# Patient Record
Sex: FEMALE | Race: BLACK OR AFRICAN AMERICAN | Employment: OTHER | URBAN - METROPOLITAN AREA
[De-identification: names, ages, dates, MRNs, and addresses within clinical notes are randomized per-mention and may not be internally consistent; named-entity substitution may affect disease eponyms.]

---

## 2021-01-27 ENCOUNTER — HOSPITAL ENCOUNTER (INPATIENT)
Age: 81
LOS: 4 days | Discharge: HOME | DRG: 377 | End: 2021-01-31
Attending: EMERGENCY MEDICINE | Admitting: INTERNAL MEDICINE
Payer: COMMERCIAL

## 2021-01-27 ENCOUNTER — APPOINTMENT (OUTPATIENT)
Dept: GENERAL RADIOLOGY | Age: 81
DRG: 377 | End: 2021-01-27
Payer: COMMERCIAL

## 2021-01-27 DIAGNOSIS — D64.9 ANEMIA, UNSPECIFIED TYPE: Primary | ICD-10-CM

## 2021-01-27 DIAGNOSIS — N17.9 AKI (ACUTE KIDNEY INJURY) (HCC): ICD-10-CM

## 2021-01-27 PROBLEM — D62 ACUTE BLOOD LOSS ANEMIA: Status: ACTIVE | Noted: 2021-01-27

## 2021-01-27 LAB
ABO/RH: NORMAL
ALBUMIN SERPL-MCNC: 3.5 G/DL (ref 3.4–5)
ALP BLD-CCNC: 84 U/L (ref 40–129)
ALT SERPL-CCNC: 24 U/L (ref 10–40)
ANION GAP SERPL CALCULATED.3IONS-SCNC: 11 MMOL/L (ref 3–16)
ANTIBODY SCREEN: NORMAL
AST SERPL-CCNC: 27 U/L (ref 15–37)
BASE EXCESS VENOUS: 2 MMOL/L (ref -2–3)
BASOPHILS ABSOLUTE: 0.1 K/UL (ref 0–0.2)
BASOPHILS RELATIVE PERCENT: 0.6 %
BILIRUB SERPL-MCNC: 0.6 MG/DL (ref 0–1)
BILIRUBIN DIRECT: <0.2 MG/DL (ref 0–0.3)
BILIRUBIN, INDIRECT: ABNORMAL MG/DL (ref 0–1)
BLOOD BANK DISPENSE STATUS: NORMAL
BLOOD BANK DISPENSE STATUS: NORMAL
BLOOD BANK PRODUCT CODE: NORMAL
BLOOD BANK PRODUCT CODE: NORMAL
BLOOD SMEAR REVIEW: NORMAL
BPU ID: NORMAL
BPU ID: NORMAL
BUN BLDV-MCNC: 43 MG/DL (ref 7–20)
CALCIUM SERPL-MCNC: 8.9 MG/DL (ref 8.3–10.6)
CARBOXYHEMOGLOBIN: 1.9 % (ref 0–1.5)
CHLORIDE BLD-SCNC: 100 MMOL/L (ref 99–110)
CO2: 25 MMOL/L (ref 21–32)
CREAT SERPL-MCNC: 1.4 MG/DL (ref 0.6–1.2)
DESCRIPTION BLOOD BANK: NORMAL
DESCRIPTION BLOOD BANK: NORMAL
EKG ATRIAL RATE: 234 BPM
EKG DIAGNOSIS: NORMAL
EKG Q-T INTERVAL: 422 MS
EKG QRS DURATION: 74 MS
EKG QTC CALCULATION (BAZETT): 384 MS
EKG R AXIS: 71 DEGREES
EKG T AXIS: -12 DEGREES
EKG VENTRICULAR RATE: 50 BPM
EOSINOPHILS ABSOLUTE: 0 K/UL (ref 0–0.6)
EOSINOPHILS RELATIVE PERCENT: 0.3 %
FERRITIN: 12 NG/ML (ref 15–150)
GFR AFRICAN AMERICAN: 44
GFR NON-AFRICAN AMERICAN: 36
GLUCOSE BLD-MCNC: 183 MG/DL (ref 70–99)
HCO3 VENOUS: 27.6 MMOL/L (ref 24–28)
HCT VFR BLD CALC: 15.4 % (ref 36–48)
HEMOGLOBIN: 4.3 G/DL (ref 12–16)
INR BLD: 3.61 (ref 0.86–1.14)
LACTATE DEHYDROGENASE: 335 U/L (ref 100–190)
LACTIC ACID: 2.5 MMOL/L (ref 0.4–2)
LYMPHOCYTES ABSOLUTE: 1.1 K/UL (ref 1–5.1)
LYMPHOCYTES RELATIVE PERCENT: 12.1 %
MCH RBC QN AUTO: 20.1 PG (ref 26–34)
MCHC RBC AUTO-ENTMCNC: 28.2 G/DL (ref 31–36)
MCV RBC AUTO: 71.2 FL (ref 80–100)
METHEMOGLOBIN VENOUS: 1 % (ref 0–1.5)
MONOCYTES ABSOLUTE: 1 K/UL (ref 0–1.3)
MONOCYTES RELATIVE PERCENT: 10.9 %
NEUTROPHILS ABSOLUTE: 6.9 K/UL (ref 1.7–7.7)
NEUTROPHILS RELATIVE PERCENT: 76.1 %
O2 SAT, VEN: 29 %
PCO2, VEN: 50.2 MMHG (ref 41–51)
PDW BLD-RTO: 21.2 % (ref 12.4–15.4)
PH VENOUS: 7.35 (ref 7.35–7.45)
PLATELET # BLD: 345 K/UL (ref 135–450)
PMV BLD AUTO: 8.1 FL (ref 5–10.5)
PO2, VEN: 24.1 MMHG (ref 25–40)
POTASSIUM REFLEX MAGNESIUM: 4.4 MMOL/L (ref 3.5–5.1)
PRO-BNP: 1113 PG/ML (ref 0–449)
PROTHROMBIN TIME: 42.4 SEC (ref 10–13.2)
RBC # BLD: 2.17 M/UL (ref 4–5.2)
SODIUM BLD-SCNC: 136 MMOL/L (ref 136–145)
TCO2 CALC VENOUS: 29 MMOL/L
TOTAL PROTEIN: 5.6 G/DL (ref 6.4–8.2)
TROPONIN: <0.01 NG/ML
WBC # BLD: 9.1 K/UL (ref 4–11)

## 2021-01-27 PROCEDURE — 83880 ASSAY OF NATRIURETIC PEPTIDE: CPT

## 2021-01-27 PROCEDURE — 86901 BLOOD TYPING SEROLOGIC RH(D): CPT

## 2021-01-27 PROCEDURE — 80076 HEPATIC FUNCTION PANEL: CPT

## 2021-01-27 PROCEDURE — 82803 BLOOD GASES ANY COMBINATION: CPT

## 2021-01-27 PROCEDURE — U0003 INFECTIOUS AGENT DETECTION BY NUCLEIC ACID (DNA OR RNA); SEVERE ACUTE RESPIRATORY SYNDROME CORONAVIRUS 2 (SARS-COV-2) (CORONAVIRUS DISEASE [COVID-19]), AMPLIFIED PROBE TECHNIQUE, MAKING USE OF HIGH THROUGHPUT TECHNOLOGIES AS DESCRIBED BY CMS-2020-01-R: HCPCS

## 2021-01-27 PROCEDURE — 99284 EMERGENCY DEPT VISIT MOD MDM: CPT

## 2021-01-27 PROCEDURE — 85610 PROTHROMBIN TIME: CPT

## 2021-01-27 PROCEDURE — 83615 LACTATE (LD) (LDH) ENZYME: CPT

## 2021-01-27 PROCEDURE — 80048 BASIC METABOLIC PNL TOTAL CA: CPT

## 2021-01-27 PROCEDURE — 86850 RBC ANTIBODY SCREEN: CPT

## 2021-01-27 PROCEDURE — 86923 COMPATIBILITY TEST ELECTRIC: CPT

## 2021-01-27 PROCEDURE — 86900 BLOOD TYPING SEROLOGIC ABO: CPT

## 2021-01-27 PROCEDURE — 82746 ASSAY OF FOLIC ACID SERUM: CPT

## 2021-01-27 PROCEDURE — P9017 PLASMA 1 DONOR FRZ W/IN 8 HR: HCPCS

## 2021-01-27 PROCEDURE — 82607 VITAMIN B-12: CPT

## 2021-01-27 PROCEDURE — 30233N1 TRANSFUSION OF NONAUTOLOGOUS RED BLOOD CELLS INTO PERIPHERAL VEIN, PERCUTANEOUS APPROACH: ICD-10-PCS | Performed by: INTERNAL MEDICINE

## 2021-01-27 PROCEDURE — 83605 ASSAY OF LACTIC ACID: CPT

## 2021-01-27 PROCEDURE — 85025 COMPLETE CBC W/AUTO DIFF WBC: CPT

## 2021-01-27 PROCEDURE — 93005 ELECTROCARDIOGRAM TRACING: CPT | Performed by: STUDENT IN AN ORGANIZED HEALTH CARE EDUCATION/TRAINING PROGRAM

## 2021-01-27 PROCEDURE — 0DB98ZX EXCISION OF DUODENUM, VIA NATURAL OR ARTIFICIAL OPENING ENDOSCOPIC, DIAGNOSTIC: ICD-10-PCS | Performed by: STUDENT IN AN ORGANIZED HEALTH CARE EDUCATION/TRAINING PROGRAM

## 2021-01-27 PROCEDURE — 2060000000 HC ICU INTERMEDIATE R&B

## 2021-01-27 PROCEDURE — 83010 ASSAY OF HAPTOGLOBIN QUANT: CPT

## 2021-01-27 PROCEDURE — 36415 COLL VENOUS BLD VENIPUNCTURE: CPT

## 2021-01-27 PROCEDURE — 84484 ASSAY OF TROPONIN QUANT: CPT

## 2021-01-27 PROCEDURE — 83550 IRON BINDING TEST: CPT

## 2021-01-27 PROCEDURE — 71045 X-RAY EXAM CHEST 1 VIEW: CPT

## 2021-01-27 PROCEDURE — P9016 RBC LEUKOCYTES REDUCED: HCPCS

## 2021-01-27 PROCEDURE — 83540 ASSAY OF IRON: CPT

## 2021-01-27 PROCEDURE — 82728 ASSAY OF FERRITIN: CPT

## 2021-01-27 RX ORDER — DEXTROSE MONOHYDRATE 25 G/50ML
12.5 INJECTION, SOLUTION INTRAVENOUS PRN
Status: DISCONTINUED | OUTPATIENT
Start: 2021-01-27 | End: 2021-01-31 | Stop reason: HOSPADM

## 2021-01-27 RX ORDER — SODIUM CHLORIDE 0.9 % (FLUSH) 0.9 %
10 SYRINGE (ML) INJECTION EVERY 12 HOURS SCHEDULED
Status: DISCONTINUED | OUTPATIENT
Start: 2021-01-28 | End: 2021-01-31 | Stop reason: HOSPADM

## 2021-01-27 RX ORDER — ONDANSETRON 2 MG/ML
4 INJECTION INTRAMUSCULAR; INTRAVENOUS EVERY 6 HOURS PRN
Status: DISCONTINUED | OUTPATIENT
Start: 2021-01-27 | End: 2021-01-31 | Stop reason: HOSPADM

## 2021-01-27 RX ORDER — ACETAMINOPHEN 650 MG/1
650 SUPPOSITORY RECTAL EVERY 6 HOURS PRN
Status: DISCONTINUED | OUTPATIENT
Start: 2021-01-27 | End: 2021-01-31 | Stop reason: HOSPADM

## 2021-01-27 RX ORDER — SODIUM CHLORIDE 9 MG/ML
INJECTION, SOLUTION INTRAVENOUS PRN
Status: DISCONTINUED | OUTPATIENT
Start: 2021-01-27 | End: 2021-01-31 | Stop reason: HOSPADM

## 2021-01-27 RX ORDER — PRAVASTATIN SODIUM 10 MG
10 TABLET ORAL DAILY
Status: DISCONTINUED | OUTPATIENT
Start: 2021-01-28 | End: 2021-01-27

## 2021-01-27 RX ORDER — LOSARTAN POTASSIUM 100 MG/1
100 TABLET ORAL DAILY
COMMUNITY

## 2021-01-27 RX ORDER — FUROSEMIDE 20 MG/1
20 TABLET ORAL 2 TIMES DAILY
Status: ON HOLD | COMMUNITY
End: 2021-01-31 | Stop reason: SDUPTHER

## 2021-01-27 RX ORDER — DILTIAZEM HYDROCHLORIDE 180 MG/1
180 CAPSULE, EXTENDED RELEASE ORAL DAILY
COMMUNITY
End: 2021-04-28 | Stop reason: ALTCHOICE

## 2021-01-27 RX ORDER — ALBUTEROL SULFATE 90 UG/1
2 AEROSOL, METERED RESPIRATORY (INHALATION) EVERY 6 HOURS PRN
COMMUNITY
End: 2021-04-28 | Stop reason: ALTCHOICE

## 2021-01-27 RX ORDER — NICOTINE POLACRILEX 4 MG
15 LOZENGE BUCCAL PRN
Status: DISCONTINUED | OUTPATIENT
Start: 2021-01-27 | End: 2021-01-31 | Stop reason: HOSPADM

## 2021-01-27 RX ORDER — SODIUM CHLORIDE 0.9 % (FLUSH) 0.9 %
10 SYRINGE (ML) INJECTION PRN
Status: DISCONTINUED | OUTPATIENT
Start: 2021-01-27 | End: 2021-01-31 | Stop reason: HOSPADM

## 2021-01-27 RX ORDER — PROMETHAZINE HYDROCHLORIDE 25 MG/1
12.5 TABLET ORAL EVERY 6 HOURS PRN
Status: DISCONTINUED | OUTPATIENT
Start: 2021-01-27 | End: 2021-01-31 | Stop reason: HOSPADM

## 2021-01-27 RX ORDER — CARVEDILOL 6.25 MG/1
6.25 TABLET ORAL 2 TIMES DAILY WITH MEALS
Status: DISCONTINUED | OUTPATIENT
Start: 2021-01-28 | End: 2021-01-31 | Stop reason: HOSPADM

## 2021-01-27 RX ORDER — POLYETHYLENE GLYCOL 3350 17 G/17G
17 POWDER, FOR SOLUTION ORAL DAILY PRN
Status: DISCONTINUED | OUTPATIENT
Start: 2021-01-27 | End: 2021-01-31 | Stop reason: HOSPADM

## 2021-01-27 RX ORDER — LETROZOLE 2.5 MG/1
2.5 TABLET, FILM COATED ORAL DAILY
COMMUNITY
End: 2021-04-28 | Stop reason: ALTCHOICE

## 2021-01-27 RX ORDER — PRAVASTATIN SODIUM 10 MG
10 TABLET ORAL DAILY
Status: DISCONTINUED | OUTPATIENT
Start: 2021-01-28 | End: 2021-01-31 | Stop reason: HOSPADM

## 2021-01-27 RX ORDER — ALBUTEROL SULFATE 90 UG/1
1 AEROSOL, METERED RESPIRATORY (INHALATION) EVERY 6 HOURS PRN
Status: DISCONTINUED | OUTPATIENT
Start: 2021-01-27 | End: 2021-01-28 | Stop reason: DRUGHIGH

## 2021-01-27 RX ORDER — PRAVASTATIN SODIUM 10 MG
10 TABLET ORAL DAILY
COMMUNITY
End: 2021-04-28 | Stop reason: ALTCHOICE

## 2021-01-27 RX ORDER — CARVEDILOL 6.25 MG/1
6.25 TABLET ORAL 2 TIMES DAILY WITH MEALS
Status: DISCONTINUED | OUTPATIENT
Start: 2021-01-28 | End: 2021-01-27

## 2021-01-27 RX ORDER — DEXTROSE MONOHYDRATE 50 MG/ML
100 INJECTION, SOLUTION INTRAVENOUS PRN
Status: DISCONTINUED | OUTPATIENT
Start: 2021-01-27 | End: 2021-01-31 | Stop reason: HOSPADM

## 2021-01-27 RX ORDER — ACETAMINOPHEN 325 MG/1
650 TABLET ORAL EVERY 6 HOURS PRN
Status: DISCONTINUED | OUTPATIENT
Start: 2021-01-27 | End: 2021-01-31 | Stop reason: HOSPADM

## 2021-01-27 RX ORDER — POTASSIUM CHLORIDE 20 MEQ/1
20 TABLET, EXTENDED RELEASE ORAL 2 TIMES DAILY
COMMUNITY
End: 2021-04-28 | Stop reason: ALTCHOICE

## 2021-01-27 RX ORDER — INSULIN LISPRO 100 [IU]/ML
0-12 INJECTION, SOLUTION INTRAVENOUS; SUBCUTANEOUS
Status: DISCONTINUED | OUTPATIENT
Start: 2021-01-28 | End: 2021-01-31 | Stop reason: HOSPADM

## 2021-01-27 RX ORDER — PANTOPRAZOLE SODIUM 40 MG/10ML
40 INJECTION, POWDER, LYOPHILIZED, FOR SOLUTION INTRAVENOUS 2 TIMES DAILY
Status: DISCONTINUED | OUTPATIENT
Start: 2021-01-28 | End: 2021-01-31

## 2021-01-27 RX ORDER — FUROSEMIDE 10 MG/ML
40 INJECTION INTRAMUSCULAR; INTRAVENOUS 2 TIMES DAILY
Status: DISCONTINUED | OUTPATIENT
Start: 2021-01-28 | End: 2021-01-31 | Stop reason: HOSPADM

## 2021-01-27 RX ORDER — CARVEDILOL 12.5 MG/1
12.5 TABLET ORAL 2 TIMES DAILY WITH MEALS
COMMUNITY

## 2021-01-27 RX ORDER — OYSTER SHELL CALCIUM WITH VITAMIN D 500; 200 MG/1; [IU]/1
1 TABLET, FILM COATED ORAL DAILY
COMMUNITY

## 2021-01-27 RX ORDER — CARVEDILOL 12.5 MG/1
12.5 TABLET ORAL 2 TIMES DAILY WITH MEALS
Status: CANCELLED | OUTPATIENT
Start: 2021-01-28

## 2021-01-27 RX ORDER — ACETAMINOPHEN 325 MG/1
650 TABLET ORAL EVERY 6 HOURS PRN
COMMUNITY

## 2021-01-27 RX ORDER — INSULIN LISPRO 100 [IU]/ML
0-6 INJECTION, SOLUTION INTRAVENOUS; SUBCUTANEOUS NIGHTLY
Status: DISCONTINUED | OUTPATIENT
Start: 2021-01-28 | End: 2021-01-31 | Stop reason: HOSPADM

## 2021-01-27 ASSESSMENT — ENCOUNTER SYMPTOMS
ABDOMINAL PAIN: 0
VOMITING: 0
SHORTNESS OF BREATH: 1
NAUSEA: 0
EYE DISCHARGE: 0
ANAL BLEEDING: 0
BLOOD IN STOOL: 0

## 2021-01-28 ENCOUNTER — ANESTHESIA EVENT (OUTPATIENT)
Dept: ENDOSCOPY | Age: 81
DRG: 377 | End: 2021-01-28
Payer: COMMERCIAL

## 2021-01-28 PROBLEM — I48.91 ATRIAL FIBRILLATION (HCC): Status: ACTIVE | Noted: 2021-01-28

## 2021-01-28 PROBLEM — K92.2 GI BLEED: Status: ACTIVE | Noted: 2021-01-28

## 2021-01-28 PROBLEM — I50.31 ACUTE DIASTOLIC HEART FAILURE (HCC): Status: ACTIVE | Noted: 2021-01-28

## 2021-01-28 PROBLEM — N17.9 AKI (ACUTE KIDNEY INJURY) (HCC): Status: ACTIVE | Noted: 2021-01-28

## 2021-01-28 LAB
ANION GAP SERPL CALCULATED.3IONS-SCNC: 9 MMOL/L (ref 3–16)
APTT: 27.2 SEC (ref 24.2–36.2)
BASOPHILS ABSOLUTE: 0 K/UL (ref 0–0.2)
BASOPHILS RELATIVE PERCENT: 0.7 %
BLOOD BANK DISPENSE STATUS: NORMAL
BLOOD BANK DISPENSE STATUS: NORMAL
BLOOD BANK PRODUCT CODE: NORMAL
BLOOD BANK PRODUCT CODE: NORMAL
BPU ID: NORMAL
BPU ID: NORMAL
BUN BLDV-MCNC: 39 MG/DL (ref 7–20)
CALCIUM SERPL-MCNC: 8.8 MG/DL (ref 8.3–10.6)
CHLORIDE BLD-SCNC: 100 MMOL/L (ref 99–110)
CHOLESTEROL, TOTAL: 67 MG/DL (ref 0–199)
CO2: 27 MMOL/L (ref 21–32)
CREAT SERPL-MCNC: 1.2 MG/DL (ref 0.6–1.2)
CREATININE URINE: 112.8 MG/DL (ref 28–259)
DESCRIPTION BLOOD BANK: NORMAL
DESCRIPTION BLOOD BANK: NORMAL
EOSINOPHILS ABSOLUTE: 0 K/UL (ref 0–0.6)
EOSINOPHILS RELATIVE PERCENT: 0.5 %
FOLATE: 12.79 NG/ML (ref 4.78–24.2)
GFR AFRICAN AMERICAN: 52
GFR NON-AFRICAN AMERICAN: 43
GLUCOSE BLD-MCNC: 130 MG/DL (ref 70–99)
GLUCOSE BLD-MCNC: 139 MG/DL (ref 70–99)
GLUCOSE BLD-MCNC: 148 MG/DL (ref 70–99)
GLUCOSE BLD-MCNC: 151 MG/DL (ref 70–99)
GLUCOSE BLD-MCNC: 159 MG/DL (ref 70–99)
GLUCOSE BLD-MCNC: 163 MG/DL (ref 70–99)
HAPTOGLOBIN: 101 MG/DL (ref 30–200)
HCT VFR BLD CALC: 21.8 % (ref 36–48)
HCT VFR BLD CALC: 22.3 % (ref 36–48)
HCT VFR BLD CALC: 22.5 % (ref 36–48)
HCT VFR BLD CALC: 22.6 % (ref 36–48)
HDLC SERPL-MCNC: 25 MG/DL (ref 40–60)
HEMOGLOBIN: 7 G/DL (ref 12–16)
HEMOGLOBIN: 7.1 G/DL (ref 12–16)
HEMOGLOBIN: 7.1 G/DL (ref 12–16)
HEMOGLOBIN: 7.2 G/DL (ref 12–16)
INR BLD: 1.99 (ref 0.86–1.14)
INR BLD: 2.09 (ref 0.86–1.14)
IRON SATURATION: 4 % (ref 15–50)
IRON: 15 UG/DL (ref 37–145)
LACTIC ACID: 1.6 MMOL/L (ref 0.4–2)
LDL CHOLESTEROL CALCULATED: 28 MG/DL
LYMPHOCYTES ABSOLUTE: 1.1 K/UL (ref 1–5.1)
LYMPHOCYTES RELATIVE PERCENT: 14.8 %
MAGNESIUM: 2.5 MG/DL (ref 1.8–2.4)
MCH RBC QN AUTO: 23.6 PG (ref 26–34)
MCHC RBC AUTO-ENTMCNC: 31.9 G/DL (ref 31–36)
MCV RBC AUTO: 74 FL (ref 80–100)
MONOCYTES ABSOLUTE: 0.7 K/UL (ref 0–1.3)
MONOCYTES RELATIVE PERCENT: 10.1 %
NEUTROPHILS ABSOLUTE: 5.3 K/UL (ref 1.7–7.7)
NEUTROPHILS RELATIVE PERCENT: 73.9 %
PDW BLD-RTO: 22.5 % (ref 12.4–15.4)
PERFORMED ON: ABNORMAL
PLATELET # BLD: 271 K/UL (ref 135–450)
PMV BLD AUTO: 8.8 FL (ref 5–10.5)
POTASSIUM SERPL-SCNC: 3.7 MMOL/L (ref 3.5–5.1)
PROTHROMBIN TIME: 23.2 SEC (ref 10–13.2)
PROTHROMBIN TIME: 24.4 SEC (ref 10–13.2)
RBC # BLD: 3.01 M/UL (ref 4–5.2)
SARS-COV-2, PCR: NOT DETECTED
SODIUM BLD-SCNC: 136 MMOL/L (ref 136–145)
TOTAL IRON BINDING CAPACITY: 363 UG/DL (ref 260–445)
TRIGL SERPL-MCNC: 71 MG/DL (ref 0–150)
UREA NITROGEN, UR: 978.6 MG/DL (ref 800–1666)
VITAMIN B-12: 848 PG/ML (ref 211–911)
VLDLC SERPL CALC-MCNC: 14 MG/DL
WBC # BLD: 7.1 K/UL (ref 4–11)

## 2021-01-28 PROCEDURE — 2580000003 HC RX 258: Performed by: STUDENT IN AN ORGANIZED HEALTH CARE EDUCATION/TRAINING PROGRAM

## 2021-01-28 PROCEDURE — C9113 INJ PANTOPRAZOLE SODIUM, VIA: HCPCS | Performed by: STUDENT IN AN ORGANIZED HEALTH CARE EDUCATION/TRAINING PROGRAM

## 2021-01-28 PROCEDURE — 83605 ASSAY OF LACTIC ACID: CPT

## 2021-01-28 PROCEDURE — 80061 LIPID PANEL: CPT

## 2021-01-28 PROCEDURE — P9017 PLASMA 1 DONOR FRZ W/IN 8 HR: HCPCS

## 2021-01-28 PROCEDURE — 2060000000 HC ICU INTERMEDIATE R&B

## 2021-01-28 PROCEDURE — 36415 COLL VENOUS BLD VENIPUNCTURE: CPT

## 2021-01-28 PROCEDURE — 85730 THROMBOPLASTIN TIME PARTIAL: CPT

## 2021-01-28 PROCEDURE — 6360000002 HC RX W HCPCS: Performed by: STUDENT IN AN ORGANIZED HEALTH CARE EDUCATION/TRAINING PROGRAM

## 2021-01-28 PROCEDURE — 6370000000 HC RX 637 (ALT 250 FOR IP): Performed by: STUDENT IN AN ORGANIZED HEALTH CARE EDUCATION/TRAINING PROGRAM

## 2021-01-28 PROCEDURE — 83735 ASSAY OF MAGNESIUM: CPT

## 2021-01-28 PROCEDURE — 85025 COMPLETE CBC W/AUTO DIFF WBC: CPT

## 2021-01-28 PROCEDURE — 2580000003 HC RX 258: Performed by: ANESTHESIOLOGY

## 2021-01-28 PROCEDURE — 80048 BASIC METABOLIC PNL TOTAL CA: CPT

## 2021-01-28 PROCEDURE — P9016 RBC LEUKOCYTES REDUCED: HCPCS

## 2021-01-28 PROCEDURE — 36430 TRANSFUSION BLD/BLD COMPNT: CPT

## 2021-01-28 PROCEDURE — 85014 HEMATOCRIT: CPT

## 2021-01-28 PROCEDURE — 82570 ASSAY OF URINE CREATININE: CPT

## 2021-01-28 PROCEDURE — 85018 HEMOGLOBIN: CPT

## 2021-01-28 PROCEDURE — 84540 ASSAY OF URINE/UREA-N: CPT

## 2021-01-28 PROCEDURE — 85610 PROTHROMBIN TIME: CPT

## 2021-01-28 RX ORDER — ALBUTEROL SULFATE 90 UG/1
2 AEROSOL, METERED RESPIRATORY (INHALATION) EVERY 4 HOURS PRN
Status: DISCONTINUED | OUTPATIENT
Start: 2021-01-28 | End: 2021-01-31 | Stop reason: HOSPADM

## 2021-01-28 RX ORDER — SODIUM CHLORIDE 9 MG/ML
INJECTION, SOLUTION INTRAVENOUS PRN
Status: DISCONTINUED | OUTPATIENT
Start: 2021-01-28 | End: 2021-01-31 | Stop reason: HOSPADM

## 2021-01-28 RX ORDER — ALBUTEROL SULFATE 2.5 MG/3ML
2.5 SOLUTION RESPIRATORY (INHALATION) EVERY 4 HOURS PRN
Status: DISCONTINUED | OUTPATIENT
Start: 2021-01-28 | End: 2021-01-28 | Stop reason: DRUGHIGH

## 2021-01-28 RX ORDER — SODIUM CHLORIDE, SODIUM LACTATE, POTASSIUM CHLORIDE, CALCIUM CHLORIDE 600; 310; 30; 20 MG/100ML; MG/100ML; MG/100ML; MG/100ML
INJECTION, SOLUTION INTRAVENOUS CONTINUOUS
Status: DISCONTINUED | OUTPATIENT
Start: 2021-01-28 | End: 2021-01-30

## 2021-01-28 RX ADMIN — CARVEDILOL 6.25 MG: 6.25 TABLET, FILM COATED ORAL at 16:54

## 2021-01-28 RX ADMIN — PRAVASTATIN SODIUM 10 MG: 10 TABLET ORAL at 07:54

## 2021-01-28 RX ADMIN — CARVEDILOL 6.25 MG: 6.25 TABLET, FILM COATED ORAL at 07:54

## 2021-01-28 RX ADMIN — INSULIN LISPRO 1 UNITS: 100 INJECTION, SOLUTION INTRAVENOUS; SUBCUTANEOUS at 21:31

## 2021-01-28 RX ADMIN — PANTOPRAZOLE SODIUM 40 MG: 40 INJECTION, POWDER, FOR SOLUTION INTRAVENOUS at 20:45

## 2021-01-28 RX ADMIN — Medication 10 ML: at 07:54

## 2021-01-28 RX ADMIN — PANTOPRAZOLE SODIUM 40 MG: 40 INJECTION, POWDER, FOR SOLUTION INTRAVENOUS at 03:11

## 2021-01-28 RX ADMIN — FUROSEMIDE 40 MG: 10 INJECTION, SOLUTION INTRAVENOUS at 07:54

## 2021-01-28 RX ADMIN — PANTOPRAZOLE SODIUM 40 MG: 40 INJECTION, POWDER, FOR SOLUTION INTRAVENOUS at 07:54

## 2021-01-28 RX ADMIN — SODIUM CHLORIDE, POTASSIUM CHLORIDE, SODIUM LACTATE AND CALCIUM CHLORIDE: 600; 310; 30; 20 INJECTION, SOLUTION INTRAVENOUS at 21:31

## 2021-01-28 RX ADMIN — INSULIN LISPRO 1 UNITS: 100 INJECTION, SOLUTION INTRAVENOUS; SUBCUTANEOUS at 02:41

## 2021-01-28 RX ADMIN — SODIUM CHLORIDE, POTASSIUM CHLORIDE, SODIUM LACTATE AND CALCIUM CHLORIDE: 600; 310; 30; 20 INJECTION, SOLUTION INTRAVENOUS at 11:48

## 2021-01-28 ASSESSMENT — ENCOUNTER SYMPTOMS
CONSTIPATION: 0
COUGH: 1
DIARRHEA: 0
ABDOMINAL PAIN: 0
BLOOD IN STOOL: 0
VOMITING: 0
BACK PAIN: 0
NAUSEA: 0
ABDOMINAL DISTENTION: 0

## 2021-01-28 ASSESSMENT — PAIN SCALES - GENERAL: PAINLEVEL_OUTOF10: 0

## 2021-01-28 NOTE — ED NOTES
Critical lab received from Lab staff. Hgb 4.3 and Hct 15.4.  Will inform the treatment team.      Erla Lanes, RN  01/27/21 1958

## 2021-01-28 NOTE — CARE COORDINATION
Case Management Assessment           Initial Evaluation                Date / Time of Evaluation: 1/28/2021 11:09 AM                 Assessment Completed by: Marie Sotelo    Patient Name: Rosa Ware     YOB: 1940  Diagnosis: Acute blood loss anemia [D62]     Date / Time: 1/27/2021  6:36 PM    Patient Admission Status: Inpatient    If patient is discharged prior to next notation, then this note serves as note for discharge by case management. Current PCP: No primary care provider on file. Clinic Patient: No    Chart Reviewed: Yes  Patient/ Family Interviewed: Yes    Initial assessment completed at bedside with: spoke with patient via phone due to COVID rule out    Hospitalization in the last 30 days: No    Emergency Contacts:  Extended Emergency Contact Information  Primary Emergency Contact: [address]  Mobile Phone: [phone]  Relation: Child  Preferred language: English    Advance Directives:   Code Status: Full [address]: No  Agent:   Contact Number:     Copy present: No     In paper Chart: No    Scanned into EMR No    Financial  Payor: BCBS / Plan: [address] [place] [address] / Product Type: *No Product type* /     Pre-cert required for SNF: Yes    Pharmacy  No Pharmacies Listed    Potential assistance Purchasing Medications: Potential Assistance Purchasing Medications: No  Does Patient want to participate in local refill/ meds to beds program?: No    Meds To [employer] Rules:  1. Can ONLY be done Monday- Friday between 8:30am-5pm  2. Prescription(s) must be in pharmacy by 3pm to be filled same day  3. Copy of patient's insurance/ prescription drug card and patient face sheet must be sent along with the prescription(s)  4. Cost of Rx cannot be added to hospital bill. If financial assistance is needed, please contact unit  or ;   or  CANNOT provide pharmacy voucher for patients co-pays 5. Patients can then  the prescription on their way out of the hospital at discharge, or pharmacy can deliver to the bedside if staff is available. (payment due at time of pick-up or delivery - cash, check, or card accepted)     Able to afford home medications/ co-pay costs: Yes    ADLS  Support Systems: Children    PT AM-PAC:   /24  OT AM-PAC:   /24    HOUSING  Home Environment: from home with her 15year old dog  Steps:     Plans to RETURN to current housing: Yes  Barriers to RETURNING to current housing: none per patient    Home Care Information  Currently ACTIVE with [address]: No  Home Care Agency: Not Applicable    Currently ACTIVE with [place] on Aging: No  Passport/ Waiver: No  Passport/ Waiver Services: Not Applicable    Durable Medical Equipment  DME Provider: has prior to admission  Equipment: cane and walker    Home Oxygen and [address] [place] prior to admission: No  [address]: Not Applicable  Other Respiratory Equipment:     Informed of need to bring portable home O2 tank on day of DISCHARGE for nursing to connect prior to leaving: No  Verbalized agreement/Understanding: No  Person to bring portable tank at discharge:     Dialysis  Active with HD/PD prior to admission: No  Nephrologist:     HD Center:  Not Applicable    DISCHARGE PLAN:  Disposition: Home- No Services Needed    Transportation PLAN for discharge: family     Factors facilitating achievement of predicted outcomes: Family support, Cooperative, Pleasant and Has needed Durable Medical Equipment at home    Barriers to discharge: Medication managment Additional Case Management Notes: CM spoke with patient via phone due to COVID rule out. Patient from home with her 15year old dog, she has a walker and cane that she uses at home prior to admission. Patient plans for return home at discharge, wants to discuss Malinda Snell with her daughters, but doesn't feel as though she needs it at this time. Patient to have EGD/ Colonoscopy once INR under 2. The Plan for Transition of Care is related to the following treatment goals of Acute blood loss anemia [D62]    The Patient and/or patient representative Bernadine Noriega and her family were provided with a choice of provider and agrees with the discharge plan Yes    Freedom of choice list was provided with basic dialogue that supports the patient's individualized plan of care/goals and shares the quality data associated with the providers.  Yes    Care Transition patient: No    Khanh Delaney RN  [facility]  Case Management Department  Ph: [phone]   Fax: [phone]

## 2021-01-28 NOTE — ED NOTES
CoVid swab collected and labeled with a lab sticker. Specimen was placed in bag in patient's room and then second bagged.  Swab will be walked to lab      [facility] MARILY BladensburgWOOD, RN  01/27/21 [id number]

## 2021-01-28 NOTE — ED PROVIDER NOTES
[address] RESIDENT NOTE       Date of evaluation: 1/27/2021    Chief Complaint     Shortness of Breath (COVID- on 12/30, started on steroids at that time for SOB and cough, hasn't felt right since, loss of appetite, +weakness)      History of Present Illness     Maureen Reyes is a 79 y.o. female with a PMH of asthma, HTN, DM, HLD who presents with with worsening shortness of breath over the last week. She states that she has had symptoms since the beginning of January and was on steroids with a long taper. She says especially since coming off the taper of the steroids she has felt worsening shortness of breath. He says that her kids have been trying to get her to come to the emergency department for about a week now. She additionally endorses cough not productive of sputum. As well as some loss of appetite and overall weakness. She denies any nausea or emesis. No abdominal pain or leg swelling. Review of Systems     Review of Systems   Constitutional: Negative for fatigue. HENT: Negative for congestion. Eyes: Negative for discharge. Respiratory: Positive for shortness of breath. Cardiovascular: Negative for chest pain and leg swelling. Gastrointestinal: Negative for abdominal pain, anal bleeding, blood in stool, nausea and vomiting. Genitourinary: Negative for dysuria and hematuria. Musculoskeletal: Negative for arthralgias. Neurological: Negative for dizziness. Psychiatric/Behavioral: Negative for agitation. Past Medical, Surgical, Family, and Social History     She has no past medical history on file. She has no past surgical history on file. Her family history is not on file. She reports that she has quit smoking. She has never used smokeless tobacco. She reports previous alcohol use. She reports that she does not use drugs.     Medications     Previous Medications    No medications on file       Allergies She has No Known Allergies. Physical Exam     INITIAL VITALS: BP: (!) 126/53, Temp: 98.2 °F (36.8 °C),  ,  , SpO2: 90 %   Physical Exam  Constitutional:       Appearance: She is well-developed. HENT:      Head: Normocephalic and atraumatic. Neck:      Musculoskeletal: Normal range of motion. Cardiovascular:      Rate and Rhythm: Normal rate and regular rhythm. Heart sounds: Normal heart sounds. Pulmonary:      Effort: Pulmonary effort is normal.      Breath sounds: Normal breath sounds. Abdominal:      General: There is no distension. Palpations: Abdomen is soft. Tenderness: There is no abdominal tenderness. Genitourinary:     Comments: No melena on Exam - a chaperone was present for this portion of the exam  Musculoskeletal: Normal range of motion. Skin:     General: Skin is warm and dry. Neurological:      Mental Status: She is alert and oriented to person, place, and time. DiagnosticResults     EKG   Interpreted in conjunction with emergencydepartment physician No att. providers found  Rhythm: atrial fibrillation - controlled  Rate: normal  Axis: normal  Ectopy: none  Conduction: normal  ST Segments: normal  T Waves:normal  Q Waves: nonspecific  Clinical Impression: A fib  Comparison:  None    RADIOLOGY:  XR CHEST PORTABLE   Final Result   1.  Cardiomegaly and vascular congestion with effusions favor congestive changes          LABS:   Results for orders placed or performed during the hospital encounter of 01/27/21   CBC Auto Differential   Result Value Ref Range    WBC 9.1 4.0 - 11.0 K/uL    RBC 2.17 (L) 4.00 - 5.20 M/uL    Hemoglobin 4.3 (LL) 12.0 - 16.0 g/dL    Hematocrit 15.4 (LL) 36.0 - 48.0 %    MCV 71.2 (L) 80.0 - 100.0 fL    MCH 20.1 (L) 26.0 - 34.0 pg    MCHC 28.2 (L) 31.0 - 36.0 g/dL    RDW 21.2 (H) 12.4 - 15.4 %    Platelets 655 984 - 429 K/uL    MPV 8.1 5.0 - 10.5 fL    Neutrophils % 76.1 %    Lymphocytes % 12.1 %    Monocytes % 10.9 %    Eosinophils % 0.3 % Basophils % 0.6 %    Neutrophils Absolute 6.9 1.7 - 7.7 K/uL    Lymphocytes Absolute 1.1 1.0 - 5.1 K/uL    Monocytes Absolute 1.0 0.0 - 1.3 K/uL    Eosinophils Absolute 0.0 0.0 - 0.6 K/uL    Basophils Absolute 0.1 0.0 - 0.2 K/uL   Basic Metabolic Panel w/ Reflex to MG   Result Value Ref Range    Sodium 136 136 - 145 mmol/L    Potassium reflex Magnesium 4.4 3.5 - 5.1 mmol/L    Chloride 100 99 - 110 mmol/L    CO2 25 21 - 32 mmol/L    Anion Gap 11 3 - 16    Glucose 183 (H) 70 - 99 mg/dL    BUN 43 (H) 7 - 20 mg/dL    CREATININE 1.4 (H) 0.6 - 1.2 mg/dL    GFR Non- 36 (A) >60    GFR  44 (A) >60    Calcium 8.9 8.3 - 10.6 mg/dL   Blood Gas, Venous   Result Value Ref Range    pH, Roberto 7.349 (L) 7.350 - 7.450    pCO2, Roberto 50.2 41.0 - 51.0 mmHg    pO2, Roberto 24.1 (L) 25.0 - 40.0 mmHg    HCO3, Venous 27.6 24.0 - 28.0 mmol/L    Base Excess, Roberto 2.0 -2.0 - 3.0 mmol/L    O2 Sat, Roberto 29 Not established %    Carboxyhemoglobin 1.9 (H) 0.0 - 1.5 %    MetHgb, Roberto 1.0 0.0 - 1.5 %    TC02 (Calc), Roberto 29 mmol/L   Lactic Acid, Plasma   Result Value Ref Range    Lactic Acid 2.5 (H) 0.4 - 2.0 mmol/L   Troponin   Result Value Ref Range    Troponin <0.01 <0.01 ng/mL   Brain Natriuretic Peptide   Result Value Ref Range    Pro-BNP 1,113 (H) 0 - 449 pg/mL   EKG 12 Lead   Result Value Ref Range    Ventricular Rate 50 BPM    Atrial Rate 234 BPM    QRS Duration 74 ms    Q-T Interval 422 ms    QTc Calculation (Bazett) 384 ms    R Axis 71 degrees    T Axis -12 degrees    Diagnosis       EKG performed in ER and to be interpreted by ER physician. Confirmed by MD, ER ([id number]),  Justo Garland ([phone]) on 1/27/2021 7:21:18 PM       ED BEDSIDE ULTRASOUND:  None    RECENT VITALS:  BP: (!) 126/53, Temp: 98.2 °F (36.8 °C),  , , SpO2: 90 %     Procedures     None    ED Course     Nursing Notes, Past Medical Hx, Past Surgical Hx, Social Hx, Allergies, and Family Hx were reviewed.     The patient was given the followingmedications: Orders Placed This Encounter   Medications    0.9 % sodium chloride infusion       CONSULTS:  IP CONSULT TO HOSPITALIST    MEDICAL DECISION MAKING / ASSESSMENT / PLAN     Josee Leonardo is a 79 y.o. female who was hemodynamically stable on presentation but did require 2 L of nasal cannula due to hypoxia on arrival.  The patient's presentation is possible Covid and will be worked up for such as well as other causes of shortness of breath. The patient's hemoglobin came back severely anemic at 4.3. On further examination and questioning the patient denied any hematemesis, hematuria, melena, hematochezia. A rectal exam was performed and showed no melena. The patient was ordered for 2 units of packed red blood cells. The patient's other labs showed a creatinine elevation to 1.4, her baseline is about 0.8. She did have an elevated BNP and a chest x-ray consistent with pulmonary edema. She will be admitted to the hospitalist for symptomatic anemia. This patient was also evaluated by the attending physician. All care plans werediscussed and agreed upon. Clinical Impression     1. Anemia, unspecified type        Disposition     PATIENT REFERRED TO:  No follow-up provider specified.     DISCHARGE MEDICATIONS:  New Prescriptions    No medications on file       DISPOSITION Decision To Transfer 01/27/2021 08:30:41 PM        Keisha Miller MD  Resident  01/27/21 2032

## 2021-01-28 NOTE — CONSULTS
[address] [facility]  GI Consultation      Patient: Nadia Ortiz  : 1940       Date:  2021    Subjective:       Requesting physician: Mati Ariza  Reason for consult: Hg 4.3, on xarelto, dark stools, recent steroid use, hx of colon polyps and previous lower GI bleed    History of Present Illness  78 y/o F with PMH of afib (on xarelto), HF, DM, asthma, pulm HTN (not oxygen dependent), HTN, HLD, EDDY on cpap, breast cancer s/p lumpectomy/radiation, colon polyps, obesity who presents with 2 weeks of fatigue and feeling unwell.      She notes about 2 weeks ago she had a cough for which she was prescribed a steroid taper for possible asthma exacerbation. Since she started taking the steroids she has been feeling unwell. She endorses fatigue, decreased appetite, intermittent confusion/inability to concentrate. Denies SOB, cough, orthopnea, PND, edema, palpitations. She notes regular stool but they have been dark \"chocolate coloured\". Denied NSAID use. Previous c-scope in 2016 w/ non-cancerous polyps. In ED she was noted to be hemodynamically stable, not tachycardic, hypoxic requiring 2L NC. Lab work significant for an MANDY, Hgb 4.3 (baseline 12), elevated pro-BNP. CXR w/ vascular congestion and effusion. EKG w/ afib, bradycardia and low-voltage. Patient admitted for further management.    COVID r/o    LFTs normal    Yes Melena/ no [place] emesis/ unsure Hematochezia/no Hematemesis  Hg POA 4.3 s/p 3 units, BL Hg unsure  MCV low at 71  WBC and PLT normal  Iron studies pending: ferritin low at 12, iron TIBC pending  LD high at 335, haptoglobin pending  Felt Weakness/fatigue/SOB  No liver disease  No UC/CD/Colon Ca    Denies NSAID use, takes xarelto · Colonoscopy was performed by Dr. Nahomy Mujica of gastroenterology on 6/22/15. Findings included 3 separate 3-6 mm sessile polyps in the cecum and ascending colon which were not removed due to elevated INR in the setting of lower GI bleeding. Blood was seen only in the left colon. There was also secondarily of bleeding a vascular polypoid lesion in the descending colon at 35 cm which was stopped with 3 Endo clips being placed above and below and over the lesion. The area was marked with a total of 1 mL of SPOT on both opposing walls around the lesion. Colonoscopy showed moderately severe left-sided diverticulosis and small hemorrhoids. Admission Meds  No current facility-administered medications on file prior to encounter.       Current Outpatient Medications on File Prior to Encounter   Medication Sig Dispense Refill    acetaminophen (TYLENOL) 325 MG tablet Take 650 mg by mouth as needed for Pain      albuterol sulfate HFA (VENTOLIN HFA) 108 (90 Base) MCG/ACT inhaler Inhale 2 puffs into the lungs every 6 hours as needed for Wheezing      calcium-vitamin D (OSCAL-500) 500-200 MG-UNIT per tablet Take 1 tablet by mouth daily      carvedilol (COREG) 6.25 MG tablet Take 6.25 mg by mouth 2 times daily (with meals)      dilTIAZem (DILACOR XR) 180 MG extended release capsule Take 180 mg by mouth daily      furosemide (LASIX) 20 MG tablet Take 20 mg by mouth 2 times daily      insulin lispro protamine & lispro (HUMALOG MIX) (75-25) 100 UNIT per ML SUSP injection vial Inject into the skin INJECT 14 UNITS IN THE MORNING AND 10 UNITS IN THE EVENING      losartan (COZAAR) 25 MG tablet Take 25 mg by mouth 2 times daily      potassium chloride (KLOR-CON M) 20 MEQ extended release tablet Take 20 mEq by mouth 2 times daily      pravastatin (PRAVACHOL) 10 MG tablet Take 10 mg by mouth daily      rivaroxaban (XARELTO) 20 MG TABS tablet Take 20 mg by mouth daily  letrozole (FEMARA) 2.5 MG tablet Take 2.5 mg by mouth daily          Allergies  No Known Allergies   Social   Social History     Tobacco Use    Smoking status: Former Smoker    Smokeless tobacco: Never Used   Substance Use Topics    Alcohol use: Not Currently           Physical Exam    /68   Pulse 74   Temp 98.2 °F (36.8 °C)   Resp 18   Ht 5' 4\" (1.626 m)   Wt 218 lb 7.6 oz (99.1 kg)   SpO2 98%   BMI 37.50 kg/m²   General appearance: alert, cooperative, no distress, appears stated age  Anicteric, No Jaundice  Head: Normocephalic, without obvious abnormality  Lungs: clear to auscultation bilaterally, Normal Effort  Heart: regular rate and rhythm, normal S1 and S2, no murmurs or rubs  Abdomen: abnormal findings:  obese. Mild generalized abd tenderness  Extremities: atraumatic, no cyanosis or edema  Skin: warm and dry  Neuro: intact  AAOX3      Data Review:    Recent Labs     01/27/21 1936 01/28/21 0319 01/28/21  0624   WBC 9.1 7.1  --    HGB 4.3* 7.1* 7.2*   HCT 15.4* 22.3* 22.5*   MCV 71.2* 74.0*  --     271  --      Recent Labs     01/27/21 1936 01/28/21 0319    136   K 4.4 3.7    100   CO2 25 27   BUN 43* 39*   CREATININE 1.4* 1.2     Recent Labs     01/27/21 1936   AST 27   ALT 24   BILIDIR <0.2   BILITOT 0.6   ALKPHOS 84       Recent Labs     01/27/21 1935 01/28/21  0624   PROTIME 42.4* 24.4*   INR 3.61* 2.09*       Assessment:     Acute blood loss anemia,microcytic  Suspected GI Bleed  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 Will give 1 u FFP and check INR and if less than 2 will proceed to EGD. Thank you for the opportunity to participate [facility].     Alicia Licona MD PGY-1

## 2021-01-28 NOTE — PROGRESS NOTES
Clinical Pharmacy Progress Note  Medication History     Admit Date: 01/27/21    List of of current medications patient is taking is complete. Home Medication list in EPIC updated to reflect changes noted below. Source of information: Patient, patient's daughter    Changes made to medication list:   Other notes:   Medication list up to date according to patient. Complete Home Medication List:  Current Outpatient Medications on File Prior to Encounter   Medication Sig    acetaminophen (TYLENOL) 325 MG tablet Take 650 mg by mouth as needed for Pain    albuterol sulfate HFA (VENTOLIN HFA) 108 (90 Base) MCG/ACT inhaler Inhale 2 puffs into the lungs every 6 hours as needed for Wheezing    calcium-vitamin D (OSCAL-500) 500-200 MG-UNIT per tablet Take 1 tablet by mouth daily    carvedilol (COREG) 6.25 MG tablet Take 6.25 mg by mouth 2 times daily (with meals)    dilTIAZem (DILACOR XR) 180 MG extended release capsule Take 180 mg by mouth daily    furosemide (LASIX) 20 MG tablet Take 20 mg by mouth 2 times daily    insulin lispro protamine & lispro (HUMALOG MIX) (75-25) 100 UNIT per ML SUSP injection vial Inject into the skin INJECT 14 UNITS IN THE MORNING AND 10 UNITS IN THE EVENING    losartan (COZAAR) 25 MG tablet Take 25 mg by mouth 2 times daily    potassium chloride (KLOR-CON M) 20 MEQ extended release tablet Take 20 mEq by mouth 2 times daily    pravastatin (PRAVACHOL) 10 MG tablet Take 10 mg by mouth daily    rivaroxaban (XARELTO) 20 MG TABS tablet Take 20 mg by mouth daily    letrozole (FEMARA) 2.5 MG tablet Take 2.5 mg by mouth daily       Please call with questions!     [facility] [id number]  1/27/2021 10:58 PM

## 2021-01-28 NOTE — ED NOTES
Mint Green, Lavender, Blue, Yellow/Orange blood tubes collected and sent to lab.    VBG syringe collected and sent to lab  Maritza Arriaza on Ice collected and sent to lab     LINN Adler  01/27/21 1939

## 2021-01-28 NOTE — PROGRESS NOTES
Internal Medicine  PGY 1  History & Physical      CC fatigue     History Obtained From:  Patient, daughter      Interval HX:    Cuco Royal. Pt seen at bedside. She feels tired this AM. Reports dark stools since tapering off steroids sometime in the middle of January. Pt endorses some abdominal pain. She denies fevers, chills, chest pain, SOB, N/V. She lost her appetite during the same time as the dark stools began as well. HISTORY OF PRESENT ILLNESS:    78 y/o F with PMH of afib (on xarelto), HF, DM, asthma, pulm HTN (not oxygen dependent), HTN, HLD, EDDY on cpap, breast cancer s/p lumpectomy/radiation, colon polyps, obesity who presents with 2 weeks of fatigue and feeling unwell. She notes about 2 weeks ago she had a cough for which she was prescribed a steroid taper for possible asthma exacerbation. Since she started taking the steroids she has been feeling unwell. She endorses fatigue, decreased appetite, intermittent confusion/inability to concentrate. Denies SOB, cough, orthopnea, PND, edema, palpitations. She notes regular stool but they have been dark \"chocolate coloured\". Denied NSAID use. Previous c-scope in 2016 w/ non-cancerous polyps. In ED she was noted to be hemodynamically stable, not tachycardic, hypoxic requiring 2L NC. Lab work significant for an MANDY, Hgb 4.3 (baseline 12), elevated pro-BNP. CXR w/ vascular congestion and effusion. EKG w/ afib, bradycardia and low-voltage. Patient admitted for further management. Past Medical History:    afib (on xarelto), HF, DM, asthma, pulm HTN (not oxygen dependent), HTN, HLD, EDDY on cpap, breast cancer s/p lumpectomy/radiation, colon polyps, obesity     Past Surgical History:    History reviewed. No pertinent surgical history.   breast cancer s/p lumpectomy/radiation,    Medications Priorto Admission:    Medications Prior to Admission: acetaminophen (TYLENOL) 325 MG tablet, Take 650 mg by mouth as needed for Pain albuterol sulfate HFA (VENTOLIN HFA) 108 (90 Base) MCG/ACT inhaler, Inhale 2 puffs into the lungs every 6 hours as needed for Wheezing  calcium-vitamin D (OSCAL-500) 500-200 MG-UNIT per tablet, Take 1 tablet by mouth daily  carvedilol (COREG) 6.25 MG tablet, Take 6.25 mg by mouth 2 times daily (with meals)  dilTIAZem (DILACOR XR) 180 MG extended release capsule, Take 180 mg by mouth daily  furosemide (LASIX) 20 MG tablet, Take 20 mg by mouth 2 times daily  insulin lispro protamine & lispro (HUMALOG MIX) (75-25) 100 UNIT per ML SUSP injection vial, Inject into the skin INJECT 14 UNITS IN THE MORNING AND 10 UNITS IN THE EVENING  losartan (COZAAR) 25 MG tablet, Take 25 mg by mouth 2 times daily  potassium chloride (KLOR-CON M) 20 MEQ extended release tablet, Take 20 mEq by mouth 2 times daily  pravastatin (PRAVACHOL) 10 MG tablet, Take 10 mg by mouth daily  rivaroxaban (XARELTO) 20 MG TABS tablet, Take 20 mg by mouth daily  letrozole (FEMARA) 2.5 MG tablet, Take 2.5 mg by mouth daily    Allergies:  Patient has no known allergies. Social History:   · TOBACCO:   reports that she has quit smoking. She has never used smokeless tobacco.  · ETOH:   reports previous alcohol use. · DRUGS : denies   · Patient currently lives alone at home w/ dog   ·   Family History:   History reviewed. No pertinent family history. ROS: A 10 point review of systems was conducted, significant findings as noted in HPI. Physical Exam  Constitutional:       General: She is not in acute distress. Appearance: She is obese. She is not ill-appearing. HENT:      Head: Normocephalic and atraumatic. Cardiovascular:      Rate and Rhythm: Bradycardia present. Rhythm irregular. Heart sounds: Murmur present. Comments: No JVD  Pulmonary:      Effort: Pulmonary effort is normal. No respiratory distress. Breath sounds: Rhonchi (expiratory ) present. No wheezing.       Comments: On 2L NC, desats to 88% on RA at rest   Abdominal: General: There is no distension. Palpations: Abdomen is soft. Tenderness: There is no abdominal tenderness. Musculoskeletal:         General: No tenderness. Right lower leg: No edema. Left lower leg: No edema. Skin:     General: Skin is dry. Comments: cold   Neurological:      General: No focal deficit present. Mental Status: She is alert and oriented to person, place, and time. Physical exam:       Vitals:    01/28/21 1200   BP: 124/78   Pulse: 84   Resp: 18   Temp: 97.7 °F (36.5 °C)   SpO2: 95%       DATA:    Labs:  CBC:   Recent Labs     01/27/21 1936 01/28/21 0319 01/28/21 0624   WBC 9.1 7.1  --    HGB 4.3* 7.1* 7.2*   HCT 15.4* 22.3* 22.5*    271  --        BMP:   Recent Labs     01/27/21 1936 01/28/21 0319    136   K 4.4 3.7    100   CO2 25 27   BUN 43* 39*   CREATININE 1.4* 1.2   GLUCOSE 183* 130*     LFT's:   Recent Labs     01/27/21 1936   AST 27   ALT 24   BILITOT 0.6   ALKPHOS 84     Troponin:   Recent Labs     01/27/21 1936   TROPONINI <0.01     BNP:No results for input(s): BNP in the last 72 hours. ABGs: No results for input(s): PHART, YLQ5NHW, PO2ART in the last 72 hours. INR:   Recent Labs     01/27/21 1935 01/28/21  0624 01/28/21  1144   INR 3.61* 2.09* 1.99*       U/A:No results for input(s): NITRITE, COLORU, PHUR, LABCAST, WBCUA, RBCUA, MUCUS, TRICHOMONAS, YEAST, BACTERIA, CLARITYU, SPECGRAV, LEUKOCYTESUR, UROBILINOGEN, BILIRUBINUR, BLOODU, GLUCOSEU, AMORPHOUS in the last 72 hours. Invalid input(s): KETONESU    XR CHEST PORTABLE   Final Result   1.  Cardiomegaly and vascular congestion with effusions favor congestive changes          ASSESSMENT AND PLAN:    Acute blood loss anemia, hypochromic microcytic - likely 2/2 GI bleed in setting of xaerlto use and likely chronic underlying DAKOTA Fatigue and dark coloured stool x 2 weeks, recently completed steroids. Hemodynamically stable. Hg 4.3 on admit (baseline 12). Last colonoscopy 8/16 with noncancerous polyps. She did have a colonoscopy in 2015 secondary to a GI bleed which required Endo clipping of vascular polypoid lesion in the sigmoid colon. She has sessile polyps in the cecum and descending colon. Also noted was left-sided severe diverticulosis and small hemorrhoids. - transfuse 3U RBC, post H&H 7.2  - INR 2.09, will give her FFP and repeat INR 1.99. GI will take her for scope this afternoon  - 2 large bore IVs ordered  - protonix gtt  - H/H q6, transfuse <7   - fecal occult pending   - holding xaerlto   - GI consulted  - iron studies pending, ferritin 12- consider IV iron pending results   - haptoglobin pending,     Acute hypoxia - likely 2/2 cardiogenic pulm edema and severe anemia   Pt requiring 2L NC to maintain sat >95%, desats to 88% on RA at rest. Pro-BNP 1113. CXR w/ cardiomegaly, vascular congestion, effusions R>L. - covid pending  - blood transfusion as above   - lasix 40 mg IV BID  - low probability for PE     Acute CHF exacerbation - 2/2 acute anemia   EGK w/ afib, bradycardia, low voltage. Trop negative. Hg 4.3. CXR w/ cardiomegaly, vascular congestion, effusions R>L. Last echo 4/17 revealed EF 60-65%. Right atrium and ventricle mildly dilated. Mild to mod MR  - echo pending, previous w/ mod MR and normal systolic function   - no signs of R sided HF  - lasix 40 mg IV BID   - cont coreg 6.25 mg BID  - TSH and T4 pending   - strict I/o, daily weight     MANDY - likely pre-renal 2/2 anemia, possibly from systolic HF  Cr 1.4 on admit (baseline 0.8).  BUN/Cr >20   - Urine studies pending; Cr and urea as pt on diuretics    - blood transfusion as above   - monitor Cr closely as pt on lasix     Lactic acidosis 2/2 decreased perfusion - resolved  LA 2.5 on admit.   - repeat lactate 1.6, will discontinue trend - transfuse blood as above     Permanent Afib, rate controlled  - holding xarlto, chadsvac score high   - holding home diltiazem in setting of bradycardia     DMT2, controlled   HgA1c 6.2% (9/2020)   - per daughter pt on lispro 14U in AM and 10U PM  - MDSS  - NPO @ midnight, glu q6    Asthma, not in exacerbation   - albuterol prn       Pulm HTN 2/2 DHF - no baseline oxygen requirement. Consider home oxygen eval before discharge. HTN - controlled. Cont lasix and coreg. Holding losartan in setting of MANDY. HLD - cont pravastatin   EDDY on CPAP - home cpap   Obesity due to excess calories - complicating assessment and treatment. Placing patient at risk for multiple co-morbidities as well as early death and contributing to the patient's presentation.  on weight loss when appropriate. Will discuss with attending physician Dr. Maryjane Rubinstein.     Code Status: Full code  FEN: Cardiac/2g Na diet   PPX: SCD, protonix drip  DISPO: ALLEGRA Watson DO PGY-1  1/28/2021,  1:45 PM

## 2021-01-28 NOTE — PROGRESS NOTES
RESPIRATORY THERAPY ASSESSMENT    Name:  [facility]  Medical Record Number:  [id number]  Age: 79 y.o. Gender: female  : 1940  Today's Date:  2021  Room:  [id number]    Assessment     Is the patient being admitted for a COPD or Asthma exacerbation? No   (If yes the patient will be seen every 4 hours for the first 24 hours and then reassessed)    Patient Admission Diagnosis      Allergies  No Known Allergies    Minimum Predicted Vital Capacity:     748          Actual Vital Capacity:      n/a              Pulmonary History:former smoker  Home Oxygen Therapy:  room air  Home Respiratory Therapy:Albuterol   Current Respiratory Therapy:  mdi albuterol prn          Respiratory Severity Index(RSI)   Patients with orders for inhalation medications, oxygen, or any therapeutic treatment modality will be placed on Respiratory Protocol. They will be assessed with the first treatment and at least every 72 hours thereafter. The following severity scale will be used to determine frequency of treatment intervention. Smoking History: Pulmonary Disease or Smoking History, Greater than 15 pack year = 2    Social History  Social History     Tobacco Use    Smoking status: Former Smoker    Smokeless tobacco: Never Used   Substance Use Topics    Alcohol use: Not Currently    Drug use: Never       Recent Surgical History: None = 0  Past Surgical History  History reviewed. No pertinent surgical history.     Level of Consciousness: Alert, Oriented, and Cooperative = 0    Level of Activity: Walking with assistance = 1    Respiratory Pattern: Regular Pattern; RR 8-20 = 0    Breath Sounds: Clear = 0    Sputum   ,  ,    Cough: Strong, spontaneous, non-productive = 0    Vital Signs   /67   Pulse 69   Temp 98.2 °F (36.8 °C) (Oral)   Resp 18   Ht 5' 4\" (1.626 m)   Wt 218 lb 7.6 oz (99.1 kg)   SpO2 100%   BMI 37.50 kg/m² SPO2 (COPD values may differ): 90-91% on room air or greater than 92% on FiO2 24- 28% = 1    Peak Flow (asthma only): not applicable = 0    RSI: 0-4 = See once and convert to home regimen or discontinue        Plan       Goals: medication delivery and improve oxygenation    Patient/caregiver was educated on the proper method of use for Respiratory Care Devices:  Yes      Level of patient/caregiver understanding able to:   ? Verbalize understanding   ? Demonstrate understanding       ? Teach back        ? Needs reinforcement       ? No available caregiver               ? Other:     Response to education:  Excellent     Is patient being placed on Home Treatment Regimen? Yes     Does the patient have everything they need prior to discharge? Yes     Comments: admits with anemia    Plan of Care: mdi albuterol prn    Electronically signed by Jess Ball RCP on 1/28/2021 at 2:41 AM    Respiratory Protocol Guidelines     1. Assessment and treatment by Respiratory Therapy will be initiated for medication and therapeutic interventions upon initiation of aerosolized medication. 2. Physician will be contacted for respiratory rate (RR) greater than 35 breaths per minute. Therapy will be held for heart rate (HR) greater than 140 beats per minute, pending direction from physician. 3. Bronchodilators will be administered via Metered Dose Inhaler (MDI) with spacer when the following criteria are met:  a. Alert and cooperative     b. HR < 140 bpm  c. RR < 30 bpm                d. Can demonstrate a 23 second inspiratory hold  4. Bronchodilators will be administered via Hand Held Nebulizer RODOLFO [facility]) to patients when ANY of the following criteria are met  a. Incognizant or uncooperative          b. Patients treated with HHN at Home        c.  Unable to demonstrate proper use of MDI with spacer     d. RR > 30 bpm

## 2021-01-28 NOTE — PROGRESS NOTES
Pt denied noticing bleeding with urine or stool previous to admission, upon using bathroom in room after patient wiped this RN noticed toilet paper to be bloody

## 2021-01-28 NOTE — PROGRESS NOTES
Pts Daughter Gatito Licona updated, all questions answered. Pt A&Ox4. Upx1. Voiding and passing gas. VSS. Rm air. No skin issues. Tolerating PO/IV fluids, poor food intake. Denies pain. Increased frequency of rounds. Safety maintained.      Electronically signed by Nicole Ayala RN on 1/28/2021 at 6:06 PM

## 2021-01-28 NOTE — ED PROVIDER NOTES
ED Attending Attestation Note     Date of evaluation: 1/27/2021    This patient was seen by the resident. I have seen and examined the patient, agree with the workup, evaluation, management and diagnosis. The care plan has been discussed. I have reviewed the ECG and concur with the resident's interpretation. My assessment reveals 69-year-old female presenting with dyspnea from home, new anemia. She denies any hemoptysis hematemesis or melena. On exam she is well-appearing in no acute distress, with normal work of breathing and clear lungs. Her abdomen is soft without rebound or guarding. Tonya Wolff MD  01/27/21 9205

## 2021-01-28 NOTE — PROGRESS NOTES
4 Eyes Admission Assessment     I agree as the admission nurse that 2 RN's have performed a thorough Head to Toe Skin Assessment on the patient. ALL assessment sites listed below have been assessed on admission. Areas assessed by both nurses: gwen and corinne  [x]   Head, Face, and Ears   [x]   Shoulders, Back, and Chest  [x]   Arms, Elbows, and Hands   [x]   Coccyx, Sacrum, and Ischum  [x]   Legs, Feet, and Heels        Does the Patient have Skin Breakdown?   No         Genaro Prevention initiated:  No   Wound Care Orders initiated:  No      [facility] nurse consulted for Pressure Injury (Stage 3,4, Unstageable, DTI, NWPT, and Complex wounds):  No      Nurse 1 eSignature: Electronically signed by Brooke Sheehan RN on 1/28/21 at 3:01 AM EST    **SHARE this note so that the co-signing nurse is able to place an eSignature**    Nurse 2 eSignature: Electronically signed by August Call RN on 1/28/21 at 3:48 AM EST

## 2021-01-28 NOTE — PLAN OF CARE
Problem: Falls - Risk of:  Goal: Will remain free from falls  Description: Will remain free from falls  Outcome: Ongoing     Problem:  Activity:  Goal: Fatigue will decrease  Description: Fatigue will decrease  Outcome: Ongoing  Goal: Ability to tolerate increased activity will improve  Description: Ability to tolerate increased activity will improve  Outcome: Ongoing

## 2021-01-28 NOTE — H&P
Internal Medicine  PGY 1  History & Physical      CC fatigue     History Obtained From:  Patient, daughter        HISTORY OF PRESENT ILLNESS:    78 y/o F with PMH of afib (on xarelto), HF, DM, asthma, pulm HTN (not oxygen dependent), HTN, HLD, EDDY on cpap, breast cancer s/p lumpectomy/radiation, colon polyps, obesity who presents with 2 weeks of fatigue and feeling unwell. She notes about 2 weeks ago she had a cough for which she was prescribed a steroid taper for possible asthma exacerbation. Since she started taking the steroids she has been feeling unwell. She endorses fatigue, decreased appetite, intermittent confusion/inability to concentrate. Denies SOB, cough, orthopnea, PND, edema, palpitations. She notes regular stool but they have been dark \"chocolate coloured\". Denied NSAID use. Previous c-scope in 2016 w/ non-cancerous polyps. In ED she was noted to be hemodynamically stable, not tachycardic, hypoxic requiring 2L NC. Lab work significant for an MANDY, Hgb 4.3 (baseline 12), elevated pro-BNP. CXR w/ vascular congestion and effusion. EKG w/ afib, bradycardia and low-voltage. Patient admitted for further management. Past Medical History:    afib (on xarelto), HF, DM, asthma, pulm HTN (not oxygen dependent), HTN, HLD, EDDY on cpap, breast cancer s/p lumpectomy/radiation, colon polyps, obesity     Past Surgical History:    History reviewed. No pertinent surgical history.   breast cancer s/p lumpectomy/radiation,    Medications Priorto Admission:    Medications Prior to Admission: acetaminophen (TYLENOL) 325 MG tablet, Take 650 mg by mouth as needed for Pain  albuterol sulfate HFA (VENTOLIN HFA) 108 (90 Base) MCG/ACT inhaler, Inhale 2 puffs into the lungs every 6 hours as needed for Wheezing  calcium-vitamin D (OSCAL-500) 500-200 MG-UNIT per tablet, Take 1 tablet by mouth daily  carvedilol (COREG) 6.25 MG tablet, Take 6.25 mg by mouth 2 times daily (with meals) dilTIAZem (DILACOR XR) 180 MG extended release capsule, Take 180 mg by mouth daily  furosemide (LASIX) 20 MG tablet, Take 20 mg by mouth 2 times daily  insulin lispro protamine & lispro (HUMALOG MIX) (75-25) 100 UNIT per ML SUSP injection vial, Inject into the skin INJECT 14 UNITS IN THE MORNING AND 10 UNITS IN THE EVENING  losartan (COZAAR) 25 MG tablet, Take 25 mg by mouth 2 times daily  potassium chloride (KLOR-CON M) 20 MEQ extended release tablet, Take 20 mEq by mouth 2 times daily  pravastatin (PRAVACHOL) 10 MG tablet, Take 10 mg by mouth daily  rivaroxaban (XARELTO) 20 MG TABS tablet, Take 20 mg by mouth daily  letrozole (FEMARA) 2.5 MG tablet, Take 2.5 mg by mouth daily    Allergies:  Patient has no known allergies. Social History:   · TOBACCO:   reports that she has quit smoking. She has never used smokeless tobacco.  · ETOH:   reports previous alcohol use. · DRUGS : denies   · Patient currently lives alone at home w/ dog   ·   Family History:   History reviewed. No pertinent family history. Review of Systems   Constitutional: Positive for fatigue. Negative for fever. Respiratory: Positive for cough. Cardiovascular: Negative for chest pain, palpitations and leg swelling. Gastrointestinal: Negative for abdominal distention, abdominal pain, blood in stool (reports dark choclate colour stools), constipation, diarrhea, nausea and vomiting. Genitourinary: Negative for dysuria. Musculoskeletal: Negative for back pain and joint swelling. Neurological: Negative for light-headedness and headaches. ROS: A 10 point review of systems was conducted, significant findings as noted in HPI. Physical Exam  Constitutional:       Appearance: She is obese. HENT:      Head: Normocephalic and atraumatic. Cardiovascular:      Rate and Rhythm: Bradycardia present. Rhythm irregular. Heart sounds: Murmur present.       Comments: No JVD  Pulmonary: Effort: Pulmonary effort is normal. No respiratory distress. Breath sounds: Rhonchi (expiratory ) present. No wheezing. Comments: On 2L NC, desats to 88% on RA at rest   Abdominal:      General: There is no distension. Palpations: Abdomen is soft. Tenderness: There is no abdominal tenderness. Musculoskeletal:         General: No tenderness. Right lower leg: No edema. Left lower leg: No edema. Skin:     General: Skin is dry. Comments: cold   Neurological:      General: No focal deficit present. Mental Status: She is alert and oriented to person, place, and time. Physical exam:       Vitals:    01/28/21 0202   BP: 129/67   Pulse: 69   Resp: 18   Temp: 98.2 °F (36.8 °C)   SpO2: 100%       DATA:    Labs:  CBC:   Recent Labs     01/27/21 1936   WBC 9.1   HGB 4.3*   HCT 15.4*          BMP:   Recent Labs     01/27/21 1936      K 4.4      CO2 25   BUN 43*   CREATININE 1.4*   GLUCOSE 183*     LFT's:   Recent Labs     01/27/21 1936   AST 27   ALT 24   BILITOT 0.6   ALKPHOS 84     Troponin:   Recent Labs     01/27/21 1936   TROPONINI <0.01     BNP:No results for input(s): BNP in the last 72 hours. ABGs: No results for input(s): PHART, AEI5VGW, PO2ART in the last 72 hours. INR:   Recent Labs     01/27/21 1935   INR 3.61*       U/A:No results for input(s): NITRITE, COLORU, PHUR, LABCAST, WBCUA, RBCUA, MUCUS, TRICHOMONAS, YEAST, BACTERIA, CLARITYU, SPECGRAV, LEUKOCYTESUR, UROBILINOGEN, BILIRUBINUR, BLOODU, GLUCOSEU, AMORPHOUS in the last 72 hours. Invalid input(s): KETONESU    XR CHEST PORTABLE   Final Result   1.  Cardiomegaly and vascular congestion with effusions favor congestive changes          ASSESSMENT AND PLAN:    Acute blood loss anemia, hypochromic microcytic - likely 2/2 GI bleed in setting of xaerlto use and likely chronic underlying DAKOTA Fatigue and dark coloured stool x 2 weeks, recently completed steroids. Hemodynamically stable. Hg 4.3 on admit (baseline 12). - transfuse 3U RBC  - H/H q6, transfuse <7   - fecal occult pending   - protonix 40 mg IV BID  - holding xaerlto   - consider GI consult pending stool occult, NPO @ midnight for possible GI intervention in AM   - iron studies pending, ferritin low - consider IV iron pending results   - haptoglobin pending, LDH high     Acute hypoxia - likely 2/2 cardiogenic pulm edema and severe anemia   Pt requiring 2L NC to maintain sat >95%, desats to 88% on RA at rest. Pro-BNP 1113. CXR w/ cardiomegaly, vascular congestion, effusions R>L. - covid pending  - blood transfusion as above   - lasix 40 mg IV BID  - low probability for PE     Acute on chronic systolic HF exacerbation - 2/2 acute anemia   EGK w/ afib, bradycardia, low voltage. Trop negative. Hg 4.3. CXR w/ cardiomegaly, vascular congestion, effusions R>L. - echo pending, previous w/ mod MR and normal systolic function   - no signs of R sided HF  - lasix 40 mg IV BID   - cont coreg 6.25 mg BID  - TSH and T4 pending   - strict I/o, daily weight     MANDY - likely pre-renal 2/2 anemia, possibly from systolic HF  Cr 1.4 on admit (baseline 0.8). BUN/Cr >20   - Urine studies pending; Cr and urea as pt on diuretics    - blood transfusion as above   - monitor Cr closely as pt on lasix     Lactic acidosis 2/2 decreased perfusion   LA 2.5 on admit.   - repeat lactate pending    - transfuse blood as above     Permanent Afib, rate controlled  - holding xarlto, chadsvac score high   - holding home diltiazem in setting of bradycardia     DMT2, controlled   HgA1c 6.2% (9/2020)   - per daughter pt on lispro 14U in AM and 10U PM  - MDSS  - NPO @ midnight, glu q6    Asthma, not in exacerbation   - albuterol prn       Pulm HTN 2/2 DHF - no baseline oxygen requirement. Consider home oxygen eval before discharge. HTN - controlled. Cont lasix and coreg. Holding losartan in setting of MANDY. HLD - cont pravastatin   EDDY on CPAP - home cpap   Obesity due to excess calories - complicating assessment and treatment. Placing patient at risk for multiple co-morbidities as well as early death and contributing to the patient's presentation.  on weight loss when appropriate. Will discuss with attending physician Dr. Rosendo Mccarthy     Code Status: Full code  FEN: Cardiac/2g Na diet   PPX: SCD  DISPO: PCU    Emely Hopson MD PGY-1  1/28/2021,  3:56 AM    I saw the patient independently from the resident . I discussed the care with the resident. I personally reviewed the HPI, PH, FH, SH, ROS and medications. I repeated pertinent portions of the examination and reviewed the relevant imaging and laboratory data. I agree with the findings, assessment and plan as documented. addition to: Patient is 80-year-old female with history of A. fib on Xarelto admitted for acute GI bleed suspected lower patient was endorsed, history of colon cancer maternally. Anemia panel obtained prior to transfusion.

## 2021-01-29 ENCOUNTER — ANESTHESIA (OUTPATIENT)
Dept: ENDOSCOPY | Age: 81
DRG: 377 | End: 2021-01-29
Payer: COMMERCIAL

## 2021-01-29 VITALS
RESPIRATION RATE: 36 BRPM | OXYGEN SATURATION: 100 % | SYSTOLIC BLOOD PRESSURE: 109 MMHG | DIASTOLIC BLOOD PRESSURE: 54 MMHG | TEMPERATURE: 96.8 F

## 2021-01-29 LAB
ANION GAP SERPL CALCULATED.3IONS-SCNC: 8 MMOL/L (ref 3–16)
BASOPHILS ABSOLUTE: 0 K/UL (ref 0–0.2)
BASOPHILS RELATIVE PERCENT: 0.6 %
BUN BLDV-MCNC: 26 MG/DL (ref 7–20)
CALCIUM SERPL-MCNC: 8.7 MG/DL (ref 8.3–10.6)
CHLORIDE BLD-SCNC: 107 MMOL/L (ref 99–110)
CO2: 29 MMOL/L (ref 21–32)
CREAT SERPL-MCNC: 1 MG/DL (ref 0.6–1.2)
EOSINOPHILS ABSOLUTE: 0.1 K/UL (ref 0–0.6)
EOSINOPHILS RELATIVE PERCENT: 1.4 %
GFR AFRICAN AMERICAN: >60
GFR NON-AFRICAN AMERICAN: 53
GLUCOSE BLD-MCNC: 124 MG/DL (ref 70–99)
GLUCOSE BLD-MCNC: 152 MG/DL (ref 70–99)
GLUCOSE BLD-MCNC: 156 MG/DL (ref 70–99)
GLUCOSE BLD-MCNC: 163 MG/DL (ref 70–99)
HCT VFR BLD CALC: 22.4 % (ref 36–48)
HCT VFR BLD CALC: 22.6 % (ref 36–48)
HCT VFR BLD CALC: 24.6 % (ref 36–48)
HEMOGLOBIN: 7 G/DL (ref 12–16)
HEMOGLOBIN: 7.1 G/DL (ref 12–16)
HEMOGLOBIN: 7.6 G/DL (ref 12–16)
INR BLD: 1.51 (ref 0.86–1.14)
LV EF: 55 %
LVEF MODALITY: NORMAL
LYMPHOCYTES ABSOLUTE: 1.3 K/UL (ref 1–5.1)
LYMPHOCYTES RELATIVE PERCENT: 17.7 %
MAGNESIUM: 2.3 MG/DL (ref 1.8–2.4)
MCH RBC QN AUTO: 23.3 PG (ref 26–34)
MCHC RBC AUTO-ENTMCNC: 31.5 G/DL (ref 31–36)
MCV RBC AUTO: 73.8 FL (ref 80–100)
MONOCYTES ABSOLUTE: 0.9 K/UL (ref 0–1.3)
MONOCYTES RELATIVE PERCENT: 11.8 %
NEUTROPHILS ABSOLUTE: 5 K/UL (ref 1.7–7.7)
NEUTROPHILS RELATIVE PERCENT: 68.5 %
PDW BLD-RTO: 22.3 % (ref 12.4–15.4)
PERFORMED ON: ABNORMAL
PLATELET # BLD: 233 K/UL (ref 135–450)
PMV BLD AUTO: 8 FL (ref 5–10.5)
POTASSIUM SERPL-SCNC: 3.3 MMOL/L (ref 3.5–5.1)
PROTHROMBIN TIME: 17.6 SEC (ref 10–13.2)
RBC # BLD: 3.04 M/UL (ref 4–5.2)
SODIUM BLD-SCNC: 144 MMOL/L (ref 136–145)
WBC # BLD: 7.3 K/UL (ref 4–11)

## 2021-01-29 PROCEDURE — 85018 HEMOGLOBIN: CPT

## 2021-01-29 PROCEDURE — 80048 BASIC METABOLIC PNL TOTAL CA: CPT

## 2021-01-29 PROCEDURE — 85610 PROTHROMBIN TIME: CPT

## 2021-01-29 PROCEDURE — 6360000002 HC RX W HCPCS: Performed by: STUDENT IN AN ORGANIZED HEALTH CARE EDUCATION/TRAINING PROGRAM

## 2021-01-29 PROCEDURE — 83735 ASSAY OF MAGNESIUM: CPT

## 2021-01-29 PROCEDURE — 36415 COLL VENOUS BLD VENIPUNCTURE: CPT

## 2021-01-29 PROCEDURE — 3609012400 HC EGD TRANSORAL BIOPSY SINGLE/MULTIPLE: Performed by: INTERNAL MEDICINE

## 2021-01-29 PROCEDURE — 2700000000 HC OXYGEN THERAPY PER DAY

## 2021-01-29 PROCEDURE — 0DB78ZX EXCISION OF STOMACH, PYLORUS, VIA NATURAL OR ARTIFICIAL OPENING ENDOSCOPIC, DIAGNOSTIC: ICD-10-PCS | Performed by: INTERNAL MEDICINE

## 2021-01-29 PROCEDURE — 2709999900 HC NON-CHARGEABLE SUPPLY: Performed by: INTERNAL MEDICINE

## 2021-01-29 PROCEDURE — 7100000010 HC PHASE II RECOVERY - FIRST 15 MIN: Performed by: INTERNAL MEDICINE

## 2021-01-29 PROCEDURE — 85014 HEMATOCRIT: CPT

## 2021-01-29 PROCEDURE — 6370000000 HC RX 637 (ALT 250 FOR IP): Performed by: STUDENT IN AN ORGANIZED HEALTH CARE EDUCATION/TRAINING PROGRAM

## 2021-01-29 PROCEDURE — C9113 INJ PANTOPRAZOLE SODIUM, VIA: HCPCS | Performed by: STUDENT IN AN ORGANIZED HEALTH CARE EDUCATION/TRAINING PROGRAM

## 2021-01-29 PROCEDURE — 6360000002 HC RX W HCPCS: Performed by: NURSE ANESTHETIST, CERTIFIED REGISTERED

## 2021-01-29 PROCEDURE — 3700000001 HC ADD 15 MINUTES (ANESTHESIA): Performed by: INTERNAL MEDICINE

## 2021-01-29 PROCEDURE — 88342 IMHCHEM/IMCYTCHM 1ST ANTB: CPT

## 2021-01-29 PROCEDURE — 94660 CPAP INITIATION&MGMT: CPT

## 2021-01-29 PROCEDURE — 2580000003 HC RX 258: Performed by: INTERNAL MEDICINE

## 2021-01-29 PROCEDURE — 88305 TISSUE EXAM BY PATHOLOGIST: CPT

## 2021-01-29 PROCEDURE — 7100000011 HC PHASE II RECOVERY - ADDTL 15 MIN: Performed by: INTERNAL MEDICINE

## 2021-01-29 PROCEDURE — 2580000003 HC RX 258: Performed by: STUDENT IN AN ORGANIZED HEALTH CARE EDUCATION/TRAINING PROGRAM

## 2021-01-29 PROCEDURE — 85025 COMPLETE CBC W/AUTO DIFF WBC: CPT

## 2021-01-29 PROCEDURE — 93306 TTE W/DOPPLER COMPLETE: CPT

## 2021-01-29 PROCEDURE — 2580000003 HC RX 258: Performed by: ANESTHESIOLOGY

## 2021-01-29 PROCEDURE — 2060000000 HC ICU INTERMEDIATE R&B

## 2021-01-29 PROCEDURE — 3700000000 HC ANESTHESIA ATTENDED CARE: Performed by: INTERNAL MEDICINE

## 2021-01-29 RX ORDER — FERROUS SULFATE 325(65) MG
325 TABLET ORAL
Status: DISCONTINUED | OUTPATIENT
Start: 2021-01-29 | End: 2021-01-30

## 2021-01-29 RX ORDER — POTASSIUM CHLORIDE 7.45 MG/ML
10 INJECTION INTRAVENOUS
Status: COMPLETED | OUTPATIENT
Start: 2021-01-29 | End: 2021-01-29

## 2021-01-29 RX ORDER — PROPOFOL 10 MG/ML
INJECTION, EMULSION INTRAVENOUS PRN
Status: DISCONTINUED | OUTPATIENT
Start: 2021-01-29 | End: 2021-01-29 | Stop reason: SDUPTHER

## 2021-01-29 RX ORDER — LIDOCAINE HYDROCHLORIDE 20 MG/ML
INJECTION, SOLUTION INTRAVENOUS PRN
Status: DISCONTINUED | OUTPATIENT
Start: 2021-01-29 | End: 2021-01-29 | Stop reason: SDUPTHER

## 2021-01-29 RX ORDER — SODIUM CHLORIDE 9 MG/ML
INJECTION, SOLUTION INTRAVENOUS CONTINUOUS
Status: DISCONTINUED | OUTPATIENT
Start: 2021-01-29 | End: 2021-01-30

## 2021-01-29 RX ADMIN — INSULIN LISPRO 1 UNITS: 100 INJECTION, SOLUTION INTRAVENOUS; SUBCUTANEOUS at 21:23

## 2021-01-29 RX ADMIN — POTASSIUM CHLORIDE 10 MEQ: 10 INJECTION, SOLUTION INTRAVENOUS at 10:49

## 2021-01-29 RX ADMIN — PROPOFOL 30 MG: 10 INJECTION, EMULSION INTRAVENOUS at 09:46

## 2021-01-29 RX ADMIN — INSULIN LISPRO 2 UNITS: 100 INJECTION, SOLUTION INTRAVENOUS; SUBCUTANEOUS at 18:20

## 2021-01-29 RX ADMIN — POTASSIUM CHLORIDE 10 MEQ: 10 INJECTION, SOLUTION INTRAVENOUS at 11:50

## 2021-01-29 RX ADMIN — PROPOFOL 30 MG: 10 INJECTION, EMULSION INTRAVENOUS at 09:51

## 2021-01-29 RX ADMIN — Medication 10 ML: at 20:13

## 2021-01-29 RX ADMIN — CARVEDILOL 6.25 MG: 6.25 TABLET, FILM COATED ORAL at 18:21

## 2021-01-29 RX ADMIN — LIDOCAINE HYDROCHLORIDE 100 MG: 20 INJECTION, SOLUTION INTRAVENOUS at 09:43

## 2021-01-29 RX ADMIN — SODIUM CHLORIDE, POTASSIUM CHLORIDE, SODIUM LACTATE AND CALCIUM CHLORIDE: 600; 310; 30; 20 INJECTION, SOLUTION INTRAVENOUS at 04:01

## 2021-01-29 RX ADMIN — PANTOPRAZOLE SODIUM 40 MG: 40 INJECTION, POWDER, FOR SOLUTION INTRAVENOUS at 20:13

## 2021-01-29 RX ADMIN — POTASSIUM CHLORIDE 10 MEQ: 10 INJECTION, SOLUTION INTRAVENOUS at 09:40

## 2021-01-29 RX ADMIN — POTASSIUM CHLORIDE 10 MEQ: 10 INJECTION, SOLUTION INTRAVENOUS at 13:32

## 2021-01-29 RX ADMIN — SODIUM CHLORIDE: 9 INJECTION, SOLUTION INTRAVENOUS at 08:37

## 2021-01-29 RX ADMIN — PROPOFOL 50 MG: 10 INJECTION, EMULSION INTRAVENOUS at 09:43

## 2021-01-29 RX ADMIN — INSULIN LISPRO 2 UNITS: 100 INJECTION, SOLUTION INTRAVENOUS; SUBCUTANEOUS at 12:29

## 2021-01-29 RX ADMIN — PANTOPRAZOLE SODIUM 40 MG: 40 INJECTION, POWDER, FOR SOLUTION INTRAVENOUS at 11:34

## 2021-01-29 RX ADMIN — FERROUS SULFATE TAB 325 MG (65 MG ELEMENTAL FE) 325 MG: 325 (65 FE) TAB at 12:31

## 2021-01-29 ASSESSMENT — PULMONARY FUNCTION TESTS
PIF_VALUE: 0

## 2021-01-29 ASSESSMENT — PAIN SCALES - GENERAL: PAINLEVEL_OUTOF10: 0

## 2021-01-29 NOTE — H&P
Pre-operative History and Physical    Patient: Kedar Martinez  : 1940     History Obtained From:  Patient and EHR    HISTORY OF PRESENT ILLNESS:    The patient is a 79 y.o. female who presents for an EGD for suspected GI bleed. She takes Xarelto but denies aspirin or NSAIDs. Past Medical History:    History reviewed. No pertinent past medical history. Past Surgical History:    History reviewed. No pertinent surgical history. Medications Prior to Admission:   No current facility-administered medications on file prior to encounter. Current Outpatient Medications on File Prior to Encounter   Medication Sig Dispense Refill    acetaminophen (TYLENOL) 325 MG tablet Take 650 mg by mouth as needed for Pain      albuterol sulfate HFA (VENTOLIN HFA) 108 (90 Base) MCG/ACT inhaler Inhale 2 puffs into the lungs every 6 hours as needed for Wheezing      calcium-vitamin D (OSCAL-500) 500-200 MG-UNIT per tablet Take 1 tablet by mouth daily      carvedilol (COREG) 6.25 MG tablet Take 6.25 mg by mouth 2 times daily (with meals)      dilTIAZem (DILACOR XR) 180 MG extended release capsule Take 180 mg by mouth daily      furosemide (LASIX) 20 MG tablet Take 20 mg by mouth 2 times daily      insulin lispro protamine & lispro (HUMALOG MIX) (75-25) 100 UNIT per ML SUSP injection vial Inject into the skin INJECT 14 UNITS IN THE MORNING AND 10 UNITS IN THE EVENING      losartan (COZAAR) 25 MG tablet Take 25 mg by mouth 2 times daily      potassium chloride (KLOR-CON M) 20 MEQ extended release tablet Take 20 mEq by mouth 2 times daily      pravastatin (PRAVACHOL) 10 MG tablet Take 10 mg by mouth daily      rivaroxaban (XARELTO) 20 MG TABS tablet Take 20 mg by mouth daily      letrozole (FEMARA) 2.5 MG tablet Take 2.5 mg by mouth daily       Allergies:  Patient has no known allergies. History of allergic reaction to anesthesia:  No    Social History:    Former smoker, no EtOH    Family History: History reviewed. No pertinent family history. PHYSICAL EXAM:      BP (!) 147/68   Pulse 78   Temp 99.1 °F (37.3 °C) (Oral)   Resp 17   Ht 5' 4\" (1.626 m)   Wt 218 lb 7.6 oz (99.1 kg)   SpO2 100%   BMI 37.50 kg/m²  I        Heart:  No m/r/g +s1/s2 RRR    Lungs:  CTA bilaterally    Abdomen:  Soft, nontender, non distended; +bs    ASA Grade:  ASA 3 - Patient with moderate systemic disease with functional limitations    Mallampati Class:  Class I: Soft palate, uvula, fauces, pillars visible  _____X_____  Class II: Soft palate, uvula, fauces visible  __________   Class III: Soft palate, base of uvula visible  __________  Class IV: Hard palate only visible   __________      ASSESSMENT AND PLAN:    1. Patient is a 79 y.o. female here for EGD with anesthesia  2. Procedure options, risks and benefits reviewed with patient. We specifically discussed that risks include, but are not limited to infection, bleeding, perforation, death, and missed lesions. Patient expresses understanding.

## 2021-01-29 NOTE — PROGRESS NOTES
[address] [place] [address]  GI Progress Note        Maddie Thorpe is a 79 y.o. female patient. 1. Anemia, unspecified type        Admit Date: 2021    Subjective:       No acute vents overnight. INR at 1.51. Hg stable at 7.1. Afeb overnight, other vitals stable. Scheduled Meds:   potassium chloride  10 mEq Intravenous Q1H    sodium chloride flush  10 mL Intravenous 2 times per day    [Held by provider] furosemide  40 mg Intravenous BID    insulin lispro  0-12 Units Subcutaneous TID WC    insulin lispro  0-6 Units Subcutaneous Nightly    pantoprazole  40 mg Intravenous BID    carvedilol  6.25 mg Oral BID WC    pravastatin  10 mg Oral Daily       Continuous Infusions:   sodium chloride 50 mL/hr at 21 0837    sodium chloride      lactated ringers 100 mL/hr at 21 0401    sodium chloride      dextrose      sodium chloride         PRN Meds:  albuterol sulfate HFA, sodium chloride, sodium chloride, sodium chloride flush, promethazine **OR** ondansetron, polyethylene glycol, acetaminophen **OR** acetaminophen, perflutren lipid microspheres, glucose, dextrose, glucagon (rDNA), dextrose, sodium chloride      Objective:       Patient Vitals for the past 24 hrs:   BP Temp Temp src Pulse Resp SpO2   21 0401 (!) 147/68 99.1 °F (37.3 °C) Oral 78 17 100 %   21 0106      98 %   21 0105     18    21 0010 (!) 149/68 99.1 °F (37.3 °C) Oral 79 17 90 %   21 2044 (!) 147/75 99.2 °F (37.3 °C) Oral 82 18 90 %   21 1652 125/78 97.7 °F (36.5 °C)  80 18 93 %   21 1200 124/78 97.7 °F (36.5 °C)  84 18 95 %   21 1132 (!) 126/57 97.2 °F (36.2 °C)  84 18 94 %       Exam:  VITALS:  BP (!) 147/68   Pulse 78   Temp 99.1 °F (37.3 °C) (Oral)   Resp 17   Ht 5' 4\" (1.626 m)   Wt 218 lb 7.6 oz (99.1 kg)   SpO2 100%   BMI 37.50 kg/m²   TEMPERATURE:  Current - Temp: 99.1 °F (37.3 °C);  Max - Temp  Av.3 °F (36.8 °C)  Min: 97.2 °F (36.2 °C)  Max: 99.2 °F (37.3 °C)    NAD  General appearance: alert, appears stated age, cooperative and no distress  Head: Normocephalic, without obvious abnormality, atraumatic  Neck: supple, symmetrical, trachea midline and thyroid not enlarged, symmetric, no tenderness/mass/nodules  CVS:  RRR, Nl s1s2  Lungs CTA Bilaterally, normal effort  Abdomen: soft, non-tender; bowel sounds normal; no masses,  no organomegaly  AAOx3, No asterixis or encephalopathy  Extremities: No edema. Recent Labs     01/27/21 1936 01/28/21 0319 01/28/21  0319 01/28/21  1636 01/28/21  2141 01/29/21  0405   WBC 9.1 7.1  --   --   --  7.3   HGB 4.3* 7.1*   < > 7.1* 7.0* 7.1*   HCT 15.4* 22.3*   < > 22.6* 21.8* 22.4*   MCV 71.2* 74.0*  --   --   --  73.8*    271  --   --   --  233    < > = values in this interval not displayed. Recent Labs     01/27/21 1936 01/28/21  0319 01/29/21  0405    136 144   K 4.4 3.7 3.3*    100 107   CO2 25 27 29   BUN 43* 39* 26*   CREATININE 1.4* 1.2 1.0     Recent Labs     01/27/21 1936   AST 27   ALT 24   BILIDIR <0.2   BILITOT 0.6   ALKPHOS 84     No results for input(s): LIPASE, AMYLASE in the last 72 hours.   Recent Labs     01/27/21  1936 01/28/21  0624 01/28/21  1144 01/29/21  0405   PROT 5.6*  --   --   --    INR  --  2.09* 1.99* 1.51*       Assessment:       Acute blood loss anemia,microcytic  Suspected GI Bleed  Suspected DAKOTA    Recommendations:       -Although Hg appears stable, I would still recommend  EGD and colonoscopy to rule out any potential sources of bleeding.  -2 Large bore IVs (18 G)  -- H/H q6, transfuse <7   -Hold anticoagulation  -Protonix gtt    Katey Soto MD PGY-1  1/29/2021  9:09 AM

## 2021-01-29 NOTE — CARE COORDINATION
Case Management Assessment           Daily Note                 Date/ Time of Note: 1/29/2021 4:02 PM         Note completed by: Gurinder Lewis    Patient Name: Ronn Saeed  YOB: 1940    Diagnosis:Acute blood loss anemia [D62]  Patient Admission Status: Inpatient    Date of Admission:1/27/2021  6:36 PM Length of Stay: 2 GLOS:      Current Plan of Care: EGD/ Colonoscopy today  ________________________________________________________________________________________  PT AM-PAC:   / 24 per last evaluation on: not ordered    OT AM-PAC:   / 24 per last evaluation on: not ordered    DME Needs for discharge:   ________________________________________________________________________________________  Discharge Plan: Home    Tentative discharge date: 01/30/21     Current barriers to discharge: medical clearance for DC    Referrals completed: Not Applicable    Resources/ information provided: Not indicated at this time  ________________________________________________________________________________________  Case Management Notes: CM continues to follow for discharge planning and needs. Patient down for EGD/ Colonoscopy today, noted ulcer and plan to hold anticoags for 2 weeks with plan for OP follow up EGD to assess healing. Patient plans for return home at NH and denies needs from CM at this time. America Stokes and her family were provided with choice of provider; she and her family are in agreement with the discharge plan.     Care Transition Patient: Arianne Lewis RN  [facility]  Case Management Department  Ph: [phone]  Fax: [phone]

## 2021-01-29 NOTE — ANESTHESIA POSTPROCEDURE EVALUATION
Department of Anesthesiology  Postprocedure Note    Patient: Maddie Thorpe  MRN: [id number]  YOB: 1940  Date of evaluation: 1/29/2021  Time:  12:04 PM     Procedure Summary     Date: 01/29/21 Room / Location: [facility]    Anesthesia Start: 6234 Anesthesia Stop: 1001    Procedure: EGD BIOPSY (N/A ) Diagnosis: (GI Bleed)    Surgeons: Koko George MD Responsible Provider: Ira Robledo MD    Anesthesia Type: MAC ASA Status: 3          Anesthesia Type: MAC    Lito Phase I: Lito Score: 10    Lito Phase II: Lito Score: 10    Last vitals: Reviewed and per EMR flowsheets.        Anesthesia Post Evaluation    Patient participation: complete - patient participated  Level of consciousness: awake  Airway patency: patent  Nausea & Vomiting: no nausea and no vomiting  Complications: no  Cardiovascular status: hemodynamically stable  Respiratory status: acceptable  Hydration status: stable

## 2021-01-29 NOTE — PROGRESS NOTES
Internal Medicine  PGY 1  Progress Note      CC fatigue     History Obtained From:  Patient, daughter      Interval HX:    Lizeth Bedolla. Patient down for EGD this morning. Patient s/p EGD. Feels tired. No complaints at this time but she is hungry. Pt denies fevers, chills, chest pain, abdominal pain, N/V.    HISTORY OF PRESENT ILLNESS:    80 y/o F with PMH of afib (on xarelto), HF, DM, asthma, pulm HTN (not oxygen dependent), HTN, HLD, EDDY on cpap, breast cancer s/p lumpectomy/radiation, colon polyps, obesity who presents with 2 weeks of fatigue and feeling unwell. She notes about 2 weeks ago she had a cough for which she was prescribed a steroid taper for possible asthma exacerbation. Since she started taking the steroids she has been feeling unwell. She endorses fatigue, decreased appetite, intermittent confusion/inability to concentrate. Denies SOB, cough, orthopnea, PND, edema, palpitations. She notes regular stool but they have been dark \"chocolate coloured\". Denied NSAID use. Previous c-scope in 2016 w/ non-cancerous polyps. In ED she was noted to be hemodynamically stable, not tachycardic, hypoxic requiring 2L NC. Lab work significant for an MANDY, Hgb 4.3 (baseline 12), elevated pro-BNP. CXR w/ vascular congestion and effusion. EKG w/ afib, bradycardia and low-voltage. Patient admitted for further management.       Past Medical History:    afib (on xarelto), HF, DM, asthma, pulm HTN (not oxygen dependent), HTN, HLD, EDDY on cpap, breast cancer s/p lumpectomy/radiation, colon polyps, obesity     Past Surgical History:        Procedure Laterality Date    UPPER GASTROINTESTINAL ENDOSCOPY N/A 1/29/2021    EGD BIOPSY performed by Leydi Mueller MD at [facility] END[facility]COPY     breast cancer s/p lumpectomy/radiation,    Medications Priorto Admission:    Medications Prior to Admission: acetaminophen (TYLENOL) 325 MG tablet, Take 650 mg by mouth as needed for Pain General: There is no distension. Palpations: Abdomen is soft. Tenderness: There is no abdominal tenderness. Musculoskeletal:         General: No tenderness. Right lower leg: No edema. Left lower leg: No edema. Skin:     General: Skin is dry. Comments: cold   Neurological:      General: No focal deficit present. Mental Status: She is alert and oriented to person, place, and time. Physical exam:       Vitals:    01/29/21 1125   BP: 125/82   Pulse: 92   Resp: 16   Temp: 97.6 °F (36.4 °C)   SpO2: 98%       DATA:    Labs:  CBC:   Recent Labs     01/27/21  1936 01/28/21 0319 01/28/21  0319 01/28/21  2141 01/29/21  0405 01/29/21  1221   WBC 9.1 7.1  --   --  7.3  --    HGB 4.3* 7.1*   < > 7.0* 7.1* 7.0*   HCT 15.4* 22.3*   < > 21.8* 22.4* 22.6*    271  --   --  233  --     < > = values in this interval not displayed. BMP:   Recent Labs     01/27/21 1936 01/28/21 0319 01/29/21  0405    136 144   K 4.4 3.7 3.3*    100 107   CO2 25 27 29   BUN 43* 39* 26*   CREATININE 1.4* 1.2 1.0   GLUCOSE 183* 130* 124*     LFT's:   Recent Labs     01/27/21 1936   AST 27   ALT 24   BILITOT 0.6   ALKPHOS 84     Troponin:   Recent Labs     01/27/21 1936   TROPONINI <0.01     BNP:No results for input(s): BNP in the last 72 hours. ABGs: No results for input(s): PHART, BHL4PIQ, PO2ART in the last 72 hours. INR:   Recent Labs     01/28/21  0624 01/28/21  1144 01/29/21  0405   INR 2.09* 1.99* 1.51*       U/A:No results for input(s): NITRITE, COLORU, PHUR, LABCAST, WBCUA, RBCUA, MUCUS, TRICHOMONAS, YEAST, BACTERIA, CLARITYU, SPECGRAV, LEUKOCYTESUR, UROBILINOGEN, BILIRUBINUR, BLOODU, GLUCOSEU, AMORPHOUS in the last 72 hours. Invalid input(s): KETONESU    XR CHEST PORTABLE   Final Result   1.  Cardiomegaly and vascular congestion with effusions favor congestive changes          ASSESSMENT AND PLAN: Acute blood loss anemia, hypochromic microcytic - likely 2/2 GI bleed in setting of xaerlto use and likely chronic underlying DAKOTA  Fatigue and dark coloured stool x 2 weeks, recently completed steroids. Hemodynamically stable. Hg 4.3 on admit (baseline 12). Last colonoscopy 8/16 with noncancerous polyps. She did have a colonoscopy in 2015 secondary to a GI bleed which required Endo clipping of vascular polypoid lesion in the sigmoid colon. She has sessile polyps in the cecum and descending colon. Also noted was left-sided severe diverticulosis and small hemorrhoids. - transfused 3U RBC  - Hb stable @ 7.3 this am  - s/p FFP, INR 1.51  - EGD revealed unusual prominence located distal to ampulla with white patches/erosions on it which was biopsied. There is a 0.5 cm clean-based ulcer in the antrum of the stomach with edges biopsied. Small gastric erosion located closer to the pylorus.  - GI plan: Follow-up path biopsies, PPI twice daily, avoid anticoagulants for 2 weeks. Patient will need a EGD to confirm ulcer healing in 12 weeks  - 2 large bore IVs  - H/H q6, transfuse <7   - holding xaerlto   - GI consulted  - iron studies: ferritin 12, iron 15, iron saturation 4, TIBC 363  - p.o. iron daily  - haptoglobin 101,     Acute hypoxia - likely 2/2 cardiogenic pulm edema and severe anemia   Pt requiring 2L NC to maintain sat >95%, desats to 88% on RA at rest. Pro-BNP 1113. CXR w/ cardiomegaly, vascular congestion, effusions R>L. - covid negative  - blood transfusion as above   - hold lasix 40 mg IV BID 2/2 GIB  - low probability for PE     Acute CHF exacerbation - 2/2 acute anemia   EGK w/ afib, bradycardia, low voltage. Trop negative. Hg 4.3. CXR w/ cardiomegaly, vascular congestion, effusions R>L. Last echo 4/17 revealed EF 60-65%. Right atrium and ventricle mildly dilated.  Mild to mod MR  - echo pending, previous w/ mod MR and normal systolic function   - no signs of R sided HF - hold lasix 40 mg IV BID   - cont coreg 6.25 mg BID  - TSH and T4 pending   - strict I/o, daily weight     MANDY - improved  Likely pre-renal 2/2 anemia, possibly from systolic HF. Cr 1.4 on admit (baseline 0.8). BUN/Cr >20.  - Urine studies: UCr 112, Uurea 978.6, Feurea 10.1%, suggestive of pre-renal etio    - Cr 1.4 -> 1.0  - blood transfusion as above   - monitor Cr closely as pt on lasix     Lactic acidosis - resolved  Likely 2/2 decreased perfusion - resolved  LA 2.5 on admit.   - repeat lactate 1.6, will discontinue trend  - transfuse blood as above     Permanent Afib, rate controlled  - holding xarlto, chadsvac score high   - holding home diltiazem in setting of bradycardia     DMT2, controlled   HgA1c 6.2% (9/2020)   - per daughter pt on lispro 14U in AM and 10U PM  - MDSS  - NPO @ midnight, glu q6    Asthma, not in exacerbation   - albuterol prn       Pulm HTN 2/2 DHF - no baseline oxygen requirement. Consider home oxygen eval before discharge. HTN - controlled. Cont lasix and coreg. Holding losartan in setting of MANDY. HLD - cont pravastatin   EDDY on CPAP - home cpap   Obesity due to excess calories - complicating assessment and treatment. Placing patient at risk for multiple co-morbidities as well as early death and contributing to the patient's presentation.  on weight loss when appropriate. Will discuss with attending physician Dr. Nataliya Malagon.     Code Status: Full code  FEN: Cardiac/2g Na diet   PPX: SCD, protonix  DISPO: U    Nathalie Poster, DO PGY-1  1/29/2021,  1:33 PM

## 2021-01-29 NOTE — ANESTHESIA PRE PROCEDURE
Department of Anesthesiology  Preprocedure Note       Name:  Selma Fairchild   Age:  79 y.o.  :  1940                                          MRN:  [id number]         Date:  2021      Surgeon: Radha Smith):  Lorrie Thibodeaux MD    Procedure: Procedure(s):  EGD DIAGNOSTIC ONLY    Medications prior to admission:   Prior to Admission medications    Medication Sig Start Date End Date Taking?  Authorizing Provider   acetaminophen (TYLENOL) 325 MG tablet Take 650 mg by mouth as needed for Pain   Yes Historical Provider, MD   albuterol sulfate HFA (VENTOLIN HFA) 108 (90 Base) MCG/ACT inhaler Inhale 2 puffs into the lungs every 6 hours as needed for Wheezing   Yes Historical Provider, MD   calcium-vitamin D (OSCAL-500) 500-200 MG-UNIT per tablet Take 1 tablet by mouth daily   Yes Historical Provider, MD   carvedilol (COREG) 6.25 MG tablet Take 6.25 mg by mouth 2 times daily (with meals)   Yes Historical Provider, MD   dilTIAZem (DILACOR XR) 180 MG extended release capsule Take 180 mg by mouth daily   Yes Historical Provider, MD   furosemide (LASIX) 20 MG tablet Take 20 mg by mouth 2 times daily   Yes Historical Provider, MD   insulin lispro protamine & lispro (HUMALOG MIX) (75-25) 100 UNIT per ML SUSP injection vial Inject into the skin INJECT 14 UNITS IN THE MORNING AND 10 UNITS IN THE EVENING   Yes Historical Provider, MD   losartan (COZAAR) 25 MG tablet Take 25 mg by mouth 2 times daily   Yes Historical Provider, MD   potassium chloride (KLOR-CON M) 20 MEQ extended release tablet Take 20 mEq by mouth 2 times daily   Yes Historical Provider, MD   pravastatin (PRAVACHOL) 10 MG tablet Take 10 mg by mouth daily   Yes Historical Provider, MD   rivaroxaban (XARELTO) 20 MG TABS tablet Take 20 mg by mouth daily   Yes Historical Provider, MD   letrozole (FEMARA) 2.5 MG tablet Take 2.5 mg by mouth daily   Yes Historical Provider, MD       Current medications:    Current Facility-Administered Medications Medication Dose Route Frequency Provider Last Rate Last Admin    potassium chloride 10 mEq/100 mL IVPB (Peripheral Line)  10 mEq Intravenous Q1H Colin Alarcon DO        albuterol sulfate  (90 Base) MCG/ACT inhaler 2 puff  2 puff Inhalation Q4H PRN Isaiah Saunders MD        0.9 % sodium chloride infusion   Intravenous PRN Colin Alarcon DO        lactated ringers infusion   Intravenous Continuous Nohemi Lui  mL/hr at 01/29/21 0401 New Bag at 01/29/21 0401    0.9 % sodium chloride infusion   Intravenous PRN Bob Mccoy MD        sodium chloride flush 0.9 % injection 10 mL  10 mL Intravenous 2 times per day Bob Mccoy MD   10 mL at 01/28/21 0754    sodium chloride flush 0.9 % injection 10 mL  10 mL Intravenous PRN Bob Mccoy MD        promethazine (PHENERGAN) tablet 12.5 mg  12.5 mg Oral Q6H PRN Bob Mccoy MD        Or    ondansetron (ZOFRAN) injection 4 mg  4 mg Intravenous Q6H PRN Bob Mccoy MD        polyethylene glycol (GLYCOLAX) packet 17 g  17 g Oral Daily PRN Bob Mccoy MD        acetaminophen (TYLENOL) tablet 650 mg  650 mg Oral Q6H PRN Bob Mccoy MD        Or    acetaminophen (TYLENOL) suppository 650 mg  650 mg Rectal Q6H PRN Bob Mccoy MD        perflutren lipid microspheres (DEFINITY) injection 1.65 mg  1.5 mL Intravenous ONCE PRN Bob Mccoy MD        [Held by provider] furosemide (LASIX) injection 40 mg  40 mg Intravenous BID Deyanira Mora MD   40 mg at 01/28/21 0754    glucose (GLUTOSE) 40 % oral gel 15 g  15 g Oral PRN Bob Mccoy MD        dextrose 50 % IV solution  12.5 g Intravenous PRN Bob Mccoy MD        glucagon (rDNA) injection 1 mg  1 mg Intramuscular PRN Bob Mccoy MD        dextrose 5 % solution  100 mL/hr Intravenous PRN Bob Mccoy MD        insulin lispro (1 Unit Dial) 0-12 Units  0-12 Units Subcutaneous TID WC Bob Mccoy MD   Stopped at 01/28/21 180  insulin lispro (1 Unit Dial) 0-6 Units  0-6 Units Subcutaneous Nightly Deyanira Mora MD   1 Units at 01/28/21 2131    0.9 % sodium chloride infusion   Intravenous PRN Rock Grant MD        pantoprazole (PROTONIX) injection 40 mg  40 mg Intravenous BID Rock Grant MD   40 mg at 01/28/21 2045    carvedilol (COREG) tablet 6.25 mg  6.25 mg Oral BID WC Kathy Vallejo MD   6.25 mg at 01/28/21 1654    pravastatin (PRAVACHOL) tablet 10 mg  10 mg Oral Daily Rock Grant MD   10 mg at 01/28/21 0754       Allergies:  No Known Allergies    Problem List:    Patient Active Problem List   Diagnosis Code    Acute blood loss anemia D62    MANDY (acute kidney injury) ([facility] N17.9    GI bleed K92.2    Acute diastolic heart failure (HCC) I50.31    Atrial fibrillation (HCC) I48.91       Past Medical History:  History reviewed. No pertinent past medical history. Past Surgical History:  History reviewed. No pertinent surgical history. Social History:    Social History     Tobacco Use    Smoking status: Former Smoker    Smokeless tobacco: Never Used   Substance Use Topics    Alcohol use: Not Currently                                Counseling given: Not Answered      Vital Signs (Current):   Vitals:    01/29/21 0010 01/29/21 0105 01/29/21 0106 01/29/21 0401   BP: (!) 149/68   (!) 147/68   Pulse: 79   78   Resp: 17 18  17   Temp: 99.1 °F (37.3 °C)   99.1 °F (37.3 °C)   TempSrc: Oral   Oral   SpO2: 90%  98% 100%   Weight:       Height:                                                  BP Readings from Last 3 Encounters:   01/29/21 (!) 147/68       NPO Status:                                                                                 BMI:   Wt Readings from Last 3 Encounters:   01/28/21 218 lb 7.6 oz (99.1 kg)     Body mass index is 37.5 kg/m².     CBC:   Lab Results   Component Value Date    WBC 7.3 01/29/2021    RBC 3.04 01/29/2021    HGB 7.1 01/29/2021    HCT 22.4 01/29/2021    MCV 73.8 01/29/2021    RDW 22.3 01/29/2021

## 2021-01-30 LAB
ANION GAP SERPL CALCULATED.3IONS-SCNC: 6 MMOL/L (ref 3–16)
ANISOCYTOSIS: ABNORMAL
BASOPHILS ABSOLUTE: 0 K/UL (ref 0–0.2)
BASOPHILS RELATIVE PERCENT: 0.7 %
BUN BLDV-MCNC: 18 MG/DL (ref 7–20)
CALCIUM SERPL-MCNC: 8.9 MG/DL (ref 8.3–10.6)
CHLORIDE BLD-SCNC: 106 MMOL/L (ref 99–110)
CO2: 29 MMOL/L (ref 21–32)
CREAT SERPL-MCNC: 0.9 MG/DL (ref 0.6–1.2)
EOSINOPHILS ABSOLUTE: 0.1 K/UL (ref 0–0.6)
EOSINOPHILS RELATIVE PERCENT: 1.7 %
GFR AFRICAN AMERICAN: >60
GFR NON-AFRICAN AMERICAN: >60
GLUCOSE BLD-MCNC: 136 MG/DL (ref 70–99)
GLUCOSE BLD-MCNC: 140 MG/DL (ref 70–99)
GLUCOSE BLD-MCNC: 153 MG/DL (ref 70–99)
GLUCOSE BLD-MCNC: 161 MG/DL (ref 70–99)
GLUCOSE BLD-MCNC: 180 MG/DL (ref 70–99)
HCT VFR BLD CALC: 22.5 % (ref 36–48)
HCT VFR BLD CALC: 24.1 % (ref 36–48)
HCT VFR BLD CALC: 24.6 % (ref 36–48)
HEMOGLOBIN: 7 G/DL (ref 12–16)
HEMOGLOBIN: 7.4 G/DL (ref 12–16)
HEMOGLOBIN: 7.6 G/DL (ref 12–16)
HYPOCHROMIA: ABNORMAL
LYMPHOCYTES ABSOLUTE: 1.2 K/UL (ref 1–5.1)
LYMPHOCYTES RELATIVE PERCENT: 18 %
MAGNESIUM: 2.3 MG/DL (ref 1.8–2.4)
MCH RBC QN AUTO: 23.3 PG (ref 26–34)
MCHC RBC AUTO-ENTMCNC: 31 G/DL (ref 31–36)
MCV RBC AUTO: 75.2 FL (ref 80–100)
MONOCYTES ABSOLUTE: 0.8 K/UL (ref 0–1.3)
MONOCYTES RELATIVE PERCENT: 11.7 %
NEUTROPHILS ABSOLUTE: 4.7 K/UL (ref 1.7–7.7)
NEUTROPHILS RELATIVE PERCENT: 67.9 %
OVALOCYTES: ABNORMAL
PDW BLD-RTO: 23.4 % (ref 12.4–15.4)
PERFORMED ON: ABNORMAL
PLATELET # BLD: 244 K/UL (ref 135–450)
PMV BLD AUTO: 8.5 FL (ref 5–10.5)
POLYCHROMASIA: ABNORMAL
POTASSIUM SERPL-SCNC: 3.6 MMOL/L (ref 3.5–5.1)
PRO-BNP: 2639 PG/ML (ref 0–449)
RBC # BLD: 2.99 M/UL (ref 4–5.2)
SODIUM BLD-SCNC: 141 MMOL/L (ref 136–145)
WBC # BLD: 6.9 K/UL (ref 4–11)

## 2021-01-30 PROCEDURE — 83735 ASSAY OF MAGNESIUM: CPT

## 2021-01-30 PROCEDURE — 2700000000 HC OXYGEN THERAPY PER DAY

## 2021-01-30 PROCEDURE — 2580000003 HC RX 258: Performed by: STUDENT IN AN ORGANIZED HEALTH CARE EDUCATION/TRAINING PROGRAM

## 2021-01-30 PROCEDURE — 85014 HEMATOCRIT: CPT

## 2021-01-30 PROCEDURE — 6370000000 HC RX 637 (ALT 250 FOR IP): Performed by: STUDENT IN AN ORGANIZED HEALTH CARE EDUCATION/TRAINING PROGRAM

## 2021-01-30 PROCEDURE — 6360000002 HC RX W HCPCS: Performed by: STUDENT IN AN ORGANIZED HEALTH CARE EDUCATION/TRAINING PROGRAM

## 2021-01-30 PROCEDURE — 36415 COLL VENOUS BLD VENIPUNCTURE: CPT

## 2021-01-30 PROCEDURE — 94660 CPAP INITIATION&MGMT: CPT

## 2021-01-30 PROCEDURE — 83880 ASSAY OF NATRIURETIC PEPTIDE: CPT

## 2021-01-30 PROCEDURE — 94761 N-INVAS EAR/PLS OXIMETRY MLT: CPT

## 2021-01-30 PROCEDURE — 85025 COMPLETE CBC W/AUTO DIFF WBC: CPT

## 2021-01-30 PROCEDURE — C9113 INJ PANTOPRAZOLE SODIUM, VIA: HCPCS | Performed by: STUDENT IN AN ORGANIZED HEALTH CARE EDUCATION/TRAINING PROGRAM

## 2021-01-30 PROCEDURE — 80048 BASIC METABOLIC PNL TOTAL CA: CPT

## 2021-01-30 PROCEDURE — 85018 HEMOGLOBIN: CPT

## 2021-01-30 PROCEDURE — 1200000000 HC SEMI PRIVATE

## 2021-01-30 RX ADMIN — PANTOPRAZOLE SODIUM 40 MG: 40 INJECTION, POWDER, FOR SOLUTION INTRAVENOUS at 08:48

## 2021-01-30 RX ADMIN — INSULIN LISPRO 1 UNITS: 100 INJECTION, SOLUTION INTRAVENOUS; SUBCUTANEOUS at 23:28

## 2021-01-30 RX ADMIN — CARVEDILOL 6.25 MG: 6.25 TABLET, FILM COATED ORAL at 08:48

## 2021-01-30 RX ADMIN — IRON SUCROSE 300 MG: 20 INJECTION, SOLUTION INTRAVENOUS at 13:12

## 2021-01-30 RX ADMIN — CARVEDILOL 6.25 MG: 6.25 TABLET, FILM COATED ORAL at 17:56

## 2021-01-30 RX ADMIN — PANTOPRAZOLE SODIUM 40 MG: 40 INJECTION, POWDER, FOR SOLUTION INTRAVENOUS at 22:44

## 2021-01-30 RX ADMIN — PRAVASTATIN SODIUM 10 MG: 10 TABLET ORAL at 08:48

## 2021-01-30 RX ADMIN — INSULIN LISPRO 2 UNITS: 100 INJECTION, SOLUTION INTRAVENOUS; SUBCUTANEOUS at 08:48

## 2021-01-30 RX ADMIN — INSULIN LISPRO 2 UNITS: 100 INJECTION, SOLUTION INTRAVENOUS; SUBCUTANEOUS at 13:12

## 2021-01-30 RX ADMIN — Medication 10 ML: at 22:44

## 2021-01-30 RX ADMIN — FERROUS SULFATE TAB 325 MG (65 MG ELEMENTAL FE) 325 MG: 325 (65 FE) TAB at 08:48

## 2021-01-30 ASSESSMENT — PAIN SCALES - GENERAL
PAINLEVEL_OUTOF10: 0
PAINLEVEL_OUTOF10: 0

## 2021-01-30 NOTE — PROGRESS NOTES
Head: Normocephalic, without obvious abnormality, atraumatic  Neck: supple, symmetrical, trachea midline and thyroid not enlarged, symmetric, no tenderness/mass/nodules  CVS:  RRR, Nl s1s2  Lungs CTA Bilaterally, normal effort  Abdomen: soft, NT  AAOx3, No asterixis or encephalopathy        Recent Labs     01/28/21 0319 01/28/21  0319 01/29/21  0405 01/29/21  1221 01/29/21  1949 01/30/21  0435   WBC 7.1  --  7.3  --   --  6.9   HGB 7.1*   < > 7.1* 7.0* 7.6* 7.0*   HCT 22.3*   < > 22.4* 22.6* 24.6* 22.5*   MCV 74.0*  --  73.8*  --   --  75.2*     --  233  --   --  244    < > = values in this interval not displayed. Recent Labs     01/28/21 0319 01/29/21  0405 01/30/21  0435    144 141   K 3.7 3.3* 3.6    107 106   CO2 27 29 29   BUN 39* 26* 18   CREATININE 1.2 1.0 0.9     Recent Labs     01/27/21 1936   AST 27   ALT 24   BILIDIR <0.2   BILITOT 0.6   ALKPHOS 84     No results for input(s): LIPASE, AMYLASE in the last 72 hours. Recent Labs     01/27/21  1936 01/28/21  0624 01/28/21  1144 01/29/21  0405   PROT 5.6*  --   --   --    INR  --  2.09* 1.99* 1.51*     No results for input(s): PTT in the last 72 hours. No results for input(s): OCCULTBLD in the last 72 hours. EGD path still pending      Assessment:     Gastric ulcer  Anemia - Hb stable and BUN has normalized    Recommendations:     Diet as tolerated  Okay to change to oral PPI BID  Repeat EGD 12 weeks to confirm healing  Will sign off.  Please call with further questions    Anisha Alcantara  1/30/2021  10:54 AM

## 2021-01-30 NOTE — PROGRESS NOTES
Patient arrived from [address] [place] [address]. Alert and oriented, denies pain. Vitals wnl. Patient with congested cough, clear sputum. Lung sounds diminished. Last hgb 7.6, stable. Oriented to room, call light, tv, bed. Reviewed safety and plan of care, she verbalized understanding. Up as tolerated in room, gait steady, good safety awareness. Will continue to monitor.

## 2021-01-30 NOTE — PROGRESS NOTES
Internal Medicine  PGY 1  Progress Note      CC fatigue     History Obtained From:  Patient, daughter      Interval HX:    Juan Jose Macdonald. Hb stable. Patient feeling pretty good this am. No complaints. Eating better, making plenty of urine and had BM yesterday. Denies fevers, chills, abdominal pain, N/V, lightheadedness, dizziness, weakness. HISTORY OF PRESENT ILLNESS:    80 y/o F with PMH of afib (on xarelto), HF, DM, asthma, pulm HTN (not oxygen dependent), HTN, HLD, EDDY on cpap, breast cancer s/p lumpectomy/radiation, colon polyps, obesity who presents with 2 weeks of fatigue and feeling unwell. She notes about 2 weeks ago she had a cough for which she was prescribed a steroid taper for possible asthma exacerbation. Since she started taking the steroids she has been feeling unwell. She endorses fatigue, decreased appetite, intermittent confusion/inability to concentrate. Denies SOB, cough, orthopnea, PND, edema, palpitations. She notes regular stool but they have been dark \"chocolate coloured\". Denied NSAID use. Previous c-scope in 2016 w/ non-cancerous polyps. In ED she was noted to be hemodynamically stable, not tachycardic, hypoxic requiring 2L NC. Lab work significant for an MANDY, Hgb 4.3 (baseline 12), elevated pro-BNP. CXR w/ vascular congestion and effusion. EKG w/ afib, bradycardia and low-voltage. Patient admitted for further management.       Past Medical History:    afib (on xarelto), HF, DM, asthma, pulm HTN (not oxygen dependent), HTN, HLD, EDDY on cpap, breast cancer s/p lumpectomy/radiation, colon polyps, obesity     Past Surgical History:        Procedure Laterality Date    UPPER GASTROINTESTINAL ENDOSCOPY N/A 1/29/2021    EGD BIOPSY performed by Augusto Lilly MD at [facility] ENDOSCOPY     breast cancer s/p lumpectomy/radiation,    Medications Priorto Admission:    Medications Prior to Admission: acetaminophen (TYLENOL) 325 MG tablet, Take 650 mg by mouth as needed for Pain albuterol sulfate HFA (VENTOLIN HFA) 108 (90 Base) MCG/ACT inhaler, Inhale 2 puffs into the lungs every 6 hours as needed for Wheezing  calcium-vitamin D (OSCAL-500) 500-200 MG-UNIT per tablet, Take 1 tablet by mouth daily  carvedilol (COREG) 6.25 MG tablet, Take 6.25 mg by mouth 2 times daily (with meals)  dilTIAZem (DILACOR XR) 180 MG extended release capsule, Take 180 mg by mouth daily  furosemide (LASIX) 20 MG tablet, Take 20 mg by mouth 2 times daily  insulin lispro protamine & lispro (HUMALOG MIX) (75-25) 100 UNIT per ML SUSP injection vial, Inject into the skin INJECT 14 UNITS IN THE MORNING AND 10 UNITS IN THE EVENING  losartan (COZAAR) 25 MG tablet, Take 25 mg by mouth 2 times daily  potassium chloride (KLOR-CON M) 20 MEQ extended release tablet, Take 20 mEq by mouth 2 times daily  pravastatin (PRAVACHOL) 10 MG tablet, Take 10 mg by mouth daily  rivaroxaban (XARELTO) 20 MG TABS tablet, Take 20 mg by mouth daily  letrozole (FEMARA) 2.5 MG tablet, Take 2.5 mg by mouth daily    Allergies:  Patient has no known allergies. Social History:   · TOBACCO:   reports that she has quit smoking. She has never used smokeless tobacco.  · ETOH:   reports previous alcohol use. · DRUGS : denies   · Patient currently lives alone at home w/ dog   ·   Family History:   History reviewed. No pertinent family history. ROS: A 10 point review of systems was conducted, significant findings as noted in HPI. Physical Exam  Constitutional:       General: She is not in acute distress. Appearance: She is obese. She is not ill-appearing. HENT:      Head: Normocephalic and atraumatic. Cardiovascular:      Rate and Rhythm: Normal rate. Rhythm irregular. Heart sounds: Murmur present. Comments: No JVD  Pulmonary:      Effort: Pulmonary effort is normal. No respiratory distress. Breath sounds: No wheezing, rhonchi (expiratory ) or rales.       Comments: On 2L NC, desats to 88% on RA at rest   Abdominal: General: There is no distension. Palpations: Abdomen is soft. Tenderness: There is no abdominal tenderness. There is no guarding. Musculoskeletal:         General: No tenderness. Right lower leg: No edema. Left lower leg: No edema. Skin:     General: Skin is warm and dry. Comments: cold   Neurological:      General: No focal deficit present. Mental Status: She is alert and oriented to person, place, and time. Physical exam:       Vitals:    01/30/21 0900   BP: 131/80   Pulse:    Resp:    Temp:    SpO2:        DATA:    Labs:  CBC:   Recent Labs     01/28/21  0319 01/28/21  0319 01/29/21  0405 01/29/21  1221 01/29/21  1949 01/30/21  0435   WBC 7.1  --  7.3  --   --  6.9   HGB 7.1*   < > 7.1* 7.0* 7.6* 7.0*   HCT 22.3*   < > 22.4* 22.6* 24.6* 22.5*     --  233  --   --  244    < > = values in this interval not displayed. BMP:   Recent Labs     01/28/21  0319 01/29/21  0405 01/30/21  0435    144 141   K 3.7 3.3* 3.6    107 106   CO2 27 29 29   BUN 39* 26* 18   CREATININE 1.2 1.0 0.9   GLUCOSE 130* 124* 161*     LFT's:   Recent Labs     01/27/21 1936   AST 27   ALT 24   BILITOT 0.6   ALKPHOS 84     Troponin:   Recent Labs     01/27/21 1936   TROPONINI <0.01     BNP:No results for input(s): BNP in the last 72 hours. ABGs: No results for input(s): PHART, CYD6IKS, PO2ART in the last 72 hours. INR:   Recent Labs     01/28/21  0624 01/28/21  1144 01/29/21  0405   INR 2.09* 1.99* 1.51*       U/A:No results for input(s): NITRITE, COLORU, PHUR, LABCAST, WBCUA, RBCUA, MUCUS, TRICHOMONAS, YEAST, BACTERIA, CLARITYU, SPECGRAV, LEUKOCYTESUR, UROBILINOGEN, BILIRUBINUR, BLOODU, GLUCOSEU, AMORPHOUS in the last 72 hours. Invalid input(s): KETONESU    XR CHEST PORTABLE   Final Result   1.  Cardiomegaly and vascular congestion with effusions favor congestive changes          ASSESSMENT AND PLAN: Acute blood loss anemia, hypochromic microcytic - likely 2/2 GI bleed in setting of xaerlto use and likely chronic underlying DAKOTA  Fatigue and dark coloured stool x 2 weeks, recently completed steroids. Hemodynamically stable. Hg 4.3 on admit (baseline 12). Last colonoscopy 8/16 with noncancerous polyps. She did have a colonoscopy in 2015 secondary to a GI bleed which required Endo clipping of vascular polypoid lesion in the sigmoid colon. She has sessile polyps in the cecum and descending colon. Also noted was left-sided severe diverticulosis and small hemorrhoids. - transfused 3U RBC  - Hb stable @ 7.0 this am  - s/p FFP  - EGD revealed unusual prominence located distal to ampulla with white patches/erosions on it which was biopsied. There is a 0.5 cm clean-based ulcer in the antrum of the stomach with edges biopsied. Small gastric erosion located closer to the pylorus.  - GI plan: Follow-up path biopsies, PPI twice daily, avoid anticoagulants for 2 weeks. Patient will need a EGD to confirm ulcer healing in 12 weeks  - IV iron 300 mg for today and tomorrow  - 2 large bore IVs  - H/H q6, transfuse <7   - holding xaerlto   - GI consulted  - iron studies: ferritin 12, iron 15, iron saturation 4, TIBC 363  - p.o. iron daily outpatient  - haptoglobin 101,     Acute hypoxia - likely 2/2 cardiogenic pulm edema and severe anemia   Pt requiring 2L NC to maintain sat >95%, desats to 88% on RA at rest. Pro-BNP 1113. CXR w/ cardiomegaly, vascular congestion, effusions R>L. - covid negative  - blood transfusion as above   - hold lasix 40 mg IV BID 2/2 GIB  - low probability for PE     Acute CHF exacerbation - 2/2 acute anemia   EGK w/ afib, bradycardia, low voltage. Trop negative. Hg 4.3. CXR w/ cardiomegaly, vascular congestion, effusions R>L. Last echo 4/17 revealed EF 60-65%. Right atrium and ventricle mildly dilated.  Mild to mod MR Lawson Pate, DO PGY-1  1/30/2021,  10:24 AM

## 2021-01-30 NOTE — PROGRESS NOTES
Physician Progress Note      PATIENT:               Mendel Apple  CSN #:                  [id number]  :                       1940  ADMIT DATE:       2021 6:36 PM  [address] [place] DATE:  RESPONDING  PROVIDER #:        Mi Arciniega MD          QUERY TEXT:    Patient admitted with GI Bleed and Acute blood loss anemia, hypochromic   microcytic - likely 2/2 GI bleed in setting of Xarelto use and likely chronic   underlying DAKOTA If possible, please document in progress notes and discharge   summary if you are evaluating and/or treating any of the following: The medical record reflects the following:  Risk Factors: 80 y/o female with  Clinical Indicators: Per ED PN: \"The patient's hemoglobin came back severely   anemic at 4.3. On further examination and questioning the patient denied any   hematemesis, hematuria, melena, hematochezia. A rectal exam was performed and   showed no melena. \"  EGD Report: 0.5cm clean based ulcer in the antrum. H/H on   admit 4.3/15.4  Treatment: The patient was ordered for 2 units of packed red blood cells, lab   monitoring, GI Consult, EGD with BX, Lab monitoring  Options provided:  -- GI Bleed due to anticoagulants, Xarelto  -- GI Bleed due to anticoagulants, Xarelto and DAKOTA  -- GI Bleed due to 0.5cm clean based ulcer in the antrum  -- GI Bleed due to other, please specify, Please specify likely cause. -- Other - I will add my own diagnosis  -- Disagree - Not applicable / Not valid  -- Disagree - Clinically unable to determine / Unknown  -- Refer to Clinical Documentation Reviewer    PROVIDER RESPONSE TEXT:    This patient has GI Bleed due to 0.5cm clean based ulcer in the antrum .     Query created by: Lars Duran on 2021 2:49 PM      Electronically signed by:  Mi Arciniega MD 2021 9:14 AM

## 2021-01-30 NOTE — PLAN OF CARE
Problem: Falls - Risk of:  Goal: Will remain free from falls  Description: Will remain free from falls  Outcome: Ongoing  Goal: Absence of physical injury  Description: Absence of physical injury  Outcome: Ongoing     Problem: Activity:  Goal: Fatigue will decrease  Description: Fatigue will decrease  Outcome: Ongoing  Goal: Ability to tolerate increased activity will improve  Description: Ability to tolerate increased activity will improve  Outcome: Ongoing     Continue with plan of care.

## 2021-01-31 VITALS
TEMPERATURE: 98.4 F | HEIGHT: 64 IN | SYSTOLIC BLOOD PRESSURE: 129 MMHG | HEART RATE: 74 BPM | BODY MASS INDEX: 37.3 KG/M2 | OXYGEN SATURATION: 95 % | WEIGHT: 218.48 LBS | DIASTOLIC BLOOD PRESSURE: 66 MMHG | RESPIRATION RATE: 17 BRPM

## 2021-01-31 LAB
ANION GAP SERPL CALCULATED.3IONS-SCNC: 8 MMOL/L (ref 3–16)
ANISOCYTOSIS: ABNORMAL
BASOPHILS ABSOLUTE: 0 K/UL (ref 0–0.2)
BASOPHILS RELATIVE PERCENT: 0.5 %
BUN BLDV-MCNC: 15 MG/DL (ref 7–20)
CALCIUM SERPL-MCNC: 9.2 MG/DL (ref 8.3–10.6)
CHLORIDE BLD-SCNC: 106 MMOL/L (ref 99–110)
CO2: 29 MMOL/L (ref 21–32)
CREAT SERPL-MCNC: 0.8 MG/DL (ref 0.6–1.2)
EOSINOPHILS ABSOLUTE: 0.1 K/UL (ref 0–0.6)
EOSINOPHILS RELATIVE PERCENT: 1.5 %
GFR AFRICAN AMERICAN: >60
GFR NON-AFRICAN AMERICAN: >60
GLUCOSE BLD-MCNC: 123 MG/DL (ref 70–99)
GLUCOSE BLD-MCNC: 136 MG/DL (ref 70–99)
HCT VFR BLD CALC: 23.1 % (ref 36–48)
HEMOGLOBIN: 7.1 G/DL (ref 12–16)
HYPOCHROMIA: ABNORMAL
LYMPHOCYTES ABSOLUTE: 1 K/UL (ref 1–5.1)
LYMPHOCYTES RELATIVE PERCENT: 14.7 %
MAGNESIUM: 2.2 MG/DL (ref 1.8–2.4)
MCH RBC QN AUTO: 23.1 PG (ref 26–34)
MCHC RBC AUTO-ENTMCNC: 30.8 G/DL (ref 31–36)
MCV RBC AUTO: 75 FL (ref 80–100)
MONOCYTES ABSOLUTE: 0.8 K/UL (ref 0–1.3)
MONOCYTES RELATIVE PERCENT: 11.1 %
NEUTROPHILS ABSOLUTE: 5.1 K/UL (ref 1.7–7.7)
NEUTROPHILS RELATIVE PERCENT: 72.2 %
PDW BLD-RTO: 23.7 % (ref 12.4–15.4)
PERFORMED ON: ABNORMAL
PLATELET # BLD: 216 K/UL (ref 135–450)
PMV BLD AUTO: 8.6 FL (ref 5–10.5)
POLYCHROMASIA: ABNORMAL
POTASSIUM SERPL-SCNC: 3.4 MMOL/L (ref 3.5–5.1)
RBC # BLD: 3.08 M/UL (ref 4–5.2)
SLIDE REVIEW: ABNORMAL
SODIUM BLD-SCNC: 143 MMOL/L (ref 136–145)
WBC # BLD: 7.1 K/UL (ref 4–11)

## 2021-01-31 PROCEDURE — 36415 COLL VENOUS BLD VENIPUNCTURE: CPT

## 2021-01-31 PROCEDURE — 6370000000 HC RX 637 (ALT 250 FOR IP): Performed by: STUDENT IN AN ORGANIZED HEALTH CARE EDUCATION/TRAINING PROGRAM

## 2021-01-31 PROCEDURE — 2580000003 HC RX 258: Performed by: STUDENT IN AN ORGANIZED HEALTH CARE EDUCATION/TRAINING PROGRAM

## 2021-01-31 PROCEDURE — 80048 BASIC METABOLIC PNL TOTAL CA: CPT

## 2021-01-31 PROCEDURE — 85025 COMPLETE CBC W/AUTO DIFF WBC: CPT

## 2021-01-31 PROCEDURE — 6360000002 HC RX W HCPCS: Performed by: STUDENT IN AN ORGANIZED HEALTH CARE EDUCATION/TRAINING PROGRAM

## 2021-01-31 PROCEDURE — 83735 ASSAY OF MAGNESIUM: CPT

## 2021-01-31 RX ORDER — LANOLIN ALCOHOL/MO/W.PET/CERES
325 CREAM (GRAM) TOPICAL
Qty: 30 TABLET | Refills: 3 | Status: SHIPPED | OUTPATIENT
Start: 2021-01-31 | End: 2021-01-31 | Stop reason: SDUPTHER

## 2021-01-31 RX ORDER — PANTOPRAZOLE SODIUM 40 MG/1
40 TABLET, DELAYED RELEASE ORAL
Qty: 30 TABLET | Refills: 0 | Status: SHIPPED | OUTPATIENT
Start: 2021-01-31 | End: 2021-01-31

## 2021-01-31 RX ORDER — POTASSIUM CHLORIDE 20 MEQ/1
40 TABLET, EXTENDED RELEASE ORAL ONCE
Status: COMPLETED | OUTPATIENT
Start: 2021-01-31 | End: 2021-01-31

## 2021-01-31 RX ORDER — FUROSEMIDE 20 MG/1
20 TABLET ORAL DAILY
Qty: 60 TABLET | Refills: 3 | Status: SHIPPED | OUTPATIENT
Start: 2021-01-31 | End: 2021-04-28 | Stop reason: ALTCHOICE

## 2021-01-31 RX ORDER — FUROSEMIDE 20 MG/1
20 TABLET ORAL DAILY
Qty: 60 TABLET | Refills: 3 | Status: SHIPPED | OUTPATIENT
Start: 2021-01-31 | End: 2021-01-31 | Stop reason: SDUPTHER

## 2021-01-31 RX ORDER — PANTOPRAZOLE SODIUM 40 MG/1
40 TABLET, DELAYED RELEASE ORAL
Status: DISCONTINUED | OUTPATIENT
Start: 2021-01-31 | End: 2021-01-31 | Stop reason: HOSPADM

## 2021-01-31 RX ORDER — LANOLIN ALCOHOL/MO/W.PET/CERES
325 CREAM (GRAM) TOPICAL
Qty: 30 TABLET | Refills: 3 | Status: SHIPPED | OUTPATIENT
Start: 2021-01-31 | End: 2021-04-28 | Stop reason: ALTCHOICE

## 2021-01-31 RX ORDER — PANTOPRAZOLE SODIUM 40 MG/1
40 TABLET, DELAYED RELEASE ORAL
Qty: 30 TABLET | Refills: 0 | Status: SHIPPED | OUTPATIENT
Start: 2021-01-31 | End: 2021-04-28 | Stop reason: ALTCHOICE

## 2021-01-31 RX ADMIN — Medication 10 ML: at 08:33

## 2021-01-31 RX ADMIN — CARVEDILOL 6.25 MG: 6.25 TABLET, FILM COATED ORAL at 08:33

## 2021-01-31 RX ADMIN — IRON SUCROSE 300 MG: 20 INJECTION, SOLUTION INTRAVENOUS at 11:21

## 2021-01-31 RX ADMIN — PANTOPRAZOLE SODIUM 40 MG: 40 TABLET, DELAYED RELEASE ORAL at 14:46

## 2021-01-31 RX ADMIN — POTASSIUM CHLORIDE 40 MEQ: 1500 TABLET, EXTENDED RELEASE ORAL at 06:53

## 2021-01-31 RX ADMIN — PANTOPRAZOLE SODIUM 40 MG: 40 TABLET, DELAYED RELEASE ORAL at 08:32

## 2021-01-31 RX ADMIN — PRAVASTATIN SODIUM 10 MG: 10 TABLET ORAL at 08:33

## 2021-01-31 NOTE — DISCHARGE SUMMARY
INTERNAL MEDICINE DEPARTMENT AT [address] [place] [address]  DISCHARGE SUMMARY    Patient ID: Carmen Govea                                             Discharge Date: 1/31/2021   Patient's PCP: No primary care provider on file. Discharge Physician: Marie Rajput MD  Admit Date: 1/27/2021   Admitting Physician: Christie Lynn DO    PROBLEMS DURING HOSPITALIZATION:  Present on Admission:   Acute blood loss anemia   MANDY (acute kidney injury) ([facility] [address]   GI bleed   Acute diastolic heart failure (HCC)   Atrial fibrillation (HCC)      DISCHARGE DIAGNOSES:  1. Acute GI bleed  2. Acute blood loss anemia 2/2 Gi bleed   3. Acute hypoxia, resolved  4. Acute CHF exacerbation, resolved in setting of anemia   5. MANDY improved   6. Lactic acidosis, resolved  7. Permanent Afib  8. DM2  9.  Pulm HTN     Brief hospital course This is a 79year old female with extensive past medical history and morbidly obese who presented to the hospital with a chief complaint of fatigue. She was found to acute GI bleed and GI was consulted. EGD was done showed unusual prominence located distal to ampulla with white patches/erosions on it which was biopsied. There is a 0.5 cm clean-based ulcer in the antrum of the stomach with edges biopsied. Small gastric erosion located closer to the pylorus. She continues to be on PPI twice daily. Will need to be off from anticoagulants for 2 weeks. She will need a repeat EGD to confirm ulcer healing in 12 weeks. She was transfused total of 2 units of blood, 1 unit of FFP given high INR. Due to significant iron deficiency in setting of anemia, she was also given IV iron for 2 days and discharged on iron supplement daily. In setting of her GI bleed, patient also suffered from acute hypoxia (resolved), acute CHF exacerbation (resolved), MANDY (resolved), and lactic acidosis (resolved). Her other chronic problems were also addressed while she's in the hospital. She was discharged home in stable condition. Physical Exam:  /66   Pulse 74   Temp 98.4 °F (36.9 °C) (Oral)   Resp 17   Ht 5' 4\" (1.626 m)   Wt 218 lb 7.6 oz (99.1 kg)   SpO2 95%   BMI 37.50 kg/m²   Constitutional:       General: She is not in acute distress. Appearance: She is obese. She is not ill-appearing. HENT:      Head: Normocephalic and atraumatic. Cardiovascular:      Rate and Rhythm: Normal rate. Rhythm irregular. Heart sounds: Murmur present. Comments: No JVD  Pulmonary:      Effort: Pulmonary effort is normal. No respiratory distress. Breath sounds: No wheezing, or rales. Abdominal:      General: There is no distension. Palpations: Abdomen is soft. Tenderness: There is no abdominal tenderness. There is no guarding. Musculoskeletal:         General: No tenderness. Right lower leg: No edema. Left lower leg: No edema. Skin:     General: Skin is warm and dry. Neurological:      General: No focal deficit present. Mental Status: She is alert and oriented to person, place, and time.       Consults: GI  Significant Diagnostic Studies:  EDG  Treatments: IV hydration and Blood transfusion, FFP transfusion    Disposition: home  Discharged Condition: Stable  Follow Up: Primary Care Physician in one week, Follow up with GI within 2 weeks     DISCHARGE MEDICATION:     Medication List      START taking these medications    ferrous sulfate 325 (65 Fe) MG EC tablet  Commonly known as: FE TABS 325  Take 1 tablet by mouth daily (with breakfast)     pantoprazole 40 MG tablet  Commonly known as: PROTONIX  Take 1 tablet by mouth 2 times daily (before meals)        CHANGE how you take these medications    furosemide 20 MG tablet  Commonly known as: LASIX  Take 1 tablet by mouth daily  What changed: when to take this        CONTINUE taking these medications    acetaminophen 325 MG tablet  Commonly known as: TYLENOL     calcium-vitamin D 500-200 MG-UNIT per tablet  Commonly known as: OSCAL-500     carvedilol 6.25 MG tablet  Commonly known as: COREG     dilTIAZem 180 MG extended release capsule  Commonly known as: DILACOR XR     insulin lispro protamine & lispro (75-25) 100 UNIT per ML Susp injection vial  Commonly known as: HUMALOG MIX     letrozole 2.5 MG tablet  Commonly known as: FEMARA     losartan 25 MG tablet  Commonly known as: COZAAR     potassium chloride 20 MEQ extended release tablet  Commonly known as: KLOR-CON M     pravastatin 10 MG tablet  Commonly known as: PRAVACHOL     Ventolin  (90 Base) MCG/ACT inhaler  Generic drug: albuterol sulfate HFA        STOP taking these medications    Xarelto 20 MG Tabs tablet  Generic drug: rivaroxaban           Where to Get Your Medications These medications were sent to [facility] [address] [phone]  [place] [id number]    Phone: [phone]   · ferrous sulfate 325 (65 Fe) MG EC tablet  · furosemide 20 MG tablet  · pantoprazole 40 MG tablet       Activity: activity as tolerated  Diet: regular diet  Wound Care: none needed    Time Spent on discharge is more than 30 minutes    Signed:  Simeon Lutz MD, PGY-2  Please contact via [employer]   1/31/2021

## 2021-01-31 NOTE — PROGRESS NOTES
Patient's hemoglobin stable. No bleeding from rectum. Tolerating general diet. Discharge orders received. IV removed. Reviewed discharge instructions, she verbalized understanding. Waiting for family to arrived, to transport home.

## 2021-01-31 NOTE — PROGRESS NOTES
Pt alert and oriented x 4. Pt up at [place]. VSS with slight increase in blood pressure. .  Blood sugar just slightly elevated. Pt Afib on telemetry. Pt currently sleeping with BIPAP. Pt calls out appropriately. Saline locked in the right ac. Call light in reach. Will continue to monitor.  Electronically signed by Massimo Carpenter RN on 1/31/2021 at 5:25 AM

## 2021-02-02 ENCOUNTER — APPOINTMENT (OUTPATIENT)
Dept: INTERVENTIONAL RADIOLOGY/VASCULAR | Age: 81
DRG: 003 | End: 2021-02-02
Payer: COMMERCIAL

## 2021-02-02 ENCOUNTER — APPOINTMENT (OUTPATIENT)
Dept: GENERAL RADIOLOGY | Age: 81
DRG: 003 | End: 2021-02-02
Payer: COMMERCIAL

## 2021-02-02 ENCOUNTER — APPOINTMENT (OUTPATIENT)
Dept: CT IMAGING | Age: 81
DRG: 003 | End: 2021-02-02
Payer: COMMERCIAL

## 2021-02-02 ENCOUNTER — HOSPITAL ENCOUNTER (INPATIENT)
Age: 81
LOS: 21 days | Discharge: NURSING, REHAB OR LONG-TERM CARE | DRG: 003 | End: 2021-02-23
Attending: EMERGENCY MEDICINE | Admitting: INTERNAL MEDICINE
Payer: COMMERCIAL

## 2021-02-02 DIAGNOSIS — R56.9 FOCAL SEIZURE (HCC): Primary | ICD-10-CM

## 2021-02-02 DIAGNOSIS — I63.9 ACUTE CEREBROVASCULAR ACCIDENT (CVA) (HCC): ICD-10-CM

## 2021-02-02 DIAGNOSIS — I63.511 ACUTE RIGHT MCA STROKE (HCC): ICD-10-CM

## 2021-02-02 LAB
A/G RATIO: 1.1 (ref 1.1–2.2)
ALBUMIN SERPL-MCNC: 3 G/DL (ref 3.4–5)
ALP BLD-CCNC: 91 U/L (ref 40–129)
ALT SERPL-CCNC: 15 U/L (ref 10–40)
ANION GAP SERPL CALCULATED.3IONS-SCNC: 10 MMOL/L (ref 3–16)
APTT: 18.8 SEC (ref 24.2–36.2)
AST SERPL-CCNC: 21 U/L (ref 15–37)
BASE EXCESS VENOUS: 3.1 MMOL/L (ref -2–3)
BASOPHILS ABSOLUTE: 0 K/UL (ref 0–0.2)
BASOPHILS RELATIVE PERCENT: 0.7 %
BILIRUB SERPL-MCNC: 0.6 MG/DL (ref 0–1)
BUN BLDV-MCNC: 15 MG/DL (ref 7–20)
CALCIUM SERPL-MCNC: 8.7 MG/DL (ref 8.3–10.6)
CARBOXYHEMOGLOBIN: 1.8 % (ref 0–1.5)
CHLORIDE BLD-SCNC: 106 MMOL/L (ref 99–110)
CO2: 23 MMOL/L (ref 21–32)
CREAT SERPL-MCNC: 0.8 MG/DL (ref 0.6–1.2)
EOSINOPHILS ABSOLUTE: 0.1 K/UL (ref 0–0.6)
EOSINOPHILS RELATIVE PERCENT: 1.1 %
GFR AFRICAN AMERICAN: >60
GFR NON-AFRICAN AMERICAN: >60
GLOBULIN: 2.7 G/DL
GLUCOSE BLD-MCNC: 154 MG/DL (ref 70–99)
HCO3 VENOUS: 28.3 MMOL/L (ref 24–28)
HCT VFR BLD CALC: 25.1 % (ref 36–48)
HEMOGLOBIN: 7.3 G/DL (ref 12–16)
INR BLD: 1.32 (ref 0.86–1.14)
LACTIC ACID: 1.1 MMOL/L (ref 0.4–2)
LYMPHOCYTES ABSOLUTE: 0.9 K/UL (ref 1–5.1)
LYMPHOCYTES RELATIVE PERCENT: 11.8 %
MCH RBC QN AUTO: 23.5 PG (ref 26–34)
MCHC RBC AUTO-ENTMCNC: 28.9 G/DL (ref 31–36)
MCV RBC AUTO: 81.3 FL (ref 80–100)
METHEMOGLOBIN VENOUS: 0.2 % (ref 0–1.5)
MONOCYTES ABSOLUTE: 0.6 K/UL (ref 0–1.3)
MONOCYTES RELATIVE PERCENT: 8.4 %
NEUTROPHILS ABSOLUTE: 5.7 K/UL (ref 1.7–7.7)
NEUTROPHILS RELATIVE PERCENT: 78 %
O2 SAT, VEN: 83 %
PCO2, VEN: 45.4 MMHG (ref 41–51)
PDW BLD-RTO: 25 % (ref 12.4–15.4)
PH VENOUS: 7.4 (ref 7.35–7.45)
PLATELET # BLD: 205 K/UL (ref 135–450)
PMV BLD AUTO: 9.1 FL (ref 5–10.5)
PO2, VEN: 49.8 MMHG (ref 25–40)
POTASSIUM REFLEX MAGNESIUM: 4.2 MMOL/L (ref 3.5–5.1)
PROTHROMBIN TIME: 15.4 SEC (ref 10–13.2)
RAPID INFLUENZA  B AGN: NEGATIVE
RAPID INFLUENZA A AGN: NEGATIVE
RBC # BLD: 3.09 M/UL (ref 4–5.2)
SODIUM BLD-SCNC: 139 MMOL/L (ref 136–145)
TCO2 CALC VENOUS: 29 MMOL/L
TOTAL PROTEIN: 5.7 G/DL (ref 6.4–8.2)
TROPONIN: <0.01 NG/ML
WBC # BLD: 7.2 K/UL (ref 4–11)

## 2021-02-02 PROCEDURE — 71045 X-RAY EXAM CHEST 1 VIEW: CPT

## 2021-02-02 PROCEDURE — 80053 COMPREHEN METABOLIC PANEL: CPT

## 2021-02-02 PROCEDURE — 83605 ASSAY OF LACTIC ACID: CPT

## 2021-02-02 PROCEDURE — C1769 GUIDE WIRE: HCPCS

## 2021-02-02 PROCEDURE — 70450 CT HEAD/BRAIN W/O DYE: CPT

## 2021-02-02 PROCEDURE — 87804 INFLUENZA ASSAY W/OPTIC: CPT

## 2021-02-02 PROCEDURE — 85730 THROMBOPLASTIN TIME PARTIAL: CPT

## 2021-02-02 PROCEDURE — 70496 CT ANGIOGRAPHY HEAD: CPT

## 2021-02-02 PROCEDURE — C1889 IMPLANT/INSERT DEVICE, NOC: HCPCS

## 2021-02-02 PROCEDURE — 36223 PLACE CATH CAROTID/INOM ART: CPT

## 2021-02-02 PROCEDURE — 85610 PROTHROMBIN TIME: CPT

## 2021-02-02 PROCEDURE — U0003 INFECTIOUS AGENT DETECTION BY NUCLEIC ACID (DNA OR RNA); SEVERE ACUTE RESPIRATORY SYNDROME CORONAVIRUS 2 (SARS-COV-2) (CORONAVIRUS DISEASE [COVID-19]), AMPLIFIED PROBE TECHNIQUE, MAKING USE OF HIGH THROUGHPUT TECHNOLOGIES AS DESCRIBED BY CMS-2020-01-R: HCPCS

## 2021-02-02 PROCEDURE — 36226 PLACE CATH VERTEBRAL ART: CPT

## 2021-02-02 PROCEDURE — C1725 CATH, TRANSLUMIN NON-LASER: HCPCS

## 2021-02-02 PROCEDURE — 84484 ASSAY OF TROPONIN QUANT: CPT

## 2021-02-02 PROCEDURE — 6370000000 HC RX 637 (ALT 250 FOR IP): Performed by: EMERGENCY MEDICINE

## 2021-02-02 PROCEDURE — C1894 INTRO/SHEATH, NON-LASER: HCPCS

## 2021-02-02 PROCEDURE — 03CG3ZZ EXTIRPATION OF MATTER FROM INTRACRANIAL ARTERY, PERCUTANEOUS APPROACH: ICD-10-PCS | Performed by: NEUROLOGICAL SURGERY

## 2021-02-02 PROCEDURE — 36415 COLL VENOUS BLD VENIPUNCTURE: CPT

## 2021-02-02 PROCEDURE — 2709999900 HC NON-CHARGEABLE SUPPLY

## 2021-02-02 PROCEDURE — 0042T CT BRAIN PERFUSION: CPT

## 2021-02-02 PROCEDURE — 6360000002 HC RX W HCPCS: Performed by: NEUROLOGICAL SURGERY

## 2021-02-02 PROCEDURE — 82803 BLOOD GASES ANY COMBINATION: CPT

## 2021-02-02 PROCEDURE — C1760 CLOSURE DEV, VASC: HCPCS

## 2021-02-02 PROCEDURE — 61645 PERQ ART M-THROMBECT &/NFS: CPT

## 2021-02-02 PROCEDURE — 6360000004 HC RX CONTRAST MEDICATION: Performed by: EMERGENCY MEDICINE

## 2021-02-02 PROCEDURE — B31R1ZZ FLUOROSCOPY OF INTRACRANIAL ARTERIES USING LOW OSMOLAR CONTRAST: ICD-10-PCS | Performed by: NEUROLOGICAL SURGERY

## 2021-02-02 PROCEDURE — C1887 CATHETER, GUIDING: HCPCS

## 2021-02-02 PROCEDURE — 2000000000 HC ICU R&B

## 2021-02-02 PROCEDURE — 85025 COMPLETE CBC W/AUTO DIFF WBC: CPT

## 2021-02-02 PROCEDURE — 99284 EMERGENCY DEPT VISIT MOD MDM: CPT

## 2021-02-02 PROCEDURE — 3E05317 INTRODUCTION OF OTHER THROMBOLYTIC INTO PERIPHERAL ARTERY, PERCUTANEOUS APPROACH: ICD-10-PCS | Performed by: NEUROLOGICAL SURGERY

## 2021-02-02 PROCEDURE — 93005 ELECTROCARDIOGRAM TRACING: CPT | Performed by: EMERGENCY MEDICINE

## 2021-02-02 RX ORDER — ONDANSETRON 2 MG/ML
4 INJECTION INTRAMUSCULAR; INTRAVENOUS EVERY 6 HOURS PRN
Status: DISCONTINUED | OUTPATIENT
Start: 2021-02-02 | End: 2021-02-23 | Stop reason: HOSPADM

## 2021-02-02 RX ORDER — ASPIRIN 81 MG/1
81 TABLET ORAL DAILY
Status: DISCONTINUED | OUTPATIENT
Start: 2021-02-03 | End: 2021-02-03

## 2021-02-02 RX ORDER — HYDROCODONE BITARTRATE AND ACETAMINOPHEN 5; 325 MG/1; MG/1
2 TABLET ORAL EVERY 4 HOURS PRN
Status: DISCONTINUED | OUTPATIENT
Start: 2021-02-02 | End: 2021-02-06

## 2021-02-02 RX ORDER — CARVEDILOL 6.25 MG/1
6.25 TABLET ORAL 2 TIMES DAILY WITH MEALS
Status: DISCONTINUED | OUTPATIENT
Start: 2021-02-03 | End: 2021-02-23 | Stop reason: HOSPADM

## 2021-02-02 RX ORDER — PROMETHAZINE HYDROCHLORIDE 25 MG/1
12.5 TABLET ORAL EVERY 6 HOURS PRN
Status: DISCONTINUED | OUTPATIENT
Start: 2021-02-02 | End: 2021-02-23 | Stop reason: HOSPADM

## 2021-02-02 RX ORDER — INSULIN LISPRO 100 [IU]/ML
0-3 INJECTION, SOLUTION INTRAVENOUS; SUBCUTANEOUS NIGHTLY
Status: DISCONTINUED | OUTPATIENT
Start: 2021-02-03 | End: 2021-02-12

## 2021-02-02 RX ORDER — INSULIN LISPRO 100 [IU]/ML
0-6 INJECTION, SOLUTION INTRAVENOUS; SUBCUTANEOUS
Status: DISCONTINUED | OUTPATIENT
Start: 2021-02-03 | End: 2021-02-12

## 2021-02-02 RX ORDER — POLYETHYLENE GLYCOL 3350 17 G/17G
17 POWDER, FOR SOLUTION ORAL DAILY PRN
Status: DISCONTINUED | OUTPATIENT
Start: 2021-02-02 | End: 2021-02-23 | Stop reason: HOSPADM

## 2021-02-02 RX ORDER — ASPIRIN 300 MG/1
300 SUPPOSITORY RECTAL ONCE
Status: COMPLETED | OUTPATIENT
Start: 2021-02-02 | End: 2021-02-02

## 2021-02-02 RX ORDER — HYDROCODONE BITARTRATE AND ACETAMINOPHEN 5; 325 MG/1; MG/1
1 TABLET ORAL EVERY 4 HOURS PRN
Status: DISCONTINUED | OUTPATIENT
Start: 2021-02-02 | End: 2021-02-06

## 2021-02-02 RX ORDER — ASPIRIN 300 MG/1
300 SUPPOSITORY RECTAL DAILY
Status: DISCONTINUED | OUTPATIENT
Start: 2021-02-03 | End: 2021-02-03

## 2021-02-02 RX ORDER — ALBUTEROL SULFATE 90 UG/1
2 AEROSOL, METERED RESPIRATORY (INHALATION) EVERY 6 HOURS PRN
Status: DISCONTINUED | OUTPATIENT
Start: 2021-02-02 | End: 2021-02-03

## 2021-02-02 RX ORDER — ATORVASTATIN CALCIUM 40 MG/1
80 TABLET, FILM COATED ORAL NIGHTLY
Status: DISCONTINUED | OUTPATIENT
Start: 2021-02-02 | End: 2021-02-23 | Stop reason: HOSPADM

## 2021-02-02 RX ORDER — ACETAMINOPHEN 325 MG/1
650 TABLET ORAL EVERY 4 HOURS PRN
Status: DISCONTINUED | OUTPATIENT
Start: 2021-02-02 | End: 2021-02-23 | Stop reason: HOSPADM

## 2021-02-02 RX ADMIN — IOPAMIDOL 80 ML: 755 INJECTION, SOLUTION INTRAVENOUS at 19:21

## 2021-02-02 RX ADMIN — IOPAMIDOL 45 ML: 755 INJECTION, SOLUTION INTRAVENOUS at 20:26

## 2021-02-02 RX ADMIN — ASPIRIN 300 MG: 300 SUPPOSITORY RECTAL at 20:50

## 2021-02-03 ENCOUNTER — APPOINTMENT (OUTPATIENT)
Dept: CT IMAGING | Age: 81
DRG: 003 | End: 2021-02-03
Payer: COMMERCIAL

## 2021-02-03 ENCOUNTER — APPOINTMENT (OUTPATIENT)
Dept: VASCULAR LAB | Age: 81
DRG: 003 | End: 2021-02-03
Payer: COMMERCIAL

## 2021-02-03 PROBLEM — I26.99 OTHER PULMONARY EMBOLISM WITHOUT ACUTE COR PULMONALE (HCC): Status: ACTIVE | Noted: 2021-02-03

## 2021-02-03 PROBLEM — I48.21 PERMANENT ATRIAL FIBRILLATION (HCC): Status: ACTIVE | Noted: 2021-02-03

## 2021-02-03 PROBLEM — K25.0 ACUTE GASTRIC ULCER WITH HEMORRHAGE: Status: ACTIVE | Noted: 2021-02-03

## 2021-02-03 LAB
ABO/RH: NORMAL
ANION GAP SERPL CALCULATED.3IONS-SCNC: 7 MMOL/L (ref 3–16)
ANTIBODY SCREEN: NORMAL
BLOOD BANK DISPENSE STATUS: NORMAL
BLOOD BANK PRODUCT CODE: NORMAL
BPU ID: NORMAL
BUN BLDV-MCNC: 13 MG/DL (ref 7–20)
CALCIUM SERPL-MCNC: 9.1 MG/DL (ref 8.3–10.6)
CHLORIDE BLD-SCNC: 108 MMOL/L (ref 99–110)
CHOLESTEROL, TOTAL: 64 MG/DL (ref 0–199)
CO2: 27 MMOL/L (ref 21–32)
CREAT SERPL-MCNC: 0.7 MG/DL (ref 0.6–1.2)
DESCRIPTION BLOOD BANK: NORMAL
EKG ATRIAL RATE: 82 BPM
EKG DIAGNOSIS: NORMAL
EKG Q-T INTERVAL: 384 MS
EKG QRS DURATION: 76 MS
EKG QTC CALCULATION (BAZETT): 428 MS
EKG R AXIS: 107 DEGREES
EKG T AXIS: -24 DEGREES
EKG VENTRICULAR RATE: 75 BPM
GFR AFRICAN AMERICAN: >60
GFR NON-AFRICAN AMERICAN: >60
GLUCOSE BLD-MCNC: 131 MG/DL (ref 70–99)
GLUCOSE BLD-MCNC: 134 MG/DL (ref 70–99)
GLUCOSE BLD-MCNC: 134 MG/DL (ref 70–99)
GLUCOSE BLD-MCNC: 137 MG/DL (ref 70–99)
GLUCOSE BLD-MCNC: 142 MG/DL (ref 70–99)
GLUCOSE BLD-MCNC: 152 MG/DL (ref 70–99)
GLUCOSE BLD-MCNC: 153 MG/DL (ref 70–99)
HCT VFR BLD CALC: 22.2 % (ref 36–48)
HCT VFR BLD CALC: 29.8 % (ref 36–48)
HDLC SERPL-MCNC: 26 MG/DL (ref 40–60)
HEMOGLOBIN: 6.6 G/DL (ref 12–16)
HEMOGLOBIN: 8.9 G/DL (ref 12–16)
LDL CHOLESTEROL CALCULATED: 28 MG/DL
MCH RBC QN AUTO: 23.7 PG (ref 26–34)
MCHC RBC AUTO-ENTMCNC: 29.8 G/DL (ref 31–36)
MCV RBC AUTO: 79.5 FL (ref 80–100)
PDW BLD-RTO: 25 % (ref 12.4–15.4)
PERFORMED ON: ABNORMAL
PLATELET # BLD: 196 K/UL (ref 135–450)
PMV BLD AUTO: 8.9 FL (ref 5–10.5)
POTASSIUM REFLEX MAGNESIUM: 3.9 MMOL/L (ref 3.5–5.1)
RBC # BLD: 2.79 M/UL (ref 4–5.2)
SARS-COV-2, PCR: NOT DETECTED
SODIUM BLD-SCNC: 142 MMOL/L (ref 136–145)
TRIGL SERPL-MCNC: 49 MG/DL (ref 0–150)
VLDLC SERPL CALC-MCNC: 10 MG/DL
WBC # BLD: 7.5 K/UL (ref 4–11)

## 2021-02-03 PROCEDURE — 83036 HEMOGLOBIN GLYCOSYLATED A1C: CPT

## 2021-02-03 PROCEDURE — 86901 BLOOD TYPING SEROLOGIC RH(D): CPT

## 2021-02-03 PROCEDURE — 85014 HEMATOCRIT: CPT

## 2021-02-03 PROCEDURE — 99223 1ST HOSP IP/OBS HIGH 75: CPT | Performed by: INTERNAL MEDICINE

## 2021-02-03 PROCEDURE — 86900 BLOOD TYPING SEROLOGIC ABO: CPT

## 2021-02-03 PROCEDURE — 2000000000 HC ICU R&B

## 2021-02-03 PROCEDURE — 2500000003 HC RX 250 WO HCPCS: Performed by: STUDENT IN AN ORGANIZED HEALTH CARE EDUCATION/TRAINING PROGRAM

## 2021-02-03 PROCEDURE — 36415 COLL VENOUS BLD VENIPUNCTURE: CPT

## 2021-02-03 PROCEDURE — 70450 CT HEAD/BRAIN W/O DYE: CPT

## 2021-02-03 PROCEDURE — 93970 EXTREMITY STUDY: CPT

## 2021-02-03 PROCEDURE — 2580000003 HC RX 258: Performed by: STUDENT IN AN ORGANIZED HEALTH CARE EDUCATION/TRAINING PROGRAM

## 2021-02-03 PROCEDURE — 80048 BASIC METABOLIC PNL TOTAL CA: CPT

## 2021-02-03 PROCEDURE — 86850 RBC ANTIBODY SCREEN: CPT

## 2021-02-03 PROCEDURE — 85018 HEMOGLOBIN: CPT

## 2021-02-03 PROCEDURE — 6360000002 HC RX W HCPCS: Performed by: STUDENT IN AN ORGANIZED HEALTH CARE EDUCATION/TRAINING PROGRAM

## 2021-02-03 PROCEDURE — C9113 INJ PANTOPRAZOLE SODIUM, VIA: HCPCS | Performed by: STUDENT IN AN ORGANIZED HEALTH CARE EDUCATION/TRAINING PROGRAM

## 2021-02-03 PROCEDURE — 80061 LIPID PANEL: CPT

## 2021-02-03 PROCEDURE — P9016 RBC LEUKOCYTES REDUCED: HCPCS

## 2021-02-03 PROCEDURE — 94664 DEMO&/EVAL PT USE INHALER: CPT

## 2021-02-03 PROCEDURE — 85027 COMPLETE CBC AUTOMATED: CPT

## 2021-02-03 PROCEDURE — 36430 TRANSFUSION BLD/BLD COMPNT: CPT

## 2021-02-03 PROCEDURE — 86923 COMPATIBILITY TEST ELECTRIC: CPT

## 2021-02-03 PROCEDURE — 6370000000 HC RX 637 (ALT 250 FOR IP): Performed by: COUNSELOR

## 2021-02-03 RX ORDER — HYDRALAZINE HYDROCHLORIDE 20 MG/ML
5 INJECTION INTRAMUSCULAR; INTRAVENOUS EVERY 6 HOURS PRN
Status: DISCONTINUED | OUTPATIENT
Start: 2021-02-03 | End: 2021-02-23 | Stop reason: HOSPADM

## 2021-02-03 RX ORDER — PANTOPRAZOLE SODIUM 40 MG/10ML
40 INJECTION, POWDER, LYOPHILIZED, FOR SOLUTION INTRAVENOUS 2 TIMES DAILY
Status: DISCONTINUED | OUTPATIENT
Start: 2021-02-03 | End: 2021-02-10

## 2021-02-03 RX ORDER — LABETALOL HYDROCHLORIDE 5 MG/ML
10 INJECTION, SOLUTION INTRAVENOUS EVERY 4 HOURS PRN
Status: DISCONTINUED | OUTPATIENT
Start: 2021-02-03 | End: 2021-02-23 | Stop reason: HOSPADM

## 2021-02-03 RX ORDER — SODIUM CHLORIDE 9 MG/ML
INJECTION, SOLUTION INTRAVENOUS PRN
Status: DISCONTINUED | OUTPATIENT
Start: 2021-02-03 | End: 2021-02-04

## 2021-02-03 RX ORDER — FERROUS SULFATE 325(65) MG
325 TABLET ORAL
Status: DISCONTINUED | OUTPATIENT
Start: 2021-02-03 | End: 2021-02-10

## 2021-02-03 RX ORDER — PANTOPRAZOLE SODIUM 40 MG/1
40 TABLET, DELAYED RELEASE ORAL
Status: DISCONTINUED | OUTPATIENT
Start: 2021-02-03 | End: 2021-02-03

## 2021-02-03 RX ORDER — ALBUTEROL SULFATE 90 UG/1
2 AEROSOL, METERED RESPIRATORY (INHALATION) PRN
Status: DISCONTINUED | OUTPATIENT
Start: 2021-02-03 | End: 2021-02-23 | Stop reason: HOSPADM

## 2021-02-03 RX ADMIN — SODIUM CHLORIDE 4 MG/HR: 9 INJECTION, SOLUTION INTRAVENOUS at 19:35

## 2021-02-03 RX ADMIN — PANTOPRAZOLE SODIUM 40 MG: 40 INJECTION, POWDER, FOR SOLUTION INTRAVENOUS at 09:38

## 2021-02-03 RX ADMIN — HYDRALAZINE HYDROCHLORIDE 5 MG: 20 INJECTION INTRAMUSCULAR; INTRAVENOUS at 13:03

## 2021-02-03 RX ADMIN — INSULIN LISPRO 1 UNITS: 100 INJECTION, SOLUTION INTRAVENOUS; SUBCUTANEOUS at 02:48

## 2021-02-03 RX ADMIN — PANTOPRAZOLE SODIUM 40 MG: 40 INJECTION, POWDER, FOR SOLUTION INTRAVENOUS at 22:07

## 2021-02-03 RX ADMIN — INSULIN LISPRO 1 UNITS: 100 INJECTION, SOLUTION INTRAVENOUS; SUBCUTANEOUS at 21:35

## 2021-02-03 RX ADMIN — SODIUM CHLORIDE 5 MG/HR: 9 INJECTION, SOLUTION INTRAVENOUS at 13:57

## 2021-02-03 ASSESSMENT — PAIN SCALES - GENERAL
PAINLEVEL_OUTOF10: 0

## 2021-02-03 ASSESSMENT — ENCOUNTER SYMPTOMS
RESPIRATORY NEGATIVE: 1
GASTROINTESTINAL NEGATIVE: 1

## 2021-02-03 NOTE — CONSULTS
STROKE / INTRACRANIAL HEMORRHAGE HISTORY and PHYSICAL    Admitting Physician: ICU  Primary Care Physician: No primary care provider on file. Chief Complaint: Left hemiplegia, confusion    HPI: 79year old woman reportedly last known well by family at 200 today. Family called and noted slurred speech at 1830, drove to her house and found her down with left sided weakness. Normally fully independent. They called squad to bring patient to [facility] ER, but patient arrived without identifying information, so history was unknown on arrival.  Later learned that she takes Xarelto for atrial fibrillation, but medication was stopped due to recent admission for GI bleed. Time last seen well: 1230    Time of arrival: 1917    PMHx:No past medical history on file. Atrial fibrillation  GI bleed    SURGHx:No past surgical history on file. Suboccipital Craniotomy for meningioma    FAMHx: No family history on file. SOCHx:   Social History     Tobacco Use    Smoking status: Not on file   Substance Use Topics    Alcohol use: Not on file       ALLERGIES:Patient has no allergy information on record. MEDS:  Prior to Admission medications    Not on File        ROS: unable to obtain due to mental status    EXAM       GEN: lying on stretcher    HEENT: NCAT    MUSCULOSKELETAL: normal bulk and tone     NEURO:    GCS: E4 V5 M6 (15)   A&O x4, speech very slurred, but appropriate   Visual Fields left hemianopsia, EOMI, forced gaze to right   Facial Symmetry left droop  Motor 3-4/5 on left 5/5 on right    NIH Stroke Scale    Interval: pre-procedure  Time: 2119  Person Administering Scale:     1a  Level of consciousness: 1=not alert but arousable by minor stimulation to obey, answer or respond   1b. LOC questions:  0=Performs both tasks correctly   1c. LOC commands: 0=Performs both tasks correctly   2.   Best Gaze: 2=forced deviation, or total gaze paresis not overcome by oculocephalic maneuver 3.  Visual: 2=Complete hemianopia   4. Facial Palsy: 1=Minor paralysis (flattened nasolabial fold, asymmetric on smiling)   5a. Motor left arm: 2=Some effort against gravity, limb cannot get to or maintain (if cured) 90 (or 45) degrees, drifts down to bed, but has some effort against gravity   5b. Motor right arm: 0=No drift, limb holds 90 (or 45) degrees for full 10 seconds   6a. motor left le=No drift, limb holds 90 (or 45) degrees for full 10 seconds   6b  Motor right le=No drift, limb holds 90 (or 45) degrees for full 10 seconds   7. Limb Ataxia: 1=Present in one limb   8. Sensory: 1=Mild to moderate sensory loss; patient feels pinprick is less sharp or is dull on the affected side; there is a loss of superficial pain with pinprick but patient is aware She is being touched   9. Best Language:  0=No aphasia, normal   10. Dysarthria: 2=Severe; patient speech is so slurred as to be unintelligible in the absence of or our of proportion to any dysphagia, or is mute/anarthric   11. Extinction and Inattention: 2=Profound demetrio-inattention or demetrio-inattention to more than one modality. Does not recognize own hand or orients only to one side of space   12. Distal motor function: 0=Normal    Total:   14         IMAGING: Time CT obtained  and time initially reviewed   CT Brain Perfusion   Final Result      1. Hypoperfusion in the right MCA territory with a central ischemic core of 8 mL, total volume of hypoperfusion of 79 mL and a penumbra of 71 mL. XR CHEST PORTABLE   Final Result   1. Limited evaluation because of patient rotation and underpenetration of the film. 2. No dense airspace consolidation. 3. Probable mild cardiomegaly. CTA HEAD NECK W CONTRAST   Final Result   1. Carotid and vertebral arteries are patent and without stenosis or acute abnormality. 2. Incidental partial imaging of the a segmental pulmonary embolism in the right middle lobe. 3. Partial imaging of small right greater than left pleural effusions. 4. Previous left suboccipital craniotomy with a residual 1.6 cm left-sided extra-axial mass at the foramen magnum with mass effect on the cord at the cervical medullary junction favored to represent meningioma. Recommend correlation with clinical history    and previous imaging. 5. Incidental multinodular goiter. CTA HEAD:      FINDINGS: The internal carotid arteries are patent and normal in caliber through the skull base. The middle cerebral arteries are patent. 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 The A2 and A3 segments of the ACAs are patent bilaterally. Left MCA is patent. There is focal occlusion of an M2 branch of the right MCA (series 2 images 427 and 428). However, other M2 branches and M3 branches in the right sylvian fissure are patent. The M1 segment of the right MCA is patent. The posterior cerebral arteries are    patent bilaterally. The basilar artery is patent and normal in caliber. It is supplied by the left vertebral artery. The small nondominant right vertebral artery terminates in right sided PICA. IMPRESSION:   1. 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 2. 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 Results were discussed with Dr. Candie Mims at 8:00 PM on 2/2/2021. CT HEAD WO CONTRAST   Final Result   1. Previous left suboccipital craniotomy with a partially calcified extra-axial mass at the left foramen magnum measuring 1.8 cm contacting the cord at the cervicomedullary junction with some degree of mass effect on the cord at the foramen magnum.  This most likely represents residual/recurrent meningioma or other extra-axial mass. Correlation with patient history and previous imaging recommended. 2. Mild global volume loss and mild chronic small vessel ischemic disease in the white matter. 3. No acute intracranial hemorrhage. A/P: Acute stroke with large vessel (right M2) occlusion and large area of reversible ischemia on CTP.   -not a candidate for IV tPA due to recent Xarelto use and GI bleed  -good candidate for mechanical thrombectomy <6h after LSW due to reversible ischemia    Gena Vigil

## 2021-02-03 NOTE — PROGRESS NOTES
Occupational Therapy/Physical Therapy  HOLD   Attempted to see pt for OT/PT evaluation this afternoon and spoke with RN who states pt is not appropriate for therapy at this time due to new parenchymal hemorrhage. Will hold pt until medically stable and re attempt at later date/time as schedule allows.     Viv Davis OTR/L  Henrique Mckenna, [facility], DPT  [phone]

## 2021-02-03 NOTE — PROGRESS NOTES
Gilman catheter removed per protocol. Diamante care provided and 10 ml of saline removed from balloon prior to removal. Patient tolerated well and an external catheter put in place at this time. Patient repositioned, call light within reach, bed alarm engaged, and bed locked and in the lowest position. Will continue to monitor.

## 2021-02-03 NOTE — ED NOTES
Pt came in for concerns for a stroke [facility] states temp is 101.2 F. Pt has Significant dysphasia, and L sided weakness. Md calling pts family. Pt taken straight back to CT from [facility]. [facility]       Jaelyn Pang RN  02/02/21 [id number]

## 2021-02-03 NOTE — PROGRESS NOTES
Patients NIH score went from 8 to 11. Patient unable to follow commands on LUE. When lifting patient arm up it instantly falls to the bed and she is unable to follow any commands on the LUE. Notified MD, will continue to monitor.

## 2021-02-03 NOTE — PROGRESS NOTES
Patient continuously pulling at leads, nagel catheter, and removing clothing. Bilateral wrist restraints ordered for patients safety. Will continue to monitor.

## 2021-02-03 NOTE — CONSULTS
ICU HISTORY AND PHYSICAL       Hospital Day: 1  ICU Day: 1                                                         Code:Full Code  Admit Date: 2/2/2021  PCP: No primary care provider on file. CC: Cerebrovascular accident    HISTORY OF PRESENT ILLNESS:   Arleth Lang is a 79 y.o. adult, with a history of afib, HF, DM, asthma, pulm HTN (not oxygen dependent), HTN, HLD, EDDY on cpap, breast cancer s/p lumpectomy/radiation, colon polyps, obesity who presented with left-sided hemiplegia, severe expressive aphasia and dysarthria, right facial droop, and right-gaze deviation. Reportedly, last known normal was around 12:30 when she had a phone call with family. Tammi Jose then had another phone call around [phone] this evening that was concerning for slurred speech and confusion.  As such family checked on her and found her slumped in her chair at home with left-sided weakness and slurred speech and EMS was called. Tammi Jose was noted by EMS to have a temperature of 101.2, however had a normal temp in ED. In ED, CTA head/neck showed a right proximal M2 occlusion as well as incidental finding of a right segmental PE. She underwent thrombectomy and thrombolysis of R MCA with reperfusion grade TICI 2b. She was seen before and after procedure by a fellow resident. Post-thrombectomy, she was able to move left-sided extremities albeit with significant weakness. She was still having dysarthria to the point where he could not make out what she was saying beyond \"yes\" and \"no\". She is in rate-controlled atrial fibrillation              Of note, she was discharged from [place] [address] 2 days ago where she was treated for acute GI bleed and was taken off of her Xarelto, which she had been taking for her afib.  An EGD at that time revealed an unusual prominence distal to ampulla which was biopsied along with ulcer in gastric antrum and erosion closer to pylorus    PAST HISTORY: No intake/output data recorded. I/O last 3 completed shifts:  In: -   Out: 400 [Urine:400]    Physical Examination:     Physical Exam  Constitutional:       General: Maria R Colunga is not in acute distress. Appearance: Maria R Colunga is not ill-appearing. Comments: A&Ox4, although some confusion   Cardiovascular:      Rate and Rhythm: Normal rate and regular rhythm. Pulses: Normal pulses. Heart sounds: Normal heart sounds. Pulmonary:      Effort: Pulmonary effort is normal.      Breath sounds: Normal breath sounds. Abdominal:      General: Abdomen is flat. Bowel sounds are normal.      Palpations: Abdomen is soft. Musculoskeletal: Normal range of motion. Skin:     General: Skin is warm. Capillary Refill: Capillary refill takes less than 2 seconds. Neurological:      Mental Status: Maria R Colunga is alert. Comments: Alert, can follow commands  Severe dysarthria and slurred speech. Strength 5/5 on left, 4/5 on right  Right gaze deviation        No results for input(s): PHART, [id number], PO2ART in the last 72 hours. DATA:       Labs:  CBC:   Recent Labs     02/02/21 2006 02/03/21  0538   WBC 7.2 7.5   HGB 7.3* 6.6*   HCT 25.1* 22.2*    196       BMP:   Recent Labs     02/02/21 2006      K 4.2      CO2 23   BUN 15   CREATININE 0.8   GLUCOSE 154*     LFT's:   Recent Labs     02/02/21 2006   AST 21   ALT 15   BILITOT 0.6   ALKPHOS 91     Troponin:   Recent Labs     02/02/21 2006   [place] <0.01     BNP:No results for input(s): BNP in the last 72 hours. ABGs: No results for input(s): PHART, QFS8EAC, PO2ART in the last 72 hours. INR:   Recent Labs     02/02/21 2006   INR 1.32*       U/A:No results for input(s): NITRITE, COLORU, PHUR, LABCAST, WBCUA, RBCUA, MUCUS, TRICHOMONAS, YEAST, BACTERIA, CLARITYU, SPECGRAV, LEUKOCYTESUR, UROBILINOGEN, BILIRUBINUR, BLOODU, GLUCOSEU, AMORPHOUS in the last 72 hours.     Invalid input(s): Swathi Iron    CT Brain Perfusion Final Result      1. Hypoperfusion in the right MCA territory with a central ischemic core of 8 mL, total volume of hypoperfusion of 79 mL and a penumbra of 71 mL. XR CHEST PORTABLE   Final Result   1. Limited evaluation because of patient rotation and underpenetration of the film. 2. No dense airspace consolidation. 3. Probable mild cardiomegaly. CTA HEAD NECK W CONTRAST   Final Result   1. Carotid and vertebral arteries are patent and without stenosis or acute abnormality. 2. Incidental partial imaging of the a segmental pulmonary embolism in the right middle lobe. 3. Partial imaging of small right greater than left pleural effusions. 4. Previous left suboccipital craniotomy with a residual 1.6 cm left-sided extra-axial mass at the foramen magnum with mass effect on the cord at the cervical medullary junction favored to represent meningioma. Recommend correlation with clinical history    and previous imaging. 5. Incidental multinodular goiter. CTA HEAD:      FINDINGS: The internal carotid arteries are patent and normal in caliber through the skull base. The middle cerebral arteries are patent. 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 The A2 and A3 segments of the ACAs are patent bilaterally. Left MCA is patent. There is focal occlusion of an M2 branch of the right MCA (series 2 images 427 and 428). However, other M2 branches and M3 branches in the right sylvian fissure are patent. The M1 segment of the right MCA is patent. The posterior cerebral arteries are    patent bilaterally. The basilar artery is patent and normal in caliber. It is supplied by the left vertebral artery. The small nondominant right vertebral artery terminates in right sided PICA. IMPRESSION:   1. 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 2. 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 Results were discussed with Dr. Yani Nielson at 8:00 PM on 2/2/2021. CT HEAD WO CONTRAST   Final Result   1. Previous left suboccipital craniotomy with a partially calcified extra-axial mass at the left foramen magnum measuring 1.8 cm contacting the cord at the cervicomedullary junction with some degree of mass effect on the cord at the foramen magnum. This    most likely represents residual/recurrent meningioma or other extra-axial mass. Correlation with patient history and previous imaging recommended. 2. Mild global volume loss and mild chronic small vessel ischemic disease in the white matter. 3. No acute intracranial hemorrhage. IR ANGIOGRAM CAROTID CEREBRAL RIGHT    (Results Pending)   CT head without contrast    (Results Pending)   VL Extremity Venous Bilateral    (Results Pending)       EKG:   Echo:  Micro:     ASSESSMENT AND PLAN:   Nadia Ortiz is a 79 y.o. adult, with a history of afib, HF, DM, asthma, pulm HTN (not oxygen dependent), HTN, HLD, EDDY on cpap, breast cancer s/p lumpectomy/radiation, colon polyps, obesity who presented with left-sided hemiplegia, severe expressive aphasia and dysarthria, right facial droop, and right-gaze deviation. She underwent M2 thrombectomy and thrombolysis with significant improvement of hemiplegia, but still with other aforementioned symptoms.     Last known normal:  12:30 2/2/21  Time of reperfusion at 21:45 on 2/2/21.     Right MCA M2 occlusion s/p thrombectomy and thrombolysis  -[id number] 24 hrs post tPA admin  -CT head 24 hrs after tPA admin. CT this AM showed new large acute parenchymal hemorrhage in the right cerebral hemisphere. NSGY aware, await recs.  But clinically appears to be doing better   -q1 neurochecks, qshift NIHSS  -PT/OT, Speech eval  -neurology consulted  -fasting lipid and Hgb A1c Intraparenchymal hemorrhage   -2/2 thrombolysis, clinically is improved. Continue to monitor neuro status, make NSGY aware of acute changes. -SBP<160, PRNs available. If insufficient start cardene drip   -Continue to hold aspirin and AC given bleed     Blood loss anemia, hypochromic microcytic 2/2 GI bleed; hgb 7.3 on admission  -recent EGD last admission  -daily iron  -Protonix 40 BID  -Received 1 unit PRBC today     Acute on chronic systolic HF, not in exacerbation   - cont coreg 6.25 mg BID  -holding home lasix 20     Segmental RML Pulmonary Embolus incidentally noted on CTA head/neck  -patient seems to be breathing/ oxygenating well; no intervention for now  -may explain temperature in EMS  -hold AC given ich  -LE doppler u/s     Permanent Afib, rate controlled  - holding xarlto given recent GI bleed and thrombectomy, chadsvac score high. Continue to hold [facility] given new bleed   - hold home diltiazem for now given HR is controlled     DMT2, controlled   HgA1c 6.2% (9/2020)   - most recent admission, per daughter pt on lispro 14U in AM and 10U PM  - Low-dose sliding scale given n.p.o. status     HTN  -control as above  -holding home losartan and diltiazem     Asthma, not in exacerbation   - albuterol prn      Code Status:  Full Code  FEN:  NPO   PPX:  SCD's   DISPO:  IP    This patient has been staffed and discussed with Dr Milind Del Angel   -----------------------------  Georgia Lowry MD, PGY-1  2/3/2021  8:09 AM   Patient examined, findings as discussed with Dr. Cachorro Goodwin. Agree with assessment and plan. Acute CVA improved post thrombectomy, but now has hemorrhagic conversion. Maintain blood pressure control. No anticoagulation. Maintain supportive care.

## 2021-02-03 NOTE — H&P
ICU HISTORY AND PHYSICAL       Hospital Day:  0                    Code:Full Code  Admit Date: 2/2/2021  PCP: No primary care provider on file. CC:    Chief Complaint   Patient presents with    Cerebrovascular Accident         HISTORY OF PRESENT ILLNESS:   Sim Hatchet is a 79 y.o. adult, with a history of afib, HF, DM, asthma, pulm HTN (not oxygen dependent), HTN, HLD, EDDY on cpap, breast cancer s/p lumpectomy/radiation, colon polyps, obesity who presented with left-sided hemiplegia, severe expressive aphasia and dysarthria, right facial droop, and right-gaze deviation. Reportedly, last known normal was around 12:30 when she had a phone call with family. She then had another phone call around [phone] this evening that was concerning for slurred speech and confusion. As such family checked on her and found her slumped in her chair at home with left-sided weakness and slurred speech and EMS was called. She was noted by EMS to have a temperature of 101.2, however had a normal temp in ED. In ED, CTA head/neck showed a right proximal M2 occlusion as well as incidental finding of a right segmental PE. She underwent thrombectomy and thrombolysis of R MCA with reperfusion grade TICI 2b. I saw her before and after procedure. Post-thrombectomy, she was able to move left-sided extremities albeit with significant weakness. She was still having dysarthria to the point where I could not make out what she was saying beyond \"yes\" and \"no\". She is in rate-controlled atrial fibrillation   Of note, she was discharged from [facility] 2 days ago where she was treated for acute GI bleed and was taken off of her Xarelto, which she had been taking for her afib. An EGD at that time revealed an unusual prominence distal to ampulla which was biopsied along with ulcer in gastric antrum and erosion closer to pylorus.       PAST HISTORY:      Past Medical History:   Diagnosis Date    Acute GI bleeding soft.      Tenderness: There is no abdominal tenderness. Neurological:      Comments: Right-sided gaze deviation, right facial droop, 3/5 strength LUE and LLE, expressive aphasia and severe dysarthria             DATA:   Labs:  CBC:   Recent Labs     02/02/21 2006   WBC 7.2   HGB 7.3*   HCT 25.1*          BMP:   Recent Labs     02/02/21 2006      K 4.2      CO2 23   BUN 15   CREATININE 0.8   GLUCOSE 154*     LFT's:   Recent Labs     02/02/21 2006   AST 21   ALT 15   BILITOT 0.6   ALKPHOS 91     Troponin:   Recent Labs     02/02/21 2006   Kamini [place] <0.01     BNP:No results for input(s): BNP in the last 72 hours. ABGs: No results for input(s): PHART, [id number], PO2ART in the last 72 hours. INR:   Recent Labs     02/02/21 2006   INR 1.32*       U/A:No results for input(s): NITRITE, COLORU, PHUR, LABCAST, WBCUA, RBCUA, MUCUS, TRICHOMONAS, YEAST, BACTERIA, CLARITYU, SPECGRAV, LEUKOCYTESUR, UROBILINOGEN, BILIRUBINUR, BLOODU, GLUCOSEU, AMORPHOUS in the last 72 hours. Invalid input(s): KETONESU    CT Brain Perfusion   Final Result      1. Hypoperfusion in the right MCA territory with a central ischemic core of 8 mL, total volume of hypoperfusion of 79 mL and a penumbra of 71 mL. XR CHEST PORTABLE   Final Result   1. Limited evaluation because of patient rotation and underpenetration of the film. 2. No dense airspace consolidation. 3. Probable mild cardiomegaly. CTA HEAD NECK W CONTRAST   Final Result   1. Carotid and vertebral arteries are patent and without stenosis or acute abnormality. 2. Incidental partial imaging of the a segmental pulmonary embolism in the right middle lobe. 3. Partial imaging of small right greater than left pleural effusions. 4. Previous left suboccipital craniotomy with a residual 1.6 cm left-sided extra-axial mass at the foramen magnum with mass effect on the cord at the cervical medullary junction favored to represent meningioma.  Recommend correlation with clinical history    and previous imaging. 5. Incidental multinodular goiter. CTA HEAD:      FINDINGS: The internal carotid arteries are patent and normal in caliber through the skull base. The middle cerebral arteries are patent. 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 The A2 and A3 segments of the ACAs are patent bilaterally. Left MCA is patent. There is focal occlusion of an M2 branch of the right MCA (series 2 images 427 and 428). However, other M2 branches and M3 branches in the right sylvian fissure are patent. The M1 segment of the right MCA is patent. The posterior cerebral arteries are    patent bilaterally. The basilar artery is patent and normal in caliber. It is supplied by the left vertebral artery. The small nondominant right vertebral artery terminates in right sided PICA. IMPRESSION:   1. 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 2. 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 Results were discussed with Dr. Mingo Veloz at 8:00 PM on 2/2/2021. CT HEAD WO CONTRAST   Final Result   1. Previous left suboccipital craniotomy with a partially calcified extra-axial mass at the left foramen magnum measuring 1.8 cm contacting the cord at the cervicomedullary junction with some degree of mass effect on the cord at the foramen magnum. This    most likely represents residual/recurrent meningioma or other extra-axial mass. Correlation with patient history and previous imaging recommended. 2. Mild global volume loss and mild chronic small vessel ischemic disease in the white matter. 3. No acute intracranial hemorrhage.       IR ANGIOGRAM CAROTID CEREBRAL RIGHT    (Results Pending)   CT head without contrast    (Results Pending)         ASSESSMENT AND PLAN:   Katt Pires is a 79 y.o. adult, with a history of afib, HF, DM, asthma, pulm HTN (not oxygen dependent), HTN, HLD, EDDY on cpap, breast cancer s/p lumpectomy/radiation, colon polyps, obesity who presented with left-sided hemiplegia, severe expressive aphasia and dysarthria, right facial droop, and right-gaze deviation. She underwent M2 thrombectomy and thrombolysis with significant improvement of hemiplegia, but still with other aforementioned symptoms. Last known normal:  12:30 2/2/21  Time of reperfusion at 21:45 on 2/2/21.     Right MCA M2 occlusion s/p thrombectomy and thrombolysis  -[id number] 24 hrs post tPA admin  -CT head 24 hrs after tPA admin  -q1 neurochecks, qshift NIHSS  -PT/OT, Speech eval  -telemetry monitoring  -neurology consulted  -fasting lipid and Hgb A1c    Blood loss anemia, hypochromic microcytic 2/2 GI bleed; hgb 7.3 on admission  -recent EGD last admission  -daily iron  -Protonix 40 BID  -daily CBC     Acute on chronic systolic HF, not in exacerbation   - cont coreg 6.25 mg BID  - strict I/o, daily weight   -holding home lasix 20    Segmental RML Pulmonary Embolus incidentally noted on CTA head/neck  -patient seems to be breathing/ oxygenating well; no intervention for now  -may explain temperature in EMS  -will be on Lovenox 24 hours after procedure  -LE doppler u/s     Permanent Afib, rate controlled  - holding xarlto, chadsvac score high   - hold home diltiazem for now given HR is controlled     DMT2, controlled   HgA1c 6.2% (9/2020)   - most recent admission, per daughter pt on lispro 14U in AM and 10U PM  - LDSS for now given she is NPO and trying to avoid hypoglycemia protocols with D5    HTN  -control as above  -holding home losartan and diltiazem    Asthma, not in exacerbation   - albuterol prn     Code Status:  Full Code  FEN:  NPO   PPX:  SCD's then Lovenox 24 hrs post procedure, protonix  DISPO:  IP

## 2021-02-03 NOTE — PROGRESS NOTES
Pt's daughter, Raz Brown called back. Consent for blood products given, placed in chart. Tano updated on Pt's status and plan of care. All questions answered. Tano requested a call from a physician. This RN will communicate that to ICU residents.

## 2021-02-03 NOTE — PROGRESS NOTES
RESPIRATORY THERAPY ASSESSMENT    Name:  Jazmin Gale  Medical Record Number:  [id number]  Age: 79 y.o. Gender: adult  : 1940  Today's Date:  2/3/2021  Room:  [address] [place] [address]-    Assessment     Is the patient being admitted for a COPD or Asthma exacerbation? No   (If yes the patient will be seen every 4 hours for the first 24 hours and then reassessed)    Patient Admission Diagnosis: CVA       Allergies  No Known Allergies        Pulmonary History:Asthma, Pulmonary Hypertension, CHF/Pulmonary Edema and EDDY on CPAP  Home Oxygen Therapy:  unknown  Home Respiratory Therapy:unknown   Current Respiratory Therapy:  Albuterol MDI PRN          Respiratory Severity Index(RSI)   Patients with orders for inhalation medications, oxygen, or any therapeutic treatment modality will be placed on Respiratory Protocol. They will be assessed with the first treatment and at least every 72 hours thereafter. The following severity scale will be used to determine frequency of treatment intervention.     Smoking History: Pulmonary Disease or Smoking History, Greater than 15 pack year = 2    Social History  Social History     Tobacco Use    Smoking status: Former Smoker   Substance Use Topics    Alcohol use: Not on file    Drug use: Not on file       Recent Surgical History: None = 0  Past Surgical History  Past Surgical History:   Procedure Laterality Date    UPPER GASTROINTESTINAL ENDOSCOPY  2021    Dr Karissa Cespedes       Level of Consciousness: Alert, Oriented, and Cooperative = 0    Level of Activity: Bedridden, unresponsive or quadriplegic = 4 (bedrest)    Respiratory Pattern: Regular Pattern; RR 8-20 = 0    Breath Sounds: Clear = 0    Sputum   ,  ,    Cough: Strong, spontaneous, non-productive = 0    Vital Signs   /77   Pulse 88   Temp 98.1 °F (36.7 °C) (Axillary)   Resp 24   Wt 225 lb (102.1 kg)   SpO2 94%   SPO2 (COPD values may differ): Greater than or equal to 92% on room air = 0 Peak Flow (asthma only): not applicable = 0    RSI: 5-6 = Q4hr PRN (every four hours as needed) for dyspnea        Plan       Goals: medication delivery    Patient/caregiver was educated on the proper method of use for Respiratory Care Devices:  No: tx not given at this time      Level of patient/caregiver understanding able to:   ? Verbalize understanding   ? Demonstrate understanding       ? Teach back        ? Needs reinforcement       ? No available caregiver               ? Other:     Response to education:  NA     Is patient being placed on Home Treatment Regimen? NA     Does the patient have everything they need prior to discharge? NA     Comments: Chart reviewed. Pt is S/P mechanical thrombectomy this evening; her speech is very slurred at this time. Plan of Care: Albuterol MDI PRN/dyspnea, O2 protocol. Electronically signed by Claude Ivory RCP on 2/3/2021 at 8:36 AM    Respiratory Protocol Guidelines     1. Assessment and treatment by Respiratory Therapy will be initiated for medication and therapeutic interventions upon initiation of aerosolized medication. 2. Physician will be contacted for respiratory rate (RR) greater than 35 breaths per minute. Therapy will be held for heart rate (HR) greater than 140 beats per minute, pending direction from physician. 3. Bronchodilators will be administered via Metered Dose Inhaler (MDI) with spacer when the following criteria are met:  a. Alert and cooperative     b. HR < 140 bpm  c. RR < 30 bpm                d. Can demonstrate a 23 second inspiratory hold  4. Bronchodilators will be administered via Hand Held Nebulizer [facility]) to patients when ANY of the following criteria are met  a. Incognizant or uncooperative          b. Patients treated with HHN at Home        c.  Unable to demonstrate proper use of MDI with spacer     d. RR > 30 bpm 5. Bronchodilators will be delivered via Metered Dose Inhaler (MDI), HHN, Aerogen to intubated patients on mechanical ventilation. 6. Inhalation medication orders will be delivered and/or substituted as outlined below. Aerosolized Medications Ordering and Administration Guidelines:    1. All Medications will be ordered by a physician, and their frequency and/or modality will be adjusted as defined by the patients Respiratory Severity Index (RSI) score. 2. If the patient does not have documented COPD, consider discontinuing anticholinergics when RSI is less than 9.  3. If the bronchospasm worsens (increased RSI), then the bronchodilator frequency can be increased to a maximum of every 4 hours. If greater than every 4 hours is required, the physician will be contacted. 4. If the bronchospasm improves, the frequency of the bronchodilator can be decreased, based on the patient's RSI, but not less than home treatment regimen frequency. 5. Bronchodilator(s) will be discontinued if patient has a RSI less than 9 and has received no scheduled or as needed treatment for 72  Hrs. Patients Ordered on a Mucolytic Agent:    1. Must always be administered with a bronchodilator. 2. Discontinue if patient experiences worsened bronchospasm, or secretions have lessened to the point that the patient is able to clear them with a cough. Anti-inflammatory and Combination Medications:    1. If the patient lacks prior history of lung disease, is not using inhaled anti-inflammatory medication at home, and lacks wheezing by examination or by history for at least 24 hours, contact physician for possible discontinuation.

## 2021-02-03 NOTE — PLAN OF CARE
Problem: Restraint Use - Nonviolent/Non-Self-Destructive Behavior:  Goal: Absence of restraint indications  Description: Absence of restraint indications  Outcome: Met This Shift  Note: Bilateral wrist restraints in place per MD orders. Pt trying to pull at lines, tubes, and remove clothing. Every hour checks in place. Repositioning, ROM, and other needs offered every two hours and PRN. Problem: HEMODYNAMIC STATUS  Goal: Patient has stable vital signs and fluid balance  Outcome: Met This Shift  Note: Patient remains hemodynamically stable throughout the shift with no continuous drips. Patient urine output adequate throughout the shift. Will continue to monitor. Problem: COMMUNICATION IMPAIRMENT  Goal: Ability to express needs and understand communication  Outcome: Met This Shift  Note: Patient with expressive aphasia and dysarthria. Both improving throughout the shift. Will continue to assess with neuro checks and NIH scoring. Problem: Falls - Risk of:  Goal: Will remain free from falls  Description: Will remain free from falls  Outcome: Met This Shift  Note: Fall precautions in place. Call light within reach, bed alarm engaged, and bed locked and in the lowest position. Fall risk light on outside of patients room, orange blanket tied to the end of the patients bed, and non skid socks on. Will offer assistance with any needs every two hours and as needed. Will educate patient about the importance of calling when needing assistance.        Problem: Urinary Elimination:  Goal: Signs and symptoms of infection will decrease  Description: Signs and symptoms of infection will decrease  Outcome: Met This Shift Note: Patient at risk for infection d/t nagel catheter being in place for strict I&O's. Will keep catheter clean and dry with chlorhexidine wipes at least every 12 hours and PRN. STAT lock in place to prevent any pulling, nagel bag kept below bladder level and off of the floor. Will continue to empty bag when repositioning and transporting patient. Will monitor for any signs of infection.

## 2021-02-03 NOTE — PLAN OF CARE
Problem: Restraint Use - Nonviolent/Non-Self-Destructive Behavior:  Goal: Absence of restraint indications  Description: Absence of restraint indications  2/3/2021 1539 by Brian Proctor RN  Outcome: Ongoing  2/3/2021 0442 by Abhijit Oconnor RN  Outcome: Met This Shift  Note:      Problem: Restraint Use - Nonviolent/Non-Self-Destructive Behavior:  Goal: Absence of restraint-related injury  Description: Absence of restraint-related injury  Outcome: Ongoing     Problem: HEMODYNAMIC STATUS  Goal: Patient has stable vital signs and fluid balance  2/3/2021 1539 by Brian Proctor RN  Outcome: Ongoing  2/3/2021 0442 by Abhijit Oconnor RN  Outcome: Met This Shift  Note:      Problem: ACTIVITY INTOLERANCE/IMPAIRED MOBILITY  Goal: Mobility/activity is maintained at optimum level for patient  Outcome: Ongoing     Problem: COMMUNICATION IMPAIRMENT  Goal: Ability to express needs and understand communication  2/3/2021 [id number] by Brian Proctor RN  Outcome: Ongoing  2/3/2021 0442 by Abhijit Oconnor RN  Outcome: Met This Shift  Note:     Problem: Falls - Risk of:  Goal: Will remain free from falls  Description: Will remain free from falls  2/3/2021 1539 by Brian Proctor RN  Outcome: Ongoing  2/3/2021 0442 by Abhijit Oconnor RN  Outcome: Met This Shift  Note:     Problem: Falls - Risk of:  Goal: Absence of physical injury  Description: Absence of physical injury  Outcome: Ongoing     Problem: Urinary Elimination:  Goal: Signs and symptoms of infection will decrease  Description: Signs and symptoms of infection will decrease  2/3/2021 1539 by Brian Proctor RN  Outcome: Ongoing  2/3/2021 0442 by Abhijit Oconnor RN  Outcome: Met This Shift  Note:      Problem: Urinary Elimination:  Goal: Complications related to the disease process, condition or treatment will be avoided or minimized  Description: Complications related to the disease process, condition or treatment will be avoided or minimized Outcome: Ongoing

## 2021-02-03 NOTE — PROGRESS NOTES
Pt had neuro change, not lifting or holding up left arm as previously per report of night shift RN. Laron NP called, came to bedside. Pt did hold up arm once he lifted, response to pain, and more movement then upon assessment at shift change. Laron stated to watch it closely, that the Pt seems exhausted and let him know if she does not move it at all and we will get CT at that point.

## 2021-02-03 NOTE — PROGRESS NOTES
Patient able to following commands intermittently on the left side. Patient able to consistently lift arm off of bed to command yet only grasps intermittently. Patient noted to have non purpose movement with the L hand and randomly grasps. Call light within reach, bed alarm engaged, and bed locked and in the lowest position. Will continue to monitor.

## 2021-02-03 NOTE — PROGRESS NOTES
Speech Language Pathology  Dysphagia - contact note    Order received, chart reviewed, d/w RN Maria Del Rosario Mcfarland who states pt lethargic at this time and requests follow up later to determine if  evaluation able to be completed this date. RN will page if pt more alert      Zhane Donaldson M.S./CCC-SLP #[id number]  Pg. # [id number]      Addendum:   D/w Rn who states pt now w/hemmorhage therefore not appropriate for evaluation. Will follow up next session as pt able      Zhane Donaldson M.S./CCC-SLP #[id number]  Pg.  # [phone] 1:35 PM

## 2021-02-03 NOTE — PROGRESS NOTES
Attempted to call emergency contacts listed for patient for blood products consent. No answer for either daughter listed. Message left with one. Will try again later.

## 2021-02-03 NOTE — PROCEDURES
Date of Procedure: 2/2/2021    History and indications: The patient is a 78year-old woman who presented to [facility] ER with acute onset stroke symptoms. The patient was shown on CT angiography to have evidence of right MCA M2 occlusion consistent with the stroke symptoms and subsequent CTP shows a large area of potentially reversible ischemia. The patient is transferred as an emergency for interventional stroke management. Patient last seen normal: 1230  Patient Arrived to [facility]: [address] to angiography: 2118  Time of groin puncture: 2127  Time of first thrombectomy attempt:2142  Time of Reperfusion: 2145  Reperfusion grade: TICI 2b    Procedure:  1. Diagnostic cerebral angiogram  2. Mechanical thrombectomy for stroke  3. Infusion therapy for thrombolysis, right middle cerebral artery  4. Right common femoral artery Angio-Seal closure arteriotomy    Vessels catheterized  1. Superselective catheterization of right middle cerebral artery for thrombectomy and thrombolysis  2. Right internal carotid artery  3. Left internal carotid artery  4. Left vertebral artery      Consent: The risks and benefits of the procedure were discussed with the patient's family who understands and agrees to proceed. Attending physician: Dusty Vigil      Anesthesia: Prior to the procedure, the patient's personal and family history were reviewed for any adverse reactions to anesthesia and no pertinent concerns were raised. Local with conscious sedation administered by the nursing staff under the supervision of the attending physician for 60 minutes. Medications given: Fentanyl and Versed, heparin and IA tPA (6mg R MCA)    ASA thgthrthathdtheth:th th5th Mallampati: III  EBL: 250 mL    Devices used:  1. 5 Western Anamaria Select catheter  2. 7 Haitian shuttle sheath  3. Penumbra ACE 60 aspiration catheter  4. 3 Max microcatheter  5. Transcend 0.014 inch wire  6. Embotrap 4 x 23 stent retriever  7.  Marksman microcatheter  8. 8 Haitian AngioSeal closure device Details of procedure: The patient was brought to the neuro angiography suite where the right groin was shaved prepped and draped in the usual sterile fashion. The right common femoral artery was accessed percutaneously under local anesthetic using a  modified Seldinger technique. A 7 Western Anamaria Shuttle sheath was inserted over a wire. A 5 Western Anamaria selection catheter was advanced over a Glidewire into the aortic arch under fluoroscopic guidance and used to selectively catheterized the target vessel listed above. The vessel origin was visualized fluoroscopically by injection of contrast prior to selective catheterization. After reviewing the images the slip catheter was removed. We chose to proceed with mechanical thrombectomy. MECHANICAL THROMBECTOMY: After securing the guidecatheter position proximally, the delivery catheter was advanced over Transcend wire beyond the level of occlusion in coaxial fashion thru the Penumbra catheter. I positioned the aspiration catheter just proximal to the occlusion site and the microcatheter just distal to the occlusion site. The aspiration catheter was then advanced over the microcatheter while under suction and deployed within the occluded segment of the artery. The microcatheter was immediately removed. The device was left in place for 5 minutes to engage the clot prior to retrieval. Aspiration to the Penumbra catheter was maintained continuously during withdrawal until the aspiration catheter showed rapid blood return . The Penumbra catheter was continually aspirated afterwards to ensure no clot was retained within the catheter. Control angiography was then completed. This showed restoration of flow to a single frontal branch and a parieto-occipital branch, but the main inferior M2 trunk remained occluded. Therefore, I repeated the process to reposition the microcatheter into the occluded division and then advanced the aspiration catheter for a second time. Again after waiting approximately 4 or 5 minutes and after withdrawal of the microcatheter, I applied suction to the penumbra aspiration catheter and slowly withdrew with continuous suction on the guide sheath once again. Control angiogram after this second attempt showed more distal filling in the main inferior M2 trunk, but it remained occluded distally. Therefore I chose to proceed with  thrombolysis. INFUSION THERAPY FOR THROMBOLYSIS: Using roadmap guidance the microcatheter was advanced over the transcend wire into the occluded segment. The wire and then the catheter were advanced beyond the occlusion and the wire was then withdrawn.  A microcatheter angiogram was performed to confirm positioning beyond the occlusive thrombus. Once the position was confirmed in the occluded branch the microcatheter was withdrawn slightly and perfused with 6 mg of TPA by slow manual infusion over 15 minutes. Total infusion time was approximately 15 minutes. Angiography showed no immediate change, so I attempted a third thrombectomy attempt, this time with a stent retriever. MECHANICAL THROMBECTOMY: I repeated the process to reposition the microcatheter into the occluded division, this time with the Marksman, and then advanced the aspiration catheter. I then deployed the  knob, during which the ACE 60 advanced further in the M2 to the proximal stent retriever. Again for approximately 4 or 5 minutes and after withdrawal of the microcatheter, I applied suction to the penumbra aspiration catheter and slowly withdrew the stent retriever with continuous suction. I saw very little clot on the device, but the ACE 60 remained plugges. I pulled it back slowly until rapid blood return was noted. Control angiogram now showed nearly complete restoration of flow with a single parietal branch left occluded. After completion of the revascularization procedure, I completed diagnostic cerebral angiography using the Select catheter. The vessels listed above were selected under fluoroscopic guidance taking care to visualize the origins with contrast prior to final catheter placement. Upon completion of the imaging, the catheter was removed. The groin sheath was exchanged over a wire for an AngioSeal closure device and the arteriotomy was closed without difficulty. Procedure Time: 60  minutes  Fluoroscopy Time: 14.4 minutes  Contrast: 2/2/2021  mL Optiray 320    Supervision and interpretation:  performed the entire procedure. Dr. Hughes Army then reviewed all films in the neuroradiology reading room at a later time.     Comparison: CTA 2/2/2021 RIGHT CAROTID, INTRACRANIAL: The right internal carotid artery has normal caliber over its entire course with no cervical stenosis. The middle cerebral arteries fill normally along the M1 segment. There is no anterior cerebral artery. There is a nearly fetal posterior cerebral artery. The middle cerebral artery territory is devoid of filling except for the inferior frontal pole, the anterior temporal lobe and the parieto-occipital border. Thrombectomy:  First control angiogram demonstrates partial restoration of flow in the inferior M2 division, but persistent distal occlusion. Second control angiogram was performed after withdrawal of the aspiration catheter and shows return of flow to a posterior frontal branch. Third control angiogram demonstrates that the microcatheter is in the occluded primary M2 trunk within a filling defect consistent with occlusive thrombus. Thrombolysis:  First control angiogram demonstrates positioning of the microcatheter within the occluded segment of the inferior M2 trunk. There is a filling defect around the catheter tip consistent with occlusive thrombus. Second control angiogram demonstrates persistence of the filling defect    Followup angiography after TPA infusion for thrombolysis shows no substantial revascularization. Thrombectomy:   Followup angiogram after mechanical thrombectomy demonstrates nearly complete restoration of flow to the right middle cerebral artery territory. There is a single posterior parietal branch that remains occluded. LEFT CAROTID, INTRACRANIAL:  Adequate contrast enhancement of the carotid artery is seen. The ophthalmic artery is adequately patent. The carotid terminus is normal with adequate caliber on both A1 and M1 segments. Both anterior cerebral arteries fill on this injection without dilution consistent with absent right ADRIEL. 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 The capillary and venous phases are within normal limits. There are no signs of fistulas, dissections or vascular malformations. LEFT VERTEBRAL, INTRACRANIAL: Normal contrast enhancement of the vertebral artery is noted. It demonstrates a normal contour and caliber. The left posterior inferior cerebellar artery is adequately seen. The basilar artery appears normal course and caliber. There is normal filling of bilateral superior cerebellar arteries and bilateral posterior cerebral arteries. Complications: None    IMPRESSION:  1. Occlusion of the major, inferior M2 division of the right middle cerebral artery, consistent with her stroke syndrome. 2. 3mm anterior communicating artery aneurysm. 3. No significant carotid stenosis. 4. Near complete restoration of flow to the right middle cerebral artery territory after mechanical thrombectomy and thrombolysis, TICI 2b.

## 2021-02-03 NOTE — CONSULTS
Clinical Pharmacy Consult Note    79 y.o. adult admitted with acute stroke. Pharmacy has been asked by Dr. Sherley Allred to adjust all drips to normal saline as appropriate based on compatibility to avoid fluid shifts since D5 is osmotically active. The following intermittent IV drips/infusions have been adjusted to saline:  none    The following medications must remain in D5W due to incompatibility with normal saline:  Amphotericin  Mycophenolate  Nitroprusside  Penicillin G Potassium    Please be aware that patient has D5W ordered as part of hypoglycemia orderset. Total IV fluid delivered to patient over last 24h: N/A    [facility] will follow daily to ensure all new IVPBs + drips are in NS. Please call with questions.   Rob Ragland, Christine, [facility] 2/2/2021 10:44 PM

## 2021-02-03 NOTE — CONSULTS
Neurology Consult Note  Reason for Consult: \"stroke\"  Chief complaint: Found down with left hemiparesis  Dr Jose Sanchez MD asked me to see Tong Salcido in consultation for evaluation of acute ischemic stroke, s/p thrombectomy and tPA. History of Present Illness:  Tong Salcido is a 79 y.o. adult who presents with left-sided hemiparesis, severe aphasia, dysarthria and right gaze deviation. She was reported to last be well around 1230 on 2/2/21 while speaking on the phone w family. Another phone call around [phone] the evening of 2/2/21 was notable for slurred speech and confusion. EMS responded and found her with deficit and brought her to [facility] ED for stroke evaluation. In the ED, CTA head and neck showed a right proximal M2 segment occlusion and incidental right segmental pulmonary embolus. CT perfusion revealed ischemic core of 8ml, total volume of hypoperfusion of 79ml, with penumbra of 71ml. She was subsequently taken for thrombectomy. There were several subsegmental occlusions post initial thrombectomy. One such lesion necessitated use of tPA. Prior to intervention, her NIHSS was 14, with 1 point for altered consciousness, 2 points for gaze deviation, 2 points for complete hemianopia, 1 point for facial palsy, 2 points for motor left arm minimal antigravity, 1 point for limb ataxia, 1 point in sensory loss, 2 points for severe dysarthria, and 2 points for extinction/inatention for left sided neglect. Her NIHSS persisted at 14 overnight. This morning around 0730 had a change in exam with new inability to lift left arm, and STAT CT head taken. She had new parenchymal hemorrhage with about 4cm of midline shift. Upon further exam at 1130 2/3/21 she is moving the LUE and has a firm  on that side. She was rousable and fully oriented.      Medical History:  Past Medical History:   Diagnosis Date    Acute GI bleeding     recent admission 1/29/2021    Atrial fibrillation ([facility] [address]     Xarelto  Systolic CHF ([facility] [address]     Type 2 diabetes mellitus [facility])      Past Surgical History:   Procedure Laterality Date    UPPER GASTROINTESTINAL ENDOSCOPY  01/29/2021    Dr Inga Muhammad     No medications prior to admission. No Known Allergies  No family history on file. Social History     Tobacco Use   Smoking Status Former Smoker     Social History     Substance and Sexual Activity   Drug Use Not on file     Social History     Substance and Sexual Activity   Alcohol Use Not on file       ROS:  Cannot obtain 2/2 severe dysarthria. Exam:  Blood pressure (!) 191/98, pulse 70, temperature 98.5 °F (36.9 °C), temperature source Axillary, resp. rate 18, weight 225 lb (102.1 kg), SpO2 94 %. Constitutional    Vital signs: BP, HR, and RR reviewed   General Alert, no distress, well-nourished  Eyes: fundoscopic exam revealed no hemorrhage   Cardiovascular: pulses symmetric in all 4 extremities. No peripheral edema. Psychiatric: cooperative with examination, no  psychotic behavior noted. Neurologic  Mental status:   orientation to person and place   General fund of knowledge grossly intact   Memory grossly intact   Attention intact as able to attend well to the exam     Language fluent in conversation   Comprehension intact; follows simple commands  Cranial nerves:   CN2: profound left hemianopsia   CN 3,4,6: Right gaze deviation  CN5: facial sensation symmetric   CN7: left face weak with severe dyarthria   CN8: hearing grossly intact  CN9: palate elevated symmetrically  CN11: trap full strength on shoulder shrug  CN12: tongue midline with protrusion  Strength: 4- LUE with 4+ , 4 LLE with drift to bed under 5 sec. Right side 5+. Deep tendon reflexes: normal in all 4 extremities  Sensory: light touch intact in all 4 extremities.   Left sided sensory extinction on double simultaneous stimulation  Cerebellar/coordination: finger nose finger normal without ataxia  Tone: normal in all 4 extremities  Gait: not assessed Objective  Vitals:   T-max:  Patient Vitals for the past 8 hrs:   BP Temp Temp src Pulse Resp SpO2   02/03/21 1303 (!) 191/98        02/03/21 1300 (!) 191/98   70 18 94 %   02/03/21 1245 (!) 175/84   63 24 97 %   02/03/21 1230 (!) 177/92   62 24 96 %   02/03/21 1200 (!) 166/93 98.5 °F (36.9 °C) Axillary 77 30 97 %   02/03/21 1130 126/64 98.7 °F (37.1 °C) Axillary 73 24 96 %   02/03/21 1117 132/65 98.5 °F (36.9 °C) Axillary 79 21 95 %   02/03/21 1115 132/65   68 22 95 %   02/03/21 1102 (!) 127/51 98.4 °F (36.9 °C) Axillary 70 27 96 %   02/03/21 1100 (!) 66/52   67 20 94 %   02/03/21 1030 134/75   80 20 (!) 89 %   02/03/21 1000 131/68   80 24 93 %   02/03/21 0930 129/62   71 17 94 %   02/03/21 0900 (!) 146/61   70 19 94 %   02/03/21 0830 136/69   72 22 94 %   02/03/21 0800 128/61 97.9 °F (36.6 °C) Oral 76 23 94 %   02/03/21 0730 127/77   88 24 94 %   02/03/21 0700 129/71   75 25 95 %   02/03/21 0630    75 (!) 31 92 %   02/03/21 0600 (!) 140/64   68 17 94 %   02/03/21 0530 137/69   75 24 94 %         LABS:    CBC:   Recent Labs     02/02/21 2006 02/03/21  0538   WBC 7.2 7.5   HGB 7.3* 6.6*   HCT 25.1* 22.2*    196   MCV 81.3 79.5*     Renal:    Recent Labs     02/02/21 2006 02/03/21  0931    142   K 4.2 3.9    108   CO2 23 27   BUN 15 13   CREATININE 0.8 0.7*   GLUCOSE 154* 134*   CALCIUM 8.7 9.1   ANIONGAP 10 7     Hepatic:   Recent Labs     02/02/21 2006   AST 21   ALT 15   BILITOT 0.6   PROT 5.7*   LABALBU 3.0*   ALKPHOS 91     Troponin:   Recent Labs     02/02/21 2006   TROPONINI <0.01     BNP: No results for input(s): BNP in the last 72 hours. Lipids:   Recent Labs     02/03/21  0538   CHOL 64   HDL 26*     ABGs:  No results for input(s): PHART, LDE7BHD, PO2ART, CPJ7HYD, BEART, THGBART, O8BCZWFX, CNS3UHN in the last 72 hours. INR:   Recent Labs     02/02/21 2006   INR 1.32*     Lactate: No results for input(s): LACTATE in the last 72 hours. are both supplied from the A1 segment of the left anterior cerebral artery. 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 The A2 and A3 segments of the ACAs are patent bilaterally. Left MCA is patent. There is focal occlusion of an M2 branch of the right MCA (series 2 images 427 and 428). However, other M2 branches and M3 branches in the right sylvian fissure are patent. The M1 segment of the right MCA is patent. The posterior cerebral arteries are    patent bilaterally. The basilar artery is patent and normal in caliber. It is supplied by the left vertebral artery. The small nondominant right vertebral artery terminates in right sided PICA. IMPRESSION:   1. 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 2. 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 Results were discussed with Dr. Mei Llanes at 8:00 PM on 2/2/2021. CT HEAD WO CONTRAST   Final Result   1. Previous left suboccipital craniotomy with a partially calcified extra-axial mass at the left foramen magnum measuring 1.8 cm contacting the cord at the cervicomedullary junction with some degree of mass effect on the cord at the foramen magnum. This    most likely represents residual/recurrent meningioma or other extra-axial mass. Correlation with patient history and previous imaging recommended. 2. Mild global volume loss and mild chronic small vessel ischemic disease in the white matter. 3. No acute intracranial hemorrhage.       IR ANGIOGRAM CAROTID CEREBRAL RIGHT    (Results Pending)   VL Extremity Venous Bilateral    (Results Pending)   CT head without contrast    (Results Pending)   CT HEAD WO CONTRAST    (Results Pending)       Impression: Marlin Nagel is a 79 y.o. adult who presented with left hemiplegia, right gaze deviation and severe dysarthria when found down at her home where she lives alone. CT subsequently revealed a large area of acute ischemia in the Right M2 segment if the right MCA. There was subsequent mechanical thrombectomy and ICU admission. The morning following her thrombectomy, she had worsening weakness of the LUE, repeat CT showed acute parenchymal hemorrhage and 4cm midline shift. Recommendations:  - Agree with current Neurosurgical recommendations of more aggressive BP control. Aim for SBP<160.  - PRN Labetalol and Hydralazine for target  SBP<160 and DBP < 100. Hold labetalol for HR<60. Start Cardene gtt if necessary.   - Continue hourly neurological assessment and ICU level of care. - Repeat CT head in 6 hours post bleed or if further acute neurologic decompensation.   - PT/OT/SLP evaluation after BP and bleed stable. - cont ASA, Lipitor 80mg, Coreg 6.25 BID as tolerating PO.   - Likely will not be able to continue her Xarelto for Afib anticoagulation given Neurological and GI bleed. - Stable meningiomas since 5/2008, no intervention needed. S/p crainiotomy. This patient will be staffed and discussed with the Neurology attending.      Kadi Flowers, DO PGY2

## 2021-02-03 NOTE — PROGRESS NOTES
Patient admitted to room 4511 from the Cath Lab s/p thrombectomy. Patient alert to self with incomprehensible speech. Patient able to follow commands yet weaker on the left side. Pupils equal, round, and reactive with a R sided deviation. R eye noted to have swelling. ICU admission NIH score 14. Neuro, NIH, and site assessment done at handoff with Cath Lab nurse. Puncture site remains intact with no hematoma or bleeding noted. Bilateral lower extremity pulses palpable at this time. Patient repositioned in bed, chlorhexidine bath complete, and a sacral heart in place for protection. Call light with reach, bed alarm engaged, and bed locked and in the lowest position. Will continue to monitor.

## 2021-02-03 NOTE — CARE COORDINATION
1. Can ONLY be done Monday- Friday between 8:30am-5pm  2. Prescription(s) must be in pharmacy by 3pm to be filled same day  3. Copy of patient's insurance/ prescription drug card and patient face sheet must be sent along with the prescription(s)  4. Cost of Rx cannot be added to hospital bill. If financial assistance is needed, please contact unit  or ;  or  CANNOT provide pharmacy voucher for patients co-pays  5. Patients can then  the prescription on their way out of the hospital at discharge, or pharmacy can deliver to the bedside if staff is available. (payment due at time of pick-up or delivery - cash, check, or card accepted)     Able to afford home medications/ co-pay costs: Yes    ADLS  Support Systems:      PT AM-PAC:   /24  OT AM-PAC:   /24    [place]: apt in a senior building  Steps: none    Plans to RETURN to current housing: Yes  Barriers to RETURNING to current housing: none noted    Durable Medical Equipment  DME Provider:   Equipment: cane and walker      DISCHARGE PLAN:  Disposition:TBD    Transportation PLAN for discharge: TBD     Factors facilitating achievement of predicted outcomes: Family support    Barriers to discharge: pending repeat CT, covid r/o, cardene drip    Additional Case Management Notes: NA    The Plan for Transition of Care is related to the following treatment goals of Acute cerebrovascular accident (CVA) ([address] [I63.9]    The Patient and/or patient representative Taylor and Taylor Canales's family were provided with a choice of provider and agrees with the discharge plan Not Indicated    Freedom of choice list was provided with basic dialogue that supports the patient's individualized plan of care/goals and shares the quality data associated with the providers.  Not Indicated    Care Transition patient: No    Jojo Fuentes RN  [facility] [employer]  Case Management Department Ph: [phone]   Fax: [phone]

## 2021-02-03 NOTE — PROGRESS NOTES
NEUROSURGERY PROGRESS NOTE    Carlos Green   [id number]   1940   2/3/2021    Hospital Day #1  Post-operative Day#1 s/p right  mechanical thrombectomy of R MCA with TICI 2b reperfusion. Subjective: patient resting in bed after returning from CT, she does not appear to be in any acute distress and she denies pain at this time. Objective:  /77   Pulse 88   Temp 98.1 °F (36.7 °C) (Axillary)   Resp 24   Wt 225 lb (102.1 kg)   SpO2 94%     Labs:  Recent Labs     02/02/21 2006         CO2 23   BUN 15   CREATININE 0.8   GLUCOSE 154*     Recent Labs     02/02/21 2006 02/03/21  0538   WBC 7.2 7.5   RBC 3.09* 2.79*   INR 1.32*  --      CT head wo contrast  2/3/2021 1205  Post therapy large acute parenchymal hemorrhage in the right cerebral hemisphere with subarachnoid component. Extensive surrounding edema is associated with 4 cm leftward shift of midline structures. Neurologic Exam:  GCS:  3 - Opens eyes to loud noise or command  5 - Alert and oriented  6 - Follows simple motor commands    Mental Status: eyes closed, will open briefly to voice, she is oriented to person/place and time  Language: dysarthric   Sensation: decreased sensation RUE/RLE   Coordination: GLADIS     Cranial Nerves:  II: Visual acuity not tested, left hemianopia  III, IV, VI: PERRL, 4 mm bilaterally, right gaze deviation, does not cross midline   V: Facial sensation intact bilaterally to touch  VII: left facial weakness with dysarthria   VIII: Hearing intact bilaterally to spoken voice  IX: Palate movement equal bilaterally  XI: decreased shrug left side   XII: Tongue midline    Musculoskeletal:   Gait: Not tested   Tone: normal   Motor strength: patient has 5/5 strength right upper extremitiy/right lower extremity. She is 3/5 strength left upper extremity with drift to bed in 6 seconds. Left lower extremity strength 3-/5 unable to hold against gravity for > 2 seconds.      Right groin site: c/d/i    Assessment 78year-old woman who presented to [facility] ER with acute onset stroke symptoms. The patient was shown on CT angiography to have evidence of right MCA M2 occlusion, now day 1 s/p mechanical thrombectomy with TICI 2b reperfusion. CT head with large acute parenchymal hemorrhage in the right cerebral hemisphere. ICH Score: 1   GCS 13-15 = Score 0  Volume (mL) greater than 30 = Score 1  Infratentorial Origin No = Score 0  IVH No = Score 0  Age (years) less than 80 = Score 0     Plan:         1. Neurologic exams frequency: q 1 hour          2. For change in exam MUST contact neurosurgery team along with critical care or primary team  3. Neurology consult for post stroke care and work up  4. repeat head CT head wo contrast at 1800 to follow up on hemorrhage   5. Maintain SBP <160; If PRN med insufficient, then may start Nicardipine infusion  6. HOB elevated, no dextrose in IVFs or IV drips, normalize sodium   7. H/H 6.6/ 22.2, tranfuse 1 unit PRBC and trend H/H   8. Hold all anticoagulation/antiplatelet   9. PT/OT/Speech, rehab consult if appropriate   10. Will continue to follow, please call with any questions or ANY change in neurologic exam      DISPO- remain in ICU Dispo     SU Diaz-CNP  Neurosurgery Nurse Practitioner  [phone]  Patient was discussed with Dr. Kris Nguyen who agrees with above assessment and plan. Electronically signed by:  Vignesh Braga, 2/3/2021 9:01 AM

## 2021-02-03 NOTE — PROGRESS NOTES
Progress Note  Admit Date: 2/2/2021       Pt seen and examined  Admitted with acute cva w temp around the ttime. Currently without specific complaints but with difficulty w word finding and lethargy in am.   Right proximal m2 occulusion s/p tpa  Scheduled Medications:    ferrous sulfate  325 mg Oral Daily with breakfast    pantoprazole  40 mg Intravenous BID    atorvastatin  80 mg Oral Nightly    carvedilol  6.25 mg Oral BID WC    insulin lispro  0-6 Units Subcutaneous TID WC    insulin lispro  0-3 Units Subcutaneous Nightly      PRN Medications: labetalol, sodium chloride, albuterol sulfate HFA, hydrALAZINE, acetaminophen, HYDROcodone 5 mg - acetaminophen **OR** HYDROcodone 5 mg - acetaminophen, promethazine **OR** ondansetron, polyethylene glycol  Diet: Diet NPO Effective Now    Continuous Infusions:   sodium chloride      niCARdipine 5 mg/hr (02/03/21 1357)       PHYSICAL EXAM:  BP (!) 217/99 Comment: started nicardipine drip  Pulse 59   Temp 98.5 °F (36.9 °C) (Axillary)   Resp 22   Wt 225 lb (102.1 kg)   SpO2 96%   Recent Labs     02/02/21  1914 02/03/21  0237 02/03/21  0927 02/03/21  1315   POCGLU 153* 152* 137* 134*       Intake/Output Summary (Last 24 hours) at 2/3/2021 1429  Last data filed at 2/3/2021 1318  Gross per 24 hour   Intake 731.5 ml   Output 600 ml   Net 131.5 ml       Physical Exam  Constitutional:       General: Chloe Head is not in acute distress. Appearance: Chloe Head is not ill-appearing. Comments: A&Ox4, although some confusion   Cardiovascular:      Rate and Rhythm: Normal rate and regular rhythm. Pulses: Normal pulses. Heart sounds: Normal heart sounds. Pulmonary:      Effort: Pulmonary effort is normal.      Breath sounds: Normal breath sounds. Abdominal:      General: Abdomen is flat. Bowel sounds are normal.      Palpations: Abdomen is soft. Musculoskeletal: Normal range of motion. Skin:     General: Skin is warm. Capillary Refill: Capillary refill takes less than 2 seconds. Neurological:      Mental Status: Oralia Quezada is alert. Comments: Alert, can follow commands  Severe dysarthria and slurred speech. Strength 5/5 on left, 4/5 on right  Right gaze deviation      LABS:  Recent Labs     02/02/21 2006 02/03/21  0538   WBC 7.2 7.5   HGB 7.3* 6.6*   HCT 25.1* 22.2*    196                                                                    Recent Labs     02/02/21 2006 02/03/21  0931    142   K 4.2 3.9    108   CO2 23 27   BUN 15 13   CREATININE 0.8 0.7*   GLUCOSE 154* 134*     Recent Labs     02/02/21 2006   AST 21   ALT 15   BILITOT 0.6   ALKPHOS 80     Recent Labs     02/02/21 2006   TROPONINI <0.01     No results for input(s): BNP in the last 72 hours. Recent Labs     02/03/21  0538   CHOL 64   HDL 26*     Recent Labs     02/02/21 2006   INR 1.32*       Assessment & Plan:    Patient Active Problem List:     Acute cerebrovascular accident (CVA) ([facility] [address] acute pe, anemia with ich. The patient will likely need close monitoring for any signs or symptoms of blood loss given hx, this is a uniquely difficult case and situation. Has chronic systolic chf, for the time being, hold off bp medications, will also consult palliative care. 78 yo admitted with acute cva, anemia - monitor blood count closeyl; monitor neuro checks. Patient has   The patient and / or the family were informed of the results of any tests, a time was given to answer questions, a plan was proposed and they agreed with plan.   Disposition: icu   Full Code      Brenda Suggs MD [address]

## 2021-02-03 NOTE — PROGRESS NOTES
Clinical Pharmacy Progress Note    79 y.o. adult admitted with acute stroke. Pharmacy has been asked by Dr. Angus Locke to adjust all drips to normal saline as appropriate based on compatibility to avoid fluid shifts since D5 is osmotically active. The following intermittent IV drips/infusions have been adjusted to saline:  Nicardipine    The following medications must remain in D5W due to incompatibility with normal saline:  Amphotericin  Mycophenolate  Nitroprusside  Penicillin G Potassium    Please be aware that patient may have D5W ordered as part of hypoglycemia orderset. Total IV fluid delivered to patient over last 24h: 111 mL    RPh will follow daily to ensure all new IVPBs + drips are in NS. Please call with questions.   Triston Bentley PharmD, [facility]  Wireless: [phone]  2/3/2021 2:42 PM

## 2021-02-03 NOTE — ED PROVIDER NOTES
[address] ENCOUNTER          ATTENDING PHYSICIAN NOTE       Date of evaluation: 2/2/2021    Chief Complaint     Cerebrovascular Accident      History of Present Illness     Chloe Judge is an unknown female who presents emergency department today with strokelike symptoms. EMS was reportedly called by family. They unfortunately did not collect the patient's identifying information and we have no past medical history available. Per their report she has had some left-sided weakness and speech deficits/confusion. There is report of a possible normal phone call that may represent her last known well at 1640 today. This cannot be confirmed via EMS report at this time. Patient has severe speech difficulty currently and is unable to provide any further coherent history at this time. Glucose was in the 200s in route and patient had a temperature of 101.2F    Review of Systems     Unable to obtain given severe communication deficit    Physical Exam     INITIAL VITALS: BP: (!) 146/73, Temp: 98.7 °F (37.1 °C), Pulse: 70, Resp: 18, SpO2: 92 %     Nursing note and vitals reviewed. General:  Adult female, alert, in NAD. HENT: Normocephalic and atraumatic. External ears normal. Nose appears normal externally. Eyes: Conjunctivae normal. No scleral icterus. PERRL, right gaze deviation noted. Unable to look to midline or to the left. No nystagmus. Neck: Neck supple. No tracheal deviation present. CV: Normal rate. Irregular rhythm. Chest: Effort normal. Breath sounds clear to auscultation bilaterally. No wheezes. No rales or rhonchi. Abdominal: Soft. No distension. No tenderness. No rebound or guarding. No masses. No peritoneal signs. Musculoskeletal: No edema. No gross deformities. Neurological: Alert, follows most simple commands.   Speech is generally incomprehensible, does make sounds and attempt to respond to questions, able to answer yes or no questions generally comprehensibly. Able to give thumbs up with right hand and wiggle right toes to command. No spontaneous movement in left leg, foot, or toes. Is able to move the left hand and arm against gravity with 4/5 strength. Patient does not follow facial motor commands, does have some mild left facial droop at rest.  Tongue protrudes in midline. Unable to assess facial sensation. Unable to assess finger-nose-finger due to difficulty with commands. Unable to assess gait. Skin: Warm, dry. No rash. No diaphoresis or erythema. Procedures   Procedures    MEDICAL DECISION MAKING     MDM: Eagle Prescott is a 79 y.o. female who initially presented as an unknown female presenting for possible stroke symptoms. On arrival, little is known about the patient, and she was promptly met at the CT scanner for possible emergent stroke work-up. EMS did not have identifying information for the patient and patient had severe communication deficits. Her initial vital signs are reassuring. Blood glucose is reassuring. She has right gaze deviation, left-sided hemiplegia worst in the left leg, and severe communication deficits with expressive aphasia and dysarthria to the point of being nearly incomprehensible beyond yes or no. She follows many simple commands but has limited ability to fully cooperate with the stroke scale. Initial NIH stroke scale is documented as 23 as performed by myself, however is elevated due to significant deficits that disable her participation with things like coordination testing and visual fields. Emergent code stroke CT head and CTA head/neck were obtained. Further broad work-up including infectious assessment given her fever and including Covid testing was obtained as well. We were able to get identifying information for the patient and I was able to contact the patient's daughter to obtain further history.   Reportedly, the patient had been in a conversation with family at approximately 200 this afternoon and was reportedly normal.  She then had another phone call around 1830 this evening that was concerning for slurred speech and confusion. As such family checked on her and found her slumped in her chair at home with left-sided weakness and slurred speech and EMS was called. Of note, the patient was recently on Xarelto until a recent admission with discharge 2 days ago due to GI bleeding. She had previously been on this for atrial fibrillation. Her case was discussed with the stroke team, Dr. Star Hernandez, and it was agreed that given the time course and her recent GI bleeding, the patient is not a candidate for TPA. CT read demonstrated an old, likely meningioma at the skull base near the spinal cord, however based on previous MRI reads in care everywhere, this appears to be relatively stable and unlikely to be causing the patient's current localizable symptoms. The CTA read was concerning for a right proximal M2 occlusion consistent with the patient's stroke symptoms. There is also an incidentally noted right segmental PE which may explain her elevated temperature as well. She is not a candidate for heparinization at this time due to potential for treatment of her stroke syndrome with thrombectomy. The stroke team discussed with the neuro interventional physician on-call who agreed to take the patient for thrombectomy given that her CT perfusion scan was favorable. Patient was given aspirin in the emergency department. Her labs are notable for stable severe anemia that does not require transfusion at this time. Labs are otherwise reassuring. Covid is sent and pending. I discussed her case with the hospitalist and the ICU team who will assume care following her procedure. Patient admitted in guarded condition for further care and management.     Critical Care:  Due to the immediate potential for life-threatening deterioration due to severe ischemic stroke symptoms, I spent 68 minutes providing critical care. Thistime excludes time spent performing procedures but includes time spent on direct patient care, history retrieval, review of the chart, and discussions with patient, family, and consultant(s). Clinical Impression     1. Acute right MCA stroke [facility])        Disposition     DISPOSITION Admitted 02/02/2021 09:19:31 PM        Dinora Suarez MD  10:48 PM                     Past Medical, Surgical, Family, and Social History     Jose Angel Castaenda has a past medical history of Acute GI bleeding, Atrial fibrillation ([facility] [address] and Type 2 diabetes mellitus ([facility] [address] Jose Angel Castaneda has a past surgical history that includes Upper gastrointestinal endoscopy (01/29/2021). Cresencio Canales's family history is not on file. Jose Angel Castaneda reports that Jose Angel Castaneda has quit smoking. Jose Angel Castaneda does not have any smokeless tobacco history on file. Medications     Previous Medications    No medications on file       Allergies     Jose Angel Castaneda has No Known Allergies. ED Course     Nursing Notes, Past Medical Hx, Past Surgical Hx, Social Hx,Allergies, and Family Hx were reviewed. Patient was given the following medications:  Orders Placed This Encounter   Medications    iopamidol (ISOVUE-370) 76 % injection 80 mL    aspirin suppository 300 mg    iopamidol (ISOVUE-370) 76 % injection 45 mL    acetaminophen (TYLENOL) tablet 650 mg    OR Linked Order Group     HYDROcodone-acetaminophen (NORCO) 5-325 MG per tablet 1 tablet     HYDROcodone-acetaminophen (NORCO) 5-325 MG per tablet 2 tablet    OR Linked Order Group     promethazine (PHENERGAN) tablet 12.5 mg     ondansetron (ZOFRAN) injection 4 mg    polyethylene glycol (GLYCOLAX) packet 17 g    enoxaparin (LOVENOX) injection 40 mg    OR Linked Order Group     aspirin EC tablet 81 mg     aspirin suppository 300 mg    atorvastatin (LIPITOR) tablet 80 mg       Diagnostic Results     EKG   Atrial fibrillation, ventricular rate 75.   Measurable intervals normal, axis normal.  T wave inversion in lead III, no ST elevation or depression noted. Compared to previous dated 1/21/2021, not significantly changed. RECENT VITALS:  BP: (!) 146/73,Temp: 98.2 °F (36.8 °C), Pulse: 84, Resp: 22, SpO2: 100 %     RADIOLOGY:  CT Brain Perfusion   Final Result      1. Hypoperfusion in the right MCA territory with a central ischemic core of 8 mL, total volume of hypoperfusion of 79 mL and a penumbra of 71 mL. XR CHEST PORTABLE   Final Result   1. Limited evaluation because of patient rotation and underpenetration of the film. 2. No dense airspace consolidation. 3. Probable mild cardiomegaly. CTA HEAD NECK W CONTRAST   Final Result   1. Carotid and vertebral arteries are patent and without stenosis or acute abnormality. 2. Incidental partial imaging of the a segmental pulmonary embolism in the right middle lobe. 3. Partial imaging of small right greater than left pleural effusions. 4. Previous left suboccipital craniotomy with a residual 1.6 cm left-sided extra-axial mass at the foramen magnum with mass effect on the cord at the cervical medullary junction favored to represent meningioma. Recommend correlation with clinical history    and previous imaging. 5. Incidental multinodular goiter. CTA HEAD:      FINDINGS: The internal carotid arteries are patent and normal in caliber through the skull base. The middle cerebral arteries are patent. 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 The A2 and A3 segments of the ACAs are patent bilaterally. Left MCA is patent. There is focal occlusion of an M2 branch of the right MCA (series 2 images 427 and 428).  However, other M2 branches and M3 branches in the right sylvian fissure are patent. The M1 segment of the right MCA is patent. The posterior cerebral arteries are    patent bilaterally. The basilar artery is patent and normal in caliber. It is supplied by the left vertebral artery. The small nondominant right vertebral artery terminates in right sided PICA. IMPRESSION:   1. 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 2. 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 Results were discussed with Dr. Kat Giles at 8:00 PM on 2/2/2021. CT HEAD WO CONTRAST   Final Result   1. Previous left suboccipital craniotomy with a partially calcified extra-axial mass at the left foramen magnum measuring 1.8 cm contacting the cord at the cervicomedullary junction with some degree of mass effect on the cord at the foramen magnum. This    most likely represents residual/recurrent meningioma or other extra-axial mass. Correlation with patient history and previous imaging recommended. 2. Mild global volume loss and mild chronic small vessel ischemic disease in the white matter. 3. No acute intracranial hemorrhage.       IR ANGIOGRAM CAROTID CEREBRAL RIGHT    (Results Pending)   CT head without contrast    (Results Pending)       LABS:   Results for orders placed or performed during the hospital encounter of 02/02/21   Rapid influenza A/B antigens    Specimen: Nasopharyngeal   Result Value Ref Range    Rapid Influenza A Ag Negative Negative    Rapid Influenza B Ag Negative Negative   CBC Auto Differential   Result Value Ref Range    WBC 7.2 4.0 - 11.0 K/uL    RBC 3.09 (L) 4.20 - 5.90 M/uL    Hemoglobin 7.3 (L) 13.5 - 17.5 g/dL    Hematocrit 25.1 (L) 40.5 - 52.5 %    MCV 81.3 80.0 - 100.0 fL    MCH 23.5 (L) 26.0 - 34.0 pg    MCHC 28.9 (L) 31.0 - 36.0 g/dL    RDW 25.0 (H) 12.4 - 15.4 %    Platelets 804 078 - 243 K/uL    MPV 9.1 5.0 - 10.5 fL    Neutrophils % 78.0 %    Lymphocytes % 11.8 %    Monocytes % 8.4 %    Eosinophils % 1.1 %    Basophils % 0.7 %    Neutrophils Absolute 5.7 1.7 - 7.7 K/uL    Lymphocytes Absolute 0.9 (L) 1.0 - 5.1 K/uL    Monocytes Absolute 0.6 0.0 - 1.3 K/uL    Eosinophils Absolute 0.1 0.0 - 0.6 K/uL    Basophils Absolute 0.0 0.0 - 0.2 K/uL   Comprehensive Metabolic Panel w/ Reflex to MG   Result Value Ref Range    Sodium 139 136 - 145 mmol/L    Potassium reflex Magnesium 4.2 3.5 - 5.1 mmol/L    Chloride 106 99 - 110 mmol/L    CO2 23 21 - 32 mmol/L    Anion Gap 10 3 - 16    Glucose 154 (H) 70 - 99 mg/dL    BUN 15 7 - 20 mg/dL    CREATININE 0.8 0.8 - 1.3 mg/dL    GFR Non-African American >60 >60    GFR African American >60 >60    Calcium 8.7 8.3 - 10.6 mg/dL    Total Protein 5.7 (L) 6.4 - 8.2 g/dL    Albumin 3.0 (L) 3.4 - 5.0 g/dL    Albumin/Globulin Ratio 1.1 1.1 - 2.2    Total Bilirubin 0.6 0.0 - 1.0 mg/dL    Alkaline Phosphatase 91 40 - 129 U/L    ALT 15 10 - 40 U/L    AST 21 15 - 37 U/L    Globulin 2.7 g/dL   Troponin   Result Value Ref Range    Troponin <0.01 <0.01 ng/mL   Protime-INR   Result Value Ref Range    Protime 15.4 (H) 10.0 - 13.2 sec    INR 1.32 (H) 0.86 - 1.14   APTT   Result Value Ref Range    aPTT 18.8 (L) 24.2 - 36.2 sec   Blood Gas, Venous   Result Value Ref Range    pH, Roberto 7.403 7.350 - 7.450    pCO2, Roberto 45.4 41.0 - 51.0 mmHg    pO2, Roberto 49.8 (H) 25.0 - 40.0 mmHg    HCO3, Venous 28.3 (H) 24.0 - 28.0 mmol/L    Base Excess, Roberto 3.1 (H) -2.0 - 3.0 mmol/L    O2 Sat, Roberto 83 Not established %    Carboxyhemoglobin 1.8 (H) 0.0 - 1.5 %    MetHgb, Roberto 0.2 0.0 - 1.5 %    TC02 (Calc), Roberto 29 mmol/L   Lactic Acid, Plasma   Result Value Ref Range    Lactic Acid 1.1 0.4 - 2.0 mmol/L       CONSULTS:  IP CONSULT TO STROKE TEAM  IP CONSULT TO HOSPITALIST  IP CONSULT TO CRITICAL CARE  IP CONSULT TO NEUROLOGY  IP CONSULT TO CRITICAL CARE  IP CONSULT TO PHARMACY  PHARMACY TO CHANGE BASE FLUIDS    PATIENT REFERRED TO:  No follow-up provider specified.     DISCHARGE MEDICATIONS:  New Prescriptions    No medications on file           Kaylyn Gant MD  02/02/21 2492

## 2021-02-04 ENCOUNTER — APPOINTMENT (OUTPATIENT)
Dept: INTERVENTIONAL RADIOLOGY/VASCULAR | Age: 81
DRG: 003 | End: 2021-02-04
Payer: COMMERCIAL

## 2021-02-04 ENCOUNTER — APPOINTMENT (OUTPATIENT)
Dept: CT IMAGING | Age: 81
DRG: 003 | End: 2021-02-04
Payer: COMMERCIAL

## 2021-02-04 LAB
ACANTHOCYTES: ABNORMAL
ANION GAP SERPL CALCULATED.3IONS-SCNC: 11 MMOL/L (ref 3–16)
ANISOCYTOSIS: ABNORMAL
BANDED NEUTROPHILS RELATIVE PERCENT: 7 % (ref 0–7)
BASOPHILS ABSOLUTE: 0.1 K/UL (ref 0–0.2)
BASOPHILS RELATIVE PERCENT: 1 %
BUN BLDV-MCNC: 11 MG/DL (ref 7–20)
CALCIUM SERPL-MCNC: 9 MG/DL (ref 8.3–10.6)
CHLORIDE BLD-SCNC: 107 MMOL/L (ref 99–110)
CO2: 24 MMOL/L (ref 21–32)
CREAT SERPL-MCNC: 0.6 MG/DL (ref 0.6–1.2)
EOSINOPHILS ABSOLUTE: 0.1 K/UL (ref 0–0.6)
EOSINOPHILS RELATIVE PERCENT: 1 %
ESTIMATED AVERAGE GLUCOSE: 102.5 MG/DL
GFR AFRICAN AMERICAN: >60
GFR NON-AFRICAN AMERICAN: >60
GLUCOSE BLD-MCNC: 111 MG/DL (ref 70–99)
GLUCOSE BLD-MCNC: 120 MG/DL (ref 70–99)
GLUCOSE BLD-MCNC: 121 MG/DL (ref 70–99)
GLUCOSE BLD-MCNC: 122 MG/DL (ref 70–99)
GLUCOSE BLD-MCNC: 128 MG/DL (ref 70–99)
HBA1C MFR BLD: 5.2 %
HCT VFR BLD CALC: 25.2 % (ref 36–48)
HEMOGLOBIN: 7.7 G/DL (ref 12–16)
HYPOCHROMIA: ABNORMAL
INR BLD: 1.25 (ref 0.86–1.14)
LV EF: 58 %
LVEF MODALITY: NORMAL
LYMPHOCYTES ABSOLUTE: 0.8 K/UL (ref 1–5.1)
LYMPHOCYTES RELATIVE PERCENT: 8 %
MCH RBC QN AUTO: 24.6 PG (ref 26–34)
MCHC RBC AUTO-ENTMCNC: 30.7 G/DL (ref 31–36)
MCV RBC AUTO: 80.3 FL (ref 80–100)
MONOCYTES ABSOLUTE: 0.9 K/UL (ref 0–1.3)
MONOCYTES RELATIVE PERCENT: 9 %
NEUTROPHILS ABSOLUTE: 8.3 K/UL (ref 1.7–7.7)
NEUTROPHILS RELATIVE PERCENT: 74 %
PDW BLD-RTO: 24.6 % (ref 12.4–15.4)
PERFORMED ON: ABNORMAL
PLATELET # BLD: 201 K/UL (ref 135–450)
PMV BLD AUTO: 9.3 FL (ref 5–10.5)
POLYCHROMASIA: ABNORMAL
POTASSIUM REFLEX MAGNESIUM: 4.7 MMOL/L (ref 3.5–5.1)
PROTHROMBIN TIME: 14.5 SEC (ref 10–13.2)
RBC # BLD: 3.14 M/UL (ref 4–5.2)
REASON FOR REJECTION: NORMAL
REJECTED TEST: NORMAL
SODIUM BLD-SCNC: 142 MMOL/L (ref 136–145)
SPHEROCYTES: ABNORMAL
WBC # BLD: 10.3 K/UL (ref 4–11)

## 2021-02-04 PROCEDURE — 6360000002 HC RX W HCPCS: Performed by: NURSE PRACTITIONER

## 2021-02-04 PROCEDURE — 80048 BASIC METABOLIC PNL TOTAL CA: CPT

## 2021-02-04 PROCEDURE — C1894 INTRO/SHEATH, NON-LASER: HCPCS

## 2021-02-04 PROCEDURE — 2500000003 HC RX 250 WO HCPCS

## 2021-02-04 PROCEDURE — C1880 VENA CAVA FILTER: HCPCS

## 2021-02-04 PROCEDURE — 70450 CT HEAD/BRAIN W/O DYE: CPT

## 2021-02-04 PROCEDURE — 99233 SBSQ HOSP IP/OBS HIGH 50: CPT | Performed by: INTERNAL MEDICINE

## 2021-02-04 PROCEDURE — C1725 CATH, TRANSLUMIN NON-LASER: HCPCS

## 2021-02-04 PROCEDURE — 2580000003 HC RX 258: Performed by: STUDENT IN AN ORGANIZED HEALTH CARE EDUCATION/TRAINING PROGRAM

## 2021-02-04 PROCEDURE — C8929 TTE W OR WO FOL WCON,DOPPLER: HCPCS

## 2021-02-04 PROCEDURE — 06H03DZ INSERTION OF INTRALUMINAL DEVICE INTO INFERIOR VENA CAVA, PERCUTANEOUS APPROACH: ICD-10-PCS | Performed by: RADIOLOGY

## 2021-02-04 PROCEDURE — C9113 INJ PANTOPRAZOLE SODIUM, VIA: HCPCS | Performed by: STUDENT IN AN ORGANIZED HEALTH CARE EDUCATION/TRAINING PROGRAM

## 2021-02-04 PROCEDURE — 2000000000 HC ICU R&B

## 2021-02-04 PROCEDURE — 85025 COMPLETE CBC W/AUTO DIFF WBC: CPT

## 2021-02-04 PROCEDURE — 36415 COLL VENOUS BLD VENIPUNCTURE: CPT

## 2021-02-04 PROCEDURE — 6360000002 HC RX W HCPCS

## 2021-02-04 PROCEDURE — 2500000003 HC RX 250 WO HCPCS: Performed by: STUDENT IN AN ORGANIZED HEALTH CARE EDUCATION/TRAINING PROGRAM

## 2021-02-04 PROCEDURE — C1769 GUIDE WIRE: HCPCS

## 2021-02-04 PROCEDURE — 6360000002 HC RX W HCPCS: Performed by: STUDENT IN AN ORGANIZED HEALTH CARE EDUCATION/TRAINING PROGRAM

## 2021-02-04 PROCEDURE — 2580000003 HC RX 258: Performed by: NURSE PRACTITIONER

## 2021-02-04 PROCEDURE — 2580000003 HC RX 258

## 2021-02-04 PROCEDURE — 37191 INS ENDOVAS VENA CAVA FILTR: CPT | Performed by: RADIOLOGY

## 2021-02-04 PROCEDURE — 85610 PROTHROMBIN TIME: CPT

## 2021-02-04 RX ADMIN — LEVETIRACETAM 500 MG: 100 INJECTION, SOLUTION INTRAVENOUS at 19:16

## 2021-02-04 RX ADMIN — LEVETIRACETAM 500 MG: 100 INJECTION, SOLUTION INTRAVENOUS at 08:50

## 2021-02-04 RX ADMIN — SODIUM CHLORIDE 4 MG/HR: 9 INJECTION, SOLUTION INTRAVENOUS at 17:41

## 2021-02-04 RX ADMIN — MICONAZOLE NITRATE: 20 POWDER TOPICAL at 08:52

## 2021-02-04 RX ADMIN — PANTOPRAZOLE SODIUM 40 MG: 40 INJECTION, POWDER, FOR SOLUTION INTRAVENOUS at 19:47

## 2021-02-04 RX ADMIN — SODIUM CHLORIDE 4 MG/HR: 9 INJECTION, SOLUTION INTRAVENOUS at 06:45

## 2021-02-04 RX ADMIN — SODIUM CHLORIDE 4 MG/HR: 9 INJECTION, SOLUTION INTRAVENOUS at 12:36

## 2021-02-04 RX ADMIN — SODIUM CHLORIDE 4 MG/HR: 9 INJECTION, SOLUTION INTRAVENOUS at 01:19

## 2021-02-04 RX ADMIN — PANTOPRAZOLE SODIUM 40 MG: 40 INJECTION, POWDER, FOR SOLUTION INTRAVENOUS at 08:53

## 2021-02-04 RX ADMIN — SODIUM CHLORIDE 6 MG/HR: 9 INJECTION, SOLUTION INTRAVENOUS at 23:22

## 2021-02-04 RX ADMIN — MICONAZOLE NITRATE: 20 POWDER TOPICAL at 19:47

## 2021-02-04 ASSESSMENT — PAIN SCALES - GENERAL
PAINLEVEL_OUTOF10: 0

## 2021-02-04 NOTE — PROGRESS NOTES
Speech Language Pathology  Hold    Chart reviewed, d/w RN Bushra Lang who states pt not alert enough for swallow eval at this time. Attempted to see pt, however pt very briefly awakened and then no responses. Will follow up as pt appropriate and schedule allows      Leslie Laguna M.S./[facility] #[id number]  Pg.  # [phone]

## 2021-02-04 NOTE — PROGRESS NOTES
NEUROSURGERY PROGRESS NOTE    Mari Payton   [id number]   1940 2/4/2021    Hospital Day #2     Interval history: More somnolent today, eyes closed and following less commands, left side weak. CT stable, neurology recommending EEG. Post-operative Day# 2 s/p right mechanical thrombectomy of R MCA with TICI 2b reperfusion. Subjective:  Severely dysarthric speech, less interactive, does not appear in acute distress     Objective:  BP (!) 167/85   Pulse 68   Temp 98.7 °F (37.1 °C) (Oral)   Resp 14   Wt 219 lb 12.8 oz (99.7 kg)   SpO2 97%     Labs:  Recent Labs     02/02/21 2006 02/03/21  0931 02/04/21  0521    142 142    108 107   CO2 23 27 24   BUN 15 13 11   CREATININE 0.8 0.7* 0.6*   GLUCOSE 154* 134* 128*     Recent Labs     02/02/21 2006 02/03/21  0538   WBC 7.2 7.5   RBC 3.09* 2.79*   INR 1.32*  --      CT head wo contrast  2/3/2021 1205  Post therapy large acute parenchymal hemorrhage in the right cerebral hemisphere with subarachnoid component. Extensive surrounding edema is associated with 4 cm leftward shift of midline structures. CT head wo contrast  2/4/2021 1109  1. Hemorrhagic transformation of right MCA infarct, without significant change. No new hemorrhage.      Neurologic Exam:  GCS:  2 - Opens eyes with pain  4 - Seems confused, disoriented  6 - follows simple commands     Mental Status: eyes closed, open very briefly to sternal rub, she is oriented to person and place with severely dysarthric speech   Language: dysarthric   Sensation: decreased sensation RUE/RLE   Coordination: GLADIS     Cranial Nerves:  II: Visual acuity not tested, left hemianopia  III, IV, VI: PERRL, 4 mm bilaterally, right gaze deviation, does not cross midline   V:  limited exam 2/2 encephalopathy  VII: left facial weakness with dysarthria   VIII: Hearing intact bilaterally to spoken voice  IX: limited exam 2/2 encephalopathy   XI:  limited exam 2/2 encephalopathy  XII:  limited exam 2/2 encephalopathy    Musculoskeletal:   Gait: Not tested   Tone: normal   Motor strength: patient has spontaneous strong movement in RUE/RLE, she has brisk localization to pain with right upper extremity and wiggles toes to commands in RLE. LUE 2/5, LLE 2/5. Right groin site: c/d/i    Assessment   78year-old woman who presented to [facility] ER with acute onset stroke symptoms. The patient was shown on CT angiography to have evidence of right MCA M2 occlusion, now day 2 s/p mechanical thrombectomy with TICI 2b reperfusion. CT head with large acute parenchymal hemorrhage in the right cerebral hemisphere. Plan:         1. Neurologic exams frequency: q 1 hour          2. For change in exam MUST contact neurosurgery team along with critical care or primary team  3. Neurology consult for post stroke care and work up  4. repeat head CT stable, MRI brain wo contrast pending   5. Maintain SBP <160; If PRN med insufficient, then may start Nicardipine infusion  6. HOB elevated, no dextrose in IVFs or IV drips, normalize sodium   7. Hold all anticoagulation/antiplatelet   8. PT/OT/Speech, rehab consult if appropriate   9. EEG per neurology for change in exam with stable CT   10. Will continue to follow, please call with any questions or ANY change in neurologic exam      DISPO- remain in ICU      SU Gregg-CNP  Neurosurgery Nurse Practitioner  [phone]  Patient was discussed with Dr. Michelle Gutierrez who agrees with above assessment and plan. Electronically signed by:  Yoselyn Edge, 2/4/2021 7:04 AM

## 2021-02-04 NOTE — PROGRESS NOTES
Progress Note  Admit Date: 2/2/2021       Pt seen and examined this morning  Seems lethargic but responsive to verbal stimuli  Had hemorrhagic conversion overnight    Scheduled Medications:    miconazole   Topical BID    levetiracetam  500 mg Intravenous Q12H    ferrous sulfate  325 mg Oral Daily with breakfast    pantoprazole  40 mg Intravenous BID    atorvastatin  80 mg Oral Nightly    carvedilol  6.25 mg Oral BID WC    insulin lispro  0-6 Units Subcutaneous TID WC    insulin lispro  0-3 Units Subcutaneous Nightly      PRN Medications: perflutren lipid microspheres, labetalol, albuterol sulfate HFA, hydrALAZINE, acetaminophen, HYDROcodone 5 mg - acetaminophen **OR** HYDROcodone 5 mg - acetaminophen, promethazine **OR** ondansetron, polyethylene glycol  Diet: Diet NPO Effective Now    Continuous Infusions:   niCARdipine 4 mg/hr (02/04/21 1236)       PHYSICAL EXAM:  BP (!) 154/71   Pulse 78   Temp 98.4 °F (36.9 °C) (Oral)   Resp 16   Ht 5' 7\" (1.702 m)   Wt 219 lb 12.8 oz (99.7 kg)   SpO2 97%   BMI 34.43 kg/m²   Recent Labs     02/03/21  1315 02/03/21  1751 02/03/21  2132 02/04/21  0848 02/04/21  1235   POCGLU 134* 131* 142* 111* 120*       Intake/Output Summary (Last 24 hours) at 2/4/2021 1652  Last data filed at 2/4/2021 0600  Gross per 24 hour   Intake 628.99 ml   Output 301 ml   Net 327.99 ml       BP (!) 154/71   Pulse 78   Temp 98.4 °F (36.9 °C) (Oral)   Resp 16   Ht 5' 7\" (1.702 m)   Wt 219 lb 12.8 oz (99.7 kg)   SpO2 97%   BMI 34.43 kg/m²   General appearance: somnolent but responds dysarthric gcs 2 opens eyes with pain, 4 seems confused and disoriented but cannot follow commands  Head: Normocephalic, without obvious abnormality, atraumatic  Neck: no adenopathy, no carotid bruit, no JVD, supple, symmetrical, trachea midline and thyroid not enlarged, symmetric, no tenderness/mass/nodules  Lungs: clear to auscultation bilaterally Heart: regular rate and rhythm, S1, S2 normal, no murmur, click, rub or gallop  Abdomen: soft, non-tender; bowel sounds normal; no masses,  no organomegaly  Extremities: extremities normal, atraumatic, no cyanosis or edema  Pulses: 2+ and symmetric  Neurologic: gcs of 2/4/6 otherwise very difficult to do has equal pupils but right gaze deiates, limited to do full mental status exam given her somnolence    LABS:  Recent Labs     02/02/21 2006 02/03/21  0538 02/03/21  1527 02/04/21  0635   WBC 7.2 7.5  --  10.3   HGB 7.3* 6.6* 8.9* 7.7*   HCT 25.1* 22.2* 29.8* 25.2*    196  --  201                                                                    Recent Labs     02/02/21 2006 02/03/21  0931 02/04/21  0521    142 142   K 4.2 3.9 4.7    108 107   CO2 23 27 24   BUN 15 13 11   CREATININE 0.8 0.7 0.6   GLUCOSE 154* 134* 128*     Recent Labs     02/02/21 2006   AST 21   ALT 15   BILITOT 0.6   ALKPHOS 91     Recent Labs     02/02/21 2006   Julissa Stitzer <0.01     No results for input(s): BNP in the last 72 hours. Recent Labs     02/03/21  0538   CHOL 64   HDL 26*     Recent Labs     02/02/21 2006 02/04/21  1250   INR 1.32* 1.25*       Assessment & Plan:    Patient Active Problem List:     Acute cerebrovascular accident (CVA) ([facility] [address]     Permanent atrial fibrillation ([facility] [address]     Acute gastric ulcer with hemorrhage     Other pulmonary embolism without acute cor pulmonale ([address]     Acute right MCA stroke ([facility] [address]      Gareth Canales is a 79 y.o. adult, with a history of afib, HF, DM, asthma, pulm HTN (not oxygen dependent), HTN, HLD, EDDY on cpap, breast cancer s/p lumpectomy/radiation, colon polyps, obesity who presented with left-sided hemiplegia, severe expressive aphasia and dysarthria, right facial droop, and right-gaze deviation. She underwent M2 thrombectomy and thrombolysis with significant improvement of hemiplegia, but still with other aforementioned symptoms.   Being monitored in icu by icu team.     Last known normal:  12:30 2/2/21  Time of reperfusion at 21:45 on 2/2/21.     Right MCA M2 occlusion s/p thrombectomy and thrombolysis  -CT on 2/4 showed new large acute parenchymal hemorrhage in the right cerebral hemisphere. Head CT x2 both stable. F/u repeat scans as indicated  -q1 neurochecks, qshift NIHSS  -PT/OT, Speech eval  -neurology consulted  -fasting lipid and Hgb A1c     Intraparenchymal hemorrhage   -2/2 thrombolysis, clinically is improved. Continue to monitor neuro status, make NSGY aware of acute changes. -SBP<160, controlled with cardene drip   -Continue to hold aspirin and AC given bleed     Blood loss anemia, hypochromic microcytic 2/2 GI bleed; hgb 7.3 on admission  -recent EGD last admission  -daily iron  -Protonix 40 BID        Acute on chronic systolic HF, not in exacerbation   - cont coreg 6.25 mg BID  -holding home lasix 20     Segmental RML Pulmonary Embolus incidentally noted on CTA head/neck  -patient seems to be breathing/ oxygenating well; no intervention for now  -may explain temperature in EMS  -hold AC given ich  -LE doppler u/s showed An acute totally occluding deep venous thrombosis is noted in the left gastrocnemius, posterior tibial, peroneal, and soleal veins. Given that the fact that the patient  already had a pulmonary embolism, recommend IVC filter placement     Permanent Afib, rate controlled  - holding xarlto given recent GI bleed and thrombectomy, chadsvac score high.  Continue to hold [facility] given new bleed   - hold home diltiazem for now given HR is controlled     DMT2, controlled   HgA1c 6.2% (9/2020)   - most recent admission, per daughter pt on lispro 14U in AM and 10U PM  - Low-dose sliding scale given n.p.o. status     HTN  -control as above  -holding home losartan and diltiazem     Asthma, not in exacerbation   - albuterol prn      Code Status:  Full Code  FEN:  NPO   PPX:  SCD's   DISPO:  IP The patient and / or the family were informed of the results of any tests, a time was given to answer questions, a plan was proposed and they agreed with plan.   Disposition: close icu monitoring  Full Code      MD Kitty Landon

## 2021-02-04 NOTE — PROGRESS NOTES
Physician Progress Note      Jeanie Aguilar  CSN #:                  [id number]  :                       1940  ADMIT DATE:       2021 7:22 PM  [address] [place] DATE:  RESPONDING  PROVIDER #:        Chun Sanderson MD          QUERY TEXT:    Pt admitted with stroke and has anemia documented. If possible, please   document in progress notes and discharge summary further specificity regarding   the acuity and type of anemia:    The medical record reflects the following:  Risk Factors: 78 yo w/ recent GIB  Clinical Indicators: Per H&P: Blood loss anemia, hypochromic microcytic 2/2 GI   bleed; hgb 7.3 on admission. 6.6 on 2/3  Treatment: lab monitoring  Options provided:  -- Anemia due to acute blood loss  -- Other - I will add my own diagnosis  -- Disagree - Not applicable / Not valid  -- Disagree - Clinically unable to determine / Unknown  -- Refer to Clinical Documentation Reviewer    PROVIDER RESPONSE TEXT:    This patient has acute blood loss anemia.     Query created by: Ary House on 2/3/2021 9:27 AM      Electronically signed by:  Chun Sanderson MD 2021 6:39 AM

## 2021-02-04 NOTE — PROGRESS NOTES
Neurology Consult Note  Consult Reason: ischemic stroke w/ hemorrhagic transformation     Updates:  F/u imaging stable   Exam worse this AM - head CT repeated and stable     Impression:  George Ball is a 79 y.o. female who presented with L-hemiparesis, aphasia, dysarthria & R gaze deviation, found to have R M2 segment occlusion, initial NIHSS of 14, not tpa candidate s/p thrombectomy w/ TICI 2b reperfusion. Initially improved NIHSS s/p thrombectomy w/ subsequent decline, repeat head CT showed hemorrhagic transformation, stable on repeat imaging.      Recommendations:  Stroke Plan s/p IV tPA &/or Thrombectomy   Imaging / Labs:  -Obtain MRI of the brain w/o contrast to eval for stroke  -Echocardiogram with bubble - if not previously completed   -Check lipid panel and hemoglobin A1C - pending   -EEG for change in Exam this morning, repeating imaging stable     Medications:  -High-intensity statin   -Hold antiplatelets at this time d/t hemorrhagic transformation   -SCDs for DVT prophylaxis  -Seizure prophylaxis x 7 days - Keppra 500mg BID     Consults / Nursing Care  -HOB elevated to help prevent aspiration  [facility] Neurologic Exams & Vitals  -NIHSS per guidelines   -Telemetry   -PT/OT: eval and treat  -PMR consult if rehab recommended   -ST: eval and treat and dysphagia screen  -Nursing bedside swallow prior to any PO intake   -Groin checks per post procedure guidelines  -Blood Pressure Management   -s/p Thrombectomy - Keep SBP <160    Follow up / Discharge Recommendations:  -Patient w/ hx of Afib - will need anticoagulation resumed, likely in 2-3 weeks (after clearance from neurosurgery)   -Stroke Education at Discharge  -Secondary Stroke Prevention Measures listed on AVS  -Follow up w/ Neurology in 1 month    SU Flanagan - CNP   Neurology Nurse Practitioner  02/04/21  11:19 AM  [phone]      Labs:  Recent Labs     02/03/21  0538   LDLCALC 28       Studies:  MRI of brain w/o pending   Head CT w/o 2/4/2021

## 2021-02-04 NOTE — PROGRESS NOTES
Clinical Pharmacy Progress Note    79 y.o. female admitted with acute stroke. Pharmacy has been asked by Dr. Zan Zhong to adjust all drips to normal saline as appropriate based on compatibility to avoid fluid shifts since D5 is osmotically active. The following intermittent IV drips/infusions have been adjusted to saline:  Nicardipine  Levetiracetam    The following medications must remain in D5W due to incompatibility with normal saline:  Amphotericin  Mycophenolate  Nitroprusside  Penicillin G Potassium    Please be aware that patient may have D5W ordered as part of hypoglycemia orderset. Total IV fluid delivered to patient over last 24h: ~975 mL    RPh will follow daily to ensure all new IVPBs + drips are in NS. Please call with questions.   Amauri Whelan PharmD, [facility]  Wireless: [phone]  2/4/2021 1:13 PM

## 2021-02-04 NOTE — CARE COORDINATION
Brief  Stroke Team Note    Ms. Bronwyn Arriaza is a 79year old woman with past medical history of DMII, afib off Xarelto d/t recent GI, and known meningioma, who presented to [facility] with a right MCA syndrome. Patient was last seen by family at 12:30 PM.  When a family member called to speak with her at 6:30 PM her speech was slurred. They went to check on her at 7 PM and found her with a right gaze, left sided weakness and severely dysarthric speech. Patient arrived to [facility] ER at 1917 PM.  Stroke Team was paged at 46. At that time I reviewed patient's head CT (not pushed to [facility]). It showed a prior left suboccipital craniotomy with partially calcified extra-axial mass at the left foramen magnum with some degree of mass effect on the cord at that level which was consistent with known meningioma. No hemorrhage or early ischemic changes identified. 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 Because we were > 6 hours from W I immediately requested a CT perfusion brain scan and [facility]) about a possible case. CTP showed an 8 cc core and 79 cc penumbra (mismatch ratio 9.9), which was favorable for endovascular therapy. I discussed the plan with the patient's daughter Shira Danielson over the phone and let the ED know that the patient would be going to thrombectomy.     NIHSS estimated at this time to be 10 (right FD, right arm/leg weakness, right gaze, severe dysarthria)    Marija Camp, PhD   Stroke Team

## 2021-02-04 NOTE — PLAN OF CARE
Problem: Restraint Use - Nonviolent/Non-Self-Destructive Behavior:  Goal: Absence of restraint indications  Description: Absence of restraint indications  Outcome: Ongoing  Goal: Absence of restraint-related injury  Description: Absence of restraint-related injury  Outcome: Ongoing     Problem: HEMODYNAMIC STATUS  Goal: Patient has stable vital signs and fluid balance  Outcome: Ongoing     Problem: ACTIVITY INTOLERANCE/IMPAIRED MOBILITY  Goal: Mobility/activity is maintained at optimum level for patient  Outcome: Ongoing     Problem: COMMUNICATION IMPAIRMENT  Goal: Ability to express needs and understand communication  Outcome: Ongoing     Problem: Falls - Risk of:  Goal: Will remain free from falls  Description: Will remain free from falls  Outcome: Ongoing  Goal: Absence of physical injury  Description: Absence of physical injury  Outcome: Ongoing     Problem: Urinary Elimination:  Goal: Signs and symptoms of infection will decrease  Description: Signs and symptoms of infection will decrease  Outcome: Ongoing  Goal: Complications related to the disease process, condition or treatment will be avoided or minimized  Description: Complications related to the disease process, condition or treatment will be avoided or minimized  Outcome: Ongoing     Problem: Skin Integrity:  Goal: Will show no infection signs and symptoms  Description: Will show no infection signs and symptoms  Outcome: Ongoing  Goal: Absence of new skin breakdown  Description: Absence of new skin breakdown  Outcome: Ongoing

## 2021-02-04 NOTE — PROCEDURES
IR Procedure Note    Requested procedure: IVC filter placement    Procedure: Successful placement of IVC filter.     Anesthesia: local lidocaine    Surgeons/Assistants: Davida Garcia    Estimated Blood Loss: Minimal    Complications: None    Jody Woods MD  2/4/2021

## 2021-02-04 NOTE — CARE COORDINATION
Case Management Assessment           Daily Note                 Date/ Time of Note: 2/4/2021 2:19 PM         Note completed by: Suresh Jacobs    Patient Name: Cheyenne Nielson  YOB: 1940    Diagnosis:Acute cerebrovascular accident (CVA) [facility]) [I63.9]  Patient Admission Status: Inpatient    Date of Admission:2/2/2021  7:22 PM Length of Stay: 2 GLOS:      Current Plan of Care: repeat CT, MRI pending, EEG recommended and ECHO with bubble  ________________________________________________________________________________________  PT AM-PAC:   / 24 per last evaluation on: pending    OT AM-PAC:   / 24 per last evaluation on: pending    DME Needs for discharge: NA  ________________________________________________________________________________________  Discharge Plan: To Be Determined DUE TO: independent prior but will most likely need placement    Tentative discharge date: TBD    Current barriers to discharge: MRI pending, ECHO with bubble and therapy evals pending    Referrals completed: Not Applicable    Resources/ information provided: Not indicated at this time  ________________________________________________________________________________________  Case Management Notes:Patient is from home alone with a senior building. Will most likely need placement or minimally home care. Mikey Berger and her family were provided with choice of provider; she and her family are in agreement with the discharge plan.     Care Transition Patient: Arianne Jacobs RN  [facility]  Case Management Department  Ph: [phone]  Fax: [phone]

## 2021-02-04 NOTE — PROGRESS NOTES
ICU Progress Note    Admit Date: 2/2/2021  Day: 2    Diet: Diet NPO Effective Now    CC: Cerebrovascular accident    Interval history: Hemorrhagic conversion stable overnight on repeat CT scans x2. No acute changes neurological status. Nicardipine drip started for blood pressure control. Will continue to monitor.         Medications:     Scheduled Meds:   miconazole   Topical BID    levetiracetam  500 mg Intravenous Q12H    ferrous sulfate  325 mg Oral Daily with breakfast    pantoprazole  40 mg Intravenous BID    atorvastatin  80 mg Oral Nightly    carvedilol  6.25 mg Oral BID WC    insulin lispro  0-6 Units Subcutaneous TID WC    insulin lispro  0-3 Units Subcutaneous Nightly     Continuous Infusions:   sodium chloride      niCARdipine 4 mg/hr (02/04/21 0645)     PRN Meds:labetalol, sodium chloride, albuterol sulfate HFA, hydrALAZINE, acetaminophen, HYDROcodone 5 mg - acetaminophen **OR** HYDROcodone 5 mg - acetaminophen, promethazine **OR** ondansetron, polyethylene glycol    Objective:   Vitals:   T-max:  Patient Vitals for the past 8 hrs:   BP Temp Temp src Pulse Resp SpO2 Weight   02/04/21 0700 (!) 160/89   88 24 94 %    02/04/21 0600 (!) 167/85   68 14 97 %    02/04/21 0502       219 lb 12.8 oz (99.7 kg)   02/04/21 0500 (!) 168/85   79 19 97 %    02/04/21 0400 (!) 153/69 98.7 °F (37.1 °C) Oral 77 19 97 %    02/04/21 0300 (!) 164/64   76 19 99 %    02/04/21 0200 (!) 157/88   84 20 98 %    02/04/21 0100 (!) 163/96   82 23     02/04/21 0015 (!) 154/82   85 (!) 33     02/04/21 0000 (!) 150/79 98.7 °F (37.1 °C) Oral 82 26 97 %    02/03/21 2353  98.9 °F (37.2 °C)            Intake/Output Summary (Last 24 hours) at 2/4/2021 0745  Last data filed at 2/4/2021 0600  Gross per 24 hour   Intake 1360.49 ml   Output 501 ml   Net 859.49 ml       Review of Systems   Unable to perform ROS: Patient nonverbal       Physical Exam  Constitutional: General: She is not in acute distress. Appearance: She is not ill-appearing. Comments: Not alert, would occasionally respond to name   Very somnolent    Cardiovascular:      Rate and Rhythm: Normal rate and regular rhythm. Pulses: Normal pulses. Heart sounds: Normal heart sounds. Pulmonary:      Effort: Pulmonary effort is normal.      Breath sounds: Normal breath sounds. Abdominal:      General: Abdomen is flat. Bowel sounds are normal.      Palpations: Abdomen is soft. Musculoskeletal: Normal range of motion. Skin:     General: Skin is warm and dry. Capillary Refill: Capillary refill takes less than 2 seconds. Neurological:      Comments: Occasionally alert to name. Spontaneously moves extremities. Would not follow commands           LABS:    CBC:   Recent Labs     02/02/21 2006 02/03/21  0538 02/03/21  1527   WBC 7.2 7.5  --    HGB 7.3* 6.6* 8.9*   HCT 25.1* 22.2* 29.8*    196  --    MCV 81.3 79.5*  --      Renal:    Recent Labs     02/02/21 2006 02/03/21  0931 02/04/21  0521    142 142   K 4.2 3.9 4.7    108 107   CO2 23 27 24   BUN 15 13 11   CREATININE 0.8 0.7* 0.6*   GLUCOSE 154* 134* 128*   CALCIUM 8.7 9.1 9.0   ANIONGAP 10 7 11     Hepatic:   Recent Labs     02/02/21 2006   AST 21   ALT 15   BILITOT 0.6   PROT 5.7*   LABALBU 3.0*   ALKPHOS 91     Troponin:   Recent Labs     02/02/21  2006   Penne Baumgarten <0.01     BNP: No results for input(s): BNP in the last 72 hours. Lipids:   Recent Labs     02/03/21  0538   CHOL 64   HDL 26*     ABGs:  No results for input(s): PHART, WYC9NOZ, PO2ART, NVK0CGJ, BEART, THGBART, M6FNCGHB, NWB0MIF in the last 72 hours. INR:   Recent Labs     02/02/21 2006   INR 1.32*     Lactate: No results for input(s): LACTATE in the last 72 hours.   Cultures:  -----------------------------------------------------------------  RAD:   CT head without contrast   Final Result 1. Stable large intraparenchymal hemorrhage located within the right posterior temporal parietal lobe with extension into the adjacent insula. There is also subarachnoid extension of hemorrhage into the overlying sulci as well as the right sylvian    fissure. Findings result in some mild right to left subfalcine herniation. 2.There is a 1.3 x 1.1 cm extra-axial mass identified within the left ordered aspect of the foramen magnum. This results in some mass effect on the adjacent medulla. This is without change from the prior study. Finding may represent a meningioma. This    could be further evaluated with a contrast-enhanced MRI of the head following resolution of the intracranial hemorrhage. CT HEAD WO CONTRAST   Final Result      1. Stable large intraparenchymal hemorrhage located within the right posterior temporal parietal lobe with extension into the adjacent insula. There is also subarachnoid extension of hemorrhage into the overlying sulci as well as the right sylvian    fissure. Findings result in some mild right to left subfalcine herniation. 2.There is a 1.3 x 1.1 cm extra-axial mass identified within the left ordered aspect of the foramen magnum. This results in some mass effect on the adjacent medulla. This is without change from the prior study. Finding may represent a meningioma. This    could be further evaluated with a contrast-enhanced MRI of the head following resolution of the intracranial hemorrhage. VL Extremity Venous Bilateral         CT HEAD WO CONTRAST   Final Result      Post therapy large acute parenchymal hemorrhage in the right cerebral hemisphere with subarachnoid component. Extensive surrounding edema is associated with 4 cm leftward shift of midline structures. Critical finding of acute intracranial hemorrhage was called to Quirino Meek of the neurosurgery department at 12:25 PM on 2/3/2021, with instructions to contact patient's attending physician with these results. CT Brain Perfusion   Final Result      1. Hypoperfusion in the right MCA territory with a central ischemic core of 8 mL, total volume of hypoperfusion of 79 mL and a penumbra of 71 mL. XR CHEST PORTABLE   Final Result   1. Limited evaluation because of patient rotation and underpenetration of the film. 2. No dense airspace consolidation. 3. Probable mild cardiomegaly. CTA HEAD NECK W CONTRAST   Final Result   1. Carotid and vertebral arteries are patent and without stenosis or acute abnormality. 2. Incidental partial imaging of the a segmental pulmonary embolism in the right middle lobe. 3. Partial imaging of small right greater than left pleural effusions. 4. Previous left suboccipital craniotomy with a residual 1.6 cm left-sided extra-axial mass at the foramen magnum with mass effect on the cord at the cervical medullary junction favored to represent meningioma. Recommend correlation with clinical history    and previous imaging. 5. Incidental multinodular goiter. CTA HEAD:      FINDINGS: The internal carotid arteries are patent and normal in caliber through the skull base. The middle cerebral arteries are patent. 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 The A2 and A3 segments of the ACAs are patent bilaterally. Left MCA is patent. There is focal occlusion of an M2 branch of the right MCA (series 2 images 427 and 428). However, other M2 branches and M3 branches in the right sylvian fissure are patent. The M1 segment of the right MCA is patent. The posterior cerebral arteries are    patent bilaterally. The basilar artery is patent and normal in caliber. It is supplied by the left vertebral artery. The small nondominant right vertebral artery terminates in right sided PICA. IMPRESSION:   1. 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 2. 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 Results were discussed with Dr. Etelvina Ayers at 8:00 PM on 2/2/2021. CT HEAD WO CONTRAST   Final Result   1. Previous left suboccipital craniotomy with a partially calcified extra-axial mass at the left foramen magnum measuring 1.8 cm contacting the cord at the cervicomedullary junction with some degree of mass effect on the cord at the foramen magnum. This    most likely represents residual/recurrent meningioma or other extra-axial mass. Correlation with patient history and previous imaging recommended. 2. Mild global volume loss and mild chronic small vessel ischemic disease in the white matter. 3. No acute intracranial hemorrhage. IR ANGIOGRAM CAROTID CEREBRAL RIGHT    (Results Pending)         Assessment/Plan:   Priscilla Canales is a 79 y.o. adult, with a history of afib, HF, DM, asthma, pulm HTN (not oxygen dependent), HTN, HLD, EDDY on cpap, breast cancer s/p lumpectomy/radiation, colon polyps, obesity who presented with left-sided hemiplegia, severe expressive aphasia and dysarthria, right facial droop, and right-gaze deviation.  She underwent M2 thrombectomy and thrombolysis with significant improvement of hemiplegia, but still with other aforementioned symptoms.     Last known normal:  12:30 2/2/21  Time of reperfusion at 21:45 on 2/2/21.     Right MCA M2 occlusion s/p thrombectomy and thrombolysis -CT on 2/4 showed new large acute parenchymal hemorrhage in the right cerebral hemisphere. Head CT x2 both stable. F/u repeat scans as indicated  -q1 neurochecks, qshift NIHSS  -PT/OT, Speech eval  -neurology consulted  -fasting lipid and Hgb A1c     Intraparenchymal hemorrhage   -2/2 thrombolysis, clinically is improved. Continue to monitor neuro status, make NSGY aware of acute changes. -SBP<160, controlled with cardene drip   -Continue to hold aspirin and AC given bleed     Blood loss anemia, hypochromic microcytic 2/2 GI bleed; hgb 7.3 on admission  -recent EGD last admission  -daily iron  -Protonix 40 BID       Acute on chronic systolic HF, not in exacerbation   - cont coreg 6.25 mg BID  -holding home lasix 20     Segmental RML Pulmonary Embolus incidentally noted on CTA head/neck  -patient seems to be breathing/ oxygenating well  -may explain temperature in EMS  -hold AC given ich  -LE doppler u/s showed An acute totally occluding deep venous thrombosis is noted in the left gastrocnemius, posterior tibial, peroneal, and soleal veins. Given that the fact that the patient  already had a pulmonary embolism, and anticoagulation is not permissible, recommend IVC filter placement     Permanent Afib, rate controlled  - holding xarlto given recent GI bleed and thrombectomy, chadsvac score high.  Continue to hold [facility] given new bleed   - hold home diltiazem for now given HR is controlled     DMT2, controlled   HgA1c 6.2% (9/2020)   - most recent admission, per daughter pt on lispro 14U in AM and 10U PM  - Low-dose sliding scale given n.p.o. status     HTN  -control as above  -holding home losartan and diltiazem     Asthma, not in exacerbation   - albuterol prn      Code Status:  Full Code  FEN:  NPO   PPX:  SCD's   DISPO: Estela Carr MD, PGY-1  02/04/21  7:45 AM    This patient has been staffed and discussed with Dr Henrique Donato Patient examined, findings as discussed with Dr Albert Chan. Agree with assessment and plan as edited above.

## 2021-02-04 NOTE — PRE SEDATION
Sedation Pre-Procedure Note    Patient Name: Nadia Ortiz   YOB: 1940  Room/Bed: 7575/0659-24  Medical Record Number: [id number]  Date: 2/4/2021   Time: 6622     Indication:  DVT. Contraindication to anticoagulation. IVC filter    Consent: I have discussed with the patient and/or the patient representative the indication, alternatives, and the possible risks and/or complications of the planned procedure and the anesthesia methods. The patient and/or patient representative appear to understand and agree to proceed. Vital Signs:   Vitals:    02/04/21 1400   BP: (!) 154/71   Pulse: 78   Resp: 16   Temp:    SpO2: 97%       Past Medical History:   has a past medical history of Acute GI bleeding, Atrial fibrillation ([facility] [address] Systolic CHF ([facility] [address] and Type 2 diabetes mellitus ([facility] [address] Past Surgical History:   has a past surgical history that includes Upper gastrointestinal endoscopy (01/29/2021). Medications:   Scheduled Meds:    miconazole   Topical BID    levetiracetam  500 mg Intravenous Q12H    ferrous sulfate  325 mg Oral Daily with breakfast    pantoprazole  40 mg Intravenous BID    atorvastatin  80 mg Oral Nightly    carvedilol  6.25 mg Oral BID WC    insulin lispro  0-6 Units Subcutaneous TID WC    insulin lispro  0-3 Units Subcutaneous Nightly     Continuous Infusions:    niCARdipine 4 mg/hr (02/04/21 1236)     PRN Meds: perflutren lipid microspheres, labetalol, albuterol sulfate HFA, hydrALAZINE, acetaminophen, HYDROcodone 5 mg - acetaminophen **OR** HYDROcodone 5 mg - acetaminophen, promethazine **OR** ondansetron, polyethylene glycol  Home Meds:   Prior to Admission medications    Not on File     Coumadin Use Last 7 Days:  no  Antiplatelet drug therapy use last 7 days: no  Other anticoagulant use last 7 days: no  Additional Medication Information:  -      Pre-Sedation Documentation and Exam:   Vital signs have been reviewed (see flow sheet for vitals). Mallampati Airway Assessment:  Mallampati Class III - (soft palate & base of uvula are visible)    Prior History of Anesthesia Complications:   none    ASA Classification:  Class 3 - A patient with severe systemic disease that limits activity but is not incapacitating    Sedation/ Anesthesia Plan:   intravenous sedation    Medications Planned:   midazolam (Versed) intravenously and fentanyl intravenously    Patient is an appropriate candidate for plan of sedation: yes    Electronically signed by Andreina Mayer MD on 2/4/2021 at [phone]

## 2021-02-05 LAB
ANION GAP SERPL CALCULATED.3IONS-SCNC: 9 MMOL/L (ref 3–16)
BASOPHILS ABSOLUTE: 0.1 K/UL (ref 0–0.2)
BASOPHILS RELATIVE PERCENT: 0.5 %
BUN BLDV-MCNC: 13 MG/DL (ref 7–20)
CALCIUM SERPL-MCNC: 9.1 MG/DL (ref 8.3–10.6)
CHLORIDE BLD-SCNC: 111 MMOL/L (ref 99–110)
CO2: 26 MMOL/L (ref 21–32)
CREAT SERPL-MCNC: 0.7 MG/DL (ref 0.6–1.2)
EOSINOPHILS ABSOLUTE: 0 K/UL (ref 0–0.6)
EOSINOPHILS RELATIVE PERCENT: 0.4 %
GFR AFRICAN AMERICAN: >60
GFR NON-AFRICAN AMERICAN: >60
GLUCOSE BLD-MCNC: 119 MG/DL (ref 70–99)
GLUCOSE BLD-MCNC: 119 MG/DL (ref 70–99)
GLUCOSE BLD-MCNC: 135 MG/DL (ref 70–99)
GLUCOSE BLD-MCNC: 144 MG/DL (ref 70–99)
HCT VFR BLD CALC: 26 % (ref 36–48)
HEMOGLOBIN: 7.7 G/DL (ref 12–16)
LYMPHOCYTES ABSOLUTE: 0.9 K/UL (ref 1–5.1)
LYMPHOCYTES RELATIVE PERCENT: 7.1 %
MCH RBC QN AUTO: 24 PG (ref 26–34)
MCHC RBC AUTO-ENTMCNC: 29.4 G/DL (ref 31–36)
MCV RBC AUTO: 81.6 FL (ref 80–100)
MONOCYTES ABSOLUTE: 0.9 K/UL (ref 0–1.3)
MONOCYTES RELATIVE PERCENT: 7.4 %
NEUTROPHILS ABSOLUTE: 10.2 K/UL (ref 1.7–7.7)
NEUTROPHILS RELATIVE PERCENT: 84.6 %
PDW BLD-RTO: 25.7 % (ref 12.4–15.4)
PERFORMED ON: ABNORMAL
PLATELET # BLD: 201 K/UL (ref 135–450)
PMV BLD AUTO: 9 FL (ref 5–10.5)
POTASSIUM REFLEX MAGNESIUM: 3.9 MMOL/L (ref 3.5–5.1)
RBC # BLD: 3.19 M/UL (ref 4–5.2)
SODIUM BLD-SCNC: 146 MMOL/L (ref 136–145)
WBC # BLD: 12 K/UL (ref 4–11)

## 2021-02-05 PROCEDURE — 97163 PT EVAL HIGH COMPLEX 45 MIN: CPT

## 2021-02-05 PROCEDURE — 6360000002 HC RX W HCPCS: Performed by: STUDENT IN AN ORGANIZED HEALTH CARE EDUCATION/TRAINING PROGRAM

## 2021-02-05 PROCEDURE — 97167 OT EVAL HIGH COMPLEX 60 MIN: CPT

## 2021-02-05 PROCEDURE — 2580000003 HC RX 258: Performed by: STUDENT IN AN ORGANIZED HEALTH CARE EDUCATION/TRAINING PROGRAM

## 2021-02-05 PROCEDURE — C9113 INJ PANTOPRAZOLE SODIUM, VIA: HCPCS | Performed by: STUDENT IN AN ORGANIZED HEALTH CARE EDUCATION/TRAINING PROGRAM

## 2021-02-05 PROCEDURE — 80048 BASIC METABOLIC PNL TOTAL CA: CPT

## 2021-02-05 PROCEDURE — 99221 1ST HOSP IP/OBS SF/LOW 40: CPT | Performed by: NURSE PRACTITIONER

## 2021-02-05 PROCEDURE — 94761 N-INVAS EAR/PLS OXIMETRY MLT: CPT

## 2021-02-05 PROCEDURE — 92610 EVALUATE SWALLOWING FUNCTION: CPT

## 2021-02-05 PROCEDURE — 99233 SBSQ HOSP IP/OBS HIGH 50: CPT | Performed by: INTERNAL MEDICINE

## 2021-02-05 PROCEDURE — 2700000000 HC OXYGEN THERAPY PER DAY

## 2021-02-05 PROCEDURE — 97535 SELF CARE MNGMENT TRAINING: CPT

## 2021-02-05 PROCEDURE — 6360000002 HC RX W HCPCS: Performed by: NURSE PRACTITIONER

## 2021-02-05 PROCEDURE — 2000000000 HC ICU R&B

## 2021-02-05 PROCEDURE — 2580000003 HC RX 258: Performed by: NURSE PRACTITIONER

## 2021-02-05 PROCEDURE — 92523 SPEECH SOUND LANG COMPREHEN: CPT

## 2021-02-05 PROCEDURE — 2500000003 HC RX 250 WO HCPCS: Performed by: STUDENT IN AN ORGANIZED HEALTH CARE EDUCATION/TRAINING PROGRAM

## 2021-02-05 PROCEDURE — 36415 COLL VENOUS BLD VENIPUNCTURE: CPT

## 2021-02-05 PROCEDURE — 85025 COMPLETE CBC W/AUTO DIFF WBC: CPT

## 2021-02-05 PROCEDURE — 97530 THERAPEUTIC ACTIVITIES: CPT

## 2021-02-05 PROCEDURE — 6370000000 HC RX 637 (ALT 250 FOR IP): Performed by: COUNSELOR

## 2021-02-05 RX ADMIN — LEVETIRACETAM 500 MG: 100 INJECTION, SOLUTION INTRAVENOUS at 18:30

## 2021-02-05 RX ADMIN — SODIUM CHLORIDE 9 MG/HR: 9 INJECTION, SOLUTION INTRAVENOUS at 08:25

## 2021-02-05 RX ADMIN — PANTOPRAZOLE SODIUM 40 MG: 40 INJECTION, POWDER, FOR SOLUTION INTRAVENOUS at 20:32

## 2021-02-05 RX ADMIN — INSULIN LISPRO 1 UNITS: 100 INJECTION, SOLUTION INTRAVENOUS; SUBCUTANEOUS at 08:26

## 2021-02-05 RX ADMIN — SODIUM CHLORIDE 7.5 MG/HR: 9 INJECTION, SOLUTION INTRAVENOUS at 03:00

## 2021-02-05 RX ADMIN — PANTOPRAZOLE SODIUM 40 MG: 40 INJECTION, POWDER, FOR SOLUTION INTRAVENOUS at 08:29

## 2021-02-05 RX ADMIN — LEVETIRACETAM 500 MG: 100 INJECTION, SOLUTION INTRAVENOUS at 06:38

## 2021-02-05 RX ADMIN — LABETALOL HYDROCHLORIDE 10 MG: 5 INJECTION, SOLUTION INTRAVENOUS at 04:52

## 2021-02-05 RX ADMIN — MICONAZOLE NITRATE: 20 POWDER TOPICAL at 08:27

## 2021-02-05 RX ADMIN — SODIUM CHLORIDE 3 MG/HR: 9 INJECTION, SOLUTION INTRAVENOUS at 17:27

## 2021-02-05 RX ADMIN — MICONAZOLE NITRATE: 20 POWDER TOPICAL at 20:32

## 2021-02-05 RX ADMIN — SODIUM CHLORIDE 9 MG/HR: 9 INJECTION, SOLUTION INTRAVENOUS at 11:17

## 2021-02-05 RX ADMIN — SODIUM CHLORIDE 10 MG/HR: 9 INJECTION, SOLUTION INTRAVENOUS at 05:55

## 2021-02-05 ASSESSMENT — PAIN SCALES - PAIN ASSESSMENT IN ADVANCED DEMENTIA (PAINAD)
CONSOLABILITY: 0
FACIALEXPRESSION: 0
BREATHING: 0
TOTALSCORE: 1
NEGVOCALIZATION: 1
CONSOLABILITY: 0
TOTALSCORE: 1
FACIALEXPRESSION: 0
NEGVOCALIZATION: 1
NEGVOCALIZATION: 1
CONSOLABILITY: 0
BODYLANGUAGE: 0
NEGVOCALIZATION: 1
FACIALEXPRESSION: 0
TOTALSCORE: 1
CONSOLABILITY: 0
BREATHING: 0
BODYLANGUAGE: 0
FACIALEXPRESSION: 0
BODYLANGUAGE: 0
TOTALSCORE: 1
NEGVOCALIZATION: 1
FACIALEXPRESSION: 0
BREATHING: 0

## 2021-02-05 ASSESSMENT — PAIN SCALES - GENERAL
PAINLEVEL_OUTOF10: 0

## 2021-02-05 NOTE — PLAN OF CARE
Problem: Restraint Use - Nonviolent/Non-Self-Destructive Behavior:  Goal: Absence of restraint indications  Description: Absence of restraint indications  2/5/2021 0146 by Bebeto Lebron RN  Outcome: Met This Shift     Problem: COMMUNICATION IMPAIRMENT  Goal: Ability to express needs and understand communication  2/5/2021 0146 by Bebeto Lebron RN  Outcome: Ongoing     Problem: Falls - Risk of:  Goal: Will remain free from falls  Description: Will remain free from falls  2/5/2021 0146 by Bebeto Lebron RN  Outcome: Ongoing     Problem: Urinary Elimination:  Goal: Signs and symptoms of infection will decrease  Description: Signs and symptoms of infection will decrease  2/5/2021 0146 by Bebeto Lebron RN  Outcome: Ongoing     Problem: Skin Integrity:  Goal: Will show no infection signs and symptoms  Description: Will show no infection signs and symptoms  2/5/2021 0146 by Bebeto Lebron RN  Outcome: Ongoing     Problem: HEMODYNAMIC STATUS  Goal: Patient has stable vital signs and fluid balance  2/5/2021 0146 by Bebeto Lebron RN  Outcome: Ongoing

## 2021-02-05 NOTE — PROGRESS NOTES
Physical Therapy    Facility/Department: [facility] ICU  Initial Assessment / treatment    NAME: Elizabeth Pereira  : 1940  MRN: [id number]    Date of Service: 2021    Discharge Recommendations: Elizabeth Pereira scored a 6/24 on the AM-PAC short mobility form. Current research shows that an AM-PAC score of 17 or less is typically not associated with a discharge to the patient's home setting. Based on the patient's AM-PAC score and their current functional mobility deficits, it is recommended that the patient have 5-7 sessions per week of Physical Therapy at d/c to increase the patient's independence. At this time, this patient demonstrates the endurance, and/or tolerance for 3 hours of therapy each day, with a treatment frequency of 5-7x/wk. Please see assessment section for further patient specific details. If patient discharges prior to next session this note will serve as a discharge summary. Please see below for the latest assessment towards goals. PT Equipment Recommendations  Equipment Needed: No  Other: defer to next level of care    Assessment   Body structures, Functions, Activity limitations: Decreased functional mobility   Assessment: Pt is 79 y.o. female admit with acute CVA. Mobility limited today by lethargy and L sided weakness. Good participation as alertness improved. Requires 2 person assist for bed mobility and transfers. Not safe to attempt transfer to chair today. Pt is below her functional baseline. Rec continued IP PT. Treatment Diagnosis: impaired gait and transfers  Prognosis: Good  Decision Making: High Complexity  PT Education: PT Role;Functional Mobility Training  Patient Education: pt demos partial understanding; rec reinforcement  REQUIRES PT FOLLOW UP: Yes       Patient Diagnosis(es): The encounter diagnosis was Acute right MCA stroke ([facility] [address] has a past medical history of Acute GI bleeding, Atrial fibrillation ([address] Systolic CHF ([address] and Type 2 diabetes mellitus ([address] has a past surgical history that includes Upper gastrointestinal endoscopy (01/29/2021) and IR GUIDED IVC FILTER PLACEMENT (2/4/2021). Restrictions  Position Activity Restriction  Other position/activity restrictions: fall precautions  Vision/Hearing  Vision: Impaired(needs max cues to open eyes - able to keep eyes open once in sitting)  Hearing: Within functional limits     Subjective  General  Chart Reviewed: Yes  Patient assessed for rehabilitation services?: Yes  Additional Pertinent Hx: Admit 2/2 with L side weakness and speech deficits, (+) Acute stroke with large vessel (right M2) occlusion and large area of reversible ischemia on CTP; 2/2 Mechanical thrombectomy + Infusion therapy for thrombolysis, right middle cerebral artery; repeat head CT showed hemorrhagic transformation, stable on repeat imaging. neuro following; PMHx: CHF, DM, GI bleed, a-fib  Referring Practitioner: MD Yaritza  Diagnosis: acute CVA  Subjective  Subjective: Pt found supine in bed. Needs max cues to open eyes. RN reports pt more alert earlier this AM - ok for therapy evals.   Pain Screening  Patient Currently in Pain: (no indication of pain)       Orientation  Orientation  Overall Orientation Status: Impaired(does not attempt to answer questions, responds to name)  Social/Functional History  Social/Functional History  Lives With: Alone  Type of Home: (senior building)  Additional Comments: pt unable to provide PLOF, per chart review, pt lives alone in senior apt  Cognition        Objective          PROM RLE (degrees)  RLE General PROM: PROM/AAROM WFL  PROM LLE (degrees)  LLE PROM: WFL  Strength RLE  Comment: able to move R LE to edge of bed - difficulty following commands, ankle DF observed  Strength LLE Comment: limited assessment due to poor command following, able to partially move L LE in bed - min to no active movement observed otherwise        Bed mobility  Rolling to Right: Dependent/Total(max A x 2)  Supine to Sit: Dependent/Total(max A x 2)  Sit to Supine: Dependent/Total(x2)  Transfers  Sit to Stand: Dependent/Total(max A x 2 from EOB x 2 trials)  Stand to sit: Dependent/Total(max a x 2)  Comment: LEs heavily braced against bed        Balance  Sitting - Static: Poor  Standing - Static: Poor  Comments: pt stood 2 x 20 sec with max A x 2 with flexed hips and knees, LEs braced against bed, B UE support. Sat EOB x 10-12 min with max A progressing to min A at times      Treatment included bed mobility, balance, and transfer training, pt education.   Plan   Plan  Times per week: 5-7  Current Treatment Recommendations: Strengthening, Transfer Training, Endurance Training, Patient/Caregiver Education & Training, Balance Training, Functional Mobility Training, Gait Training, Home Exercise Program, Equipment Evaluation, Education, & procurement  Safety Devices  Type of devices: Call light within reach, Nurse notified, Gait belt, Left in bed, Bed alarm in place    G-Code       OutComes Score                                                  AM-PAC Score  AM-PAC Inpatient Mobility Raw Score : 6 (02/05/21 1156)  AM-PAC Inpatient T-Scale Score : 23.55 (02/05/21 1156)  Mobility Inpatient CMS 0-100% Score: 100 (02/05/21 1156)  Mobility Inpatient CMS G-Code Modifier : CN (02/05/21 1156)          Goals  Short term goals  Time Frame for Short term goals: discharge  Short term goal 1: Pt will transfer supine <--> sit with mod A x 2  Short term goal 2: Pt will transfer sit <--> stand with mod A x 2  Short term goal 3: Pt will sit EOB x 2 min with CGA or better  Short term goal 4: Pt will demo static stance x 10 sec with min A x 1 or better  Patient Goals   Patient goals : pt unable to state       Therapy Time Individual Concurrent Group Co-treatment   Time In 1057         Time Out 1137         Minutes 40                 Timed Code Treatment Minutes:  25    Total Treatment Minutes:  40    If patient is discharged prior to next treatment, this note will serve as the discharge summary.   Eagle Ashraf, PT, DPT  [id number]

## 2021-02-05 NOTE — PROGRESS NOTES
Shift assessment complete. Incomprehensible slurred speech. NIH 16 given. R sided gaze deviation. R side facial droop. Some effort against gravity x4-each extremity returns to bed. Pt able to follow commands. Lungs clear. Pt has weak cough-unable to clear secretions. Need for frequent suctioning. Pt remains in [address] controlled. Pt denies pain at this time. Q1h neuro checks. Plan for MRI later this AM. Will continue to monitor.

## 2021-02-05 NOTE — PROGRESS NOTES
Speech Language Pathology  Facility/Department: [facility] ICU  Initial Speech/Language/Cognitive Assessment    NAME: Abel Oviedo  : 1940   MRN: [id number]  ADMISSION DATE: 2021  ADMITTING DIAGNOSIS: has Acute cerebrovascular accident (CVA) ([facility] [address] Permanent atrial fibrillation ([facility] [address] Acute gastric ulcer with hemorrhage; Other pulmonary embolism without acute cor pulmonale (HCC); and Acute right MCA stroke ([facility] [address] on their problem list.  DATE ONSET: 21    Date of Eval: 2021   Evaluating Therapist: HEATHER Mccray    RECENT RESULTS  CT OF HEAD/MRI:     Primary Complaint: unable to state    Pain:  Pain Assessment  Pain Assessment: 0-10  Pain Level: 0  Patient's Stated Pain Goal: No pain    Assessment:  Cognitive Diagnosis: unable to assess  Aphasia Diagnosis: unable to assess  Speech Diagnosis: Dysarthria (R47.1)   Diagnosis: Severe dysarthria    Recommendations:  Requires SLP Intervention: Yes  Duration/Frequency of Treatment: 3-5x/week x LOS  D/C Recommendations: To be determined  Referral To: Dysphagia evaluation    Plan:   Goals:  Short-term Goals  Goal 1: Pt will participate in ongoing assessment of cognitive communication skills with goals added as indicated. Goal 2: Pt will express wants and needs with minimal assistance. Goal 3: Pt understand and use compensatory strategies to facilitate speech intelligibility with minimal assistance.    Patient/family involved in developing goals and treatment plan: patient    Subjective:  Previous level of function and limitations: per chart, pt lived alone prior to admission  General  Chart Reviewed: Yes  Patient assessed for rehabilitation services?: Yes  Family / Caregiver Present: No  Subjective  Subjective: Cooperative, lethargic,  Social/Functional History  Lives With: Alone  Vision  Vision: (unable to assess)  Hearing  Hearing: (unable to assess)     Objective:  Oral/Motor  Oral Motor: Exceptions to [facility]  Labial Strength: Reduced Lingual Strength: Reduced  Lingual Coordination: Reduced    Auditory Comprehension  Comprehension:  Adequate for basic/personal/simple information. Needs assessment of higher level comprehension. Yes/No Questions: (Pt answeres personal and orientation yes/no questions appropriately.)  One Step Basic Commands: (Pt follows simple 1-step directions as able.)    Reading Comprehension  Reading Status: (Not assessed at this time - pt very lethargic.)    Expression  Primary Mode of Expression: Verbal  Verbal Expression  Verbal Expression: (Unable to assess fully due to severity of dysarthria.)    Written Expression  Written Expression: (Not assessed at this time - pt very lethargic.)    Motor Speech  Motor Speech: Exceptions to [facility]  Intelligibility: Severe, wet vocal quality, RN suctioning frequently  Dysarthria:  Severely unintelligible at the word level. Pt is unable to imitate single words intelligibly (even producing incorrect number of syllables). Pt able to use head nod/shake to facilitate intelligibility of yes/no responses. Cognition:   Orientation  Overall Orientation Status:   Orientation Level: Oriented to person; Oriented to place (Via yes/no questions, pt indicates orientation to self and hospital.)  Attention  Attention: Unable to assess  Memory  Memory: Unable to assess  Problem Solving  Problem Solving: Unable to assess  Numeric Reasoning  Numeric Reasoning: Unable to assess    Prognosis:  Speech Therapy Prognosis  Prognosis: Fair  Individuals consulted  Consulted and agree with results and recommendations: Patient;RN    Education:  Patient Education:  SLP educated pt re: role of SLP, intelligibility strategies, and POC recommendations. Patient Education Response: Needs reinforcement  Safety Devices in place: Yes  Type of devices:  All fall risk precautions in place;Nurse notified    Therapy Time:   Individual Concurrent Group Co-treatment   Time In 1010         Time Out 1025 Minutes [address] 3-5x/week to address goals per POC  Discharge Plan:  TBD  Discussed with RNAngela. Needs within reach. Electronically Signed by:  Sánchez S[id number] [id number]  Pager #[phone]    This document will serve as a discharge summary if pt discharges before next treatment.    2/5/2021 11:05 AM

## 2021-02-05 NOTE — PROGRESS NOTES
NEUROSURGERY PROGRESS NOTE    Mercedes Olivo   [id number]   1940 2/5/2021    Hospital Day #3     Interval history: Neuro exam slightly improved from yesterday. Post-operative Day# 3 s/p right mechanical thrombectomy of R MCA with TICI 2b reperfusion. Subjective:  Patient reports no pain, resting in bed comfortably. Objective:  BP (!) 147/70   Pulse 83   Temp 99.5 °F (37.5 °C) (Oral)   Resp 18   Ht 5' 7\" (1.702 m)   Wt 219 lb 12.8 oz (99.7 kg)   SpO2 94%   BMI 34.43 kg/m²     Labs:  Recent Labs     02/03/21  0931 02/04/21  0521 02/05/21  0546    142 146*    107 111*   CO2 27 24 26   BUN 13 11 13   CREATININE 0.7 0.6 0.7   GLUCOSE 134* 128* 135*     Recent Labs     02/02/21  2006 02/03/21  0538 02/04/21  0635 02/04/21  1250 02/05/21  0546   WBC 7.2 7.5 10.3  --  12.0*   RBC 3.09* 2.79* 3.14*  --  3.19*   INR 1.32*  --   --  1.25*  --      CT head wo contrast  2/3/2021 1205  Post therapy large acute parenchymal hemorrhage in the right cerebral hemisphere with subarachnoid component. Extensive surrounding edema is associated with 4 cm leftward shift of midline structures. CT head wo contrast  2/4/2021 1109  1. Hemorrhagic transformation of right MCA infarct, without significant change. No new hemorrhage.      Neurologic Exam:  GCS:  3 - Opens eyes to loud noise or command  5 - Alert and oriented  6 - Follows simple motor commands    Mental Status: eyes closed, opens briefly to voice she is oriented to person and place with severely dysarthric speech, she is following some commands   Language: dysarthric   Sensation: decreased sensation RUE/RLE   Coordination: GLADIS     Cranial Nerves:  II: Visual acuity not tested   III, IV, VI: PERRL, 4 mm bilaterally, does not follow EOMs, gaze deviation improved   V:  limited exam 2/2 encephalopathy  VII: face appears symmetrical   VIII: Hearing intact bilaterally to spoken voice  IX:Palate movement equal bilaterally XI:  limited exam 2/2 encephalopathy  XII: tongue midline     Musculoskeletal:   Gait: Not tested   Tone: normal   Motor strength: patient has spontaneous strong movement in RUE/RLE, she has brisk localization to pain with right upper extremity and wiggles toes to commands in RLE. LUE 2/5, LLE 3+/5. Right groin site: c/d/i    Assessment   78year-old woman who presented to [facility] ER with acute onset stroke symptoms. The patient was shown on CT angiography to have evidence of right MCA M2 occlusion, now day 3 s/p mechanical thrombectomy with TICI 2b reperfusion. CT head with large acute parenchymal hemorrhage in the right cerebral hemisphere. Plan:         1. Neurologic exams frequency: q 4 hour          2. For change in exam MUST contact neurosurgery team along with critical care or primary team  3. Neurology consult for post stroke care and work up  4. repeat head CT stable, MRI brain wo contrast pending  5. repeat stat CT head for any change in neurologic exam   6. Maintain SBP <160; If PRN med insufficient, then may start Nicardipine infusion  7. HOB elevated, no dextrose in IVFs or IV drips, normalize sodium   8. Hold all anticoagulation/antiplatelet   9. SCDs for DVT prophylaxis, ok to start SQ heparin 24 hours after stable head CT if medically indicated   10. PT/OT/Speech, rehab consult if appropriate   11. Will follow peripherally while in house, please call with any questions or ANY change in neurologic exam    DISPO- remain in ICU      SU Palacios-CNP  Neurosurgery Nurse Practitioner  [phone]  Patient was discussed with Dr. Davide Hoffman who agrees with above assessment and plan. Electronically signed by:  Dorcas Garcia, 2/5/2021 10:59 AM

## 2021-02-05 NOTE — CONSULTS
[facility]  Palliative Medicine Consultation Note      Date Of Admission:2/2/2021  Date of consult: 02/05/21  Seen by [facility] in the past:  No    Recommendations:        Saw pt at the bedside, she is alert and able to follow some commands. Able to interact, however she is extremely dysarthic and most words are not intelligible. She is able to indicate that she is not in pain, and that she has three children. D/w SLP Clem Zuñiga, the pt did poorly with her swallow eval. Will likely need a PEG tube. Called to speak with pt's daughter Kayley Barrera, introduced palliative care. She reports that at baseline the pt lives alone. She has been doing her grocery shopping/errands since the beginning of the pandemic. The pt does not drive but is otherwise independent. Kayley Barrera report that the pt has two daughters, herself and April Tejada (she thinks the pt stated that she had three children as she considers her dog to be her baby), they are her NOK. Discussed her understanding of the pt's medical condition. Discussed results of the swallow evaluation. She understands that an NGT will need to be placed later today. We also discussed the possibility of a PEG, as well as risk for aspiration. She thinks that the pt would be agreeable to this. She is hopeful for recovery, she understands that it may take a long time to see any improvement. 1. Goals of Care/Advanced Care planning/Code status: Full code, did not discuss in depth today. Kayley Barrera thinks that the pt would be agreeable to a PEG tube. Continue with current management. 2. Pain: Pt denied  3. SOB: Pt denied  4. Dysphagia: Pt did not make attempts to swallow with SLP during evaluation, will likely need a PEG tube in the future. D/w SLP Clem Zuñiga, Neuro SU Guerrero and pt's daughter Kayley Barrera as above.   5. Disposition: Will likely need placement when medically ready for discharge    Reason for Consult:         [x]  Goals of Care [x]  Code Status Discussion/Advanced Care Planning   [x]  Psychosocial/Family Support  []  Symptom Management  []  Other (Specify)    Requesting Physician: Dr. Kavita Bedolla:  CVA    History Obtained From:  patient, electronic medical record    History of Present Illness:         Meliton Huerta is a 79 y.o. female with PMH of afib, HF, DM, asthma, pulmonary HTN, HTN, HLD, EDDY on CPAP, breast ca s/p lumpectomy/radiation, colon polyps who presented with left sided hemiplegia, severe expressive aphasia and dysarthia, right facial droop, right gaze deviation. She called family in the evening prior to presentation, they were concerned for slurred speech and so went to check on her. EMS was called. During admission she underwent M2 thrombectomy and thrombolysis with significant improvement of hemiplegia. Subjective:         Past Medical History:        Diagnosis Date    Acute GI bleeding     recent admission 1/29/2021    Atrial fibrillation ([address]     Xarelto    Systolic CHF ([address]     Type 2 diabetes mellitus ([address]        Past Surgical History:        Procedure Laterality Date    IR IVC FILTER PLACEMENT W IMAGING  2/4/2021    IR IVC FILTER PLACEMENT W IMAGING 2/4/2021 [facility]    UPPER GASTROINTESTINAL ENDOSCOPY  01/29/2021    Dr Yunior Liz       Current Medications:    No medications prior to admission. Allergies:  Patient has no known allergies. Social History:    · TOBACCO: reports that she has quit smoking. She does not have any smokeless tobacco history on file. · ETOH:   has no history on file for alcohol. · Patient currently lives alone    Review of Systems -   Review of Systems   Unable to perform ROS: Other   Pt is severely dysarthic and makes minimal attempts to communicate    Objective:          Physical Exam  Constitutional:       Appearance: She is ill-appearing. Cardiovascular:      Rate and Rhythm: Normal rate and regular rhythm. Pulses: Normal pulses. Heart sounds: Normal heart sounds. Pulmonary:      Breath sounds: Normal breath sounds. Abdominal:      General: Bowel sounds are normal.      Palpations: Abdomen is soft. Musculoskeletal:      Right lower leg: No edema. Left lower leg: No edema. Skin:     General: Skin is warm and dry. Neurological:      Mental Status: She is alert. Motor: Weakness present. Comments: Dysarthic, is able to indicate answers to some questions (whether or not she is in pain, how many children she has)          Palliative Performance Scale:  [] 60% Ambulation reduced; Significant disease; Can't do hobbies/housework; intake normal or reduced; occasional assist; LOC full/confusion  [] 50% Mainly sit/lie; Extensive disease; Can't do any work; Considerable assist; intake normal  Or reduced; LOC full/confusion  [] 40% Mainly in bed; Extensive disease; Mainly assist; intake normal or reduced; occasional assist; LOC full/confusion  [x] 30% Bed Bound; Extensive disease; Total care; intake reduced; LOC full/confusion  [] 20% Bed Bound; Extensive disease; Total care; intake minimal; Drowsy/coma  [] 10% Bed Bound; Extensive disease;  Total care; Mouth care only; Drowsy/coma  [] 0% Death    PPS: 30     Vitals:    BP (!) 158/66   Pulse 83   Temp 99.5 °F (37.5 °C) (Oral)   Resp 20   Ht 5' 7\" (1.702 m)   Wt 219 lb 12.8 oz (99.7 kg)   SpO2 94%   BMI 34.43 kg/m²     Labs:    BMP:   Recent Labs     02/03/21  0931 02/04/21  0521 02/05/21  0546    142 146*   K 3.9 4.7 3.9    107 111*   CO2 27 24 26   BUN 13 11 13   CREATININE 0.7 0.6 0.7   GLUCOSE 134* 128* 135*     CBC:   Recent Labs     02/03/21  0538 02/03/21  1527 02/04/21  0635 02/05/21  0546   WBC 7.5  --  10.3 12.0*   HGB 6.6* 8.9* 7.7* 7.7*   HCT 22.2* 29.8* 25.2* 26.0*     --  201 201       LFT's:   Recent Labs     02/02/21 2006   AST 21   ALT 15   BILITOT 0.6   ALKPHOS 91     Troponin:   Recent Labs     02/02/21  2006   [place] <0.01 BNP: No results for input(s): BNP in the last 72 hours. ABGs: No results for input(s): PHART, DKL4DTO, PO2ART in the last 72 hours. INR:   Recent Labs     02/02/21 2006 02/04/21  1250   INR 1.32* 1.25*       U/A:No results for input(s): NITRITE, COLORU, PHUR, LABCAST, WBCUA, RBCUA, MUCUS, TRICHOMONAS, YEAST, BACTERIA, CLARITYU, SPECGRAV, LEUKOCYTESUR, UROBILINOGEN, BILIRUBINUR, BLOODU, GLUCOSEU, AMORPHOUS in the last 72 hours. Invalid input(s): KETONESU    IR GUIDED IVC FILTER PLACEMENT   Final Result   Impression:      Successful placement of retrievable IVC filter. CT HEAD WO CONTRAST   Final Result      1. Hemorrhagic transformation of right MCA infarct, without significant change. No new hemorrhage. CT head without contrast   Final Result         1. Stable large intraparenchymal hemorrhage located within the right posterior temporal parietal lobe with extension into the adjacent insula. There is also subarachnoid extension of hemorrhage into the overlying sulci as well as the right sylvian    fissure. Findings result in some mild right to left subfalcine herniation. 2.There is a 1.3 x 1.1 cm extra-axial mass identified within the left ordered aspect of the foramen magnum. This results in some mass effect on the adjacent medulla. This is without change from the prior study. Finding may represent a meningioma. This    could be further evaluated with a contrast-enhanced MRI of the head following resolution of the intracranial hemorrhage. CT HEAD WO CONTRAST   Final Result      1. Stable large intraparenchymal hemorrhage located within the right posterior temporal parietal lobe with extension into the adjacent insula. There is also subarachnoid extension of hemorrhage into the overlying sulci as well as the right sylvian    fissure. Findings result in some mild right to left subfalcine herniation. 2.There is a 1.3 x 1.1 cm extra-axial mass identified within the left ordered aspect of the foramen magnum. This results in some mass effect on the adjacent medulla. This is without change from the prior study. Finding may represent a meningioma. This    could be further evaluated with a contrast-enhanced MRI of the head following resolution of the intracranial hemorrhage. VL Extremity Venous Bilateral         CT HEAD WO CONTRAST   Final Result      Post therapy large acute parenchymal hemorrhage in the right cerebral hemisphere with subarachnoid component. Extensive surrounding edema is associated with 4 cm leftward shift of midline structures. Critical finding of acute intracranial hemorrhage was called to Isaac Borges of the neurosurgery department at 12:25 PM on 2/3/2021, with instructions to contact patient's attending physician with these results. CT Brain Perfusion   Final Result      1. Hypoperfusion in the right MCA territory with a central ischemic core of 8 mL, total volume of hypoperfusion of 79 mL and a penumbra of 71 mL. XR CHEST PORTABLE   Final Result   1. Limited evaluation because of patient rotation and underpenetration of the film. 2. No dense airspace consolidation. 3. Probable mild cardiomegaly. CTA HEAD NECK W CONTRAST   Final Result   1. Carotid and vertebral arteries are patent and without stenosis or acute abnormality. 2. Incidental partial imaging of the a segmental pulmonary embolism in the right middle lobe. 3. Partial imaging of small right greater than left pleural effusions. 4. Previous left suboccipital craniotomy with a residual 1.6 cm left-sided extra-axial mass at the foramen magnum with mass effect on the cord at the cervical medullary junction favored to represent meningioma. Recommend correlation with clinical history    and previous imaging. 5. Incidental multinodular goiter.       CTA HEAD: FINDINGS: The internal carotid arteries are patent and normal in caliber through the skull base. The middle cerebral arteries are patent. 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 The A2 and A3 segments of the ACAs are patent bilaterally. Left MCA is patent. There is focal occlusion of an M2 branch of the right MCA (series 2 images 427 and 428). However, other M2 branches and M3 branches in the right sylvian fissure are patent. The M1 segment of the right MCA is patent. The posterior cerebral arteries are    patent bilaterally. The basilar artery is patent and normal in caliber. It is supplied by the left vertebral artery. The small nondominant right vertebral artery terminates in right sided PICA. IMPRESSION:   1. 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 2. 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 Results were discussed with Dr. Tyrell Sanchez at 8:00 PM on 2/2/2021. CT HEAD WO CONTRAST   Final Result   1. Previous left suboccipital craniotomy with a partially calcified extra-axial mass at the left foramen magnum measuring 1.8 cm contacting the cord at the cervicomedullary junction with some degree of mass effect on the cord at the foramen magnum. This    most likely represents residual/recurrent meningioma or other extra-axial mass. Correlation with patient history and previous imaging recommended. 2. Mild global volume loss and mild chronic small vessel ischemic disease in the white matter. 3. No acute intracranial hemorrhage.       IR ANGIOGRAM CAROTID CEREBRAL RIGHT    (Results Pending)   MRI BRAIN WO CONTRAST    (Results Pending)         Conclusion/Time spent:         Recommendations see above Time spent with patient and/or family: 20  Time reviewing records: 10 min   Time communicating with staff: 5 min     A total of 35 minutes spent with the patient and family on unit greater than 50% in counseling regarding palliative care and in goals of care for the patient. Thank you to Dr. Orin Jones for this consultation. We will continue to follow Ms. Jesus Hawkins care as needed.     Mireya Jackson [facility]  Inpatient Palliative Care  [phone]

## 2021-02-05 NOTE — PROGRESS NOTES
ICU Progress Note    Admit Date: 2/2/2021  Day: 2    Diet: Diet NPO Effective Now    CC: Cerebrovascular accident    Interval history: Nicole Perez. Pt alert, but remains non-verbal. Pt able to follow commands, and moved LUE and LLE on command. Gaze remains deviated to the right. Pt able to communicate through non-verbal actions that she would like something to drink. IVC filter placed successfully on 2/4.      Medications:     Scheduled Meds:   miconazole   Topical BID    levetiracetam  500 mg Intravenous Q12H    ferrous sulfate  325 mg Oral Daily with breakfast    pantoprazole  40 mg Intravenous BID    atorvastatin  80 mg Oral Nightly    carvedilol  6.25 mg Oral BID WC    insulin lispro  0-6 Units Subcutaneous TID WC    insulin lispro  0-3 Units Subcutaneous Nightly     Continuous Infusions:   niCARdipine 9 mg/hr (02/05/21 0649)     PRN Meds:perflutren lipid microspheres, labetalol, albuterol sulfate HFA, hydrALAZINE, acetaminophen, HYDROcodone 5 mg - acetaminophen **OR** HYDROcodone 5 mg - acetaminophen, promethazine **OR** ondansetron, polyethylene glycol    Objective:   Vitals:   T-max:  Patient Vitals for the past 8 hrs:   BP Temp Temp src Pulse Resp SpO2   02/05/21 0600 (!) 158/66   83 24    02/05/21 0545 (!) 156/79   90 20 94 %   02/05/21 0530 (!) 150/69   74 18 (!) 89 %   02/05/21 0515 (!) 158/77   72 23 100 %   02/05/21 0500 (!) 144/79   74 25 99 %   02/05/21 0445 (!) 181/88   102 28 97 %   02/05/21 0430 (!) 168/94   80 26 (!) 89 %   02/05/21 0415 (!) 154/81   66 21 95 %   02/05/21 0400 (!) 165/85 99.5 °F (37.5 °C) Oral 60 21 96 %   02/05/21 0345      (!) 83 %   02/05/21 0330 (!) 153/79   83 21 95 %   02/05/21 0315 (!) 144/66   85 24 96 %   02/05/21 0300 (!) 167/82   95 25 96 %   02/05/21 0245 (!) 159/98   107 24 91 %   02/05/21 0230    85 25 91 %   02/05/21 0215 (!) 140/55   71 23 94 %   02/05/21 0200 (!) 158/81   80 26 96 % 02/05/21 0145 (!) 175/138   97 23 94 %   02/05/21 0130 (!) 169/64   84 26 92 %   02/05/21 0115 (!) 152/73   86 24 91 %   02/05/21 0100 (!) 150/69   68 21 91 %   02/05/21 0045 (!) 166/75   66 22 93 %   02/05/21 0030 (!) 157/55   94 22 93 %       Intake/Output Summary (Last 24 hours) at 2/5/2021 0815  Last data filed at 2/5/2021 0600  Gross per 24 hour   Intake 1435.33 ml   Output 750 ml   Net 685.33 ml       Review of Systems   Unable to perform ROS: Patient nonverbal       Physical Exam  Constitutional:       General: She is not in acute distress. Appearance: She is not ill-appearing or toxic-appearing. Comments: Not alert, would occasionally respond to name   Very somnolent    HENT:      Head: Normocephalic. Mouth/Throat:      Mouth: Mucous membranes are dry. Pharynx: No posterior oropharyngeal erythema. Eyes:      Comments: Rightward deviation of vision. Cardiovascular:      Rate and Rhythm: Normal rate. Rhythm irregular. Pulses: Normal pulses. Heart sounds: Normal heart sounds. No murmur. No friction rub. No gallop. Comments: Pt remains in afib. Rate controlled. Pulmonary:      Effort: Pulmonary effort is normal.      Breath sounds: Normal breath sounds. Abdominal:      General: Abdomen is flat. Bowel sounds are normal. There is no distension. Palpations: Abdomen is soft. Tenderness: There is no abdominal tenderness. There is no guarding. Musculoskeletal:      Comments: Strength 5/5 RUE + RLE Strength 0/5 LUE +LLE    Skin:     General: Skin is warm and dry. Capillary Refill: Capillary refill takes less than 2 seconds. Neurological:      Motor: Weakness present. Comments: Occasionally alert to name. Spontaneously moves extremities.  Would not follow commands           LABS:    CBC:   Recent Labs     02/03/21  0538 02/03/21  1527 02/04/21  0635 02/05/21  0546   WBC 7.5  --  10.3 12.0*   HGB 6.6* 8.9* 7.7* 7.7* HCT 22.2* 29.8* 25.2* 26.0*     --  201 201   MCV 79.5*  --  80.3 81.6     Renal:    Recent Labs     02/03/21  0931 02/04/21  0521 02/05/21  0546    142 146*   K 3.9 4.7 3.9    107 111*   CO2 27 24 26   BUN 13 11 13   CREATININE 0.7 0.6 0.7   GLUCOSE 134* 128* 135*   CALCIUM 9.1 9.0 9.1   ANIONGAP 7 11 9     Hepatic:   Recent Labs     02/02/21 2006   AST 21   ALT 15   BILITOT 0.6   PROT 5.7*   LABALBU 3.0*   ALKPHOS 91     Troponin:   Recent Labs     02/02/21 2006   Cyndra Chill <0.01     BNP: No results for input(s): BNP in the last 72 hours. Lipids:   Recent Labs     02/03/21  0538   CHOL 64   HDL 26*     ABGs:  No results for input(s): PHART, JZZ1YCV, PO2ART, KTJ1WYL, BEART, THGBART, L0CTJFFR, GTZ7DIQ in the last 72 hours. INR:   Recent Labs     02/02/21 2006 02/04/21  1250   INR 1.32* 1.25*     Lactate: No results for input(s): LACTATE in the last 72 hours. Cultures:  -----------------------------------------------------------------  RAD:   IR GUIDED IVC FILTER PLACEMENT   Final Result   Impression:      Successful placement of retrievable IVC filter. CT HEAD WO CONTRAST   Final Result      1. Hemorrhagic transformation of right MCA infarct, without significant change. No new hemorrhage. CT head without contrast   Final Result         1. Stable large intraparenchymal hemorrhage located within the right posterior temporal parietal lobe with extension into the adjacent insula. There is also subarachnoid extension of hemorrhage into the overlying sulci as well as the right sylvian    fissure. Findings result in some mild right to left subfalcine herniation. 2.There is a 1.3 x 1.1 cm extra-axial mass identified within the left ordered aspect of the foramen magnum. This results in some mass effect on the adjacent medulla. This is without change from the prior study. Finding may represent a meningioma.  This could be further evaluated with a contrast-enhanced MRI of the head following resolution of the intracranial hemorrhage. CT HEAD WO CONTRAST   Final Result      1. Stable large intraparenchymal hemorrhage located within the right posterior temporal parietal lobe with extension into the adjacent insula. There is also subarachnoid extension of hemorrhage into the overlying sulci as well as the right sylvian    fissure. Findings result in some mild right to left subfalcine herniation. 2.There is a 1.3 x 1.1 cm extra-axial mass identified within the left ordered aspect of the foramen magnum. This results in some mass effect on the adjacent medulla. This is without change from the prior study. Finding may represent a meningioma. This    could be further evaluated with a contrast-enhanced MRI of the head following resolution of the intracranial hemorrhage. VL Extremity Venous Bilateral         CT HEAD WO CONTRAST   Final Result      Post therapy large acute parenchymal hemorrhage in the right cerebral hemisphere with subarachnoid component. Extensive surrounding edema is associated with 4 cm leftward shift of midline structures. Critical finding of acute intracranial hemorrhage was called to Yuniel Mondragon of the neurosurgery department at 12:25 PM on 2/3/2021, with instructions to contact patient's attending physician with these results. CT Brain Perfusion   Final Result      1. Hypoperfusion in the right MCA territory with a central ischemic core of 8 mL, total volume of hypoperfusion of 79 mL and a penumbra of 71 mL. XR CHEST PORTABLE   Final Result   1. Limited evaluation because of patient rotation and underpenetration of the film. 2. No dense airspace consolidation. 3. Probable mild cardiomegaly. CTA HEAD NECK W CONTRAST   Final Result   1. Carotid and vertebral arteries are patent and without stenosis or acute abnormality. 2. Incidental partial imaging of the a segmental pulmonary embolism in the right middle lobe. 3. Partial imaging of small right greater than left pleural effusions. 4. Previous left suboccipital craniotomy with a residual 1.6 cm left-sided extra-axial mass at the foramen magnum with mass effect on the cord at the cervical medullary junction favored to represent meningioma. Recommend correlation with clinical history    and previous imaging. 5. Incidental multinodular goiter. CTA HEAD:      FINDINGS: The internal carotid arteries are patent and normal in caliber through the skull base. The middle cerebral arteries are patent. 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 The A2 and A3 segments of the ACAs are patent bilaterally. Left MCA is patent. There is focal occlusion of an M2 branch of the right MCA (series 2 images 427 and 428). However, other M2 branches and M3 branches in the right sylvian fissure are patent. The M1 segment of the right MCA is patent. The posterior cerebral arteries are    patent bilaterally. The basilar artery is patent and normal in caliber. It is supplied by the left vertebral artery. The small nondominant right vertebral artery terminates in right sided PICA. IMPRESSION:   1. 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 2. 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 Results were discussed with Dr. Joseph Crawley at 8:00 PM on 2/2/2021.       CT HEAD WO CONTRAST   Final Result 1. Previous left suboccipital craniotomy with a partially calcified extra-axial mass at the left foramen magnum measuring 1.8 cm contacting the cord at the cervicomedullary junction with some degree of mass effect on the cord at the foramen magnum. This    most likely represents residual/recurrent meningioma or other extra-axial mass. Correlation with patient history and previous imaging recommended. 2. Mild global volume loss and mild chronic small vessel ischemic disease in the white matter. 3. No acute intracranial hemorrhage. IR ANGIOGRAM CAROTID CEREBRAL RIGHT    (Results Pending)   MRI BRAIN WO CONTRAST    (Results Pending)         Assessment/Plan:   Elmore Litten Durr is a 79 y.o. adult, with a history of afib, HF, DM, asthma, pulm HTN (not oxygen dependent), HTN, HLD, EDDY on cpap, breast cancer s/p lumpectomy/radiation, colon polyps, obesity who presented with left-sided hemiplegia, severe expressive aphasia and dysarthria, right facial droop, and right-gaze deviation. She underwent M2 thrombectomy and thrombolysis with significant improvement of hemiplegia, but still with other aforementioned symptoms.     Last known normal:  12:30 2/2/21  Time of reperfusion at 21:45 on 2/2/21.     Right MCA M2 occlusion s/p thrombectomy and thrombolysis  -CT on 2/4 showed new large acute parenchymal hemorrhage in the right cerebral hemisphere. Head CT x2 both stable. F/u repeat scans as indicated  -q1 neurochecks, qshift NIHSS   -neuro check stable this AM  -PT/OT, Speech eval   -unable to perform 2/4 reevaluate 2/5   -neurology consulted, follow for recommendations        Intraparenchymal hemorrhage   -2/2 thrombolysis, clinically is improved. Continue to monitor neuro status, make NSGY aware of acute changes.   -SBP<160, controlled with cardene drip   -Continue to hold aspirin and AC given bleed     Blood loss anemia, hypochromic microcytic 2/2 GI bleed; hgb 7.3 on admission,  5.2 on 2/4  -recent EGD last admission -325 mg Ferrous Sulfate qD   -Protonix 40 BID     Acute on chronic systolic HF, not in exacerbation   - cont coreg 6.25 mg BID  -holding home lasix 20     Segmental RML Pulmonary Embolus incidentally noted on CTA head/neck  -patient seems to be breathing/ oxygenating well  -may explain temperature in EMS  -hold AC given ich  -LE doppler u/s showed An acute totally occluding deep venous thrombosis is noted in the left gastrocnemius, posterior tibial, peroneal, and soleal veins. -IVC filter placed on 2/4/21     Permanent Afib, rate controlled  - holding xarlto given recent GI bleed and thrombectomy, chadsvac score high. Continue to hold [facility] given new bleed   - hold home diltiazem for now given HR is controlled     DMT2, controlled   HgA1bc 5.2 (2/4/21)  - most recent admission, per daughter pt on lispro 14U in AM and 10U PM  - Low-dose sliding scale given NPO status   -will monitor as pt transitions off NPO     HTN  -control as above  -holding home losartan and diltiazem     Asthma, not in exacerbation   - albuterol prn      Code Status:  Full Code  FEN:  NPO   PPX:   DISPO: Foster Robertson, MS4   02/05/21  8:15 AM    This patient has been staffed and discussed with Dr Dong Sessions     Patient examined, findings as discussed with Dr Mario Doan. Agree with assessment and plan as edited above.

## 2021-02-05 NOTE — PROGRESS NOTES
Neurology Consult Note  Consult Reason: ischemic stroke w/ hemorrhagic transformation     Updates:  F/u imaging stable   Exam improved today - able to understand some words, following commands    Impression:  Radames Riedel is a 79 y.o. female who presented with L-hemiparesis, aphasia, dysarthria & R gaze deviation, found to have R M2 segment occlusion, initial NIHSS of 14, not tpa candidate s/p thrombectomy w/ TICI 2b reperfusion. Initially improved NIHSS s/p thrombectomy w/ subsequent decline, repeat head CT showed hemorrhagic transformation, stable on repeat imaging.  NIHSS currently 11    Recommendations:  Stroke Plan s/p IV tPA &/or Thrombectomy   Imaging / Labs:  -Obtain MRI of the brain w/o contrast to eval for stroke - pending     Medications:  -High-intensity statin   -Hold antiplatelets at this time d/t hemorrhagic transformation   -SCDs for DVT prophylaxis  -Seizure prophylaxis x 7 days - Keppra 500mg BID     Consults / Nursing Care  -HOB elevated to help prevent aspiration  -Q4H Neurologic Exams & Vitals  -NIHSS per guidelines   -Telemetry   -PT/OT: eval and treat  -PMR consult if rehab recommended   -ST: eval and treat and dysphagia screen - pending   -If fails SLP exam will likely need PEG placement  -Blood Pressure Management   -s/p Thrombectomy - Keep SBP <160    Follow up / Discharge Recommendations:  -Patient w/ hx of Afib - will need anticoagulation resumed, likely in 2-3 weeks (after clearance from neurosurgery)   -Stroke Education at Discharge  -Secondary Stroke Prevention Measures listed on AVS  -Follow up w/ Neurology in 1 month    SU Hilton - CNP   Neurology Nurse Practitioner  02/05/21  10:28 AM  [phone]      Labs:  Recent Labs     02/03/21  0538   LABA1C 5.2   LDLCALC 28   ECHO 2/4/2021 - LV size normal, EF 55-60%, elevated PA pressures, No evidence of shunting     Studies:  MRI of brain w/o pending   Head CT w/o 2/4/2021    Physical Exam: Blood pressure (!) 158/66, pulse 83, temperature 99.5 °F (37.5 °C), temperature source Oral, resp. rate 20, height 5' 7\" (1.702 m), weight 219 lb 12.8 oz (99.7 kg), SpO2 94 %.   Constitutional    Vital signs: BP, HR, and RR reviewed   General eyes mostly closed, will attempt to open when asked   Neurologic  Mental status:   Awake, eyes closed - will attempt to open  Oriented to name and hospital  Speech very dysarthric - only able to understand few words  RUE/RLE - full strength  LUE - 2/5 (able to lift off bed for nursing on earlier assessment)  LLE - 3/5    Cranial nerves:   Pupils reactive   Would not follow for EOM's  On very limited vision assessment all fields seems intact   No obvious facial asymmetry   +cough  Hearing intact to spoken voice     Sensory: decreased sensation to L side   Cerebellar/coordination: GLADIS  Tone: normal in all 4 extremities  Gait: Did not assess      SU Gee - CNP   Neurology Nurse Practitioner  02/05/21  10:28 AM  [phone]

## 2021-02-05 NOTE — PROGRESS NOTES
Speech Language Pathology  Facility/Department: [facility] ICU   CLINICAL BEDSIDE SWALLOW EVALUATION    NAME: Arleth Lang  : 1940  MRN: [id number]    ADMISSION DATE: 2021  ADMITTING DIAGNOSIS: has Acute cerebrovascular accident (CVA) ([address] Permanent atrial fibrillation ([address] Acute gastric ulcer with hemorrhage; Other pulmonary embolism without acute cor pulmonale ([address] and Acute right MCA stroke ([facility] [address] on their problem list.  ONSET DATE:  21    Recent Chest Xray/CT of Chest: (21)  Impression   1. Limited evaluation because of patient rotation and underpenetration of the film. 2. No dense airspace consolidation. 3. Probable mild cardiomegaly. Date of Eval: 2021  Evaluating Therapist: Annie Pollack    Current Diet level:  NPO    Primary Complaint  Unable to state    Pain:  Pain Assessment  Pain Assessment: 0-10  Pain Level: 0  Patient's Stated Pain Goal: No pain  PAINAD (Pain Assessment in Advance Dementia)  Breathing: normal  Negative Vocalization: occasional moan/groan, low speech, negative/disapproving quality  Facial Expression: smiling or inexpressive  Body Language: relaxed  Consolability: no need to console  PAINAD Score: 1    Reason for Referral  Arleth Lang was referred for a bedside swallow evaluation to assess the efficiency of her swallow function, identify signs and symptoms of aspiration and make recommendations regarding safe dietary consistencies, effective compensatory strategies, and safe eating environment. Impression  Dysphagia Diagnosis: Moderate oral stage dysphagia; Severe pharyngeal stage dysphagia Dysphagia Impression:  Severe oral-pharyngeal dysphagia. Pt with reduced labial/lingual strength & coordination. Impaired oral phase with poor oral awareness when spoon+ice chip presented; did accept when given verbal cues. Required verbal cues to initiate mastication. Pt unable to initiate swallow despite verbal and external tactile cues. PO trials discontinued out of concern for pt safety. Recommend NPO with ongoing re-assessment of swallow function. Prognosis for improvement in swallow function that will result in adequate PO intake within next 24 hours is guarded. Consider temporary alternative means of nutrition at this time. Dysphagia Outcome Severity Scale: Level 1: Severe dysphagia- NPO. Unable to tolerate any PO safely     Treatment Plan  Requires SLP Intervention: Yes  Duration/Frequency of Treatment: 3-5x/week x LOS  D/C Recommendations: To be determined  Referral To: Speech Evaluation    Recommended Diet and Intervention  Diet Solids Recommendation: NPO  Liquid Consistency Recommendation: NPO  Recommended Form of Meds: Via alternative means of nutrition  Recommendations: NPO;Dysphagia treatment;Consider alternative nutrition  Therapeutic Interventions: Oral care; Patient/Family education    Compensatory Swallowing Strategies  needs aggressive oral care    Treatment/Goals  Dysphagia Goals:   1. The patient will tolerate repeat BSE when able. 2.  The patient/caregiver will demonstrate understanding of recommendations for improved swallowing safety.     General  Chart Reviewed: Yes  Behavior/Cognition: Cooperative  Temperature Spikes Noted: No(per flowsheet)  Respiratory Status: O2 via nasual cannula  Breath Sounds: Clear;Diminished(per flowsheet)  O2 Device: Nasal cannula  Liters of Oxygen: 2 L  Communication Observation: Dysarthria(suspect aphasia, but unable to assess due to severity of dysarthria)  Follows Directions: Simple  Patient Positioning: Upright in bed Baseline Vocal Quality: Wet(requiring suction to maintain secretions)  Volitional Swallow: Absent  Prior Dysphagia History: none noted  Consistencies Administered: Ice Chips(PO trials discontinued when pt unable to initiate swallow with ice chips)    Vision/Hearing  Vision  Vision: (unable to assess)  Hearing  Hearing: (unable to assess)    Oral Motor Deficits  Oral/Motor  Oral Motor: Exceptions to [facility]  Labial Strength: Reduced  Lingual Strength: Reduced  Lingual Coordination: Reduced    Oral Phase Dysfunction  Oral Phase  Oral Phase: Exceptions  Oral Phase Dysfunction  Decreased Anterior to Posterior Transit: Ice chips  Oral Phase  Oral Phase - Comment: Impaired oral phase with poor oral awareness when spoon+ice chip presented. Did accept when given verbal cues. Required verbal cues to initiate mastication. Indicators of Pharyngeal Phase Dysfunction  Pharyngeal Phase  Pharyngeal Phase: Exceptions  Indicators of Pharyngeal Phase Dysfunction  Absent Swallow: Ice chips  Pharyngeal Phase   Pharyngeal: Pt unable to initiate swallow despite verbal and external tactile cues. Prognosis  Prognosis  Prognosis for safe diet advancement: fair  Individuals consulted  Consulted and agree with results and recommendations: Patient;RN    Education  Patient Education:  SLP educated pt re: role of SLP and POC/NPO recommendations/rationale. Patient Education Response: Needs reinforcement  Safety Devices in place: Yes  Type of devices: All fall risk precautions in place;Nurse notified       Therapy Time  SLP Individual Minutes  Time In: 1025  Time Out: [address]  Minutes: 15            Plan  Follow-up dysphagia treatment 3-5x/week as tolerated. Diet Recommendations:    NPO   Discharge Plan:  TBD  Discussed with Angela ESTEVES  Needs within reach.     Electronically Signed by:  Joe Dick, [address] Pathologist  [phone]  Pager #[phone]  02/05/21  12:41 PM This document will serve as a discharge summary if pt discharges before next treatment.      2/5/2021 12:41 PM

## 2021-02-05 NOTE — PROGRESS NOTES
Occupational Therapy   Occupational Therapy Initial Assessment/Tx    Date: 2021   Patient Name: George Ball  MRN: [id number]     : 1940    Date of Service: 2021    Discharge Recommendations: George Ball scored a  on the AM-PAC ADL Inpatient form. Current research shows that an AM-PAC score of 17 or less is typically not associated with a discharge to the patient's home setting. Based on the patient's AM-PAC score and their current ADL deficits, it is recommended that the patient have 5-7 sessions per week of Occupational Therapy at d/c to increase the patient's independence. At this time, this patient demonstrates the endurance, and/or tolerance for 3 hours of therapy each day, with a treatment frequency of 5-7x/wk. Please see assessment section for further patient specific details. If patient discharges prior to next session this note will serve as a discharge summary. Please see below for the latest assessment towards goals. OT Equipment Recommendations  Other: defer to d/c location pending progress    Assessment   Performance deficits / Impairments: Decreased functional mobility ; Decreased ADL status; Decreased strength;Decreased endurance;Decreased cognition;Decreased balance;Decreased posture;Decreased fine motor control;Decreased high-level IADLs;Decreased ROM Assessment: Pt is 80 y.o. female that was highly independent at senior living facility prior to having an acute right CVA. Pt now has left hemiparesis, expressive aphasia, and very lathargic with maximal VC to arouse. Pt this date required max A-total A for all bed mobility and seated at edge of bed. Pt has function with RUE and able to wash face with min A. Pt would benefit from ongoing acute OT services for neuromuscular reeducation on LUE and increased independence and function for ADLS and transfers. Pt at d/c will benefit from intense inpatient cont therapy services for return to PLOF. Pt will be followed on POC. Treatment Diagnosis: decreased independence with ADLs and functional transfers, left hemiparesis  Prognosis: Good  Decision Making: High Complexity  OT Education: OT Role;Plan of Care;Transfer Training  Patient Education: pt alert, nods head yes/no  Barriers to Learning: expressive aphasia, requires increased time and repition to follow commands  REQUIRES OT FOLLOW UP: Yes  Activity Tolerance  Activity Tolerance: Patient limited by fatigue  Activity Tolerance: vitals remained stable, BP and HR good, pt with fatigue noted by closing eyes and seated tolerance decreasing  Safety Devices  Safety Devices in place: Yes  Type of devices: All fall risk precautions in place; Left in bed;Nurse notified;Call light within reach; Bed alarm in place           Patient Diagnosis(es): The encounter diagnosis was Acute right MCA stroke ([facility] [address] has a past medical history of Acute GI bleeding, Atrial fibrillation ([facility] [address] Systolic CHF ([facility] [address] and Type 2 diabetes mellitus ([facility] [address] has a past surgical history that includes Upper gastrointestinal endoscopy (01/29/2021) and IR GUIDED IVC FILTER PLACEMENT (2/4/2021).     Treatment Diagnosis: decreased independence with ADLs and functional transfers, left hemiparesis      Restrictions  Position Activity Restriction  Other position/activity restrictions: fall precautions Subjective   General  Chart Reviewed: Yes  Patient assessed for rehabilitation services?: Yes  Additional Pertinent Hx: Elizabeth Pereira is a 79 y.o. adult, with a history of afib, HF, DM, asthma, pulm HTN (not oxygen dependent), HTN, HLD, EDDY on cpap, breast cancer s/p lumpectomy/radiation, colon polyps, obesity who presented with left-sided hemiplegia, severe expressive aphasia and dysarthria, right facial droop, and right-gaze deviation. CT (+) 1. 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 2. 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 Pt was discharged from [facility] 2 days ago where she was treated for acute GI bleed  Family / Caregiver Present: No  Referring Practitioner: Juan Vigil MD  Diagnosis: acute CVA  Subjective  Subjective: Pt lying supine in bed upon OT arrival. Pt agreeable to OT session by shaking head. Pt able to make some more purposeful statements, \"I like you\", \"I'm not in pain\", \"I want a drink\". Speech continues to be garbled and very difficult to understand but can be made out with increased time.   Patient Currently in Pain: Denies  Pain Assessment  Pain Assessment: 0-10  Pain Level: 0  Vital Signs  Temp: 99 °F (37.2 °C)  Temp Source: Oral  Pulse: 78  Heart Rate Source: Monitor  Resp: 15  BP: (!) 142/65  BP Location: Left upper arm  MAP (mmHg): 86  Patient Position: Semi fowlers  Level of Consciousness: Responds to Voice (1)  MEWS Score: 1  Patient Currently in Pain: Denies  Oxygen Therapy  SpO2: 98 %  O2 Flow Rate (L/min): 3 L/min  Social/Functional History  Social/Functional History  Lives With: Alone  Type of Home: (senior building)  Additional Comments: pt unable to provide PLOF, per chart review, pt lives alone in senior [facility] and has daugther that helps her out when needed       Objective   Vision: Impaired(max verbal cues to open eyes, crusty on Left eye lip with warm wash cloth to help, then pt able to keep eyes open)  Hearing: Within functional limits Orientation  Overall Orientation Status: Impaired  Orientation Level: Unable to assess  Observation/Palpation  Posture: Fair  Balance  Sitting Balance: Maximum assistance(max A primarily, brief moments of min A, tolerated for 10-12 min, mild leaning to right side)  Standing Balance: Maximum assistance(Max A x 2, 2 trials, brief intervals ( 45 seconds + 1 min))  ADL  Feeding: NPO  Grooming: Minimal assistance(Pt min A for washing face with wash cloth this date)  UE Dressing: Maximum assistance(Max A for donning of gown)  LE Dressing: Dependent/Total(total A for LB dressing of socks)  Toileting: Dependent/Total(total A for changing of adrián pad, wiping rear, and replacing purwick)  Tone RUE  RUE Tone: Normotonic  Tone LUE  LUE Tone: Hypotonic  Tone Description: Pt with minimal movement in LUE, appearing flaccid initiatly but pt progressing with increased muscle activation when seated on EOB  Coordination  Coordination and Movement description: Left UE;Gross motor impairments     Bed mobility  Rolling to Right: Dependent/Total;2 Person assistance;Maximum assistance  Supine to Sit: Dependent/Total;2 Person assistance;Maximum assistance  Sit to Supine: Dependent/Total;2 Person assistance  Scootin Person assistance;Dependent/Total  Transfers  Sit to stand: 2 Person assistance;Maximum assistance(2 trials)  Stand to sit: 2 Person assistance;Maximum assistance  Vision - Basic Assessment  Patient Visual Report: No visual complaint reported. ;Eye fatigue/eye pain/headache(difficulty initially lifting eyelid on left side, progressed throughout session)  Cognition  Overall Cognitive Status: Exceptions  Arousal/Alertness: Delayed responses to stimuli  Following Commands: Follows one step commands with repetition; Follows one step commands with increased time  Attention Span: Attends with cues to redirect  Memory: Decreased short term memory  Safety Judgement: Decreased awareness of need for assistance Problem Solving: Assistance required to generate solutions;Assistance required to implement solutions  Insights: Decreased awareness of deficits  Initiation: Requires cues for all  Sequencing: Requires cues for all  Perception  Overall Perceptual Status: Impaired  Motor Planning: Hand over hand to sequence tasks(with LUE)     Sensation  Overall Sensation Status: WFL        LUE PROM (degrees)  LUE PROM: WFL  LUE AROM (degrees)  LUE AROM : Exceptions  LUE General AROM: left hemiparesis , no AROM of LUE outside of brief movement with digits. Pt has increased muscle activation when seated on edge of bed when attempting to hold self up. Left Hand PROM (degrees)  Left Hand PROM: WFL  Left Hand AROM (degrees)  Left Hand AROM: Exceptions  Left Hand General AROM: decreased, pt able to make slight active finger extension upon command with max VC  RUE PROM (degrees)  RUE PROM: WFL  RUE AROM (degrees)  RUE AROM : WFL  Right Hand PROM (degrees)  Right Hand PROM: WFL  Right Hand AROM (degrees)  Right Hand AROM: WFL              Treatment included functional transfer training, ADLs, and patient education.            Plan   Plan  Times per week: 5-7x  Times per day: Daily  Current Treatment Recommendations: Neuromuscular Re-education, Functional Mobility Training, Endurance Training, Cognitive Reorientation, Self-Care / ADL, Safety Education & Training, ROM, Positioning, Strengthening, Balance Training, Patient/Caregiver Education & Training    G-Code     OutComes Score                                                  AM-PAC Score        AM-[facility] Inpatient Daily Activity Raw Score: 11 (02/05/21 1207)  AM-PAC Inpatient ADL T-Scale Score : 29.04 (02/05/21 1207)  ADL Inpatient CMS 0-100% Score: 70.42 (02/05/21 1207)  ADL Inpatient CMS G-Code Modifier : CL (02/05/21 1207)    Goals  Short term goals  Time Frame for Short term goals: by d/c  Short term goal 1: Pt will perform supine to sit transfer with min A x 2 Short term goal 2: Pt will perform rolling to left<>right side with min A  Short term goal 3: Pt will perform sit-stand transfer with mod A x 2  Patient Goals   Patient goals : not stated       Therapy Time   Individual Concurrent Group Co-treatment   Time In 1100         Time Out 1139         Minutes 39         Timed Code Treatment Minutes: 24 Minutes(+ 15 min OT eval)       Christy Beavers OT

## 2021-02-05 NOTE — PROGRESS NOTES
Abdomen: soft, non-tender; bowel sounds normal; no masses,  no organomegaly  Extremities: extremities normal, atraumatic, no cyanosis or edema  Pulses: 2+ and symmetric  Awake, eyes closed - will attempt to open  Oriented to name and hospital  Speech very dysarthric - only able to understand few words  RUE/RLE - full strength  LUE - 2/5 (able to lift off bed for nursing on earlier assessment)  LLE - 3/5  LABS:  Recent Labs     02/03/21  0538 02/03/21  1527 02/04/21  0635 02/05/21  0546   WBC 7.5  --  10.3 12.0*   HGB 6.6* 8.9* 7.7* 7.7*   HCT 22.2* 29.8* 25.2* 26.0*     --  201 201                                                                    Recent Labs     02/03/21  0931 02/04/21  0521 02/05/21  0546    142 146*   K 3.9 4.7 3.9    107 111*   CO2 27 24 26   BUN 13 11 13   CREATININE 0.7 0.6 0.7   GLUCOSE 134* 128* 135*     Recent Labs     02/02/21 2006   AST 21   ALT 15   BILITOT 0.6   ALKPHOS 91     Recent Labs     02/02/21 2006   Tanmay Vincent <0.01     No results for input(s): BNP in the last 72 hours.   Recent Labs     02/03/21  0538   CHOL 64   HDL 26*     Recent Labs     02/02/21 2006 02/04/21  1250   INR 1.32* 1.25*       Assessment & Plan:    Patient Active Problem List:     Acute cerebrovascular accident (CVA) ([address]     Permanent atrial fibrillation ([facility] [address]     Acute gastric ulcer with hemorrhage     Other pulmonary embolism without acute cor pulmonale ([address]     Acute right MCA stroke ([address] 78 yo admitted with right mca m2 occlusion s/p thormbectomy and thrombolysis. Patient had subsequent acute parenchymal hemorrhage on follow up ct scan. She is doing ok, but speech is still an issue along with dysphagia. Since she is progressing, wuill continue aggressive speech therapy and monitor function. Palliative care is consulted as wel, she has a hx of afib, monitor progression through the weekend. Patient also has anemia, monitor for any signs or symptoms of blood loss. On nicardipene infusion and protonix 40 mg iv as she has a hx of gastric ulcer. The patient and / or the family were informed of the results of any tests, a time was given to answer questions, a plan was proposed and they agreed with plan.   Disposition: therapy evaluation awaiting placement  Full Code      MD Jf Martinezo

## 2021-02-05 NOTE — PROGRESS NOTES
Clinical Pharmacy Progress Note    79 y.o. female admitted with acute stroke. Pharmacy has been asked by Dr. Angus Locke to adjust all drips to normal saline as appropriate based on compatibility to avoid fluid shifts since D5 is osmotically active. The following intermittent IV drips/infusions have been adjusted to saline:  Nicardipine  Levetiracetam    The following medications must remain in D5W due to incompatibility with normal saline:  Amphotericin  Mycophenolate  Nitroprusside  Penicillin G Potassium    Please be aware that patient may have D5W ordered as part of hypoglycemia orderset. Total IV fluid delivered to patient over last 24h: ~800 mL    As patient is not being treated for pituitary tumor resection or requiring hypertonic saline (defined as >0.9% NaCl), pharmacy will sign off of IV review consult, per protocol. Please call with questions.   Avani Barton, PharmD  PGY1 Pharmacy Resident  2/5/2021 3:33 PM  [id number]

## 2021-02-05 NOTE — PROGRESS NOTES
ICU Progress Note    Admit Date: 2/2/2021     Diet: Diet NPO Effective Now    CC: Cerebrovascular accident    Interval history: No acute events overnight. IVC filter placed successfully on 2/4. Patient is alert and able to follow commands in extremities. Very lethargic, able to faintly say her name and ask for water. Cardene at 9. On 3L NC.         Medications:     Scheduled Meds:   miconazole   Topical BID    levetiracetam  500 mg Intravenous Q12H    ferrous sulfate  325 mg Oral Daily with breakfast    pantoprazole  40 mg Intravenous BID    atorvastatin  80 mg Oral Nightly    carvedilol  6.25 mg Oral BID WC    insulin lispro  0-6 Units Subcutaneous TID WC    insulin lispro  0-3 Units Subcutaneous Nightly     Continuous Infusions:   niCARdipine 9 mg/hr (02/05/21 0649)     PRN Meds:perflutren lipid microspheres, labetalol, albuterol sulfate HFA, hydrALAZINE, acetaminophen, HYDROcodone 5 mg - acetaminophen **OR** HYDROcodone 5 mg - acetaminophen, promethazine **OR** ondansetron, polyethylene glycol    Objective:   Vitals:   T-max:  Patient Vitals for the past 8 hrs:   BP Temp Temp src Pulse Resp SpO2   02/05/21 0600 (!) 158/66   83 24    02/05/21 0545 (!) 156/79   90 20 94 %   02/05/21 0530 (!) 150/69   74 18 (!) 89 %   02/05/21 0515 (!) 158/77   72 23 100 %   02/05/21 0500 (!) 144/79   74 25 99 %   02/05/21 0445 (!) 181/88   102 28 97 %   02/05/21 0430 (!) 168/94   80 26 (!) 89 %   02/05/21 0415 (!) 154/81   66 21 95 %   02/05/21 0400 (!) 165/85 99.5 °F (37.5 °C) Oral 60 21 96 %   02/05/21 0345      (!) 83 %   02/05/21 0330 (!) 153/79   83 21 95 %   02/05/21 0315 (!) 144/66   85 24 96 %   02/05/21 0300 (!) 167/82   95 25 96 %   02/05/21 0245 (!) 159/98   107 24 91 %   02/05/21 0230    85 25 91 %   02/05/21 0215 (!) 140/55   71 23 94 %   02/05/21 0200 (!) 158/81   80 26 96 %   02/05/21 0145 (!) 175/138   97 23 94 %   02/05/21 0130 (!) 169/64   84 26 92 %   02/05/21 0115 (!) 152/73   86 24 91 %   02/05/21 0100 (!) 150/69   68 21 91 %   02/05/21 0045 (!) 166/75   66 22 93 %   02/05/21 0030 (!) 157/55   94 22 93 %   02/05/21 0015 (!) 153/94   84 24 93 %       Intake/Output Summary (Last 24 hours) at 2/5/2021 8826  Last data filed at 2/5/2021 0600  Gross per 24 hour   Intake 1435.33 ml   Output 750 ml   Net 685.33 ml       Review of Systems   Unable to perform ROS: Patient nonverbal       Physical Exam  Constitutional:       General: She is not in acute distress. Appearance: She is not ill-appearing. Comments: Lethargic. Alert, responds to name. Cardiovascular:      Rate and Rhythm: Normal rate and regular rhythm. Pulses: Normal pulses. Heart sounds: Normal heart sounds. Pulmonary:      Effort: Pulmonary effort is normal.      Breath sounds: Normal breath sounds. Abdominal:      General: Abdomen is flat. Bowel sounds are normal.      Palpations: Abdomen is soft. Musculoskeletal: Normal range of motion. Skin:     General: Skin is warm and dry. Capillary Refill: Capillary refill takes less than 2 seconds. Neurological:      Comments: Alert, and able to follow commands in extremities. Very lethargic, able to faintly say her name and ask for water.              LABS:    CBC:   Recent Labs     02/03/21  0538 02/03/21  1527 02/04/21  0635 02/05/21  0546   WBC 7.5  --  10.3 12.0*   HGB 6.6* 8.9* 7.7* 7.7*   HCT 22.2* 29.8* 25.2* 26.0*     --  201 201   MCV 79.5*  --  80.3 81.6     Renal:    Recent Labs     02/03/21  0931 02/04/21  0521 02/05/21  0546    142 146*   K 3.9 4.7 3.9    107 111*   CO2 27 24 26   BUN 13 11 13   CREATININE 0.7 0.6 0.7   GLUCOSE 134* 128* 135*   CALCIUM 9.1 9.0 9.1   ANIONGAP 7 11 9     Hepatic:   Recent Labs     02/02/21 2006   AST 21   ALT 15   BILITOT 0.6   PROT 5.7*   LABALBU 3.0*   ALKPHOS 91     Troponin:   Recent Labs     02/02/21 2006   TROPONINI <0.01     BNP: No results for input(s): BNP in the last 72 hours. Lipids:   Recent Labs     02/03/21  0538   CHOL 64   HDL 26*     ABGs:  No results for input(s): PHART, IPK6KAK, PO2ART, KLN5HHY, BEART, THGBART, S3GWDSRX, DAZ1MGA in the last 72 hours. INR:   Recent Labs     02/02/21 2006 02/04/21  1250   INR 1.32* 1.25*     Lactate: No results for input(s): LACTATE in the last 72 hours. Cultures:  -----------------------------------------------------------------  RAD:   IR GUIDED IVC FILTER PLACEMENT   Final Result   Impression:      Successful placement of retrievable IVC filter. CT HEAD WO CONTRAST   Final Result      1. Hemorrhagic transformation of right MCA infarct, without significant change. No new hemorrhage. CT head without contrast   Final Result         1. Stable large intraparenchymal hemorrhage located within the right posterior temporal parietal lobe with extension into the adjacent insula. There is also subarachnoid extension of hemorrhage into the overlying sulci as well as the right sylvian    fissure. Findings result in some mild right to left subfalcine herniation. 2.There is a 1.3 x 1.1 cm extra-axial mass identified within the left ordered aspect of the foramen magnum. This results in some mass effect on the adjacent medulla. This is without change from the prior study. Finding may represent a meningioma. This    could be further evaluated with a contrast-enhanced MRI of the head following resolution of the intracranial hemorrhage. CT HEAD WO CONTRAST   Final Result      1. Stable large intraparenchymal hemorrhage located within the right posterior temporal parietal lobe with extension into the adjacent insula. There is also subarachnoid extension of hemorrhage into the overlying sulci as well as the right sylvian    fissure. Findings result in some mild right to left subfalcine herniation. 2.There is a 1.3 x 1.1 cm extra-axial mass identified within the left ordered aspect of the foramen magnum. This results in some mass effect on the adjacent medulla. This is without change from the prior study. Finding may represent a meningioma. This    could be further evaluated with a contrast-enhanced MRI of the head following resolution of the intracranial hemorrhage. VL Extremity Venous Bilateral         CT HEAD WO CONTRAST   Final Result      Post therapy large acute parenchymal hemorrhage in the right cerebral hemisphere with subarachnoid component. Extensive surrounding edema is associated with 4 cm leftward shift of midline structures. Critical finding of acute intracranial hemorrhage was called to Truett Soulier of the neurosurgery department at 12:25 PM on 2/3/2021, with instructions to contact patient's attending physician with these results. CT Brain Perfusion   Final Result      1. Hypoperfusion in the right MCA territory with a central ischemic core of 8 mL, total volume of hypoperfusion of 79 mL and a penumbra of 71 mL. XR CHEST PORTABLE   Final Result   1. Limited evaluation because of patient rotation and underpenetration of the film. 2. No dense airspace consolidation. 3. Probable mild cardiomegaly. CTA HEAD NECK W CONTRAST   Final Result   1. Carotid and vertebral arteries are patent and without stenosis or acute abnormality. 2. Incidental partial imaging of the a segmental pulmonary embolism in the right middle lobe. 3. Partial imaging of small right greater than left pleural effusions. 4. Previous left suboccipital craniotomy with a residual 1.6 cm left-sided extra-axial mass at the foramen magnum with mass effect on the cord at the cervical medullary junction favored to represent meningioma. Recommend correlation with clinical history    and previous imaging. 5. Incidental multinodular goiter. CTA HEAD:      FINDINGS: The internal carotid arteries are patent and normal in caliber through the skull base. The middle cerebral arteries are patent.  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 The A2 and A3 segments of the ACAs are patent bilaterally. Left MCA is patent. There is focal occlusion of an M2 branch of the right MCA (series 2 images 427 and 428). However, other M2 branches and M3 branches in the right sylvian fissure are patent. The M1 segment of the right MCA is patent. The posterior cerebral arteries are    patent bilaterally. The basilar artery is patent and normal in caliber. It is supplied by the left vertebral artery. The small nondominant right vertebral artery terminates in right sided PICA. IMPRESSION:   1. 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 2. 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 Results were discussed with Dr. Mei Llanes at 8:00 PM on 2/2/2021. CT HEAD WO CONTRAST   Final Result   1. Previous left suboccipital craniotomy with a partially calcified extra-axial mass at the left foramen magnum measuring 1.8 cm contacting the cord at the cervicomedullary junction with some degree of mass effect on the cord at the foramen magnum. This    most likely represents residual/recurrent meningioma or other extra-axial mass. Correlation with patient history and previous imaging recommended. 2. Mild global volume loss and mild chronic small vessel ischemic disease in the white matter. 3. No acute intracranial hemorrhage.       IR ANGIOGRAM CAROTID CEREBRAL RIGHT    (Results Pending)   MRI BRAIN WO CONTRAST    (Results Pending)         Assessment/Plan:   Cherrie Canales is a 79 y.o. adult, with a history of afib, HF, DM, asthma, pulm HTN (not oxygen dependent), HTN, HLD, EDDY on cpap, breast cancer s/p lumpectomy/radiation, colon polyps, obesity who presented with left-sided hemiplegia, severe expressive aphasia and dysarthria, right facial droop, and right-gaze deviation. She underwent M2 thrombectomy and thrombolysis with significant improvement of hemiplegia, but still with other aforementioned symptoms.     Last known normal:  12:30 2/2/21  Time of reperfusion at 21:45 on 2/2/21.     Right MCA M2 occlusion s/p thrombectomy and thrombolysis  -CT on 2/4 showed new large acute parenchymal hemorrhage in the right cerebral hemisphere. F/u CTH 2/4 stable. -MRI pending   -q4 neurochecks. Will remain in ICU for cardene drip.   -PT/OT  -SLP: NPO->NG tube placed today. May need PEG in future. -neurology following   -Echo 2/4 unremarkable, lipid panel and A1c unremarkable  -Atorvastatin 80 mg daily patient can tolerate p.o.  -cvEEG ordered  -palliative care consulted    Intraparenchymal hemorrhage   -2/2 thrombolysis. Continue to monitor neuro status, make NSGY aware of acute changes. -SBP<160, controlled with cardene drip   -Continue to hold aspirin and AC given bleed  -Keppra 500 BID for 7 days started started 2/4 for seizure ppx     Blood loss anemia, hypochromic microcytic 2/2 GI bleed; hgb 7.3 on admission  -recent EGD last admission  -daily iron  -Protonix 40 BID     Acute on chronic systolic HF, not in exacerbation   - cont coreg 6.25 mg BID  -holding home lasix 20  -Echo 2/4 EF 55-60%     Segmental RML Pulmonary Embolus incidentally noted on CTA head/neck  -patient seems to be breathing/oxygenating well  -hold AC given ich  -LE doppler u/s showed an acute totally occluding deep venous thrombosis is noted in the left gastrocnemius, posterior tibial, peroneal, and soleal veins. -IVC filter placed 2/4.     Permanent Afib, rate controlled  - holding xarelto given recent GI bleed and thrombectomy, chadsvac score high.  Continue to hold [facility] [place] given new bleed   - hold home diltiazem for now given HR is controlled     DMT2, controlled   HgA1c 6.2% (9/2020)   - 5.2% on 2/3/21.  - most recent admission, per daughter pt on lispro 14U in AM and 10U PM  - Low-dose sliding scale given n.p.o. status     HTN  -control as above  -holding home losartan and diltiazem     Asthma, not in exacerbation   - albuterol prn      Code Status:  Full Code  FEN:  NPO   PPX:  SCD's, protonix 40 mg BID  DISPO: Michael Martin MD, PGY-1  02/05/21  8:07 AM    This patient has been staffed and discussed with Dr Karan Sena     Patient examined, findings as discussed with Dr. Luís Smith. Agree  with assessment and plan as above.

## 2021-02-05 NOTE — CARE COORDINATION
Case Management Assessment           Daily Note                 Date/ Time of Note: 2/5/2021 5:36 PM         Note completed by: Lui Richter    Patient Name: Rosa Ware  YOB: 1940    Diagnosis:Acute cerebrovascular accident (CVA) [facility]) [I63.9]  Patient Admission Status: Inpatient    Date of Admission:2/2/2021  7:22 PM Length of Stay: 3 GLOS:      Current Plan of Care: remains on a cardene drip, remains NPO  ________________________________________________________________________________________  PT AM-PAC: 6 / 24 per last evaluation on: 02/05    OT AM-PAC: 11 / 24 per last evaluation on: 02/05    DME Needs for discharge: pending  ________________________________________________________________________________________  Discharge Plan: Inpatient Rehab: family deciding on which one    Tentative discharge date: TBD    Current barriers to discharge: [employer] guzman, will need BCBS precert    Referrals completed: Not Applicable    Resources/ information provided: Not indicated at this time  ________________________________________________________________________________________  Case Management Notes:I spoke with patient but she has aphasia so it is very difficult to understand. She was fine with having me discuss d/c plans with her daughter. I called Gaudencio and she told me that her sister works at [employer] and her mother went to the [facility] before but since she has been at [facility] they have really appreciated the care she has received here. She will talk it over with her sister and will let me know on Monday. She will need BCBS precert for any placement. Gareth Ward and her family were provided with choice of provider; she and her family are in agreement with the discharge plan.     Care Transition Patient: Arianne Richter RN  [facility] [employer] [employer]  Case Management Department  Ph: [phone]  Fax: [phone]

## 2021-02-06 ENCOUNTER — APPOINTMENT (OUTPATIENT)
Dept: GENERAL RADIOLOGY | Age: 81
DRG: 003 | End: 2021-02-06
Payer: COMMERCIAL

## 2021-02-06 ENCOUNTER — APPOINTMENT (OUTPATIENT)
Dept: MRI IMAGING | Age: 81
DRG: 003 | End: 2021-02-06
Payer: COMMERCIAL

## 2021-02-06 PROBLEM — R09.02 HYPOXEMIA: Status: ACTIVE | Noted: 2021-02-06

## 2021-02-06 LAB
ANION GAP SERPL CALCULATED.3IONS-SCNC: 10 MMOL/L (ref 3–16)
BASE EXCESS ARTERIAL: 2 (ref -3–3)
BASOPHILS ABSOLUTE: 0.1 K/UL (ref 0–0.2)
BASOPHILS RELATIVE PERCENT: 0.3 %
BUN BLDV-MCNC: 16 MG/DL (ref 7–20)
CALCIUM SERPL-MCNC: 9.4 MG/DL (ref 8.3–10.6)
CHLORIDE BLD-SCNC: 113 MMOL/L (ref 99–110)
CO2: 25 MMOL/L (ref 21–32)
CREAT SERPL-MCNC: 0.6 MG/DL (ref 0.6–1.2)
EOSINOPHILS ABSOLUTE: 0.1 K/UL (ref 0–0.6)
EOSINOPHILS RELATIVE PERCENT: 0.4 %
GFR AFRICAN AMERICAN: >60
GFR NON-AFRICAN AMERICAN: >60
GLUCOSE BLD-MCNC: 112 MG/DL (ref 70–99)
GLUCOSE BLD-MCNC: 121 MG/DL (ref 70–99)
GLUCOSE BLD-MCNC: 131 MG/DL (ref 70–99)
GLUCOSE BLD-MCNC: 139 MG/DL (ref 70–99)
GLUCOSE BLD-MCNC: 146 MG/DL (ref 70–99)
HCO3 ARTERIAL: 29 MMOL/L (ref 21–29)
HCT VFR BLD CALC: 30 % (ref 36–48)
HEMOGLOBIN: 8.7 G/DL (ref 12–16)
LYMPHOCYTES ABSOLUTE: 1.7 K/UL (ref 1–5.1)
LYMPHOCYTES RELATIVE PERCENT: 11.2 %
MCH RBC QN AUTO: 24.1 PG (ref 26–34)
MCHC RBC AUTO-ENTMCNC: 28.9 G/DL (ref 31–36)
MCV RBC AUTO: 83.5 FL (ref 80–100)
MONOCYTES ABSOLUTE: 1 K/UL (ref 0–1.3)
MONOCYTES RELATIVE PERCENT: 6.5 %
NEUTROPHILS ABSOLUTE: 12.2 K/UL (ref 1.7–7.7)
NEUTROPHILS RELATIVE PERCENT: 81.6 %
O2 SAT, ARTERIAL: 91 % (ref 93–100)
PCO2 ARTERIAL: 61.4 MM HG (ref 35–45)
PDW BLD-RTO: 25.8 % (ref 12.4–15.4)
PERFORMED ON: ABNORMAL
PH ARTERIAL: 7.28 (ref 7.35–7.45)
PLATELET # BLD: 237 K/UL (ref 135–450)
PMV BLD AUTO: 9.3 FL (ref 5–10.5)
PO2 ARTERIAL: 71.4 MM HG (ref 75–108)
POC SAMPLE TYPE: ABNORMAL
POTASSIUM REFLEX MAGNESIUM: 4.1 MMOL/L (ref 3.5–5.1)
PRO-BNP: 1087 PG/ML (ref 0–449)
RBC # BLD: 3.59 M/UL (ref 4–5.2)
SODIUM BLD-SCNC: 148 MMOL/L (ref 136–145)
TCO2 ARTERIAL: 31 MMOL/L
WBC # BLD: 14.9 K/UL (ref 4–11)

## 2021-02-06 PROCEDURE — 99233 SBSQ HOSP IP/OBS HIGH 50: CPT | Performed by: INTERNAL MEDICINE

## 2021-02-06 PROCEDURE — 2500000003 HC RX 250 WO HCPCS: Performed by: STUDENT IN AN ORGANIZED HEALTH CARE EDUCATION/TRAINING PROGRAM

## 2021-02-06 PROCEDURE — 85025 COMPLETE CBC W/AUTO DIFF WBC: CPT

## 2021-02-06 PROCEDURE — 82803 BLOOD GASES ANY COMBINATION: CPT

## 2021-02-06 PROCEDURE — 6360000002 HC RX W HCPCS: Performed by: NURSE PRACTITIONER

## 2021-02-06 PROCEDURE — 6360000002 HC RX W HCPCS: Performed by: STUDENT IN AN ORGANIZED HEALTH CARE EDUCATION/TRAINING PROGRAM

## 2021-02-06 PROCEDURE — C9113 INJ PANTOPRAZOLE SODIUM, VIA: HCPCS | Performed by: STUDENT IN AN ORGANIZED HEALTH CARE EDUCATION/TRAINING PROGRAM

## 2021-02-06 PROCEDURE — 2580000003 HC RX 258: Performed by: NURSE PRACTITIONER

## 2021-02-06 PROCEDURE — 2700000000 HC OXYGEN THERAPY PER DAY

## 2021-02-06 PROCEDURE — 70551 MRI BRAIN STEM W/O DYE: CPT

## 2021-02-06 PROCEDURE — 2000000000 HC ICU R&B

## 2021-02-06 PROCEDURE — 6370000000 HC RX 637 (ALT 250 FOR IP): Performed by: COUNSELOR

## 2021-02-06 PROCEDURE — 2580000003 HC RX 258: Performed by: STUDENT IN AN ORGANIZED HEALTH CARE EDUCATION/TRAINING PROGRAM

## 2021-02-06 PROCEDURE — 6370000000 HC RX 637 (ALT 250 FOR IP): Performed by: NEUROLOGICAL SURGERY

## 2021-02-06 PROCEDURE — 36415 COLL VENOUS BLD VENIPUNCTURE: CPT

## 2021-02-06 PROCEDURE — 80048 BASIC METABOLIC PNL TOTAL CA: CPT

## 2021-02-06 PROCEDURE — 83880 ASSAY OF NATRIURETIC PEPTIDE: CPT

## 2021-02-06 PROCEDURE — 71045 X-RAY EXAM CHEST 1 VIEW: CPT

## 2021-02-06 RX ORDER — FUROSEMIDE 10 MG/ML
40 INJECTION INTRAMUSCULAR; INTRAVENOUS ONCE
Status: COMPLETED | OUTPATIENT
Start: 2021-02-06 | End: 2021-02-06

## 2021-02-06 RX ADMIN — HYDRALAZINE HYDROCHLORIDE 5 MG: 20 INJECTION INTRAMUSCULAR; INTRAVENOUS at 12:09

## 2021-02-06 RX ADMIN — SODIUM CHLORIDE 3 MG/HR: 9 INJECTION, SOLUTION INTRAVENOUS at 01:15

## 2021-02-06 RX ADMIN — LEVETIRACETAM 500 MG: 100 INJECTION, SOLUTION INTRAVENOUS at 18:50

## 2021-02-06 RX ADMIN — MICONAZOLE NITRATE: 20 POWDER TOPICAL at 21:25

## 2021-02-06 RX ADMIN — ATORVASTATIN CALCIUM 80 MG: 40 TABLET, FILM COATED ORAL at 21:08

## 2021-02-06 RX ADMIN — PANTOPRAZOLE SODIUM 40 MG: 40 INJECTION, POWDER, FOR SOLUTION INTRAVENOUS at 21:08

## 2021-02-06 RX ADMIN — MICONAZOLE NITRATE: 20 POWDER TOPICAL at 08:39

## 2021-02-06 RX ADMIN — LEVETIRACETAM 500 MG: 100 INJECTION, SOLUTION INTRAVENOUS at 07:08

## 2021-02-06 RX ADMIN — PANTOPRAZOLE SODIUM 40 MG: 40 INJECTION, POWDER, FOR SOLUTION INTRAVENOUS at 08:36

## 2021-02-06 RX ADMIN — CARVEDILOL 6.25 MG: 6.25 TABLET, FILM COATED ORAL at 18:08

## 2021-02-06 RX ADMIN — FUROSEMIDE 40 MG: 10 INJECTION, SOLUTION INTRAVENOUS at 12:31

## 2021-02-06 ASSESSMENT — PAIN SCALES - PAIN ASSESSMENT IN ADVANCED DEMENTIA (PAINAD)
FACIALEXPRESSION: 0
NEGVOCALIZATION: 0
BREATHING: 0
CONSOLABILITY: 0
NEGVOCALIZATION: 0
BODYLANGUAGE: 0
CONSOLABILITY: 0
BODYLANGUAGE: 0
CONSOLABILITY: 0
TOTALSCORE: 1
CONSOLABILITY: 0
BREATHING: 0
NEGVOCALIZATION: 1
NEGVOCALIZATION: 1
BREATHING: 0
TOTALSCORE: 0
BODYLANGUAGE: 0
BREATHING: 0
CONSOLABILITY: 0
NEGVOCALIZATION: 0
CONSOLABILITY: 0
CONSOLABILITY: 0
BODYLANGUAGE: 0
NEGVOCALIZATION: 0
FACIALEXPRESSION: 0
BODYLANGUAGE: 0
BODYLANGUAGE: 0
CONSOLABILITY: 0
NEGVOCALIZATION: 0
FACIALEXPRESSION: 0
FACIALEXPRESSION: 0
BREATHING: 0
BREATHING: 0
TOTALSCORE: 0
CONSOLABILITY: 0
NEGVOCALIZATION: 0
BREATHING: 0
BODYLANGUAGE: 0
CONSOLABILITY: 0
BODYLANGUAGE: 0
FACIALEXPRESSION: 0
BODYLANGUAGE: 0

## 2021-02-06 ASSESSMENT — PAIN SCALES - GENERAL
PAINLEVEL_OUTOF10: 0
PAINLEVEL_OUTOF10: 0

## 2021-02-06 NOTE — PROGRESS NOTES
ICU Progress Note    Admit Date: 2/2/2021     Diet: Diet NPO Effective Now    CC: Cerebrovascular accident    Interval history:  . Afeb overnight, other vitals stable. Oxygen saturation not reading above 89. Venti-mask placed at 50% (6L). O2 Sat increased to 94 %. No secretions were extracted from the patient. Lungs have bilateral crackles. Chest x-ray shows increased airspace disease in right upper lobe. Still on cardene gtt, wont take PO   SLP said NPO, Not AAO, only responds to pain. /86 on 1 mg on nicardipine. Net pos 1.5 L SA  . IVC filter placed successfully on 2/4.       MRI pending    Medications:     Scheduled Meds:   miconazole   Topical BID    levetiracetam  500 mg Intravenous Q12H    ferrous sulfate  325 mg Oral Daily with breakfast    pantoprazole  40 mg Intravenous BID    atorvastatin  80 mg Oral Nightly    carvedilol  6.25 mg Oral BID WC    insulin lispro  0-6 Units Subcutaneous TID WC    insulin lispro  0-3 Units Subcutaneous Nightly     Continuous Infusions:   niCARdipine Stopped (02/06/21 1200)     PRN Meds:perflutren lipid microspheres, labetalol, albuterol sulfate HFA, hydrALAZINE, acetaminophen, promethazine **OR** ondansetron, polyethylene glycol    Objective:   Vitals:   T-max:  Patient Vitals for the past 8 hrs:   BP Temp Temp src Pulse Resp SpO2   02/06/21 1313      96 %   02/06/21 1306     20 92 %   02/06/21 1300 (!) 137/97   86 22 91 %   02/06/21 1200 (!) 169/79 99.1 °F (37.3 °C) Axillary 87 18    02/06/21 1130 (!) 157/80   87 22    02/06/21 1115 (!) 156/64   89 20 95 %   02/06/21 1100 (!) 155/66   92 18 (!) 89 %   02/06/21 1045 (!) 140/76   83 20 91 %   02/06/21 1030 (!) 140/68   69 21 91 %   02/06/21 1015 (!) 145/66   79 27 91 %   02/06/21 1000 (!) 137/59   74 28 91 %   02/06/21 0945 (!) 141/63   69 27 91 %   02/06/21 0930 125/65   76 (!) 33 92 %   02/06/21 0915 (!) 145/73   90 27 92 %   02/06/21 0900 (!) 147/86   97 26 92 % 02/06/21 0858     24 92 %   02/06/21 0845 (!) 143/73   85 26 91 %   02/06/21 0830 (!) 148/57   81 29 93 %   02/06/21 0815 (!) 142/59   75 22 91 %   02/06/21 0800 138/73 98.4 °F (36.9 °C)  78 26 92 %   02/06/21 0745 (!) 143/68   71 26 93 %   02/06/21 0730 (!) 141/72   89 30 92 %   02/06/21 0715 139/62   91 (!) 34 93 %   02/06/21 0700 (!) 149/77   90 18 91 %   02/06/21 0600 (!) 142/71   84 18 93 %       Intake/Output Summary (Last 24 hours) at 2/6/2021 1336  Last data filed at 2/6/2021 1200  Gross per 24 hour   Intake 450 ml   Output 800 ml   Net -350 ml       Review of Systems   Unable to perform ROS: Patient nonverbal       Physical Exam  Constitutional:       General: She is not in acute distress. Appearance: She is not ill-appearing. Comments: Lethargic. Alert, responds to name. Cardiovascular:      Rate and Rhythm: Normal rate and regular rhythm. Pulses: Normal pulses. Heart sounds: Normal heart sounds. Pulmonary:      Effort: Pulmonary effort is normal.      Breath sounds: Rales present. Abdominal:      General: Abdomen is flat. Bowel sounds are normal.      Palpations: Abdomen is soft. Musculoskeletal: Normal range of motion. Skin:     General: Skin is warm and dry. Capillary Refill: Capillary refill takes less than 2 seconds. Neurological:      Comments: Alert, and able to follow commands in extremities. Very lethargic, able to faintly say her name and ask for water.          LABS:    CBC:   Recent Labs     02/04/21  0635 02/05/21  0546 02/06/21  0447   WBC 10.3 12.0* 14.9*   HGB 7.7* 7.7* 8.7*   HCT 25.2* 26.0* 30.0*    201 237   MCV 80.3 81.6 83.5     Renal:    Recent Labs     02/04/21  0521 02/05/21  0546 02/06/21  0447    146* 148*   K 4.7 3.9 4.1    111* 113*   CO2 24 26 25   BUN 11 13 16   CREATININE 0.6 0.7 0.6   GLUCOSE 128* 135* 146*   CALCIUM 9.0 9.1 9.4   ANIONGAP 11 9 10     INR:   Recent Labs     02/04/21  1250 could be further evaluated with a contrast-enhanced MRI of the head following resolution of the intracranial hemorrhage. VL Extremity Venous Bilateral   Final Result      CT HEAD WO CONTRAST   Final Result      Post therapy large acute parenchymal hemorrhage in the right cerebral hemisphere with subarachnoid component. Extensive surrounding edema is associated with 4 cm leftward shift of midline structures. Critical finding of acute intracranial hemorrhage was called to Bruce Escobedo of the neurosurgery department at 12:25 PM on 2/3/2021, with instructions to contact patient's attending physician with these results. CT Brain Perfusion   Final Result      1. Hypoperfusion in the right MCA territory with a central ischemic core of 8 mL, total volume of hypoperfusion of 79 mL and a penumbra of 71 mL. XR CHEST PORTABLE   Final Result   1. Limited evaluation because of patient rotation and underpenetration of the film. 2. No dense airspace consolidation. 3. Probable mild cardiomegaly. CTA HEAD NECK W CONTRAST   Final Result   1. Carotid and vertebral arteries are patent and without stenosis or acute abnormality. 2. Incidental partial imaging of the a segmental pulmonary embolism in the right middle lobe. 3. Partial imaging of small right greater than left pleural effusions. 4. Previous left suboccipital craniotomy with a residual 1.6 cm left-sided extra-axial mass at the foramen magnum with mass effect on the cord at the cervical medullary junction favored to represent meningioma. Recommend correlation with clinical history    and previous imaging. 5. Incidental multinodular goiter. CTA HEAD:      FINDINGS: The internal carotid arteries are patent and normal in caliber through the skull base. The middle cerebral arteries are patent.  The A1 segment of the right anterior cerebral artery is congenitally hypoplastic and the anterior cerebral arteries Eddie Canales is a 79 y.o. adult, with a history of afib, HF, DM, asthma, pulm HTN (not oxygen dependent), HTN, HLD, EDDY on cpap, breast cancer s/p lumpectomy/radiation, colon polyps, obesity who presented with left-sided hemiplegia, severe expressive aphasia and dysarthria, right facial droop, and right-gaze deviation. She underwent M2 thrombectomy and thrombolysis with significant improvement of hemiplegia, but still with other aforementioned symptoms.     Last known normal:  12:30 2/2/21  Time of reperfusion at 21:45 on 2/2/21. Acute hypoxic respiratory failure  -Given the acute hypoxia, crackles on auscultation and chest x-ray showing worsening pulmonary infiltrates suspicion for fluid in lungs is high.  -Will give a lasix dose and reassess O2 req kenny.     Right MCA M2 occlusion s/p thrombectomy and thrombolysis  -CT on 2/4 showed new large acute parenchymal hemorrhage in the right cerebral hemisphere. F/u CTH 2/4 stable.  -Echo 2/4 unremarkable, lipid panel and A1c unremarkable  -q4 neurochecks. Will remain in ICU for cardene drip. -MRI pending   -SLP: NPO->NG tube placed today. May need PEG in future. Intraparenchymal hemorrhage   -2/2 thrombolysis. Continue to monitor neuro status, make NSGY aware of acute changes. -SBP<160, controlled with cardene drip   -Continue to hold aspirin and AC given bleed  -Keppra 500 BID for 7 days started started 2/4 for seizure ppx     Blood loss anemia, hypochromic microcytic 2/2 GI bleed;   hgb 7.3 on admission  -recent EGD last admission  -daily iron  -Protonix 40 BID     Acute on chronic systolic HF, not in exacerbation   - cont coreg 6.25 mg BID  -Echo 2/4 EF 55-60%     Segmental RML Pulmonary Embolus incidentally noted on CTA head/neck  -patient seems to be breathing/oxygenating well  -hold AC given ich  -LE doppler u/s showed an acute totally occluding deep venous thrombosis is noted in the left gastrocnemius, posterior tibial, peroneal, and soleal veins. -IVC filter placed 2/4.     Permanent Afib, rate controlled  - holding xarelto given recent GI bleed and thrombectomy, chadsvac score high. Continue to hold [facility] given new bleed   - hold home diltiazem for now given HR is controlled     DMT2, controlled   HgA1c 6.2% (9/2020)   - 5.2% on 2/3/21.  - most recent admission, per daughter pt on lispro 14U in AM and 10U PM  - Low-dose sliding scale given n.p.o. status     HTN  -control as above  -holding home losartan and diltiazem     Asthma, not in exacerbation   - albuterol prn      Code Status:  Full Code  FEN:  NPO   PPX:  SCD's, protonix 40 mg BID  DISPO: Sandro Canas MD, PGY-1  02/06/21  1:36 PM    This patient has been staffed and discussed with Dr Gisele Medeiros     Patient examined, findings as discussed with Dr. Susana Franks. Agree with assessment and plan as above. She is responding well to diuresis, with decreasing oxygen requirement.

## 2021-02-06 NOTE — PLAN OF CARE
Problem: HEMODYNAMIC STATUS  Goal: Patient has stable vital signs and fluid balance  Outcome: Ongoing     Problem: COMMUNICATION IMPAIRMENT  Goal: Ability to express needs and understand communication  Outcome: Ongoing     Problem: Falls - Risk of:  Goal: Absence of physical injury  Description: Absence of physical injury  Outcome: Ongoing     Problem: Skin Integrity:  Goal: Absence of new skin breakdown  Description: Absence of new skin breakdown  Outcome: Ongoing

## 2021-02-06 NOTE — PROGRESS NOTES
Speech Language Pathology    Chart reviewed, discussed with RN. Pt now requiring venti mask. Assessed pt at bedside with RN. Attempted removal of venti mask and pt unable to tolerate. SLP / RN recommend hold dysphagia treatment at this time as pt can not participate. HOLD speech treatment as pt not alert to participate at this time. Will re-attempt at another time as schedule allows and pt can participate.      Popeye Jordan M.A., [facility]  Speech-Language Pathologist  Pager [phone]

## 2021-02-06 NOTE — PROGRESS NOTES
Hospitalist Progress Note      PCP: No primary care provider on file. Date of Admission: 2/2/2021    Chief Complaint:     Chief Complaint   Patient presents with    Cerebrovascular Accident       Subjective:  Patient seen and examined at the bedside.   She withdraws from noxious stimuli but would not interact with this provider  IVC filter placed 2/4  On cardene gtt  O2 requirement has increased to 6L, 50%    PFHS: reviewed as documented 2/2/2021, no changes    Medications:  Reviewed    Infusion Medications    niCARdipine 3 mg/hr (02/06/21 0606)     Scheduled Medications    miconazole   Topical BID    levetiracetam  500 mg Intravenous Q12H    ferrous sulfate  325 mg Oral Daily with breakfast    pantoprazole  40 mg Intravenous BID    atorvastatin  80 mg Oral Nightly    carvedilol  6.25 mg Oral BID WC    insulin lispro  0-6 Units Subcutaneous TID WC    insulin lispro  0-3 Units Subcutaneous Nightly     PRN Meds: perflutren lipid microspheres, labetalol, albuterol sulfate HFA, hydrALAZINE, acetaminophen, HYDROcodone 5 mg - acetaminophen **OR** HYDROcodone 5 mg - acetaminophen, promethazine **OR** ondansetron, polyethylene glycol      Intake/Output Summary (Last 24 hours) at 2/6/2021 0816  Last data filed at 2/6/2021 0606  Gross per 24 hour   Intake 450 ml   Output    Net 450 ml       Physical Exam    BP (!) 149/77   Pulse 90   Temp 98.3 °F (36.8 °C) (Axillary)   Resp 18   Ht 5' 7\" (1.702 m)   Wt 222 lb 14.2 oz (101.1 kg)   SpO2 91%   BMI 34.91 kg/m²     General appearance:  No apparent distress, sleeping, responds to noxious stimuli  Eyes: Pupils equal, round, reactive to light, conjunctiva/corneas clear  Ears/Nose/Mouth/Throat: No external lesions or scars, hearing intact to voice  Neck: Trachea midline, no masses noted, no thyromegaly  Respiratory:  Non-labored breathing, clear to auscultation bilaterally  Cardiovascular: Regular rate and rhythm, no murmurs, gallops, or rubs Abdomen: soft, non-tender, non-distended  Musculoskeletal: Warm, well perfused, no cyanosis or edema  Skin: normal color, no wounds noted  Psychiatric: responds to noxious stimuli but not to voice, does not folllow commands    Labs:   Recent Labs     02/04/21  0635 02/05/21  0546 02/06/21  0447   WBC 10.3 12.0* 14.9*   HGB 7.7* 7.7* 8.7*   HCT 25.2* 26.0* 30.0*    201 237     Recent Labs     02/04/21  0521 02/05/21  0546 02/06/21  0447    146* 148*   K 4.7 3.9 4.1    111* 113*   CO2 24 26 25   BUN 11 13 16   CREATININE 0.6 0.7 0.6   CALCIUM 9.0 9.1 9.4     No results for input(s): AST, ALT, BILIDIR, BILITOT, ALKPHOS in the last 72 hours. Recent Labs     02/04/21  1250   INR 1.25*     No results for input(s): Andre Ripper in the last 72 hours. Urinalysis:    No results found for: Allegra Brunt, BACTERIA, RBCUA, BLOODU, [address], [place] [address]    Radiology:  IR GUIDED IVC FILTER PLACEMENT   Final Result   Impression:      Successful placement of retrievable IVC filter. CT HEAD WO CONTRAST   Final Result      1. Hemorrhagic transformation of right MCA infarct, without significant change. No new hemorrhage. CT head without contrast   Final Result         1. Stable large intraparenchymal hemorrhage located within the right posterior temporal parietal lobe with extension into the adjacent insula. There is also subarachnoid extension of hemorrhage into the overlying sulci as well as the right sylvian    fissure. Findings result in some mild right to left subfalcine herniation. 2.There is a 1.3 x 1.1 cm extra-axial mass identified within the left ordered aspect of the foramen magnum. This results in some mass effect on the adjacent medulla. This is without change from the prior study. Finding may represent a meningioma. This    could be further evaluated with a contrast-enhanced MRI of the head following resolution of the intracranial hemorrhage.                    CT HEAD WO CONTRAST Final Result      1. Stable large intraparenchymal hemorrhage located within the right posterior temporal parietal lobe with extension into the adjacent insula. There is also subarachnoid extension of hemorrhage into the overlying sulci as well as the right sylvian    fissure. Findings result in some mild right to left subfalcine herniation. 2.There is a 1.3 x 1.1 cm extra-axial mass identified within the left ordered aspect of the foramen magnum. This results in some mass effect on the adjacent medulla. This is without change from the prior study. Finding may represent a meningioma. This    could be further evaluated with a contrast-enhanced MRI of the head following resolution of the intracranial hemorrhage. VL Extremity Venous Bilateral   Final Result      CT HEAD WO CONTRAST   Final Result      Post therapy large acute parenchymal hemorrhage in the right cerebral hemisphere with subarachnoid component. Extensive surrounding edema is associated with 4 cm leftward shift of midline structures. Critical finding of acute intracranial hemorrhage was called to Truett Soulier of the neurosurgery department at 12:25 PM on 2/3/2021, with instructions to contact patient's attending physician with these results. CT Brain Perfusion   Final Result      1. Hypoperfusion in the right MCA territory with a central ischemic core of 8 mL, total volume of hypoperfusion of 79 mL and a penumbra of 71 mL. XR CHEST PORTABLE   Final Result   1. Limited evaluation because of patient rotation and underpenetration of the film. 2. No dense airspace consolidation. 3. Probable mild cardiomegaly. CTA HEAD NECK W CONTRAST   Final Result   1. Carotid and vertebral arteries are patent and without stenosis or acute abnormality. 2. Incidental partial imaging of the a segmental pulmonary embolism in the right middle lobe. 3. Partial imaging of small right greater than left pleural effusions. 4. Previous left suboccipital craniotomy with a residual 1.6 cm left-sided extra-axial mass at the foramen magnum with mass effect on the cord at the cervical medullary junction favored to represent meningioma. Recommend correlation with clinical history    and previous imaging. 5. Incidental multinodular goiter. CTA HEAD:      FINDINGS: The internal carotid arteries are patent and normal in caliber through the skull base. The middle cerebral arteries are patent. 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 The A2 and A3 segments of the ACAs are patent bilaterally. Left MCA is patent. There is focal occlusion of an M2 branch of the right MCA (series 2 images 427 and 428). However, other M2 branches and M3 branches in the right sylvian fissure are patent. The M1 segment of the right MCA is patent. The posterior cerebral arteries are    patent bilaterally. The basilar artery is patent and normal in caliber. It is supplied by the left vertebral artery. The small nondominant right vertebral artery terminates in right sided PICA. IMPRESSION:   1. 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 2. 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 Results were discussed with Dr. Natalia Alarcon at 8:00 PM on 2/2/2021. CT HEAD WO CONTRAST   Final Result   1. Previous left suboccipital craniotomy with a partially calcified extra-axial mass at the left foramen magnum measuring 1.8 cm contacting the cord at the cervicomedullary junction with some degree of mass effect on the cord at the foramen magnum. This    most likely represents residual/recurrent meningioma or other extra-axial mass. Correlation with patient history and previous imaging recommended. 2. Mild global volume loss and mild chronic small vessel ischemic disease in the white matter. 3. No acute intracranial hemorrhage.       IR ANGIOGRAM CAROTID CEREBRAL RIGHT    (Results Pending)   MRI BRAIN WO CONTRAST    (Results Pending)       Assessment/Plan:    Active Hospital Problems    Diagnosis Date Noted    Permanent atrial fibrillation ([facility] [address] [I48.21] 02/03/2021    Acute gastric ulcer with hemorrhage [K25.0] 02/03/2021    Other pulmonary embolism without acute cor pulmonale (HCC) [I26.99] 02/03/2021    Acute right MCA stroke ([address] [I63.511]     Acute cerebrovascular accident (CVA) ([facility] [facility] [I63.9] 02/02/2021       Plan:    # R MCA stroke, s/p thrombectomy and thrombolysis  -CT 2/4: new large acute parenchymal hemorrhage, repeat stable  -MRI pending  -Frequent neuro checks  -NPO, possible eventual PEG  -palliative care following    # Intraparenchymal hemorrhage  -stable  -keep SBP < 160  -Holding ac/ap  -Continue keppra    # Acute blood loss anemia  -Protonix BID    # Chronic systolic heart failure  -holding home diuretics    # Segmental PE  -incidental finding  -No AC due to above  -IVC filter placed 2/4    # Atrial fibrillation  -Holding AC  -monitor HR    # T2DM  -mealtime and correctional insulin    # HTN  -as above    # asthma  -breathing treatments as needed    DVT Prophylaxis: SCDs  Diet: Diet NPO Effective Now  Code Status: Full Code    PT/OT Eval Status: Deferred    Dispo: Brooklyn Joy MD

## 2021-02-06 NOTE — PROGRESS NOTES
Occupational Therapy/Physical Therapy  Reason Patient Not Seen        Patient now requiring venti mask to maintain safe levels of O2 saturation; will hold OT/PT services until safe and medically appropriate to increase activity level. RN notified and aware.     Rosa Liu OTR/L #[id number]  Amanda FERGUSON [facility] 75.

## 2021-02-06 NOTE — PROGRESS NOTES
Assessment completed as charted, see flow sheet. Nicardipine drip infusing at 3 mg/hr. O2 at 50% Venti mask. Awakens to voice, follows commands, speech slurred.

## 2021-02-07 LAB
ANION GAP SERPL CALCULATED.3IONS-SCNC: 8 MMOL/L (ref 3–16)
BASOPHILS ABSOLUTE: 0 K/UL (ref 0–0.2)
BASOPHILS RELATIVE PERCENT: 0.3 %
BUN BLDV-MCNC: 18 MG/DL (ref 7–20)
CALCIUM SERPL-MCNC: 9.3 MG/DL (ref 8.3–10.6)
CHLORIDE BLD-SCNC: 112 MMOL/L (ref 99–110)
CO2: 29 MMOL/L (ref 21–32)
CREAT SERPL-MCNC: 0.6 MG/DL (ref 0.6–1.2)
EOSINOPHILS ABSOLUTE: 0.1 K/UL (ref 0–0.6)
EOSINOPHILS RELATIVE PERCENT: 0.7 %
GFR AFRICAN AMERICAN: >60
GFR NON-AFRICAN AMERICAN: >60
GLUCOSE BLD-MCNC: 117 MG/DL (ref 70–99)
GLUCOSE BLD-MCNC: 120 MG/DL (ref 70–99)
GLUCOSE BLD-MCNC: 122 MG/DL (ref 70–99)
GLUCOSE BLD-MCNC: 123 MG/DL (ref 70–99)
GLUCOSE BLD-MCNC: 130 MG/DL (ref 70–99)
HCT VFR BLD CALC: 28.8 % (ref 36–48)
HEMOGLOBIN: 8.5 G/DL (ref 12–16)
LYMPHOCYTES ABSOLUTE: 0.7 K/UL (ref 1–5.1)
LYMPHOCYTES RELATIVE PERCENT: 8.5 %
MCH RBC QN AUTO: 24.6 PG (ref 26–34)
MCHC RBC AUTO-ENTMCNC: 29.5 G/DL (ref 31–36)
MCV RBC AUTO: 83.3 FL (ref 80–100)
MONOCYTES ABSOLUTE: 0.6 K/UL (ref 0–1.3)
MONOCYTES RELATIVE PERCENT: 6.8 %
NEUTROPHILS ABSOLUTE: 7.2 K/UL (ref 1.7–7.7)
NEUTROPHILS RELATIVE PERCENT: 83.7 %
PDW BLD-RTO: 26.6 % (ref 12.4–15.4)
PERFORMED ON: ABNORMAL
PLATELET # BLD: 162 K/UL (ref 135–450)
PMV BLD AUTO: 9.5 FL (ref 5–10.5)
POTASSIUM REFLEX MAGNESIUM: 3.9 MMOL/L (ref 3.5–5.1)
RBC # BLD: 3.46 M/UL (ref 4–5.2)
SODIUM BLD-SCNC: 149 MMOL/L (ref 136–145)
WBC # BLD: 8.7 K/UL (ref 4–11)

## 2021-02-07 PROCEDURE — 6360000002 HC RX W HCPCS: Performed by: NURSE PRACTITIONER

## 2021-02-07 PROCEDURE — 6360000002 HC RX W HCPCS: Performed by: STUDENT IN AN ORGANIZED HEALTH CARE EDUCATION/TRAINING PROGRAM

## 2021-02-07 PROCEDURE — 36415 COLL VENOUS BLD VENIPUNCTURE: CPT

## 2021-02-07 PROCEDURE — 2580000003 HC RX 258: Performed by: NURSE PRACTITIONER

## 2021-02-07 PROCEDURE — 80048 BASIC METABOLIC PNL TOTAL CA: CPT

## 2021-02-07 PROCEDURE — 1200000000 HC SEMI PRIVATE

## 2021-02-07 PROCEDURE — C9113 INJ PANTOPRAZOLE SODIUM, VIA: HCPCS | Performed by: STUDENT IN AN ORGANIZED HEALTH CARE EDUCATION/TRAINING PROGRAM

## 2021-02-07 PROCEDURE — 99233 SBSQ HOSP IP/OBS HIGH 50: CPT | Performed by: INTERNAL MEDICINE

## 2021-02-07 PROCEDURE — 85025 COMPLETE CBC W/AUTO DIFF WBC: CPT

## 2021-02-07 PROCEDURE — 92526 ORAL FUNCTION THERAPY: CPT

## 2021-02-07 PROCEDURE — 6370000000 HC RX 637 (ALT 250 FOR IP): Performed by: COUNSELOR

## 2021-02-07 PROCEDURE — 6370000000 HC RX 637 (ALT 250 FOR IP): Performed by: NEUROLOGICAL SURGERY

## 2021-02-07 RX ORDER — NICOTINE POLACRILEX 4 MG
15 LOZENGE BUCCAL PRN
Status: DISCONTINUED | OUTPATIENT
Start: 2021-02-07 | End: 2021-02-23 | Stop reason: HOSPADM

## 2021-02-07 RX ORDER — FUROSEMIDE 10 MG/ML
40 INJECTION INTRAMUSCULAR; INTRAVENOUS ONCE
Status: COMPLETED | OUTPATIENT
Start: 2021-02-07 | End: 2021-02-07

## 2021-02-07 RX ORDER — DEXTROSE MONOHYDRATE 50 MG/ML
100 INJECTION, SOLUTION INTRAVENOUS PRN
Status: DISCONTINUED | OUTPATIENT
Start: 2021-02-07 | End: 2021-02-23 | Stop reason: HOSPADM

## 2021-02-07 RX ORDER — DEXTROSE MONOHYDRATE 25 G/50ML
12.5 INJECTION, SOLUTION INTRAVENOUS PRN
Status: DISCONTINUED | OUTPATIENT
Start: 2021-02-07 | End: 2021-02-23 | Stop reason: HOSPADM

## 2021-02-07 RX ADMIN — PANTOPRAZOLE SODIUM 40 MG: 40 INJECTION, POWDER, FOR SOLUTION INTRAVENOUS at 20:27

## 2021-02-07 RX ADMIN — PANTOPRAZOLE SODIUM 40 MG: 40 INJECTION, POWDER, FOR SOLUTION INTRAVENOUS at 09:59

## 2021-02-07 RX ADMIN — ATORVASTATIN CALCIUM 80 MG: 40 TABLET, FILM COATED ORAL at 20:27

## 2021-02-07 RX ADMIN — LEVETIRACETAM 500 MG: 100 INJECTION, SOLUTION INTRAVENOUS at 06:32

## 2021-02-07 RX ADMIN — FUROSEMIDE 40 MG: 10 INJECTION, SOLUTION INTRAVENOUS at 10:58

## 2021-02-07 RX ADMIN — CARVEDILOL 6.25 MG: 6.25 TABLET, FILM COATED ORAL at 16:53

## 2021-02-07 RX ADMIN — LEVETIRACETAM 500 MG: 100 INJECTION, SOLUTION INTRAVENOUS at 19:17

## 2021-02-07 RX ADMIN — CARVEDILOL 6.25 MG: 6.25 TABLET, FILM COATED ORAL at 09:58

## 2021-02-07 RX ADMIN — MICONAZOLE NITRATE: 20 POWDER TOPICAL at 09:59

## 2021-02-07 RX ADMIN — FERROUS SULFATE TAB 325 MG (65 MG ELEMENTAL FE) 325 MG: 325 (65 FE) TAB at 09:58

## 2021-02-07 RX ADMIN — MICONAZOLE NITRATE: 20 POWDER TOPICAL at 20:28

## 2021-02-07 ASSESSMENT — PAIN SCALES - PAIN ASSESSMENT IN ADVANCED DEMENTIA (PAINAD)
FACIALEXPRESSION: 0
BODYLANGUAGE: 0
NEGVOCALIZATION: 0
FACIALEXPRESSION: 0
CONSOLABILITY: 0
BODYLANGUAGE: 0
NEGVOCALIZATION: 0
NEGVOCALIZATION: 0
BODYLANGUAGE: 0
FACIALEXPRESSION: 0
BODYLANGUAGE: 0
TOTALSCORE: 0
CONSOLABILITY: 0
TOTALSCORE: 0
FACIALEXPRESSION: 0
CONSOLABILITY: 0
CONSOLABILITY: 0

## 2021-02-07 ASSESSMENT — PAIN SCALES - GENERAL: PAINLEVEL_OUTOF10: 0

## 2021-02-07 NOTE — PROGRESS NOTES
First encounter with pt this shift. Pt moving extremities, weakness noted. Pt not following commands or opening eyes. Kate Robles in place for urinary control. Pt resting quietly VSS.

## 2021-02-07 NOTE — PROGRESS NOTES
Physical Therapy & Occupational Therapy  Shelly Hero    Chart reviewed. PT reviewed with RN, per RN not appropriate for PT/OT intervention this date. Plan to hold and continue to follow to attempt to see for PT/OT treatment as medically appropriate.     Henny Bonds PT, DPT [id number]  Dora Harris, MOT-OTR/L [id number]

## 2021-02-07 NOTE — PROGRESS NOTES
Hospitalist Progress Note      PCP: No primary care provider on file. Date of Admission: 2/2/2021    Chief Complaint:     Chief Complaint   Patient presents with    Cerebrovascular Accident       Subjective:  Patient seen and examined at the bedside.   Cardene gtt now titrated off  Sats roughly stable, now on 5L NC    PFHS: reviewed as documented 2/2/2021, no changes    Medications:  Reviewed    Infusion Medications    niCARdipine Stopped (02/06/21 1200)     Scheduled Medications    miconazole   Topical BID    levetiracetam  500 mg Intravenous Q12H    ferrous sulfate  325 mg Oral Daily with breakfast    pantoprazole  40 mg Intravenous BID    atorvastatin  80 mg Oral Nightly    carvedilol  6.25 mg Oral BID WC    insulin lispro  0-6 Units Subcutaneous TID WC    insulin lispro  0-3 Units Subcutaneous Nightly     PRN Meds: perflutren lipid microspheres, labetalol, albuterol sulfate HFA, hydrALAZINE, acetaminophen, promethazine **OR** ondansetron, polyethylene glycol      Intake/Output Summary (Last 24 hours) at 2/7/2021 0934  Last data filed at 2/7/2021 0600  Gross per 24 hour   Intake 100 ml   Output 2600 ml   Net -2500 ml       Physical Exam    /67   Pulse 77   Temp 98.2 °F (36.8 °C) (Axillary)   Resp 14   Ht 5' 7\" (1.702 m)   Wt 218 lb 11.1 oz (99.2 kg)   SpO2 94%   BMI 34.25 kg/m²     General appearance:  No apparent distress, sleeping, responds to noxious stimuli  Eyes: Pupils equal, round, reactive to light, conjunctiva/corneas clear  Ears/Nose/Mouth/Throat: No external lesions or scars, hearing intact to voice  Neck: Trachea midline, no masses noted, no thyromegaly  Respiratory:  Non-labored breathing, clear to auscultation bilaterally  Cardiovascular: Regular rate and rhythm, no murmurs, gallops, or rubs  Abdomen: soft, non-tender, non-distended  Musculoskeletal: Warm, well perfused, no cyanosis or edema  Skin: normal color, no wounds noted Psychiatric: responds to noxious stimuli but not to voice, does not folllow commands    Labs:   Recent Labs     02/05/21  0546 02/06/21  0447 02/07/21  0545   WBC 12.0* 14.9* 8.7   HGB 7.7* 8.7* 8.5*   HCT 26.0* 30.0* 28.8*    237 162     Recent Labs     02/05/21  0546 02/06/21  0447 02/07/21  0545   * 148* 149*   K 3.9 4.1 3.9   * 113* 112*   CO2 26 25 29   BUN 13 16 18   CREATININE 0.7 0.6 0.6   CALCIUM 9.1 9.4 9.3     No results for input(s): AST, ALT, BILIDIR, BILITOT, ALKPHOS in the last 72 hours. Recent Labs     02/04/21  1250   INR 1.25*     No results for input(s): Geofm Squibb in the last 72 hours. Urinalysis:    No results found for: Kenna Eli, [address] [facility], [address] [place] [address], [facility] 994    Radiology:  MRI BRAIN WO CONTRAST   Final Result      Region of right middle cerebral artery hemorrhagic infarction measures approximately 5.0 x 4.2 cm with surrounding mild vasogenic edema and diffusion restriction consistent with right middle cerebral artery distribution infarct with hemorrhagic    components      No additional areas of hemorrhage or infarction identified. Mild local mass effect is identified with very minimal midline shift of 3 to 4 mm      XR CHEST PORTABLE   Final Result      Persistent bilateral infiltrates and cardiomegaly. IR GUIDED IVC FILTER PLACEMENT   Final Result   Impression:      Successful placement of retrievable IVC filter. CT HEAD WO CONTRAST   Final Result      1. Hemorrhagic transformation of right MCA infarct, without significant change. No new hemorrhage. CT head without contrast   Final Result         1. Stable large intraparenchymal hemorrhage located within the right posterior temporal parietal lobe with extension into the adjacent insula. There is also subarachnoid extension of hemorrhage into the overlying sulci as well as the right sylvian    fissure. Findings result in some mild right to left subfalcine herniation. 2.There is a 1.3 x 1.1 cm extra-axial mass identified within the left ordered aspect of the foramen magnum. This results in some mass effect on the adjacent medulla. This is without change from the prior study. Finding may represent a meningioma. This    could be further evaluated with a contrast-enhanced MRI of the head following resolution of the intracranial hemorrhage. CT HEAD WO CONTRAST   Final Result      1. Stable large intraparenchymal hemorrhage located within the right posterior temporal parietal lobe with extension into the adjacent insula. There is also subarachnoid extension of hemorrhage into the overlying sulci as well as the right sylvian    fissure. Findings result in some mild right to left subfalcine herniation. 2.There is a 1.3 x 1.1 cm extra-axial mass identified within the left ordered aspect of the foramen magnum. This results in some mass effect on the adjacent medulla. This is without change from the prior study. Finding may represent a meningioma. This    could be further evaluated with a contrast-enhanced MRI of the head following resolution of the intracranial hemorrhage. VL Extremity Venous Bilateral   Final Result      CT HEAD WO CONTRAST   Final Result      Post therapy large acute parenchymal hemorrhage in the right cerebral hemisphere with subarachnoid component. Extensive surrounding edema is associated with 4 cm leftward shift of midline structures. Critical finding of acute intracranial hemorrhage was called to [facility] of the neurosurgery department at 12:25 PM on 2/3/2021, with instructions to contact patient's attending physician with these results. CT Brain Perfusion   Final Result      1. Hypoperfusion in the right MCA territory with a central ischemic core of 8 mL, total volume of hypoperfusion of 79 mL and a penumbra of 71 mL.       XR CHEST PORTABLE   Final Result 1. Limited evaluation because of patient rotation and underpenetration of the film. 2. No dense airspace consolidation. 3. Probable mild cardiomegaly. CTA HEAD NECK W CONTRAST   Final Result   1. Carotid and vertebral arteries are patent and without stenosis or acute abnormality. 2. Incidental partial imaging of the a segmental pulmonary embolism in the right middle lobe. 3. Partial imaging of small right greater than left pleural effusions. 4. Previous left suboccipital craniotomy with a residual 1.6 cm left-sided extra-axial mass at the foramen magnum with mass effect on the cord at the cervical medullary junction favored to represent meningioma. Recommend correlation with clinical history    and previous imaging. 5. Incidental multinodular goiter. CTA HEAD:      FINDINGS: The internal carotid arteries are patent and normal in caliber through the skull base. The middle cerebral arteries are patent. 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 The A2 and A3 segments of the ACAs are patent bilaterally. Left MCA is patent. There is focal occlusion of an M2 branch of the right MCA (series 2 images 427 and 428). However, other M2 branches and M3 branches in the right sylvian fissure are patent. The M1 segment of the right MCA is patent. The posterior cerebral arteries are    patent bilaterally. The basilar artery is patent and normal in caliber. It is supplied by the left vertebral artery. The small nondominant right vertebral artery terminates in right sided PICA. IMPRESSION:   1. 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 2. 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 Results were discussed with Dr. Dennis Garcia at 8:00 PM on 2/2/2021. CT HEAD WO CONTRAST   Final Result   1. Previous left suboccipital craniotomy with a partially calcified extra-axial mass at the left foramen magnum measuring 1.8 cm contacting the cord at the cervicomedullary junction with some degree of mass effect on the cord at the foramen magnum. This    most likely represents residual/recurrent meningioma or other extra-axial mass. Correlation with patient history and previous imaging recommended. 2. Mild global volume loss and mild chronic small vessel ischemic disease in the white matter. 3. No acute intracranial hemorrhage.       IR ANGIOGRAM CAROTID CEREBRAL RIGHT    (Results Pending)       Assessment/Plan:    Active Hospital Problems    Diagnosis Date Noted    Hypoxemia [R09.02] 02/06/2021    Permanent atrial fibrillation (HCC) [I48.21] 02/03/2021    Acute gastric ulcer with hemorrhage [K25.0] 02/03/2021    Other pulmonary embolism without acute cor pulmonale (HCC) [I26.99] 02/03/2021    Acute right MCA stroke ([address] [I63.511]     Acute cerebrovascular accident (CVA) ([address] [I63.9] 02/02/2021       Plan:    # R MCA stroke, s/p thrombectomy and thrombolysis  -CT 2/4: new large acute parenchymal hemorrhage, repeat stable  -MRI w/ findings as above  -Frequent neuro checks  -NPO, possible eventual PEG  -palliative care following     # Intraparenchymal hemorrhage  -stable  -keep SBP < 160  -Holding ac/ap  -Continue keppra     # Acute blood loss anemia  -Protonix BID     # Chronic systolic heart failure  -holding home diuretics    # Segmental PE  -incidental finding  -No AC due to above  -IVC filter placed 2/4     # Atrial fibrillation  -Holding AC  -monitor HR     # T2DM  -mealtime and correctional insulin     # HTN  -as above     # asthma  -breathing treatments as needed    DVT Prophylaxis: SCDs  Diet: Diet NPO Effective Now  Code Status: Full Code    PT/OT Eval Status: Deferred    Dispo: ICU [place] MD

## 2021-02-07 NOTE — PROGRESS NOTES
Speech Language Pathology  Facility/Department: [facility] ICU  Dysphagia Daily Treatment Note    NAME: Radames Riedel  : 1940  MRN: [id number]    Patient Diagnosis(es):   Patient Active Problem List    Diagnosis Date Noted    Hypoxemia 2021    Permanent atrial fibrillation ([facility] 2021    Acute gastric ulcer with hemorrhage 2021    Other pulmonary embolism without acute cor pulmonale ([facility] 2021    Acute right MCA stroke ([facility]     [facility] 2021     Allergies: No Known Allergies    Chart reviewed. CXR ():  Persistent bilateral infiltrates and cardiomegaly. MRI Brain ():  Region of right middle cerebral artery hemorrhagic infarction measures approximately 5.0 x 4.2 cm with surrounding mild vasogenic edema and diffusion restriction consistent with right middle cerebral artery distribution infarct with hemorrhagic components  No additional areas of hemorrhage or infarction identified. Mild local mass effect is identified with very minimal midline shift of 3 to 4 mm      Medical Diagnosis:    Treatment Diagnosis:  Oral-pharyngeal Dysphagia (R13.12)    BSE Impression (21):  Dysphagia Diagnosis: Moderate oral stage dysphagia; Severe pharyngeal stage dysphagia  Dysphagia Impression: Severe oral-pharyngeal dysphagia. Pt with reduced labial/lingual strength & coordination. Impaired oral phase with poor oral awareness when spoon+ice chip presented; did accept when given verbal cues. Required verbal cues to initiate mastication. Pt unable to initiate swallow despite verbal and external tactile cues. PO trials discontinued out of concern for pt safety. Recommend NPO with ongoing re-assessment of swallow function. Prognosis for improvement in swallow function that will result in adequate PO intake within next 24 hours is guarded. Consider temporary alternative means of nutrition at this time.     MBS results: N/A    Pain: none reported Current Diet: NPO with TF    Treatment:  Pt seen bedside to address the following goals:  Dysphagia Goals:   1. The patient will tolerate repeat BSE when able. 2/7: Patient presents with an increased risk of oropharyngeal dysphagia secondary to oral defensiveness, mental status, and acute R MCA hemorrhage. With ice chips she demonstrates severe oral defensiveness via turning head toward the side and pursing her lips tightly. With a single ice chip, she demonstrates near absent oral manipulation and delayed swallows x2 once ice chip melted with no s/s penetration/aspiration. She refused all further trials (more ice chips and tsp thin liquid). She makes no attempts to communicate during dysphagia treatment. Recommend continue NPO with TFs. Ice chips are permitted in isolation after excellent oral care. SLP will continue to follow. 2.  The patient/caregiver will demonstrate understanding of recommendations for improved swallowing safety. 2/7: Not addressed as pt is NPO. Patient/Family/Caregiver Education:  Education provided re: role of SLP, dysphagia, recommended NPO status with ice chips. Patient is unable to demonstrate understanding. Compensatory Strategies:  Excellent oral care      Plan:  Continued dysphagia treatment with goals per plan of care. Diet Recommendations: NPO with TFs  Ice chips are permitted (01/SP) after excellent oral care  Discharge Plan: TBD closer to discharge. Treatment: 10 minutes  Discussed with LINN Reyes Resides)  Needs within reach. Parag Kaur M.A., [id number] [facility]  Speech-Language Pathologist, Rehab Services  [address] Certified Clinician  FEES Certified Clinician  SLP Pager: [phone]  Office: [phone]    This document will serve as a discharge summary if pt discharge before next treatment session.

## 2021-02-07 NOTE — PROGRESS NOTES
ICU Progress Note    Admit Date: 2/2/2021     Diet: Diet NPO Effective Now    CC: Cerebrovascular accident    Interval history:  Off cardene gtt, NG tube placed yesterday. MRI yesterday showing no additional hemorrhage. Medications:     Scheduled Meds:   miconazole   Topical BID    levetiracetam  500 mg Intravenous Q12H    ferrous sulfate  325 mg Oral Daily with breakfast    pantoprazole  40 mg Intravenous BID    atorvastatin  80 mg Oral Nightly    carvedilol  6.25 mg Oral BID WC    insulin lispro  0-6 Units Subcutaneous TID WC    insulin lispro  0-3 Units Subcutaneous Nightly     Continuous Infusions:   niCARdipine Stopped (02/06/21 1200)     PRN Meds:perflutren lipid microspheres, labetalol, albuterol sulfate HFA, hydrALAZINE, acetaminophen, promethazine **OR** ondansetron, polyethylene glycol    Objective:   Vitals:   T-max:  Patient Vitals for the past 8 hrs:   BP Pulse SpO2   02/07/21 1200 (!) 140/69  95 %   02/07/21 1100 132/80 87 96 %   02/07/21 1009  71 96 %   02/07/21 1008  79 97 %   02/07/21 1000 (!) 150/83 75 100 %   02/07/21 0900 138/67 77 94 %   02/07/21 0800 135/74 72 (!) 84 %   02/07/21 0759  73 (!) 87 %       Intake/Output Summary (Last 24 hours) at 2/7/2021 1523  Last data filed at 2/7/2021 1000  Gross per 24 hour   Intake 200 ml   Output 900 ml   Net -700 ml       Review of Systems   Unable to perform ROS: Patient nonverbal       Physical Exam  Constitutional:       General: She is not in acute distress. Appearance: She is not ill-appearing. Comments: Lethargic. Alert, responds to name. Cardiovascular:      Rate and Rhythm: Normal rate and regular rhythm. Pulses: Normal pulses. Heart sounds: Normal heart sounds. Pulmonary:      Effort: Pulmonary effort is normal.      Breath sounds: Rales present. Abdominal:      General: Abdomen is flat. Bowel sounds are normal.      Palpations: Abdomen is soft. Musculoskeletal: Normal range of motion.    Skin: General: Skin is warm and dry. Capillary Refill: Capillary refill takes less than 2 seconds. Neurological:      Comments: Lethargic, difficult to rouse, not following commands. Moving extremities          LABS:    CBC:   Recent Labs     02/05/21  0546 02/06/21 0447 02/07/21  0545   WBC 12.0* 14.9* 8.7   HGB 7.7* 8.7* 8.5*   HCT 26.0* 30.0* 28.8*    237 162   MCV 81.6 83.5 83.3     Renal:    Recent Labs     02/05/21  0546 02/06/21 0447 02/07/21 0545   * 148* 149*   K 3.9 4.1 3.9   * 113* 112*   CO2 26 25 29   BUN 13 16 18   CREATININE 0.7 0.6 0.6   GLUCOSE 135* 146* 120*   CALCIUM 9.1 9.4 9.3   ANIONGAP 9 10 8     INR:   No results for input(s): INR in the last 72 hours. Cultures:  -----------------------------------------------------------------  RAD:   MRI BRAIN WO CONTRAST   Final Result      Region of right middle cerebral artery hemorrhagic infarction measures approximately 5.0 x 4.2 cm with surrounding mild vasogenic edema and diffusion restriction consistent with right middle cerebral artery distribution infarct with hemorrhagic    components      No additional areas of hemorrhage or infarction identified. Mild local mass effect is identified with very minimal midline shift of 3 to 4 mm      XR CHEST PORTABLE   Final Result      Persistent bilateral infiltrates and cardiomegaly. IR GUIDED IVC FILTER PLACEMENT   Final Result   Impression:      Successful placement of retrievable IVC filter. CT HEAD WO CONTRAST   Final Result      1. Hemorrhagic transformation of right MCA infarct, without significant change. No new hemorrhage. CT head without contrast   Final Result         1. Stable large intraparenchymal hemorrhage located within the right posterior temporal parietal lobe with extension into the adjacent insula.  There is also subarachnoid extension of hemorrhage into the overlying sulci as well as the right sylvian fissure. Findings result in some mild right to left subfalcine herniation. 2.There is a 1.3 x 1.1 cm extra-axial mass identified within the left ordered aspect of the foramen magnum. This results in some mass effect on the adjacent medulla. This is without change from the prior study. Finding may represent a meningioma. This    could be further evaluated with a contrast-enhanced MRI of the head following resolution of the intracranial hemorrhage. CT HEAD WO CONTRAST   Final Result      1. Stable large intraparenchymal hemorrhage located within the right posterior temporal parietal lobe with extension into the adjacent insula. There is also subarachnoid extension of hemorrhage into the overlying sulci as well as the right sylvian    fissure. Findings result in some mild right to left subfalcine herniation. 2.There is a 1.3 x 1.1 cm extra-axial mass identified within the left ordered aspect of the foramen magnum. This results in some mass effect on the adjacent medulla. This is without change from the prior study. Finding may represent a meningioma. This    could be further evaluated with a contrast-enhanced MRI of the head following resolution of the intracranial hemorrhage. VL Extremity Venous Bilateral   Final Result      CT HEAD WO CONTRAST   Final Result      Post therapy large acute parenchymal hemorrhage in the right cerebral hemisphere with subarachnoid component. Extensive surrounding edema is associated with 4 cm leftward shift of midline structures. Critical finding of acute intracranial hemorrhage was called to Helena Mayfield of the neurosurgery department at 12:25 PM on 2/3/2021, with instructions to contact patient's attending physician with these results. CT Brain Perfusion   Final Result      1. Hypoperfusion in the right MCA territory with a central ischemic core of 8 mL, total volume of hypoperfusion of 79 mL and a penumbra of 71 mL.       XR CHEST PORTABLE Final Result   1. Limited evaluation because of patient rotation and underpenetration of the film. 2. No dense airspace consolidation. 3. Probable mild cardiomegaly. CTA HEAD NECK W CONTRAST   Final Result   1. Carotid and vertebral arteries are patent and without stenosis or acute abnormality. 2. Incidental partial imaging of the a segmental pulmonary embolism in the right middle lobe. 3. Partial imaging of small right greater than left pleural effusions. 4. Previous left suboccipital craniotomy with a residual 1.6 cm left-sided extra-axial mass at the foramen magnum with mass effect on the cord at the cervical medullary junction favored to represent meningioma. Recommend correlation with clinical history    and previous imaging. 5. Incidental multinodular goiter. CTA HEAD:      FINDINGS: The internal carotid arteries are patent and normal in caliber through the skull base. The middle cerebral arteries are patent. 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 The A2 and A3 segments of the ACAs are patent bilaterally. Left MCA is patent. There is focal occlusion of an M2 branch of the right MCA (series 2 images 427 and 428). However, other M2 branches and M3 branches in the right sylvian fissure are patent. The M1 segment of the right MCA is patent. The posterior cerebral arteries are    patent bilaterally. The basilar artery is patent and normal in caliber. It is supplied by the left vertebral artery. The small nondominant right vertebral artery terminates in right sided PICA. IMPRESSION:   1. 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 2. 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 Results were discussed with Dr. Nabeel Valdes at 8:00 PM on 2/2/2021. CT HEAD WO CONTRAST   Final Result   1. Previous left suboccipital craniotomy with a partially calcified extra-axial mass at the left foramen magnum measuring 1.8 cm contacting the cord at the cervicomedullary junction with some degree of mass effect on the cord at the foramen magnum. This    most likely represents residual/recurrent meningioma or other extra-axial mass. Correlation with patient history and previous imaging recommended. 2. Mild global volume loss and mild chronic small vessel ischemic disease in the white matter. 3. No acute intracranial hemorrhage. IR ANGIOGRAM CAROTID CEREBRAL RIGHT    (Results Pending)         Assessment/Plan:   Raghu Canales is a 79 y.o. adult, with a history of afib, HF, DM, asthma, pulm HTN (not oxygen dependent), HTN, HLD, EDDY on cpap, breast cancer s/p lumpectomy/radiation, colon polyps, obesity who presented with left-sided hemiplegia, severe expressive aphasia and dysarthria, right facial droop, and right-gaze deviation. She underwent M2 thrombectomy and thrombolysis with significant improvement of hemiplegia, but still with other aforementioned symptoms.     Last known normal:  12:30 2/2/21  Time of reperfusion at 21:45 on 2/2/21. Good UOP in response to Lasix yesterday (2.6 UOP). Will give Lasix again today; although she doesn't look fluid-overloaded, creatinine level has not bumped up and may help dry out lungs in this patient with CHF. She had a greater O2 requirement overnight. Looking through history, she wears CPAP at night. Will restart. Off Cardene gtt with SBP <160. Will transfer to floor later this afternoon if BP remains stable.     Acute hypoxic respiratory failure  -Given the acute hypoxia, crackles on auscultation and chest x-ray showing worsening pulmonary infiltrates suspicion for fluid in lungs is high.     Right MCA M2 occlusion s/p thrombectomy and thrombolysis -CT on 2/4 showed new large acute parenchymal hemorrhage in the right cerebral hemisphere. F/u CTH 2/4 stable.  -Echo 2/4 unremarkable, lipid panel and A1c unremarkable  -q4 neurochecks. Now off Cardene drip  -MRI yesterday showing no new hemorrhage  -SLP: NPO->NG tube placed 2/6. May need PEG in future. Intraparenchymal hemorrhage   -2/2 thrombolysis. Continue to monitor neuro status, make NSGY aware of acute changes. -SBP<160, controlled with cardene drip   -Continue to hold aspirin and AC given bleed  -Keppra 500 BID for 7 days started started 2/4 for seizure ppx     Blood loss anemia, hypochromic microcytic 2/2 GI bleed;   hgb 7.3 on admission  -recent EGD last admission  -daily iron  -Protonix 40 BID     Acute on chronic systolic HF, not in exacerbation   - cont coreg 6.25 mg BID  -Echo 2/4 EF 55-60%     Segmental RML Pulmonary Embolus incidentally noted on CTA head/neck  -patient seems to be breathing/oxygenating well  -hold AC given ich  -LE doppler u/s showed an acute totally occluding deep venous thrombosis is noted in the left gastrocnemius, posterior tibial, peroneal, and soleal veins. -IVC filter placed 2/4.     Permanent Afib, rate controlled  - holding xarelto given recent GI bleed and thrombectomy, chadsvac score high.  Continue to hold [facility] given new bleed   - hold home diltiazem for now given HR is controlled     DMT2, controlled   HgA1c 6.2% (9/2020)   - 5.2% on 2/3/21.  - most recent admission, per daughter pt on lispro 14U in AM and 10U PM  - Low-dose sliding scale given n.p.o. status     HTN  -control as above  -holding home losartan and diltiazem     Asthma, not in exacerbation   - albuterol prn      Code Status:  Full Code  FEN:  NPO   PPX:  SCD's, protonix 40 mg BID  DISPO:  transfer to [facility]    Aileen Rivera MD, PGY-1  02/07/21  3:23 PM    This patient has been staffed and discussed with Dr Pat Bentley Patient examined, findings as discussed with Dr. Shan Ryder. Agree with assessment and plan.   Follow-up chest x-ray for pulmonary edema, hypoxemia and decision to continue diuretic therapy

## 2021-02-07 NOTE — PROGRESS NOTES
Pt resting comfortably. VSS, purewick dislodged earlier leading to urinary incontinence in bed. Pt bathed and sabine care performed. Clean sheets and gown applied. Purewick replaced with new.

## 2021-02-07 NOTE — PLAN OF CARE
Problem: COMMUNICATION IMPAIRMENT  Goal: Ability to express needs and understand communication  Outcome: Ongoing  Note: Pt not communicating this shift     Problem: Falls - Risk of:  Goal: Will remain free from falls  Description: Will remain free from falls  Outcome: Ongoing  Goal: Absence of physical injury  Description: Absence of physical injury  Outcome: Ongoing  Note: Pt remains free from falls and injury

## 2021-02-07 NOTE — PROGRESS NOTES
Updated daughter on pt condition. Daughter satisfied with update and verbalized appreciation of care. Pt reportedly enjoys reading her bible and old TV shows ([employer]). Advised daughter that we will make sure that she is able to listen to scripture until she is able to read. Daughter said any preacher is fine. Pt currently listening to [employer]. Will switch to [employer] this afternoon to maintain familiar and comfortable environment for pt. Pt not verbalizing with staff at this time.

## 2021-02-08 ENCOUNTER — APPOINTMENT (OUTPATIENT)
Dept: GENERAL RADIOLOGY | Age: 81
DRG: 003 | End: 2021-02-08
Payer: COMMERCIAL

## 2021-02-08 LAB
ANION GAP SERPL CALCULATED.3IONS-SCNC: 8 MMOL/L (ref 3–16)
ANISOCYTOSIS: ABNORMAL
BASOPHILS ABSOLUTE: 0 K/UL (ref 0–0.2)
BASOPHILS RELATIVE PERCENT: 0.6 %
BUN BLDV-MCNC: 18 MG/DL (ref 7–20)
CALCIUM SERPL-MCNC: 9.4 MG/DL (ref 8.3–10.6)
CHLORIDE BLD-SCNC: 108 MMOL/L (ref 99–110)
CO2: 33 MMOL/L (ref 21–32)
CREAT SERPL-MCNC: 0.6 MG/DL (ref 0.6–1.2)
EOSINOPHILS ABSOLUTE: 0.1 K/UL (ref 0–0.6)
EOSINOPHILS RELATIVE PERCENT: 1.2 %
GFR AFRICAN AMERICAN: >60
GFR NON-AFRICAN AMERICAN: >60
GLUCOSE BLD-MCNC: 102 MG/DL (ref 70–99)
GLUCOSE BLD-MCNC: 107 MG/DL (ref 70–99)
GLUCOSE BLD-MCNC: 109 MG/DL (ref 70–99)
GLUCOSE BLD-MCNC: 122 MG/DL (ref 70–99)
GLUCOSE BLD-MCNC: 125 MG/DL (ref 70–99)
HCT VFR BLD CALC: 27.9 % (ref 36–48)
HEMOGLOBIN: 8.2 G/DL (ref 12–16)
LYMPHOCYTES ABSOLUTE: 0.7 K/UL (ref 1–5.1)
LYMPHOCYTES RELATIVE PERCENT: 11.6 %
MCH RBC QN AUTO: 24.6 PG (ref 26–34)
MCHC RBC AUTO-ENTMCNC: 29.4 G/DL (ref 31–36)
MCV RBC AUTO: 83.6 FL (ref 80–100)
MONOCYTES ABSOLUTE: 0.4 K/UL (ref 0–1.3)
MONOCYTES RELATIVE PERCENT: 6 %
NEUTROPHILS ABSOLUTE: 4.8 K/UL (ref 1.7–7.7)
NEUTROPHILS RELATIVE PERCENT: 80.6 %
PDW BLD-RTO: 25.9 % (ref 12.4–15.4)
PERFORMED ON: ABNORMAL
PLATELET # BLD: 167 K/UL (ref 135–450)
PMV BLD AUTO: 9.6 FL (ref 5–10.5)
POLYCHROMASIA: ABNORMAL
POTASSIUM REFLEX MAGNESIUM: 3.7 MMOL/L (ref 3.5–5.1)
RBC # BLD: 3.34 M/UL (ref 4–5.2)
SODIUM BLD-SCNC: 149 MMOL/L (ref 136–145)
STOMATOCYTES: ABNORMAL
WBC # BLD: 6 K/UL (ref 4–11)

## 2021-02-08 PROCEDURE — 6360000002 HC RX W HCPCS: Performed by: NURSE PRACTITIONER

## 2021-02-08 PROCEDURE — 97530 THERAPEUTIC ACTIVITIES: CPT

## 2021-02-08 PROCEDURE — 71045 X-RAY EXAM CHEST 1 VIEW: CPT

## 2021-02-08 PROCEDURE — 6370000000 HC RX 637 (ALT 250 FOR IP): Performed by: NEUROLOGICAL SURGERY

## 2021-02-08 PROCEDURE — 6370000000 HC RX 637 (ALT 250 FOR IP): Performed by: COUNSELOR

## 2021-02-08 PROCEDURE — 2580000003 HC RX 258: Performed by: NURSE PRACTITIONER

## 2021-02-08 PROCEDURE — 80048 BASIC METABOLIC PNL TOTAL CA: CPT

## 2021-02-08 PROCEDURE — 97535 SELF CARE MNGMENT TRAINING: CPT

## 2021-02-08 PROCEDURE — C9113 INJ PANTOPRAZOLE SODIUM, VIA: HCPCS | Performed by: STUDENT IN AN ORGANIZED HEALTH CARE EDUCATION/TRAINING PROGRAM

## 2021-02-08 PROCEDURE — 94761 N-INVAS EAR/PLS OXIMETRY MLT: CPT

## 2021-02-08 PROCEDURE — 6360000002 HC RX W HCPCS: Performed by: INTERNAL MEDICINE

## 2021-02-08 PROCEDURE — 92526 ORAL FUNCTION THERAPY: CPT

## 2021-02-08 PROCEDURE — 85025 COMPLETE CBC W/AUTO DIFF WBC: CPT

## 2021-02-08 PROCEDURE — 2700000000 HC OXYGEN THERAPY PER DAY

## 2021-02-08 PROCEDURE — 99231 SBSQ HOSP IP/OBS SF/LOW 25: CPT | Performed by: NURSE PRACTITIONER

## 2021-02-08 PROCEDURE — 1200000000 HC SEMI PRIVATE

## 2021-02-08 PROCEDURE — 2580000003 HC RX 258: Performed by: INTERNAL MEDICINE

## 2021-02-08 PROCEDURE — 36415 COLL VENOUS BLD VENIPUNCTURE: CPT

## 2021-02-08 PROCEDURE — 6360000002 HC RX W HCPCS: Performed by: STUDENT IN AN ORGANIZED HEALTH CARE EDUCATION/TRAINING PROGRAM

## 2021-02-08 RX ADMIN — MICONAZOLE NITRATE: 20 POWDER TOPICAL at 09:12

## 2021-02-08 RX ADMIN — CARVEDILOL 6.25 MG: 6.25 TABLET, FILM COATED ORAL at 09:11

## 2021-02-08 RX ADMIN — MICONAZOLE NITRATE: 20 POWDER TOPICAL at 21:42

## 2021-02-08 RX ADMIN — LEVETIRACETAM 500 MG: 100 INJECTION, SOLUTION INTRAVENOUS at 18:49

## 2021-02-08 RX ADMIN — ATORVASTATIN CALCIUM 80 MG: 40 TABLET, FILM COATED ORAL at 21:42

## 2021-02-08 RX ADMIN — PANTOPRAZOLE SODIUM 40 MG: 40 INJECTION, POWDER, FOR SOLUTION INTRAVENOUS at 09:11

## 2021-02-08 RX ADMIN — FERROUS SULFATE TAB 325 MG (65 MG ELEMENTAL FE) 325 MG: 325 (65 FE) TAB at 09:11

## 2021-02-08 RX ADMIN — CEFAZOLIN 1000 MG: 1 INJECTION, POWDER, FOR SOLUTION INTRAMUSCULAR; INTRAVENOUS at 21:18

## 2021-02-08 RX ADMIN — PANTOPRAZOLE SODIUM 40 MG: 40 INJECTION, POWDER, FOR SOLUTION INTRAVENOUS at 21:42

## 2021-02-08 RX ADMIN — LEVETIRACETAM 500 MG: 100 INJECTION, SOLUTION INTRAVENOUS at 06:30

## 2021-02-08 ASSESSMENT — PAIN SCALES - GENERAL: PAINLEVEL_OUTOF10: 0

## 2021-02-08 ASSESSMENT — PAIN SCALES - PAIN ASSESSMENT IN ADVANCED DEMENTIA (PAINAD)
FACIALEXPRESSION: 0
BODYLANGUAGE: 0
TOTALSCORE: 0
BREATHING: 0

## 2021-02-08 NOTE — PROGRESS NOTES
Physical Therapy  Facility/Department: [address] [place] [address] ICU  Daily Treatment Note  NAME: Smitha Stewart  : 1940  MRN: [id number]    Date of Service: 2021    Discharge Recommendations: Smitha Stewart scored a 6/24 on the AM-PAC short mobility form. Current research shows that an AM-PAC score of 17 or less is typically not associated with a discharge to the patient's home setting. Based on the patient's AM-PAC score and their current functional mobility deficits, it is recommended that the patient have 3-5 sessions per week of Physical Therapy at d/c to increase the patient's independence. Please see assessment section for further patient specific details. If patient discharges prior to next session this note will serve as a discharge summary. Please see below for the latest assessment towards goals. PT Equipment Recommendations  Other: defer to next level of care    Assessment   Body structures, Functions, Activity limitations: Decreased functional mobility   Assessment: Mobility significantly limited by weakness and lethargy. Pt unable to open eyes until end of session and pt observed to have R gaze deviation. No attempts at verbalization throughout session. Decreased participation today as compared to initial evaluation. Follows a few commands very briefly. Demos limited participation. Pt is well below her functional baseline. Rec continued IP PT. Treatment Diagnosis: impaired gait and transfers  Prognosis: Good  PT Education: PT Role;Functional Mobility Training  Patient Education: pt unable to demo understanding; rec reinforcement  REQUIRES PT FOLLOW UP: Yes     Patient Diagnosis(es): The encounter diagnosis was Acute right MCA stroke ([facility] has a past medical history of Acute GI bleeding, Atrial fibrillation ([facility] Systolic CHF ([facility] and Type 2 diabetes mellitus ([facility] has a past surgical history that includes Upper gastrointestinal endoscopy (01/29/2021) and IR GUIDED IVC FILTER PLACEMENT (2/4/2021). Restrictions  Position Activity Restriction  Other position/activity restrictions: fall precautions  Subjective   General  Chart Reviewed: Yes  Additional Pertinent Hx: Admit 2/2 with L side weakness and speech deficits, (+) Acute stroke with large vessel (right M2) occlusion and large area of reversible ischemia on CTP; 2/2 Mechanical thrombectomy + Infusion therapy for thrombolysis, right middle cerebral artery; repeat head CT showed hemorrhagic transformation, stable on repeat imaging. neuro following; PMHx: CHF, DM, GI bleed, a-fib  Referring Practitioner: MD Yaritza  Subjective  Subjective: Pt found supine in bed. RN states pt ok for therapy. Pt unable to open eyes despite max cues and sternal rub. Does not head briefly and follows a few commands on R side. Indicates agreement to PT. Pain Screening  Patient Currently in Pain: (no indication of pain)       Orientation  Orientation  Overall Orientation Status: Impaired(difficult to assess due to lethargy and cognition)  Cognition      Objective   Bed mobility  Rolling to Left: Dependent/Total(x2)  Rolling to Right: Dependent/Total(x2)  Supine to Sit: Dependent/Total(x2; pt initiated movement of both LEs towards EOB but requires dep assist x 2 to complete task)  Sit to Supine: Dependent/Total(x2)  Scooting: Dependent/Total(x2 scooting towards HOB)           Balance  Sitting - Static: Poor  Comments: pt sat EOB x 10-12 min with max A initially and progressing to min A briefly at times. Mod A provided throughout majority of time sitting EOB. Pt with slumped posture. Cues provided for hand placement - pt attempts to move R hand at times to improve positioning. Able to demo 2-3 reps R LE kicks through limited ROM while seated at EOB.   Exercises Comments: x 6-8 reps B LE PROM: ankle DF, hip/knee flex, hip ABD. Max verbal cues and tactile cues provided for AAROM but pt unable to demo. Strength Other  Other: demos R toe wiggling at times and briefly kicks R LE when in sitting. Initiated movement of B LEs through limited ROM towards R side of bed in preparation for supine to sit transfer. G-Code     OutComes Score                                                     AM-PAC Score  AM-PAC Inpatient Mobility Raw Score : 6 (02/08/21 1143)  AM-PAC Inpatient T-Scale Score : 23.55 (02/08/21 1143)  Mobility Inpatient CMS 0-100% Score: 100 (02/08/21 1143)  Mobility Inpatient CMS G-Code Modifier : CN (02/08/21 1143)          Goals  Short term goals  Time Frame for Short term goals: discharge  Short term goal 1: Pt will transfer supine <--> sit with mod A x 2 ongoing  Short term goal 2: Pt will transfer sit <--> stand with mod A x 2   ongoing  Short term goal 3: Pt will sit EOB x 2 min with CGA or better   ongoing  Short term goal 4: Pt will demo static stance x 10 sec with min A x 1 or better   ongoing  Patient Goals   Patient goals : pt unable to state    Plan    Plan  Times per week: 5-7  Current Treatment Recommendations: Strengthening, Transfer Training, Endurance Training, Patient/Caregiver Education & Training, Balance Training, Functional Mobility Training, Gait Training, Home Exercise Program, Equipment Evaluation, Education, & procurement  Safety Devices  Type of devices: Call light within reach, Nurse notified, Gait belt, Left in bed, Bed alarm in place     Therapy Time   Individual Concurrent Group Co-treatment   Time In 1040         Time Out 1125         Minutes 45                 Timed Code Treatment Minutes:  45    Total Treatment Minutes:  45    If patient is discharged prior to next treatment, this note will serve as the discharge summary.   Alfonzo Milan, PT, DPT  [phone]

## 2021-02-08 NOTE — PLAN OF CARE
Problem: HEMODYNAMIC STATUS  Goal: Patient has stable vital signs and fluid balance  Outcome: Ongoing  Note: VSS~ NSR on monitor. Pt is oriented to self, pt will open eyes to name. Neuro checks unchanged. Pt will follow commands, but does not stay alert very long. SLP unable to do swallow eval due to decreased alertness. Meds given thru NG tube. Plan is to place PEG tube tomorrow, GI consulted. Pt will be NPO after midnight. Will continue to monitor. Problem: Falls - Risk of:  Goal: Will remain free from falls  Description: Will remain free from falls  Outcome: Met This Shift  Note: Patient at risk for falls. Patient resting quietly in bed. Side rails up x 2. Bed locked in lowest position. Bed alarm on. Bedside table and call light within reach. Patient instructed to call for assistance. No attempts to get up on own. Will continue to monitor. Problem: Skin Integrity:  Goal: Absence of new skin breakdown  Description: Absence of new skin breakdown  Outcome: Met This Shift  Note: Pt at risk for skin breakdown. Skin assessment complete. No signs of skin breakdown. Pts skin cleansed with inc cleanser prn, purewick draining well. Pt repositioned in bed with pillow support q2. Will continue to monitor.

## 2021-02-08 NOTE — PROGRESS NOTES
Speech Language Pathology  Treatment attempt    Chart reviewed. Spoke with RN this morning who reported pt is too lethargic to safely participate in swallow re-evaluation. Will attempt again later as time permits.     Kendy Carlin, [facility], [place] [address]  Speech-Language Pathologist  Pager [phone]

## 2021-02-08 NOTE — CONSULTS
[address] [place] [address]  GI Consultation                                                                 Patient: Abel Code  : 1940       Date:  2021    Subjective:     Referring physician: Vane Jefferson  Reason for consult: PEG tube placement    History of Present Illness  Rolando Canales is a 79 y.o. adult, with a history of afib, HF, DM, asthma, pulm HTN (not oxygen dependent), HTN, HLD, EDDY on cpap, breast cancer s/p lumpectomy/radiation, colon polyps, obesity who presented with left-sided hemiplegia, severe expressive aphasia and dysarthria, right facial droop, and right-gaze deviation. She underwent M2 thrombectomy and thrombolysis with new new large acute parenchymal hemorrhage in the right cerebral hemisphere post TPA. Last known normal:  12:30 21  Time of reperfusion at 21:45 on 21. Patient was on cardene gtt due to inability to tolerate PO HTN meds which was weaned off yesterday. She is still not able to swallow. Vitals stable, on 3 L NC with SpO2 above 95 %.     INR 2021 1.25    Past Medical History:   Diagnosis Date    Acute GI bleeding     recent admission 2021    Atrial fibrillation ([address]     Xarelto    Systolic CHF ([address]     Type 2 diabetes mellitus ([address]       Past Surgical History:   Procedure Laterality Date    IR IVC FILTER PLACEMENT W IMAGING  2021    IR IVC FILTER PLACEMENT W IMAGING 2021 [facility]    UPPER GASTROINTESTINAL ENDOSCOPY  2021    Dr Wilder Cates        Admission Meds  No current facility-administered medications on file prior to encounter. No current outpatient medications on file prior to encounter.          Allergies  No Known Allergies   Social   Social History     Tobacco Use    Smoking status: Former Smoker   Substance Use Topics    Alcohol use: Not on file           Physical Exam

## 2021-02-08 NOTE — PROGRESS NOTES
Occupational Therapy  Facility/Department: [facility] ICU  Daily Treatment Note  NAME: Joel Goodman  : 1940  MRN: [id number]    Date of Service: 2021    Discharge Recommendations:  Joel Goodman scored a  on the AM-PAC ADL Inpatient form. Current research shows that an AM-PAC score of 17 or less is typically not associated with a discharge to the patient's home setting. Based on the patient's AM-PAC score and their current ADL deficits, it is recommended that the patient have 3-5 sessions per week of Occupational Therapy at d/c to increase the patient's independence. Please see assessment section for further patient specific details. If patient discharges prior to next session this note will serve as a discharge summary. Please see below for the latest assessment towards goals. OT Equipment Recommendations  Other: defer to d/c location pending progress    Assessment   Performance deficits / Impairments: Decreased functional mobility ; Decreased ADL status; Decreased strength;Decreased endurance;Decreased cognition;Decreased balance;Decreased posture;Decreased fine motor control;Decreased high-level IADLs;Decreased ROM Assessment: Pt had increased difficulty this date arousing and engaging in occupational therapy session. Pt required total A x 2 for bed mobility and remained with eyes closed majority of the session. Pt required maximal assistance to wash face with wash cloth this date d/t fatigue and sat on edge of bed for 5 min with mod A but was not alert enough to attempt standing. Pt at end of session returned to bed with LUE positioned and pt sleeping. Pt would benefit from ongoing acute OT services since pt was independent at baseline prior to acute right CVA. Pts daughter appreciations updates on mothers status follow therapy sessions via phone call (refer to general subjective section for #). Initially suggested ARU for discharge plan but concern for this degree of therapy d/t pts decreased level of alertness and engagement this date. Will CTA. Treatment Diagnosis: decreased independence with ADLs and functional transfers, left hemiparesis  Prognosis: Good  OT Education: OT Role;Plan of Care;ADL Adaptive Strategies;Transfer Training; Low Vision Education  Patient Education: pt only shaking head 1x this session, pt very fatigued  Barriers to Learning: expressive aphasia, requires increased time and repition to follow commands, decreased arousal  REQUIRES OT FOLLOW UP: Yes  Activity Tolerance  Activity Tolerance: Patient limited by fatigue  Activity Tolerance: pt very lathargic this date and overall with decreased engagement throughout session  Safety Devices  Safety Devices in place: Yes  Type of devices: All fall risk precautions in place; Left in bed;Nurse notified;Call light within reach; Bed alarm in place         Patient Diagnosis(es): The encounter diagnosis was Acute right MCA stroke ([facility] [address] has a past medical history of Acute GI bleeding, Atrial fibrillation ([facility] [address] Systolic CHF ([facility] [address] and Type 2 diabetes mellitus ([facility] [address] Comment: x2 for all bed mobility, pt very lathargic                       Vision  Patient Visual Report: (pt remained with eyes closed throughout the entire session with only brief moments of holding them open with eyes fixed in right gaze despite maximal verbal cues to scan environment)              Positioning  Bed Positioning Type: Upper extremity  Bed Postion Comment: pillows positioned under LUE to prevent subluxation and swelling in LUE        Type of ROM/Therapeutic Exercise  Type of ROM/Therapeutic Exercise: PROM  Comment: Pt tolerated therapist passively ranging all UE joints this date 5x. Pt remained with eyes closed throughout.   Exercises  Grasp/Release: 3x with RUE  LUE AROM (degrees)  LUE General AROM: ongoing left hemiparesis , no AROM of LUE outside of brief movement with digits                 Plan   Plan  Times per week: 5-7x  Times per day: Daily  Current Treatment Recommendations: Neuromuscular Re-education, Functional Mobility Training, Endurance Training, Cognitive Reorientation, Self-Care / ADL, Safety Education & Training, ROM, Positioning, Strengthening, Balance Training, Patient/Caregiver Education & Training  G-Code     OutComes Score                                                  AM-PAC Score        AM-[facility] Inpatient Daily Activity Raw Score: 6 (02/08/21 1307)  AM-PAC Inpatient ADL T-Scale Score : 17.07 (02/08/21 1307)  ADL Inpatient CMS 0-100% Score: 100 (02/08/21 1307)  ADL Inpatient CMS G-Code Modifier : CN (02/08/21 1307)    Goals  Short term goals  Time Frame for Short term goals: by d/c  Short term goal 1: Pt will perform supine to sit transfer with min A x 2- ongoing  Short term goal 2: Pt will perform rolling to left<>right side with min A- ongoing  Short term goal 3: Pt will perform sit-stand transfer with mod A x 2- ongoing  Short term goal 4: New established goal: Pt will open up eyes to display engagement following 3 verbal cues  Patient Goals   Patient goals : not stated Therapy Time   Individual Concurrent Group Co-treatment   Time In 1040         Time Out 1125         Minutes 45         Timed Code Treatment Minutes: [address] [place], [place]

## 2021-02-08 NOTE — PROGRESS NOTES
Hospitalist Progress Note      PCP: No primary care provider on file. Date of Admission: 2/2/2021    Chief Complaint:     Chief Complaint   Patient presents with    Cerebrovascular Accident       Subjective:  Patient seen and examined at the bedside. Lethargic, opens eyes plan does not follow commands. Withdraws extremities to painful stimuli. Moving right side extremities spontaneously.      PFHS: reviewed as documented 2/2/2021, no changes    Medications:  Reviewed    Infusion Medications    dextrose      niCARdipine Stopped (02/06/21 1200)     Scheduled Medications    miconazole   Topical BID    levetiracetam  500 mg Intravenous Q12H    ferrous sulfate  325 mg Oral Daily with breakfast    pantoprazole  40 mg Intravenous BID    atorvastatin  80 mg Oral Nightly    carvedilol  6.25 mg Oral BID WC    insulin lispro  0-6 Units Subcutaneous TID WC    insulin lispro  0-3 Units Subcutaneous Nightly     PRN Meds: glucose, dextrose, glucagon (rDNA), dextrose, perflutren lipid microspheres, labetalol, albuterol sulfate HFA, hydrALAZINE, acetaminophen, promethazine **OR** ondansetron, polyethylene glycol      Intake/Output Summary (Last 24 hours) at 2/8/2021 7819  Last data filed at 2/8/2021 0600  Gross per 24 hour   Intake 100 ml   Output 1200 ml   Net -1100 ml       Physical Exam    /88   Pulse 79   Temp 98.6 °F (37 °C) (Axillary)   Resp 14   Ht 5' 7\" (1.702 m)   Wt 218 lb 11.1 oz (99.2 kg)   SpO2 100%   BMI 34.25 kg/m²     General appearance:  No apparent distress, sleeping, responds to noxious stimuli  Eyes: Pupils equal, round, reactive to light, conjunctiva/corneas clear  Ears/Nose/Mouth/Throat: No external lesions or scars, hearing intact to voice  Neck: Trachea midline, no masses noted, no thyromegaly  Respiratory:  Non-labored breathing, clear to auscultation bilaterally  Cardiovascular: Regular rate and rhythm, no murmurs, gallops, or rubs Abdomen: soft, non-tender, non-distended  Musculoskeletal: Warm, well perfused, no cyanosis or edema  Skin: normal color, no wounds noted  Neuro:  Lethargic, opens eyes plan does not follow commands. Withdraws extremities to painful stimuli. Moving right side extremities spontaneously. Labs:   Recent Labs     02/06/21 0447 02/07/21  0545 02/08/21  0545   WBC 14.9* 8.7 6.0   HGB 8.7* 8.5* 8.2*   HCT 30.0* 28.8* 27.9*    162 167     Recent Labs     02/06/21 0447 02/07/21  0545 02/08/21  0545   * 149* 149*   K 4.1 3.9 3.7   * 112* 108   CO2 25 29 33*   BUN 16 18 18   CREATININE 0.6 0.6 0.6   CALCIUM 9.4 9.3 9.4     No results for input(s): AST, ALT, BILIDIR, BILITOT, ALKPHOS in the last 72 hours. No results for input(s): INR in the last 72 hours. No results for input(s): Lyndee Robert in the last 72 hours. Urinalysis:    No results found for: Gino Cabral, BACTERIA, RBCUA, BLOODU, SPECGRAV, [address]    Radiology:  XR CHEST 1 VIEW   Final Result   1. Interval extubation. 2. Progression of bilateral airspace disease. MRI BRAIN WO CONTRAST   Final Result      Region of right middle cerebral artery hemorrhagic infarction measures approximately 5.0 x 4.2 cm with surrounding mild vasogenic edema and diffusion restriction consistent with right middle cerebral artery distribution infarct with hemorrhagic    components      No additional areas of hemorrhage or infarction identified. Mild local mass effect is identified with very minimal midline shift of 3 to 4 mm      XR CHEST PORTABLE   Final Result      Persistent bilateral infiltrates and cardiomegaly. IR GUIDED IVC FILTER PLACEMENT   Final Result   Impression:      Successful placement of retrievable IVC filter. CT HEAD WO CONTRAST   Final Result      1. Hemorrhagic transformation of right MCA infarct, without significant change. No new hemorrhage.       CT head without contrast   Final Result 1. Stable large intraparenchymal hemorrhage located within the right posterior temporal parietal lobe with extension into the adjacent insula. There is also subarachnoid extension of hemorrhage into the overlying sulci as well as the right sylvian    fissure. Findings result in some mild right to left subfalcine herniation. 2.There is a 1.3 x 1.1 cm extra-axial mass identified within the left ordered aspect of the foramen magnum. This results in some mass effect on the adjacent medulla. This is without change from the prior study. Finding may represent a meningioma. This    could be further evaluated with a contrast-enhanced MRI of the head following resolution of the intracranial hemorrhage. CT HEAD WO CONTRAST   Final Result      1. Stable large intraparenchymal hemorrhage located within the right posterior temporal parietal lobe with extension into the adjacent insula. There is also subarachnoid extension of hemorrhage into the overlying sulci as well as the right sylvian    fissure. Findings result in some mild right to left subfalcine herniation. 2.There is a 1.3 x 1.1 cm extra-axial mass identified within the left ordered aspect of the foramen magnum. This results in some mass effect on the adjacent medulla. This is without change from the prior study. Finding may represent a meningioma. This    could be further evaluated with a contrast-enhanced MRI of the head following resolution of the intracranial hemorrhage. VL Extremity Venous Bilateral   Final Result      CT HEAD WO CONTRAST   Final Result      Post therapy large acute parenchymal hemorrhage in the right cerebral hemisphere with subarachnoid component. Extensive surrounding edema is associated with 4 cm leftward shift of midline structures. Critical finding of acute intracranial hemorrhage was called to Myron Findmireya of the neurosurgery department at 12:25 PM on 2/3/2021, with instructions to contact patient's attending physician with these results. CT Brain Perfusion   Final Result      1. Hypoperfusion in the right MCA territory with a central ischemic core of 8 mL, total volume of hypoperfusion of 79 mL and a penumbra of 71 mL. XR CHEST PORTABLE   Final Result   1. Limited evaluation because of patient rotation and underpenetration of the film. 2. No dense airspace consolidation. 3. Probable mild cardiomegaly. CTA HEAD NECK W CONTRAST   Final Result   1. Carotid and vertebral arteries are patent and without stenosis or acute abnormality. 2. Incidental partial imaging of the a segmental pulmonary embolism in the right middle lobe. 3. Partial imaging of small right greater than left pleural effusions. 4. Previous left suboccipital craniotomy with a residual 1.6 cm left-sided extra-axial mass at the foramen magnum with mass effect on the cord at the cervical medullary junction favored to represent meningioma. Recommend correlation with clinical history    and previous imaging. 5. Incidental multinodular goiter. CTA HEAD:      FINDINGS: The internal carotid arteries are patent and normal in caliber through the skull base. The middle cerebral arteries are patent. 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 The A2 and A3 segments of the ACAs are patent bilaterally. Left MCA is patent. There is focal occlusion of an M2 branch of the right MCA (series 2 images 427 and 428). However, other M2 branches and M3 branches in the right sylvian fissure are patent. The M1 segment of the right MCA is patent. The posterior cerebral arteries are    patent bilaterally. The basilar artery is patent and normal in caliber. It is supplied by the left vertebral artery. The small nondominant right vertebral artery terminates in right sided PICA. IMPRESSION:   1. 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 2. 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 Results were discussed with Dr. Leidy Lutz at 8:00 PM on 2/2/2021. CT HEAD WO CONTRAST   Final Result   1. Previous left suboccipital craniotomy with a partially calcified extra-axial mass at the left foramen magnum measuring 1.8 cm contacting the cord at the cervicomedullary junction with some degree of mass effect on the cord at the foramen magnum. This    most likely represents residual/recurrent meningioma or other extra-axial mass. Correlation with patient history and previous imaging recommended. 2. Mild global volume loss and mild chronic small vessel ischemic disease in the white matter. 3. No acute intracranial hemorrhage.       IR ANGIOGRAM CAROTID CEREBRAL RIGHT    (Results Pending)       Assessment/Plan:    Active Hospital Problems    Diagnosis Date Noted    Hypoxemia [R09.02] 02/06/2021    Permanent atrial fibrillation (HCC) [I48.21] 02/03/2021    Acute gastric ulcer with hemorrhage [K25.0] 02/03/2021    Other pulmonary embolism without acute cor pulmonale (HCC) [I26.99] 02/03/2021    Acute right MCA stroke ([address] [I63.511]     Acute cerebrovascular accident (CVA) ([address] [I63.9] 02/02/2021       Plan:    # R MCA stroke, s/p thrombectomy and thrombolysis  -CT 2/4: new large acute parenchymal hemorrhage, repeat stable  -MRI w/ findings as above  -Frequent neuro checks -NPO, unable to have speech eval due to lethargy. Discussed with daughter Jolene Martinez regarding PEG tube placement. Daughter agreeable. GI consulted  -palliative care following     # Intraparenchymal hemorrhage  -CT 02/04 showed hemorrhagic transformation of right MCA infarct  -stable  -keep SBP < 160.   Off nicardipine drip  -Holding anticoagulation and antiplatelets  -Continue keppra     # Acute blood loss anemia  -Protonix BID     # Chronic systolic heart failure  -holding home diuretics    # Segmental PE  -incidental finding  -No AC due to above  -IVC filter placed 2/4     # Atrial fibrillation  -Holding AC  -monitor HR     # T2DM  -mealtime and correctional insulin     # HTN  -as above     # asthma  -breathing treatments as needed    DVT Prophylaxis: SCDs  Diet: Diet NPO Effective Now  Code Status: Full Code    PT/OT Eval Status: Deferred    Dispo: Pending PEG tube placement and facility placement    Elise Bedoya MD

## 2021-02-08 NOTE — PROGRESS NOTES
Oral care / small amounts of ice chips if alert enough for the comfort of pt, health and hydration of oropharyngeal mucosa and swallow stimulation  Re-assessment as pt appropriate  Discharge Plan: TBD closer to discharge. Treatment: 12 minutes  Discussed with LINN Doll  Needs within reach. Arjun Aaron M.S./[facility]-SLP #[id number]  Pg. # [id number]  This document will serve as a discharge summary if pt discharge before next treatment session.

## 2021-02-08 NOTE — PROGRESS NOTES
Neurology Consult Note  Consult Reason: ischemic stroke w/ hemorrhagic transformation     Updates:  Exam stable, no new changes over weekend    Impression:  Joel Goodman is a 79 y.o. female who presented with L-hemiparesis, aphasia, dysarthria & R gaze deviation, found to have R M2 segment occlusion, initial NIHSS of 14, not tpa candidate s/p thrombectomy w/ TICI 2b reperfusion. Initially improved NIHSS s/p thrombectomy w/ subsequent decline, repeat head CT showed hemorrhagic transformation, stable on repeat imaging. NIHSS currently 11    Recommendations:  Stroke Plan s/p IV tPA &/or Thrombectomy   Medications:  -High-intensity statin   -Hold antiplatelets at this time d/t hemorrhagic transformation   -SCDs for DVT prophylaxis  -Seizure prophylaxis x 7 days - Keppra 500mg BID     Consults / Nursing Care  -HOB elevated to help prevent aspiration  -Q4H Neurologic Exams & Vitals  -NIHSS per guidelines   -Telemetry   -PT/OT: eval and treat  -PMR consult if rehab recommended   -ST: eval and treat and dysphagia screen - pending   -If fails SLP exam will likely need PEG placement   -No contraindication from neurology to start enteral nutrition today  -Blood Pressure Management   -s/p Thrombectomy - Keep SBP <160    Follow up / Discharge Recommendations:  -Patient w/ hx of Afib - will need anticoagulation resumed, likely in 2-3 weeks (after clearance from neurosurgery)   -Stroke Education at Discharge  -Secondary Stroke Prevention Measures listed on AVS  -Follow up w/ Neurology in 1 month  -We will sign off.  Please call with questions     SU Conley - CNP   Neurology Nurse Practitioner  02/08/21  9:34 AM  [phone]      Labs:  ECHO 2/4/2021 - LV size normal, EF 55-60%, elevated PA pressures, No evidence of shunting     Studies:  MRI of brain w/o - 2/7/2021    Physical Exam: Blood pressure (!) 143/84, pulse 73, temperature 98.6 °F (37 °C), temperature source Axillary, resp. rate 14, height 5' 7\" (1.702 m), weight 218 lb 11.1 oz (99.2 kg), SpO2 100 %.   Constitutional    Vital signs: BP, HR, and RR reviewed   General eyes mostly closed, will attempt to open when asked   Neurologic  Mental status:   Awake, eyes closed - will attempt to open  Oriented to name and hospital (intermittently)   Speech very dysarthric - only able to understand few words  RUE/RLE - full strength  LUE - 2/5 (able to lift off bed for nursing on earlier assessment)  LLE - 3/5    Cranial nerves:   Pupils reactive   Would not follow for EOM's  On very limited vision assessment all fields seems intact   No obvious facial asymmetry   +cough  Hearing intact to spoken voice     Sensory: decreased sensation to L side   Cerebellar/coordination: GLADIS  Tone: normal in all 4 extremities  Gait: Did not assess    [facility] - CNP   Neurology Nurse Practitioner  02/08/21  9:34 AM  [phone]

## 2021-02-08 NOTE — CARE COORDINATION
Case Management Assessment           Daily Note                 Date/ Time of Note: 2/8/2021 12:23 PM         Note completed by: Jesús Avila    Patient Name: Kedar Martinez  YOB: 1940    Diagnosis:Acute cerebrovascular accident (CVA) [facility]) [I63.9]  Patient Admission Status: Inpatient    Date of Admission:2/2/2021  7:22 PM Length of Stay: 6 GLOS:      Current Plan of Care: speech attempted to work with patient but too lethargic today.   ________________________________________________________________________________________  PT AM-PAC: 6 / 24 per last evaluation on: 02/08    OT AM-PAC: 11 / 24 per last evaluation on: 02/07    DME Needs for discharge: pending  ________________________________________________________________________________________  Discharge Plan: Inpatient Rehab: [facility] first choice and [facility] as 2nd choice    Tentative discharge date: TBD    Current barriers to discharge: NG in place for TF-may need peg    Referrals completed: Inpatient Rehab: [facility]    Resources/ information provided: Not indicated at this time  ________________________________________________________________________________________  Case Management Notes:Spoke with pt's daughter Vince Buckley regarding placement and they prefer [facility] as their first choice and [facility] as their second choice so far. Patient was still not able to work with speech and is still NPO with TF ordered. Jean Huertas and her family were provided with choice of provider; she and her family are in agreement with the discharge plan.     Care Transition Patient: Arianne Avila RN  [facility]  Case Management Department  Ph: [phone]  Fax: [phone]

## 2021-02-08 NOTE — PROGRESS NOTES
Palliative Medicine Progress Note    Admit Date: 2/2/2021  Hospital Day:  Hospital Day: 7     CC: CVA  HPI: Meagan Frias is a 79 y.o. female with PMH of afib, HF, DM, asthma, pulmonary HTN, HTN, HLD, EDDY on CPAP, breast ca s/p lumpectomy/radiation, colon polyps who presented with left sided hemiplegia, severe expressive aphasia and dysarthia, right facial droop, right gaze deviation. She called family in the evening prior to presentation, they were concerned for slurred speech and so went to check on her. EMS was called. During admission she underwent M2 thrombectomy and thrombolysis with significant improvement of hemiplegia. Recommendations:     Saw pt at the bedside, she remains lethargic this morning. Noted that SLP came to re-evaluate swallowing, however she remained too lethargic. Spoke with pt's daughter Connie Fisher, provided brief updates. Discussed again that pt need PEG tube. Connie Fisher is open to the PEG tube if needed. She understands that the pt will need placement at discharge for further rehab. Called to speak with pt's daughter Jessica Major, provided brief updates. Discussed her understanding of the pt's medical condition. Discussed the fact that the pt is still not swallowing, was too lethargic to work with SLP. Jessica Major understands that she may need a PEG tube. Jessica Major is a PT and understands that the pt will have a prolonged recovery. 1. Goals of Care/Advanced Care planning/Code status: Full code, continue with current management, discussed with family today. 2. Pain: Pt denied  3. SOB: Pt denied  4. Dysphagia: Pt did not make attempts to swallow with SLP during initial evaluation. Will likely require a PEG tube.    5. Disposition: Will likely need placement when medically ready for discharge    Subjective:     Scheduled Meds:   miconazole   Topical BID    levetiracetam  500 mg Intravenous Q12H    ferrous sulfate  325 mg Oral Daily with breakfast    pantoprazole  40 mg Intravenous BID  atorvastatin  80 mg Oral Nightly    carvedilol  6.25 mg Oral BID WC    insulin lispro  0-6 Units Subcutaneous TID WC    insulin lispro  0-3 Units Subcutaneous Nightly       Continuous Infusions:   dextrose      niCARdipine Stopped (02/06/21 1200)       PRN Meds:glucose, dextrose, glucagon (rDNA), dextrose, perflutren lipid microspheres, labetalol, albuterol sulfate HFA, hydrALAZINE, acetaminophen, promethazine **OR** ondansetron, polyethylene glycol    Review of Systems. Review of Systems - History obtained from unobtainable from patient due to mental status      Performance status 30      Objective:     Patient Vitals for the past 8 hrs:   BP Temp Temp src Pulse Resp SpO2   02/08/21 0900 (!) 143/84   73 14    02/08/21 0800 124/88 98.6 °F (37 °C) Axillary 79  100 %   02/08/21 0700 (!) 143/66   66  100 %   02/08/21 0600 130/77   71     02/08/21 0400 132/70 98.6 °F (37 °C) Axillary 67  93 %   02/08/21 0300 126/62   87       I/O last 3 completed shifts: In: 100 [NG/GT:100]  Out: 1200 [Urine:1200]  No intake/output data recorded. Physical Exam  Constitutional:       Comments: lethargic   Cardiovascular:      Rate and Rhythm: Normal rate and regular rhythm. Heart sounds: Normal heart sounds. Pulmonary:      Effort: Pulmonary effort is normal.      Breath sounds: Normal breath sounds. Abdominal:      General: Bowel sounds are normal.      Palpations: Abdomen is soft. Musculoskeletal:      Right lower leg: No edema. Left lower leg: No edema. Skin:     General: Skin is warm and dry. Neurological:      Mental Status: She is disoriented.           WBC/Hgb/Hct/Plts:  6.0/8.2/27.9/167 (02/08 0545)           Assessment:     Active Problems:    Acute cerebrovascular accident (CVA) ([facility] [address]    Permanent atrial fibrillation (HCC)    Acute gastric ulcer with hemorrhage    Other pulmonary embolism without acute cor pulmonale (HCC)    Acute right MCA stroke (HCC)    Hypoxemia Resolved Problems:    * No resolved hospital problems. *      Time spent with patient and/or family: 21  Time reviewing records: 5  Time communicating with providers: 5    A total of 30 minutes spent with the patient and family on unit greater than 50% face to face time in counseling regarding palliative care and goals of care for the patient.        Asa Finney [facility]  Inpatient Palliative Care  [phone]

## 2021-02-09 ENCOUNTER — ANESTHESIA EVENT (OUTPATIENT)
Dept: ENDOSCOPY | Age: 81
DRG: 003 | End: 2021-02-09
Payer: COMMERCIAL

## 2021-02-09 ENCOUNTER — ANESTHESIA (OUTPATIENT)
Dept: ENDOSCOPY | Age: 81
DRG: 003 | End: 2021-02-09
Payer: COMMERCIAL

## 2021-02-09 VITALS
DIASTOLIC BLOOD PRESSURE: 71 MMHG | RESPIRATION RATE: 27 BRPM | OXYGEN SATURATION: 99 % | SYSTOLIC BLOOD PRESSURE: 151 MMHG

## 2021-02-09 LAB
ANION GAP SERPL CALCULATED.3IONS-SCNC: 9 MMOL/L (ref 3–16)
BASOPHILS ABSOLUTE: 0.1 K/UL (ref 0–0.2)
BASOPHILS RELATIVE PERCENT: 0.7 %
BUN BLDV-MCNC: 18 MG/DL (ref 7–20)
CALCIUM SERPL-MCNC: 9.4 MG/DL (ref 8.3–10.6)
CHLORIDE BLD-SCNC: 110 MMOL/L (ref 99–110)
CO2: 33 MMOL/L (ref 21–32)
CREAT SERPL-MCNC: 0.6 MG/DL (ref 0.6–1.2)
EOSINOPHILS ABSOLUTE: 0.1 K/UL (ref 0–0.6)
EOSINOPHILS RELATIVE PERCENT: 0.9 %
FERRITIN: 191.2 NG/ML (ref 15–150)
GFR AFRICAN AMERICAN: >60
GFR NON-AFRICAN AMERICAN: >60
GLUCOSE BLD-MCNC: 105 MG/DL (ref 70–99)
GLUCOSE BLD-MCNC: 112 MG/DL (ref 70–99)
GLUCOSE BLD-MCNC: 117 MG/DL (ref 70–99)
GLUCOSE BLD-MCNC: 117 MG/DL (ref 70–99)
GLUCOSE BLD-MCNC: 119 MG/DL (ref 70–99)
HCT VFR BLD CALC: 30.8 % (ref 36–48)
HEMOGLOBIN: 8.8 G/DL (ref 12–16)
INR BLD: 1.15 (ref 0.86–1.14)
IRON SATURATION: 10 % (ref 15–50)
IRON: 25 UG/DL (ref 37–145)
LYMPHOCYTES ABSOLUTE: 0.9 K/UL (ref 1–5.1)
LYMPHOCYTES RELATIVE PERCENT: 10.7 %
MCH RBC QN AUTO: 24.6 PG (ref 26–34)
MCHC RBC AUTO-ENTMCNC: 28.7 G/DL (ref 31–36)
MCV RBC AUTO: 85.9 FL (ref 80–100)
MONOCYTES ABSOLUTE: 0.5 K/UL (ref 0–1.3)
MONOCYTES RELATIVE PERCENT: 5.6 %
NEUTROPHILS ABSOLUTE: 7 K/UL (ref 1.7–7.7)
NEUTROPHILS RELATIVE PERCENT: 82.1 %
PDW BLD-RTO: 26 % (ref 12.4–15.4)
PERFORMED ON: ABNORMAL
PLATELET # BLD: 185 K/UL (ref 135–450)
PMV BLD AUTO: 9.3 FL (ref 5–10.5)
POTASSIUM REFLEX MAGNESIUM: 3.8 MMOL/L (ref 3.5–5.1)
PROTHROMBIN TIME: 13.4 SEC (ref 10–13.2)
RBC # BLD: 3.59 M/UL (ref 4–5.2)
SODIUM BLD-SCNC: 152 MMOL/L (ref 136–145)
TOTAL IRON BINDING CAPACITY: 255 UG/DL (ref 260–445)
WBC # BLD: 8.5 K/UL (ref 4–11)

## 2021-02-09 PROCEDURE — 85610 PROTHROMBIN TIME: CPT

## 2021-02-09 PROCEDURE — 83540 ASSAY OF IRON: CPT

## 2021-02-09 PROCEDURE — 83550 IRON BINDING TEST: CPT

## 2021-02-09 PROCEDURE — C9113 INJ PANTOPRAZOLE SODIUM, VIA: HCPCS | Performed by: STUDENT IN AN ORGANIZED HEALTH CARE EDUCATION/TRAINING PROGRAM

## 2021-02-09 PROCEDURE — 3E0G76Z INTRODUCTION OF NUTRITIONAL SUBSTANCE INTO UPPER GI, VIA NATURAL OR ARTIFICIAL OPENING: ICD-10-PCS | Performed by: INTERNAL MEDICINE

## 2021-02-09 PROCEDURE — 6370000000 HC RX 637 (ALT 250 FOR IP): Performed by: COUNSELOR

## 2021-02-09 PROCEDURE — 7100000011 HC PHASE II RECOVERY - ADDTL 15 MIN: Performed by: INTERNAL MEDICINE

## 2021-02-09 PROCEDURE — 82728 ASSAY OF FERRITIN: CPT

## 2021-02-09 PROCEDURE — 6360000002 HC RX W HCPCS: Performed by: COUNSELOR

## 2021-02-09 PROCEDURE — 97530 THERAPEUTIC ACTIVITIES: CPT

## 2021-02-09 PROCEDURE — 2709999900 HC NON-CHARGEABLE SUPPLY: Performed by: INTERNAL MEDICINE

## 2021-02-09 PROCEDURE — 2580000003 HC RX 258: Performed by: NURSE ANESTHETIST, CERTIFIED REGISTERED

## 2021-02-09 PROCEDURE — 7100000010 HC PHASE II RECOVERY - FIRST 15 MIN: Performed by: INTERNAL MEDICINE

## 2021-02-09 PROCEDURE — 97112 NEUROMUSCULAR REEDUCATION: CPT

## 2021-02-09 PROCEDURE — 2700000000 HC OXYGEN THERAPY PER DAY

## 2021-02-09 PROCEDURE — 36415 COLL VENOUS BLD VENIPUNCTURE: CPT

## 2021-02-09 PROCEDURE — 3700000001 HC ADD 15 MINUTES (ANESTHESIA): Performed by: INTERNAL MEDICINE

## 2021-02-09 PROCEDURE — 2500000003 HC RX 250 WO HCPCS: Performed by: STUDENT IN AN ORGANIZED HEALTH CARE EDUCATION/TRAINING PROGRAM

## 2021-02-09 PROCEDURE — 6360000002 HC RX W HCPCS: Performed by: STUDENT IN AN ORGANIZED HEALTH CARE EDUCATION/TRAINING PROGRAM

## 2021-02-09 PROCEDURE — 3609013300 HC EGD TUBE PLACEMENT: Performed by: INTERNAL MEDICINE

## 2021-02-09 PROCEDURE — 2580000003 HC RX 258: Performed by: COUNSELOR

## 2021-02-09 PROCEDURE — 6360000002 HC RX W HCPCS: Performed by: NURSE ANESTHETIST, CERTIFIED REGISTERED

## 2021-02-09 PROCEDURE — 2060000000 HC ICU INTERMEDIATE R&B

## 2021-02-09 PROCEDURE — 94761 N-INVAS EAR/PLS OXIMETRY MLT: CPT

## 2021-02-09 PROCEDURE — 0DH63UZ INSERTION OF FEEDING DEVICE INTO STOMACH, PERCUTANEOUS APPROACH: ICD-10-PCS | Performed by: INTERNAL MEDICINE

## 2021-02-09 PROCEDURE — C9113 INJ PANTOPRAZOLE SODIUM, VIA: HCPCS | Performed by: COUNSELOR

## 2021-02-09 PROCEDURE — 3700000000 HC ANESTHESIA ATTENDED CARE: Performed by: INTERNAL MEDICINE

## 2021-02-09 PROCEDURE — 97535 SELF CARE MNGMENT TRAINING: CPT

## 2021-02-09 PROCEDURE — 85025 COMPLETE CBC W/AUTO DIFF WBC: CPT

## 2021-02-09 PROCEDURE — 80048 BASIC METABOLIC PNL TOTAL CA: CPT

## 2021-02-09 PROCEDURE — 2580000003 HC RX 258: Performed by: INTERNAL MEDICINE

## 2021-02-09 PROCEDURE — 2580000003 HC RX 258: Performed by: NURSE PRACTITIONER

## 2021-02-09 PROCEDURE — 2500000003 HC RX 250 WO HCPCS: Performed by: INTERNAL MEDICINE

## 2021-02-09 PROCEDURE — 6360000002 HC RX W HCPCS: Performed by: NURSE PRACTITIONER

## 2021-02-09 RX ORDER — PROPOFOL 10 MG/ML
INJECTION, EMULSION INTRAVENOUS CONTINUOUS PRN
Status: DISCONTINUED | OUTPATIENT
Start: 2021-02-09 | End: 2021-02-09 | Stop reason: SDUPTHER

## 2021-02-09 RX ORDER — SODIUM CHLORIDE 9 MG/ML
INJECTION, SOLUTION INTRAVENOUS CONTINUOUS PRN
Status: DISCONTINUED | OUTPATIENT
Start: 2021-02-09 | End: 2021-02-09 | Stop reason: SDUPTHER

## 2021-02-09 RX ORDER — SODIUM CHLORIDE 9 MG/ML
INJECTION, SOLUTION INTRAVENOUS ONCE
Status: COMPLETED | OUTPATIENT
Start: 2021-02-09 | End: 2021-02-09

## 2021-02-09 RX ORDER — LIDOCAINE HYDROCHLORIDE 10 MG/ML
INJECTION, SOLUTION EPIDURAL; INFILTRATION; INTRACAUDAL; PERINEURAL PRN
Status: DISCONTINUED | OUTPATIENT
Start: 2021-02-09 | End: 2021-02-09 | Stop reason: ALTCHOICE

## 2021-02-09 RX ADMIN — MICONAZOLE NITRATE: 20 POWDER TOPICAL at 08:00

## 2021-02-09 RX ADMIN — PROPOFOL 100 MCG/KG/MIN: 10 INJECTION, EMULSION INTRAVENOUS at 15:14

## 2021-02-09 RX ADMIN — PANTOPRAZOLE SODIUM 40 MG: 40 INJECTION, POWDER, FOR SOLUTION INTRAVENOUS at 08:00

## 2021-02-09 RX ADMIN — SODIUM CHLORIDE: 9 INJECTION, SOLUTION INTRAVENOUS at 14:36

## 2021-02-09 RX ADMIN — PANTOPRAZOLE SODIUM 40 MG: 40 INJECTION, POWDER, FOR SOLUTION INTRAVENOUS at 20:40

## 2021-02-09 RX ADMIN — LEVETIRACETAM 500 MG: 100 INJECTION, SOLUTION INTRAVENOUS at 06:39

## 2021-02-09 RX ADMIN — HYDRALAZINE HYDROCHLORIDE 5 MG: 20 INJECTION INTRAMUSCULAR; INTRAVENOUS at 08:00

## 2021-02-09 RX ADMIN — MICONAZOLE NITRATE: 20 POWDER TOPICAL at 20:40

## 2021-02-09 RX ADMIN — FERROUS SULFATE TAB 325 MG (65 MG ELEMENTAL FE) 325 MG: 325 (65 FE) TAB at 08:00

## 2021-02-09 RX ADMIN — LABETALOL HYDROCHLORIDE 10 MG: 5 INJECTION, SOLUTION INTRAVENOUS at 11:13

## 2021-02-09 RX ADMIN — LEVETIRACETAM 500 MG: 100 INJECTION, SOLUTION INTRAVENOUS at 18:27

## 2021-02-09 RX ADMIN — SODIUM CHLORIDE: 9 INJECTION, SOLUTION INTRAVENOUS at 15:06

## 2021-02-09 RX ADMIN — CARVEDILOL 6.25 MG: 6.25 TABLET, FILM COATED ORAL at 08:00

## 2021-02-09 ASSESSMENT — PULMONARY FUNCTION TESTS
PIF_VALUE: 1
PIF_VALUE: 7
PIF_VALUE: 1
PIF_VALUE: 4
PIF_VALUE: 1

## 2021-02-09 ASSESSMENT — PAIN SCALES - GENERAL: PAINLEVEL_OUTOF10: 0

## 2021-02-09 NOTE — PROGRESS NOTES
Dr Alexia Francis made aware of 1gm Cefazolin IVPB given last evening @ 2118  No further abx ordered for PEG procedure

## 2021-02-09 NOTE — PROGRESS NOTES
Patient Education: pt more alert this date  Barriers to Learning: expressive aphasia, requires increased time and repition to follow commands, decreased arousal  REQUIRES OT FOLLOW UP: Yes  Activity Tolerance  Activity Tolerance: Patient limited by fatigue  Activity Tolerance: pt with increased response this date to therapy session. BP remains high ~ systolic in 766'C. Safety Devices  Safety Devices in place: Yes  Type of devices: All fall risk precautions in place; Left in bed;Nurse notified;Call light within reach; Bed alarm in place         Patient Diagnosis(es): The encounter diagnosis was Acute right MCA stroke ([facility] [address] has a past medical history of Acute GI bleeding, Atrial fibrillation ([facility] [address] Systolic CHF ([facility] [address] and Type 2 diabetes mellitus ([facility] [address] has a past surgical history that includes Upper gastrointestinal endoscopy (01/29/2021) and IR GUIDED IVC FILTER PLACEMENT (2/4/2021). Restrictions  Position Activity Restriction  Other position/activity restrictions: fall precautions  Subjective   General  Chart Reviewed: Yes  Patient assessed for rehabilitation services?: Yes  Additional Pertinent Hx: Tong Salcido is a 79 y.o. adult, with a history of afib, HF, DM, asthma, pulm HTN (not oxygen dependent), HTN, HLD, EDDY on cpap, breast cancer s/p lumpectomy/radiation, colon polyps, obesity who presented with left-sided hemiplegia, severe expressive aphasia and dysarthria, right facial droop, and right-gaze deviation. CT (+) 1. 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 2. 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 Pt was discharged from [facility] 2 days ago where she was treated for acute GI bleed  Family / Caregiver Present: No  Referring Practitioner:  Florencio Vigil MD  Diagnosis: acute CVA  Subjective Subjective: Pt lying supine in bed upon OT arrival. Pt initially difficult to arouse but then improving with responses- yes/no via head nods. Pt able to open up right eye more consistently this date and purposefully. General Comment  Comments: call daughter following therapy session with update: Rodolfo Suarez - [phone] ; daughter updated via phone call 2/9  Vital Signs  Patient Currently in Pain: No(Simultaneous filing. User may not have seen previous data.)   Orientation     Objective    ADL  Feeding: NPO(PEG tube procedure planned for this PM)  Grooming: Maximum assistance(Max A to brush hair this date. Therapist placed brush in pts right hand and with maximal verbal cues, pt gripped brush. Therapist held weight of pt's RUE while pt made wrist/hand movements to brush hair 4x.)  UE Bathing: Dependent/Total(total A for therapist to apply lotion to UE this date)  UE Dressing: (total A for donning of new depends)  LE Dressing: Dependent/Total(total A for donning of new depends)  Toileting: Dependent/Total(purwick in place, pt with large, loose BM this date. Pt required total A x 3 for clean up and donning of new purwick and depends)        Balance  Sitting Balance: Moderate assistance(mod A with moments of max A and brief moments of min A. This flucuates based on pts hand positioning on edge of bed. Pt was provided support intermitently anteriorly and posteriorly.  Pt was able to perform 2 right lateral leans and 2 left with max A)  Bed mobility  Rolling to Right: Dependent/Total(X2-3)  Supine to Sit: Dependent/Total  Sit to Supine: Dependent/Total  Scooting: Dependent/Total  Comment: x3 for dressing change this date from bed level  Transfers  Transfer Comments: unable to perform this date                 Neuromuscular Education  Neuromuscular education: Yes  NDT Treatment: Upper extremity  Deep Pressure: LUE

## 2021-02-09 NOTE — ANESTHESIA POSTPROCEDURE EVALUATION
Department of Anesthesiology  Postprocedure Note    Patient: Sim Hatchet  MRN: [id number]  YOB: 1940  Date of evaluation: 2/9/2021  Time:  4:43 PM     Procedure Summary     Date: 02/09/21 [facility]    Anesthesia Start: 9737 Anesthesia Stop: 1228    Procedure: EGD PEG TUBE PLACEMENT (N/A ) Diagnosis: (INABILITY TO SWALLOW S/P CVA)    Surgeons: Jason Nj MD Responsible Provider: Alana Pink DO    Anesthesia Type: MAC ASA Status: 3          Anesthesia Type: MAC    Liot Phase I: Lito Score: 6    Lito Phase II: Lito Score: 6    Last vitals: Reviewed and per EMR flowsheets.        Anesthesia Post Evaluation    Patient location during evaluation: PACU  Patient participation: complete - patient participated  Level of consciousness: awake and alert  Airway patency: patent  Nausea & Vomiting: no nausea and no vomiting  Cardiovascular status: blood pressure returned to baseline  Respiratory status: acceptable  Hydration status: euvolemic

## 2021-02-09 NOTE — PLAN OF CARE
Problem: HEMODYNAMIC STATUS  Goal: Patient has stable vital signs and fluid balance  2/8/2021 2212 by Ricardo Rueda RN  Outcome: Ongoing  2/8/2021 1502 by Carlos Eduardo Summers RN  Outcome: Ongoing  Note: VSS~ NSR on monitor. Pt is oriented to self, pt will open eyes to name. Neuro checks unchanged. Pt will follow commands, but does not stay alert very long. SLP unable to do swallow eval due to decreased alertness. Meds given thru NG tube. Plan is to place PEG tube tomorrow, GI consulted. Pt will be NPO after midnight. Will continue to monitor. Problem: ACTIVITY INTOLERANCE/IMPAIRED MOBILITY  Goal: Mobility/activity is maintained at optimum level for patient  Outcome: Ongoing     Problem: COMMUNICATION IMPAIRMENT  Goal: Ability to express needs and understand communication  Outcome: Ongoing     Problem: Falls - Risk of:  Goal: Will remain free from falls  Description: Will remain free from falls  2/8/2021 2212 by Ricardo Rueda RN  Outcome: Ongoing  2/8/2021 1502 by Carlos Eduardo Summers RN  Outcome: Met This Shift  Note: Patient at risk for falls. Patient resting quietly in bed. Side rails up x 2. Bed locked in lowest position. Bed alarm on. Bedside table and call light within reach. Patient instructed to call for assistance. No attempts to get up on own. Will continue to monitor.       Goal: Absence of physical injury  Description: Absence of physical injury  Outcome: Ongoing     Problem: Urinary Elimination:  Goal: Signs and symptoms of infection will decrease  Description: Signs and symptoms of infection will decrease  Outcome: Ongoing  Goal: Complications related to the disease process, condition or treatment will be avoided or minimized  Description: Complications related to the disease process, condition or treatment will be avoided or minimized  Outcome: Ongoing     Problem: Skin Integrity:  Goal: Will show no infection signs and symptoms Description: Will show no infection signs and symptoms  Outcome: Ongoing  Goal: Absence of new skin breakdown  Description: Absence of new skin breakdown  2/8/2021 2212 by Karla Back RN  Outcome: Ongoing  2/8/2021 1502 by Jasson Giordano RN  Outcome: Met This Shift  Note: Pt at risk for skin breakdown. Skin assessment complete. No signs of skin breakdown. Pts skin cleansed with inc cleanser prn, purewick draining well. Pt repositioned in bed with pillow support q2. Will continue to monitor.

## 2021-02-09 NOTE — PROGRESS NOTES
[address] [place] [address]  GI Progress Note          Maddie Thorpe is a 79 y.o. female patient. 1. Acute right MCA stroke ([address]        Admit Date: 2/2/2021    Subjective:       No acute events overnight. Pt seen at bedside resting comfortably. Able to wake up but lethargic. Unable to obtain ROS.      Scheduled Meds:   miconazole   Topical BID    levetiracetam  500 mg Intravenous Q12H    ferrous sulfate  325 mg Oral Daily with breakfast    pantoprazole  40 mg Intravenous BID    atorvastatin  80 mg Oral Nightly    carvedilol  6.25 mg Oral BID WC    insulin lispro  0-6 Units Subcutaneous TID WC    insulin lispro  0-3 Units Subcutaneous Nightly       Continuous Infusions:   dextrose      niCARdipine Stopped (02/06/21 1200)       PRN Meds:  glucose, dextrose, glucagon (rDNA), dextrose, perflutren lipid microspheres, labetalol, albuterol sulfate HFA, hydrALAZINE, acetaminophen, promethazine **OR** ondansetron, polyethylene glycol      Objective:       Patient Vitals for the past 24 hrs:   BP Temp Temp src Pulse Resp SpO2   02/09/21 1100 (!) 182/91   90 22    02/09/21 1000 (!) 154/74   73 18 94 %   02/09/21 0900 (!) 163/75   84 23 94 %   02/09/21 0817    95 21    02/09/21 0816    76 17    02/09/21 0815    80 16    02/09/21 0814    72 17 93 %   02/09/21 0813    80 17    02/09/21 0812    81 18    02/09/21 0811    79 17    02/09/21 0810    75 16    02/09/21 0809    88 17    02/09/21 0808    82 16    02/09/21 0800 (!) 173/75 98.2 °F (36.8 °C) Axillary 80 17 93 %   02/09/21 0725    74 18    02/09/21 0724    81 17    02/09/21 0723    79 16 97 %   02/09/21 0722    86 17    02/09/21 0721    90 15    02/09/21 0720    83 20    02/09/21 0719    86 20    02/09/21 0718    76 19    02/09/21 0717 (!) 172/83   89 19    02/09/21 0716    79 17 98 %   02/09/21 0715 (!) 178/89   83 18 97 %   02/09/21 0714    76 17  21 0713    87 18    21 0712    79 19    21 0711    80 20    21 0710    84 19    21 0709    83 19    21 0708    83 21 93 %   21 0700 (!) 180/90   83 17 94 %   21 0600 (!) 162/75   74 20 94 %   21 0400 (!) 103/47   84 20 95 %   21 0300 (!) 113/56   78 17 94 %   21 0200 (!) 108/52   75 18 93 %   21 0100 (!) 124/56   86 16 97 %   21 0000 (!) 123/53   77 19    21 2115 (!) 114/54   77 17 92 %   21 2100    93 17 91 %   21 2000 134/68   67 15    21 1900    81 10    21 1800 128/66   76 18 100 %   21 1700 (!) 105/58   76 13 100 %   21 1600 (!) 107/54 98.2 °F (36.8 °C) Axillary 67 11 100 %   21 1500 (!) 111/57   69 15 100 %   21 1400 (!) 121/54   67 11 100 %   21 1300 118/61   74 12 100 %   21 1200 118/75 98.5 °F (36.9 °C) Axillary 71 12 100 %       Exam:  VITALS:  BP (!) 182/91   Pulse 90   Temp 98.2 °F (36.8 °C) (Axillary)   Resp 22   Ht 5' 7\" (1.702 m)   Wt 218 lb 11.1 oz (99.2 kg)   SpO2 94%   BMI 34.25 kg/m²   TEMPERATURE:  Current - Temp: 98.2 °F (36.8 °C); Max - Temp  Av.3 °F (36.8 °C)  Min: 98.2 °F (36.8 °C)  Max: 98.5 °F (36.9 °C)    NAD  General appearance: lethargic, cooperative and no distress  Head: Normocephalic, without obvious abnormality, atraumatic  Neck: supple, symmetrical, trachea midline and thyroid not enlarged, symmetric, no tenderness/mass/nodules  CVS:  irregularly irregular, Nl s1s2  Lungs CTA Bilaterally, normal effort  Abdomen: soft, non-tender; bowel sounds normal; no masses,  no organomegaly  AAOx3, No asterixis or encephalopathy  Extremities: No edema.       Recent Labs     21  0545 21  0545 21  0502   WBC 8.7 6.0 8.5   HGB 8.5* 8.2* 8.8*   HCT 28.8* 27.9* 30.8*   MCV 83.3 83.6 85.9    167 185     Recent Labs     21  0545 21  0545 21  0502 * 149* 152*   K 3.9 3.7 3.8   * 108 110   CO2 29 33* 33*   BUN 18 18 18   CREATININE 0.6 0.6 0.6     No results for input(s): AST, ALT, ALB, BILIDIR, BILITOT, ALKPHOS in the last 72 hours. No results for input(s): LIPASE, AMYLASE in the last 72 hours. Recent Labs     02/09/21  0502   INR 1.15*     No results for input(s): PTT in the last 72 hours. No results for input(s): OCCULTBLD in the last 72 hours. Assessment:       Active Problems:    Acute cerebrovascular accident (CVA) ([address]    Permanent atrial fibrillation (HCC)    Acute gastric ulcer with hemorrhage    Other pulmonary embolism without acute cor pulmonale (HCC)    Acute right MCA stroke (HCC)    Hypoxemia  Resolved Problems:    * No resolved hospital problems.  *      Dysphagia 2/2 to R MCA CVA s/p thrombectomy and thrombolysis    Recommendations:       Dysphagia 2/2 CVA  - PEG tube placement planned for later today  - consult dietary thereafter  - Hold anticoagulation for AF for 2-3 wks per NSx    Normocytic anemia  - f/u iron studies  - continue protonix 40mg BID    Bandar De Jesus MD  PGY-3  2/9/2021  11:13 AM

## 2021-02-09 NOTE — PROGRESS NOTES
Attempted to call report to Aashish at [phone]. RN will call back when avail. Occupational therapy found eyedrops in pt belongings fom home. Need to get clearance to administer. Will adv oncoming RN.

## 2021-02-09 NOTE — PROGRESS NOTES
Speech Language Pathology  Hold    Chart reviewed, noted pt NPO for PEG today. Will follow up as pt able and schedule allows      Jenna Monk M.S./CCC-SLP #[id number]  Pg.  # [phone]

## 2021-02-09 NOTE — PROGRESS NOTES
Hospitalist Progress Note      PCP: No primary care provider on file. Date of Admission: 2/2/2021    Chief Complaint:     Chief Complaint   Patient presents with    Cerebrovascular Accident       Subjective:  Patient seen and examined at the bedside.    Sleepy, but follows basic commands    PFHS: reviewed as documented 2/2/2021, no changes    Medications:  Reviewed    Infusion Medications    dextrose      niCARdipine Stopped (02/06/21 1200)     Scheduled Medications    miconazole   Topical BID    levetiracetam  500 mg Intravenous Q12H    ferrous sulfate  325 mg Oral Daily with breakfast    pantoprazole  40 mg Intravenous BID    atorvastatin  80 mg Oral Nightly    carvedilol  6.25 mg Oral BID WC    insulin lispro  0-6 Units Subcutaneous TID WC    insulin lispro  0-3 Units Subcutaneous Nightly     PRN Meds: glucose, dextrose, glucagon (rDNA), dextrose, perflutren lipid microspheres, labetalol, albuterol sulfate HFA, hydrALAZINE, acetaminophen, promethazine **OR** ondansetron, polyethylene glycol      Intake/Output Summary (Last 24 hours) at 2/9/2021 1100  Last data filed at 2/9/2021 1000  Gross per 24 hour   Intake 350 ml   Output 300 ml   Net 50 ml       Physical Exam    BP (!) 154/74   Pulse 73   Temp 98.2 °F (36.8 °C) (Axillary)   Resp 18   Ht 5' 7\" (1.702 m)   Wt 218 lb 11.1 oz (99.2 kg)   SpO2 94%   BMI 34.25 kg/m²     General appearance:  Drowsy, follows basic commands  Eyes: Pupils equal, round, reactive to light, conjunctiva/corneas clear  Ears/Nose/Mouth/Throat: No external lesions or scars, hearing intact to voice  Neck: Trachea midline, no masses noted, no thyromegaly  Respiratory:  Non-labored breathing, clear to auscultation bilaterally  Cardiovascular: Regular rate and rhythm, no murmurs, gallops, or rubs  Abdomen: soft, non-tender, non-distended  Musculoskeletal: Warm, well perfused, no cyanosis or edema  Skin: normal color, no wounds noted Psychiatric: Moves R side spontaneously, decreased strength on left    Labs:   Recent Labs     02/07/21  0545 02/08/21  0545 02/09/21  0502   WBC 8.7 6.0 8.5   HGB 8.5* 8.2* 8.8*   HCT 28.8* 27.9* 30.8*    167 185     Recent Labs     02/07/21  0545 02/08/21  0545 02/09/21  0502   * 149* 152*   K 3.9 3.7 3.8   * 108 110   CO2 29 33* 33*   BUN 18 18 18   CREATININE 0.6 0.6 0.6   CALCIUM 9.3 9.4 9.4     No results for input(s): AST, ALT, BILIDIR, BILITOT, ALKPHOS in the last 72 hours. Recent Labs     02/09/21  0502   INR 1.15*     No results for input(s): Yakelin Loyal in the last 72 hours. Urinalysis:    No results found for: [place], BACTERIA, RBCUA, BLOODU, SPECGRAV, [place] [address]    Radiology:  XR CHEST 1 VIEW   Final Result   1. Interval extubation. 2. Progression of bilateral airspace disease. MRI BRAIN WO CONTRAST   Final Result      Region of right middle cerebral artery hemorrhagic infarction measures approximately 5.0 x 4.2 cm with surrounding mild vasogenic edema and diffusion restriction consistent with right middle cerebral artery distribution infarct with hemorrhagic    components      No additional areas of hemorrhage or infarction identified. Mild local mass effect is identified with very minimal midline shift of 3 to 4 mm      XR CHEST PORTABLE   Final Result      Persistent bilateral infiltrates and cardiomegaly. IR GUIDED IVC FILTER PLACEMENT   Final Result   Impression:      Successful placement of retrievable IVC filter. CT HEAD WO CONTRAST   Final Result      1. Hemorrhagic transformation of right MCA infarct, without significant change. No new hemorrhage.       CT head without contrast   Final Result 1. Stable large intraparenchymal hemorrhage located within the right posterior temporal parietal lobe with extension into the adjacent insula. There is also subarachnoid extension of hemorrhage into the overlying sulci as well as the right sylvian    fissure. Findings result in some mild right to left subfalcine herniation. 2.There is a 1.3 x 1.1 cm extra-axial mass identified within the left ordered aspect of the foramen magnum. This results in some mass effect on the adjacent medulla. This is without change from the prior study. Finding may represent a meningioma. This    could be further evaluated with a contrast-enhanced MRI of the head following resolution of the intracranial hemorrhage. CT HEAD WO CONTRAST   Final Result      1. Stable large intraparenchymal hemorrhage located within the right posterior temporal parietal lobe with extension into the adjacent insula. There is also subarachnoid extension of hemorrhage into the overlying sulci as well as the right sylvian    fissure. Findings result in some mild right to left subfalcine herniation. 2.There is a 1.3 x 1.1 cm extra-axial mass identified within the left ordered aspect of the foramen magnum. This results in some mass effect on the adjacent medulla. This is without change from the prior study. Finding may represent a meningioma. This    could be further evaluated with a contrast-enhanced MRI of the head following resolution of the intracranial hemorrhage. VL Extremity Venous Bilateral   Final Result      CT HEAD WO CONTRAST   Final Result      Post therapy large acute parenchymal hemorrhage in the right cerebral hemisphere with subarachnoid component. Extensive surrounding edema is associated with 4 cm leftward shift of midline structures. Critical finding of acute intracranial hemorrhage was called to Gilbert Rivas of the neurosurgery department at 12:25 PM on 2/3/2021, with instructions to contact patient's attending physician with these results. CT Brain Perfusion   Final Result      1. Hypoperfusion in the right MCA territory with a central ischemic core of 8 mL, total volume of hypoperfusion of 79 mL and a penumbra of 71 mL. XR CHEST PORTABLE   Final Result   1. Limited evaluation because of patient rotation and underpenetration of the film. 2. No dense airspace consolidation. 3. Probable mild cardiomegaly. CTA HEAD NECK W CONTRAST   Final Result   1. Carotid and vertebral arteries are patent and without stenosis or acute abnormality. 2. Incidental partial imaging of the a segmental pulmonary embolism in the right middle lobe. 3. Partial imaging of small right greater than left pleural effusions. 4. Previous left suboccipital craniotomy with a residual 1.6 cm left-sided extra-axial mass at the foramen magnum with mass effect on the cord at the cervical medullary junction favored to represent meningioma. Recommend correlation with clinical history    and previous imaging. 5. Incidental multinodular goiter. CTA HEAD:      FINDINGS: The internal carotid arteries are patent and normal in caliber through the skull base. The middle cerebral arteries are patent. 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 The A2 and A3 segments of the ACAs are patent bilaterally. Left MCA is patent. -palliative care following     # Intraparenchymal hemorrhage  -CT 02/04 showed hemorrhagic transformation of right MCA infarct  -stable  -keep SBP < 160. Off nicardipine drip  -Holding anticoagulation and antiplatelets  -Continue keppra     # Acute blood loss anemia  -Protonix BID     # Chronic systolic heart failure  -holding home diuretics     # Segmental PE  -incidental finding  -No AC due to above  -IVC filter placed 2/4     # Atrial fibrillation  -Holding AC  -monitor HR     # T2DM  -mealtime and correctional insulin     # HTN  -as above     # asthma  -breathing treatments as needed    DVT Prophylaxis: SCDs  Diet: Diet NPO, After Midnight Exceptions are:  Other (See Comment)  Code Status: Full Code    PT/OT Eval Status: Deferred    Dispo: [facility] pending clinical improvement    Bismark Birmingham MD

## 2021-02-09 NOTE — OP NOTE
Patient:   Rosa Ware   :    1940   Facility:   [facility]  Procedure:   Esophagogastroduodenoscopy with placement of a percutaneous endoscopic gastrostomy tube  Date:     2021   Endoscopist:  Robyn Boggs     Preoperative Diagnosis:  Feeding Difficulties    Postoperative Diagnosis:  Feeding Difficulties    Preprocedure medications: One gram of Cefazolin was given. prior to the procedure. Anesthesia:  MAC    Estimated blood loss: Minimal    Complications: None    Description of Procedure:  Informed consent was obtained  after explanation of the procedure including indications, description of the procedure,  benefits and possible risks and complications of the procedure, and alternatives. Questions were answered. The patient's history was reviewed and a directed physical examination was performed prior to the procedure. Patient recieved prophylactic antibiotics prior to the start of the procedure and was monitored throughout the procedure with pulse oximetry and periodic assessment of vital signs. Patient was sedated as noted above. With the patient in the semi-Molina's position, Olympus flexible endoscope was introduced and passed without difficulty. Visualization of the esophagus, stomach, and duodenum was performed and retroflexed view of the proximal stomach was obtained. The scope was passed to the 2nd portion of the duodenum. No contraindication to placement of the gastrostomy tube was appreciated. The site for placement of the gastrostomy tube was identified with palpation and transillumination of the abdomen. The abdomen, having been prepared, was draped in a sterile fashion. Local anesthesia was provided with  1% Xylocaine. A nick was made in the skin with a #11 scalpel blade. Angiocath was introduced through the anterior abdominal wall. This was visualized internally with endoscope. The needle was withdrawn and an insertion wire introduced. This was grasped with a snare and withdrawn through the patient's mouth with withdrawal of the endoscope. A 20-Maltese Ponsky-type gastrostomy tube was affixed to the wire and traction applied to the later. The tube was delivered through the anterior abdominal wall and a bolster placed at 2 cm. Dry sterile dressing was applied and an abdominal binder was not placed. The patient tolerated the procedure well. Findings:  -Successful PEG tube placement. Recommendations:   May use the PEG tube in six hours for feeds and medications. Daily dressing changes recommended.       José Miguel Patel MD

## 2021-02-09 NOTE — PROGRESS NOTES
4 Eyes Admission Assessment     I agree as the admission nurse that 2 RN's have performed a thorough Head to Toe Skin Assessment on the patient. ALL assessment sites listed below have been assessed on admission. Areas assessed by both nurses:   [x]   Head, Face, and Ears   [x]   Shoulders, Back, and Chest  [x]   Arms, Elbows, and Hands   [x]   Coccyx, Sacrum, and Ischium  [x]   Legs, Feet, and Heels        Does the Patient have Skin Breakdown?scattered Redness, blanchable. Sacral heart in place to sacrum.  Excoriation to abdominal folds and under breast.          Genaro Prevention initiated: NO  Wound Care Orders initiated: NO      WOC nurse consulted for Pressure Injury (Stage 3,4, Unstageable, DTI, NWPT, and Complex wounds) or Genaro score 18 or lower:  NO    Nurse 1 eSignature: Electronically signed by Wesley Schrader RN on 2/9/21 at 5:32 PM EST    SHARE this note so that the co-signing nurse is able to place an eSignature    Nurse 2 eSignature: Electronically signed by Derek Steve RN on 2/9/21 at 6:07 PM EST

## 2021-02-09 NOTE — PROGRESS NOTES
Physical Therapy  Facility/Department: [facility] ICU  Daily Treatment Note  NAME: Shaina Thakur  : 1940  MRN: [id number]    Date of Service: 2021    Discharge Recommendations: Shaina hTakur scored a 6/24 on the AM-PAC short mobility form. Current research shows that an AM-PAC score of 17 or less is typically not associated with a discharge to the patient's home setting. Based on the patient's AM-PAC score and their current functional mobility deficits, it is recommended that the patient have 3-5 sessions per week of Physical Therapy at d/c to increase the patient's independence. Please see assessment section for further patient specific details. If patient discharges prior to next session this note will serve as a discharge summary. Please see below for the latest assessment towards goals. PT Equipment Recommendations  Other: defer to next level of care    Assessment   Body structures, Functions, Activity limitations: Decreased functional mobility   Assessment: Improved alertness and paticipation today. Eyes closed throughout most of session. Eye drops found in personal belongings at end of session and pt indicates she uses them regularly. RN informed. Large BM noted during session. Pericare provided. RN aware. Continues to require dep assist x 2 for bed mobility and rolling. Will continue to follow to maximize independence. Spoke with dtr, James Cooper, at end of session per her request for therapy updates. Treatment Diagnosis: impaired gait and transfers  Prognosis: Good  PT Education: PT Role;Functional Mobility Training  Patient Education: pt unable to demo understanding; rec reinforcement  REQUIRES PT FOLLOW UP: Yes     Patient Diagnosis(es): The encounter diagnosis was Acute right MCA stroke ([facility] [address] has a past medical history of Acute GI bleeding, Atrial fibrillation ([facility] [address] Systolic CHF ([facility] [address] and Type 2 diabetes mellitus ([facility] [address] Time Frame for Short term goals: discharge  Short term goal 1: Pt will transfer supine <--> sit with mod A x 2 ongoing  Short term goal 2: Pt will transfer sit <--> stand with mod A x 2   ongoing  Short term goal 3: Pt will sit EOB x 2 min with CGA or better   ongoing  Short term goal 4: Pt will demo static stance x 10 sec with min A x 1 or better   ongoing  Patient Goals   Patient goals : pt unable to state    Plan    Plan  Times per week: 5-7  Current Treatment Recommendations: Strengthening, Transfer Training, Endurance Training, Patient/Caregiver Education & Training, Balance Training, Functional Mobility Training, Gait Training, Home Exercise Program, Equipment Evaluation, Education, & procurement  Safety Devices  Type of devices: Call light within reach, Nurse notified, Gait belt, Left in bed, Bed alarm in place     Therapy Time   Individual Concurrent Group Co-treatment   Time In 1107         Time Out 1202         Minutes 55                 Timed Code Treatment Minutes:  55    Total Treatment Minutes:  55    If patient is discharged prior to next treatment, this note will serve as the discharge summary.   Brooke Cons, PT, DPT  [id number]

## 2021-02-09 NOTE — ANESTHESIA PRE PROCEDURE
Department of Anesthesiology  Preprocedure Note       Name:  Denise Francois   Age:  79 y.o.  :  1940                                          MRN:  [id number]         Date:  2021      Surgeon: Ester Gomez):  Pablo Morales MD    Procedure: Procedure(s):  EGD PEG TUBE PLACEMENT    Medications prior to admission:   Prior to Admission medications    Not on File       Current medications:    Current Facility-Administered Medications   Medication Dose Route Frequency Provider Last Rate Last Admin    glucose (GLUTOSE) 40 % oral gel 15 g  15 g Oral PRN Edwar Jones MD        dextrose 50 % IV solution  12.5 g Intravenous PRN Edwar Jones MD        glucagon (rDNA) injection 1 mg  1 mg Intramuscular PRN Edwar oJnes MD        dextrose 5 % solution  100 mL/hr Intravenous PRN Edwar Jones MD        miconazole (MICOTIN) 2 % powder   Topical BID Merline Rye, MD   Given at 21 0800    levETIRAcetam (KEPPRA) 500 mg in sodium chloride 0.9 % 100 mL IVPB  500 mg Intravenous Q12H SU Sheppard - NP   Stopped at 21 9540    perflutren lipid microspheres (DEFINITY) injection 1.65 mg  1.5 mL Intravenous ONCE PRN SU Gee - CNP        ferrous sulfate (IRON 325) tablet 325 mg  325 mg Oral Daily with breakfast Edwar Jnoes MD   325 mg at 21 0800    labetalol (NORMODYNE;TRANDATE) injection 10 mg  10 mg Intravenous Q4H PRN Eliot Class, DO   10 mg at 21 1113    albuterol sulfate  (90 Base) MCG/ACT inhaler 2 puff  2 puff Inhalation PRN Karina Hobson MD        pantoprazole (PROTONIX) injection 40 mg  40 mg Intravenous BID Yunior Garsia MD   40 mg at 21 0800    hydrALAZINE (APRESOLINE) injection 5 mg  5 mg Intravenous Q6H PRN Eliot Class, DO   5 mg at 21 0800    niCARdipine (CARDENE) 25 mg in sodium chloride 0.9 % 250 mL infusion  3-15 mg/hr Intravenous Continuous Tung Vazquez MD   Stopped at 21 1200  acetaminophen (TYLENOL) tablet 650 mg  650 mg Oral Q4H PRN Gibson Vigil MD        promethazine (PHENERGAN) tablet 12.5 mg  12.5 mg Oral Q6H PRN Gibson Vigil MD        Or    ondansetron [facility]) injection 4 mg  4 mg Intravenous Q6H PRN Gibson Vigil MD        polyethylene glycol (GLYCOLAX) packet 17 g  17 g Oral Daily PRN Gibson Vigil MD        atorvastatin (LIPITOR) tablet 80 mg  80 mg Oral Nightly [facility]   80 mg at 02/08/21 2142    carvedilol (COREG) tablet 6.25 mg  6.25 mg Oral BID  Natalia Doll MD   6.25 mg at 02/09/21 0800    insulin lispro (1 Unit Dial) 0-6 Units  0-6 Units Subcutaneous TID  Natalia Doll MD   Stopped at 02/05/21 1831    insulin lispro (1 Unit Dial) 0-3 Units  0-3 Units Subcutaneous Nightly Natalia Doll MD   Stopped at 02/05/21 2148       Allergies:  No Known Allergies    Problem List:    Patient Active Problem List   Diagnosis Code    Acute cerebrovascular accident (CVA) ([address] I63.9    Permanent atrial fibrillation ([address] I48.21    Acute gastric ulcer with hemorrhage K25.0    Other pulmonary embolism without acute cor pulmonale (HCC) I26.99    Acute right MCA stroke ([address] I63.511    Hypoxemia R09.02       Past Medical History:        Diagnosis Date    Acute GI bleeding     recent admission 1/29/2021    Atrial fibrillation ([address]     Xarelto    Systolic CHF ([address]     Type 2 diabetes mellitus ([address]        Past Surgical History:        Procedure Laterality Date    IR IVC FILTER PLACEMENT W IMAGING  2/4/2021    IR IVC FILTER PLACEMENT W IMAGING 2/4/2021 [facility]    UPPER GASTROINTESTINAL ENDOSCOPY  01/29/2021    Dr Inga Muhammad       Social History:    Social History     Tobacco Use    Smoking status: Former Smoker   Substance Use Topics    Alcohol use: Not on file                                Counseling given: Not Answered      Vital Signs (Current):   Vitals: 02/09/21 0817 02/09/21 0900 02/09/21 1000 02/09/21 1100   BP:  (!) 163/75 (!) 154/74 (!) 182/91   Pulse: 95 84 73 90   Resp: 21 23 18 22   Temp:       TempSrc:       SpO2:  94% 94%    Weight:       Height:                                                  BP Readings from Last 3 Encounters:   02/09/21 (!) 182/91       NPO Status:                                                                                 BMI:   Wt Readings from Last 3 Encounters:   02/07/21 218 lb 11.1 oz (99.2 kg)     Body mass index is 34.25 kg/m².     CBC:   Lab Results   Component Value Date    WBC 8.5 02/09/2021    RBC 3.59 02/09/2021    HGB 8.8 02/09/2021    HCT 30.8 02/09/2021    MCV 85.9 02/09/2021    RDW 26.0 02/09/2021     02/09/2021       CMP:   Lab Results   Component Value Date     02/09/2021    K 3.8 02/09/2021     02/09/2021    CO2 33 02/09/2021    BUN 18 02/09/2021    CREATININE 0.6 02/09/2021    GFRAA >60 02/09/2021    AGRATIO 1.1 02/02/2021    LABGLOM >60 02/09/2021    GLUCOSE 105 02/09/2021    PROT 5.7 02/02/2021    CALCIUM 9.4 02/09/2021    BILITOT 0.6 02/02/2021    ALKPHOS 91 02/02/2021    AST 21 02/02/2021    ALT 15 02/02/2021       POC Tests:   Recent Labs     02/09/21  0759   POCGLU 112*       Coags:   Lab Results   Component Value Date    PROTIME 13.4 02/09/2021    INR 1.15 02/09/2021    APTT 18.8 02/02/2021       HCG (If Applicable): No results found for: PREGTESTUR, PREGSERUM, HCG, HCGQUANT     ABGs:   Lab Results   Component Value Date    PHART 7.283 02/06/2021    PO2ART 71.4 02/06/2021    FNV1AQP 61.4 02/06/2021    GPJ4PIU 29.0 02/06/2021    BEART 2 02/06/2021    X7RDRZHI 91 02/06/2021        Type & Screen (If Applicable):  No results found for: LABABO, LABRH    Drug/Infectious Status (If Applicable):  No results found for: HIV, HEPCAB    COVID-19 Screening (If Applicable):   Lab Results   Component Value Date    COVID19 Not Detected 02/02/2021         Anesthesia Evaluation Patient summary reviewed no history of anesthetic complications:   Airway: Mallampati: III        Dental:    (+) edentulous      Pulmonary:                             ROS comment: Hx PE   Cardiovascular:    (+) dysrhythmias: atrial fibrillation, CHF:,                   Neuro/Psych:   (+) CVA (CVA with left side weakness):,             GI/Hepatic/Renal:   (+) PUD,           Endo/Other:    (+) Diabetes, . Abdominal:           Vascular:                                        Anesthesia Plan      MAC     ASA 3     (Pt not communicating  Information from chart )  Induction: intravenous. Anesthetic plan and risks discussed with patient.                       Angélica Meneses MD   2/9/2021

## 2021-02-09 NOTE — CARE COORDINATION
Case Management Assessment           Daily Note                 Date/ Time of Note: 2/9/2021 3:36 PM         Note completed by: Alicia Donato    Patient Name: Marga Crocker  YOB: 1940    Diagnosis:Acute cerebrovascular accident (CVA) [facility]) [I63.9]  Patient Admission Status: Inpatient    Date of Admission:2/2/2021  7:22 PM Length of Stay: 7 GLOS:      Current Plan of Care: Endo for peg placement today  ________________________________________________________________________________________  PT AM-PAC: 6 / 24 per last evaluation on: 02/09    OT AM-PAC: 7 / 24 per last evaluation on: 02/09    DME Needs for discharge: pending  ________________________________________________________________________________________  Discharge Plan: Inpatient Rehab: [facility]    Tentative discharge date: TBD    Current barriers to discharge: getting peg today. Will need precert for rehab    Referrals completed: Inpatient Rehab: [facility]     Resources/ information provided: Not indicated at this time  ________________________________________________________________________________________  Case Management Notes:I called patient's daughter Mekhi Dixon to let her know that I had not heard back from [address] yet but that [facility] would be able to accept. She asked about our ARU and I messaged Dr. Hernandez Goes regarding this. Cherrie New and her family were provided with choice of provider; she and her family are in agreement with the discharge plan.     Care Transition Patient: Arianne Donato RN  [facility]  Case Management Department  Ph: [phone]  Fax: [phone]

## 2021-02-10 ENCOUNTER — APPOINTMENT (OUTPATIENT)
Dept: CT IMAGING | Age: 81
DRG: 003 | End: 2021-02-10
Payer: COMMERCIAL

## 2021-02-10 LAB
ANION GAP SERPL CALCULATED.3IONS-SCNC: 10 MMOL/L (ref 3–16)
ANISOCYTOSIS: ABNORMAL
BACTERIA: ABNORMAL /HPF
BASOPHILS ABSOLUTE: 0.1 K/UL (ref 0–0.2)
BASOPHILS RELATIVE PERCENT: 1 %
BILIRUBIN URINE: ABNORMAL
BLOOD, URINE: ABNORMAL
BUN BLDV-MCNC: 18 MG/DL (ref 7–20)
CALCIUM SERPL-MCNC: 9.2 MG/DL (ref 8.3–10.6)
CHLORIDE BLD-SCNC: 113 MMOL/L (ref 99–110)
CLARITY: CLEAR
CO2: 32 MMOL/L (ref 21–32)
COLOR: YELLOW
CREAT SERPL-MCNC: 0.6 MG/DL (ref 0.6–1.2)
CRYSTALS, UA: ABNORMAL /HPF
EOSINOPHILS ABSOLUTE: 0.1 K/UL (ref 0–0.6)
EOSINOPHILS RELATIVE PERCENT: 1 %
EPITHELIAL CELLS, UA: ABNORMAL /HPF (ref 0–5)
GFR AFRICAN AMERICAN: >60
GFR NON-AFRICAN AMERICAN: >60
GLUCOSE BLD-MCNC: 119 MG/DL (ref 70–99)
GLUCOSE BLD-MCNC: 120 MG/DL (ref 70–99)
GLUCOSE BLD-MCNC: 132 MG/DL (ref 70–99)
GLUCOSE BLD-MCNC: 146 MG/DL (ref 70–99)
GLUCOSE BLD-MCNC: 163 MG/DL (ref 70–99)
GLUCOSE URINE: NEGATIVE MG/DL
HCT VFR BLD CALC: 30.3 % (ref 36–48)
HEMOGLOBIN: 9 G/DL (ref 12–16)
KETONES, URINE: 40 MG/DL
LEUKOCYTE ESTERASE, URINE: NEGATIVE
LYMPHOCYTES ABSOLUTE: 0.7 K/UL (ref 1–5.1)
LYMPHOCYTES RELATIVE PERCENT: 8 %
MCH RBC QN AUTO: 24.8 PG (ref 26–34)
MCHC RBC AUTO-ENTMCNC: 29.7 G/DL (ref 31–36)
MCV RBC AUTO: 83.5 FL (ref 80–100)
MICROCYTES: ABNORMAL
MICROSCOPIC EXAMINATION: YES
MONOCYTES ABSOLUTE: 0.1 K/UL (ref 0–1.3)
MONOCYTES RELATIVE PERCENT: 1 %
MUCUS: ABNORMAL /LPF
NEUTROPHILS ABSOLUTE: 8 K/UL (ref 1.7–7.7)
NEUTROPHILS RELATIVE PERCENT: 89 %
NITRITE, URINE: POSITIVE
OVALOCYTES: ABNORMAL
PDW BLD-RTO: 25.5 % (ref 12.4–15.4)
PERFORMED ON: ABNORMAL
PH UA: 6 (ref 5–8)
PLATELET # BLD: 205 K/UL (ref 135–450)
PMV BLD AUTO: 9.9 FL (ref 5–10.5)
POLYCHROMASIA: ABNORMAL
POTASSIUM REFLEX MAGNESIUM: 3.9 MMOL/L (ref 3.5–5.1)
PROTEIN UA: 30 MG/DL
RBC # BLD: 3.63 M/UL (ref 4–5.2)
RBC UA: ABNORMAL /HPF (ref 0–4)
SCHISTOCYTES: ABNORMAL
SODIUM BLD-SCNC: 155 MMOL/L (ref 136–145)
SODIUM URINE: 23 MMOL/L
SPECIFIC GRAVITY UA: >=1.03 (ref 1–1.03)
URINE TYPE: ABNORMAL
UROBILINOGEN, URINE: 0.2 E.U./DL
WBC # BLD: 9 K/UL (ref 4–11)
WBC UA: ABNORMAL /HPF (ref 0–5)

## 2021-02-10 PROCEDURE — 97112 NEUROMUSCULAR REEDUCATION: CPT

## 2021-02-10 PROCEDURE — 6370000000 HC RX 637 (ALT 250 FOR IP): Performed by: COUNSELOR

## 2021-02-10 PROCEDURE — 6360000002 HC RX W HCPCS: Performed by: STUDENT IN AN ORGANIZED HEALTH CARE EDUCATION/TRAINING PROGRAM

## 2021-02-10 PROCEDURE — 2060000000 HC ICU INTERMEDIATE R&B

## 2021-02-10 PROCEDURE — 2580000003 HC RX 258: Performed by: INTERNAL MEDICINE

## 2021-02-10 PROCEDURE — 6360000002 HC RX W HCPCS: Performed by: COUNSELOR

## 2021-02-10 PROCEDURE — C9113 INJ PANTOPRAZOLE SODIUM, VIA: HCPCS | Performed by: STUDENT IN AN ORGANIZED HEALTH CARE EDUCATION/TRAINING PROGRAM

## 2021-02-10 PROCEDURE — 85025 COMPLETE CBC W/AUTO DIFF WBC: CPT

## 2021-02-10 PROCEDURE — 81001 URINALYSIS AUTO W/SCOPE: CPT

## 2021-02-10 PROCEDURE — 36415 COLL VENOUS BLD VENIPUNCTURE: CPT

## 2021-02-10 PROCEDURE — 70450 CT HEAD/BRAIN W/O DYE: CPT

## 2021-02-10 PROCEDURE — 97530 THERAPEUTIC ACTIVITIES: CPT

## 2021-02-10 PROCEDURE — 80048 BASIC METABOLIC PNL TOTAL CA: CPT

## 2021-02-10 PROCEDURE — 2580000003 HC RX 258: Performed by: COUNSELOR

## 2021-02-10 PROCEDURE — 83935 ASSAY OF URINE OSMOLALITY: CPT

## 2021-02-10 PROCEDURE — 84300 ASSAY OF URINE SODIUM: CPT

## 2021-02-10 RX ORDER — PANTOPRAZOLE SODIUM 40 MG/10ML
40 INJECTION, POWDER, LYOPHILIZED, FOR SOLUTION INTRAVENOUS DAILY
Status: DISCONTINUED | OUTPATIENT
Start: 2021-02-11 | End: 2021-02-10

## 2021-02-10 RX ORDER — PANTOPRAZOLE SODIUM 40 MG/10ML
40 INJECTION, POWDER, LYOPHILIZED, FOR SOLUTION INTRAVENOUS DAILY
Status: DISCONTINUED | OUTPATIENT
Start: 2021-02-10 | End: 2021-02-18

## 2021-02-10 RX ORDER — FERROUS SULFATE 300 MG/5ML
300 LIQUID (ML) ORAL EVERY OTHER DAY
Status: DISCONTINUED | OUTPATIENT
Start: 2021-02-11 | End: 2021-02-23 | Stop reason: HOSPADM

## 2021-02-10 RX ORDER — DEXTROSE MONOHYDRATE 50 MG/ML
INJECTION, SOLUTION INTRAVENOUS CONTINUOUS
Status: DISCONTINUED | OUTPATIENT
Start: 2021-02-10 | End: 2021-02-10

## 2021-02-10 RX ORDER — FERROUS SULFATE 325(65) MG
325 TABLET ORAL EVERY OTHER DAY
Status: DISCONTINUED | OUTPATIENT
Start: 2021-02-11 | End: 2021-02-10

## 2021-02-10 RX ADMIN — LEVETIRACETAM 500 MG: 100 INJECTION, SOLUTION INTRAVENOUS at 06:18

## 2021-02-10 RX ADMIN — PANTOPRAZOLE SODIUM 40 MG: 40 INJECTION, POWDER, FOR SOLUTION INTRAVENOUS at 16:29

## 2021-02-10 RX ADMIN — ATORVASTATIN CALCIUM 80 MG: 40 TABLET, FILM COATED ORAL at 20:42

## 2021-02-10 RX ADMIN — INSULIN LISPRO 1 UNITS: 100 INJECTION, SOLUTION INTRAVENOUS; SUBCUTANEOUS at 20:31

## 2021-02-10 RX ADMIN — LEVETIRACETAM 500 MG: 100 INJECTION, SOLUTION INTRAVENOUS at 20:30

## 2021-02-10 RX ADMIN — MICONAZOLE NITRATE: 20 POWDER TOPICAL at 20:42

## 2021-02-10 RX ADMIN — MICONAZOLE NITRATE: 20 POWDER TOPICAL at 11:48

## 2021-02-10 RX ADMIN — INSULIN LISPRO 1 UNITS: 100 INJECTION, SOLUTION INTRAVENOUS; SUBCUTANEOUS at 18:52

## 2021-02-10 RX ADMIN — CARVEDILOL 6.25 MG: 6.25 TABLET, FILM COATED ORAL at 14:00

## 2021-02-10 ASSESSMENT — PAIN SCALES - WONG BAKER
WONGBAKER_NUMERICALRESPONSE: 0
WONGBAKER_NUMERICALRESPONSE: 0

## 2021-02-10 ASSESSMENT — PAIN SCALES - PAIN ASSESSMENT IN ADVANCED DEMENTIA (PAINAD)
TOTALSCORE: 0
TOTALSCORE: 0
NEGVOCALIZATION: 0
CONSOLABILITY: 0
BODYLANGUAGE: 0
BODYLANGUAGE: 0

## 2021-02-10 ASSESSMENT — PAIN SCALES - GENERAL: PAINLEVEL_OUTOF10: 0

## 2021-02-10 NOTE — PROGRESS NOTES
Speech Language Pathology  Hold      Chart reviewed, d/w RN who states pt more lethargic today and just had CT scan. Will follow up as pt able and schedule allows        Sammy Boyle M.S./CCC-SLP #[id number]  Pg.  # [phone]

## 2021-02-10 NOTE — PLAN OF CARE
Nutrition Problem #1: Inadequate oral intake  Intervention: Food and/or Nutrient Delivery: Start Tube Feeding, Continue NPO  Nutritional Goals: pt will tolerate EN at goal rate to meet 100% of nutrition needs.

## 2021-02-10 NOTE — PROGRESS NOTES
Physical Therapy  Facility/Department: [facility] 5T ORTHO/NEURO  Daily Treatment Note  NAME: Joel Goodman  : 1940  MRN: [id number]    Date of Service: 2/10/2021    Discharge Recommendations:  Joel Goodman scored a 6/24 on the AM-PAC short mobility form. Current research shows that an AM-PAC score of 17 or less is typically not associated with a discharge to the patient's home setting. Based on the patient's AM-PAC score and their current functional mobility deficits, it is recommended that the patient have 3-5 sessions per week of Physical Therapy at d/c to increase the patient's independence. Please see assessment section for further patient specific details. If patient discharges prior to next session this note will serve as a discharge summary. Please see below for the latest assessment towards goals. Patient would benefit from continued therapy after discharge   PT Equipment Recommendations  Equipment Needed: No    Assessment   Body structures, Functions, Activity limitations: Decreased functional mobility   Assessment: Pt with low participation due to lethargy. Pt unarousable even in sitting. Pt assisted BTB and positioned to comfort. Rec continued inpt PT (trial) at d/c. Will continue. Treatment Diagnosis: impaired gait and transfers  Prognosis: Good  Decision Making: High Complexity  PT Education: PT Role;Functional Mobility Training  Patient Education: Pt unable to demo understanding; rec reinforcement  REQUIRES PT FOLLOW UP: Yes  Activity Tolerance  Activity Tolerance: Patient limited by cognitive status     Patient Diagnosis(es): The encounter diagnosis was Acute right MCA stroke ([facility] [address] has a past medical history of Acute GI bleeding, Atrial fibrillation ([facility] [address] Systolic CHF ([facility] [address] and Type 2 diabetes mellitus ([facility] [address] has a past surgical history that includes Upper gastrointestinal endoscopy (2021); IR GUIDED IVC FILTER PLACEMENT (2021); and Gastrostomy tube placement (N/A, 2021). Restrictions  Position Activity Restriction  Other position/activity restrictions: fall precautions, no activity restrictions noted     Subjective   General  Chart Reviewed: Yes  Additional Pertinent Hx: Admit  with L side weakness and speech deficits, (+) Acute stroke with large vessel (right M2) occlusion and large area of reversible ischemia on CTP; 2/2 Mechanical thrombectomy + Infusion therapy for thrombolysis, right middle cerebral artery; repeat head CT showed hemorrhagic transformation, stable on repeat imaging. neuro following; PMHx: CHF, DM, GI bleed, a-fib  Referring Practitioner: MD Yaritza  Subjective  Subjective: Pt supine in bed, lethargic, pt not responding to questions  Pain Screening  Patient Currently in Pain: No       Orientation  Orientation  Overall Orientation Status: (Unable to assess)    Objective   Bed mobility  Supine to Sit: 2 Person assistance;Dependent/Total  Sit to Supine: 2 Person assistance;Dependent/Total  Scootin Person assistance;Dependent/Total     Balance  Comments: Pt sits EOB x 8 mins with dependent assist and min assist of another. Pt with lean to the L.       Comment: Pt positioned to comfort in bed    AM-PAC Score  AM-PAC Inpatient Mobility Raw Score : 6 (02/10/21 1223)  AM-PAC Inpatient T-Scale Score : 23.55 (02/10/21 1223)  Mobility Inpatient CMS 0-100% Score: 100 (02/10/21 1223)  Mobility Inpatient CMS G-Code Modifier : CN (02/10/21 1223)    Goals  Short term goals  Time Frame for Short term goals: discharge  Short term goal 1: Pt will transfer supine <--> sit with mod A x 2 ongoing  Short term goal 2: Pt will transfer sit <--> stand with mod A x 2   ongoing  Short term goal 3: Pt will sit EOB x 2 min with CGA or better   ongoing Short term goal 4: Pt will demo static stance x 10 sec with min A x 1 or better   ongoing  Patient Goals   Patient goals : pt unable to state    Plan    Plan  Times per week: 5-7  Current Treatment Recommendations: Strengthening, Transfer Training, Endurance Training, Patient/Caregiver Education & Training, Balance Training, Functional Mobility Training, Gait Training, Home Exercise Program, Equipment Evaluation, Education, & procurement  Safety Devices  Type of devices: Call light within reach, Nurse notified, Left in bed, Bed alarm in place     Therapy Time   Individual Concurrent Group Co-treatment   Time In 1040         Time Out 1112         Minutes 32           Timed Code Treatment Minutes:   32    Total Treatment Minutes:  [facility],

## 2021-02-10 NOTE — CONSULTS
NUTRITION ASSESSMENT  Admission Date: 2/2/2021     Type and Reason for Visit: Consult, Initial    NUTRITION RECOMMENDATIONS:   1. Recommend EN formula Standard Formula with Fiber  Jevity 1.2  @ goal rate 55 ml/hr to provide 1320 total volume, 1584 kcal, 73 g protein and 1065 ml free water. Initiate EN @25mL/hr and as tolerated, increase by 25 mL/hr q 4 hours until goal of 55 mL/hr is met. 2. Recommend 110 water bolus every hour or 400 ml every 4 hours d/t hyponatremia. 3. Continue NPO per SLP     NUTRITION ASSESSMENT:  RD consult for TF ordering & Mgt. Pt admitted d/t R MCA M2 occlusion s/p thrombectomy and thrombolysis. Pt has now been NPO x 7 days sine admit d/t Oral-pharyngeal Dysphagia per SLP eval not appropriate for PO diet. She is now s/p PEG placement 2/9. RD providing EN rec'd to start with close monitor of pt tolerance to meet nutrition needs. PMH significant for HF, DM, asthma, pulm HTN (not oxygen dependent), HTN, HLD, EDDY on cpap, breast cancer s/p lumpectomy/radiation, colon polyps, obesity    MALNUTRITION ASSESSMENT  Context of Malnutrition: Acute Illness   Malnutrition Status: At risk for malnutrition (Comment)  Findings of the 6 clinical characteristics of malnutrition (Minimum of 2 out of 6 clinical characteristics is required to make the diagnosis of moderate or severe Protein Calorie Malnutrition based on AND/ASPEN Guidelines):  Energy Intake: Less than/equal to 50% of estimated energy requirements    Energy Intake Time: Greater than or equal to 7 days    Weight Loss %: Unable to assess    Weight loss Time: Unable to assess   Due to current CDC guidelines recommending 6-ft distancing for social isolation for COVID19 prevention, physical aspects of the malnutrition assessment were withheld at this time.      NUTRITION DIAGNOSIS   Problem: Problem #1: Inadequate oral intake  Etiology: Cognitive or neurological impairment Signs & Symptoms: NPO status due to medical condition and Nutrition [address] and/or Nutrient Delivery:Continue NPO or Start Tube Feeding   Nutrition education/counseling/coordination of care: Continue Inpatient Monitoring  or Speech Therapy    NUTRITION MONITORING & EVALUATION:  Evaluation:Goals set   Goals:Goals: pt will tolerate EN at goal rate to meet 100% of nutrition needs. Monitoring: TF Intake  or TF Tolerance      OBJECTIVE DATA:  · Nutrition-Focused Physical Findings: lbm; aphasic; RD notes A1c 5.2 on 2/3 and lispro/insulin not given last several days  · Wounds None      Past Medical History:   Diagnosis Date    Acute GI bleeding     recent admission 1/29/2021    Atrial fibrillation ([facility] [address]     Xarelto    Systolic CHF (HCC)     Type 2 diabetes mellitus (HCC)         ANTHROPOMETRICS  Current Height: 5' 7\" (170.2 cm)  Current Weight: 218 lb 11.1 oz (99.2 kg)    Admission weight: 225 lb (102.1 kg)  Ideal Bodyweight 135 lb    Usual Bodyweight  No wt hx; pt unable to provide   Weight Changes mirlande d/t limited wt hx       BMI BMI (Calculated): 34.3    Wt Readings from Last 50 Encounters:   02/07/21 218 lb 11.1 oz (99.2 kg)       COMPARATIVE STANDARDS  Estimated Total Kcals/Day : 15-18 Current Bodyweight (99.2 kg) ~2834-3145 kcal    Estimated Total Protein (g/day) : 1.0-1.2 Ideal Bodyweight  (61.4 kg) 61-74 g/day  Estimated Daily Total Fluid (ml/day): 5.3 L needed for water deficit; Food / Nutrition-Related History  Pre-Admission / Home Diet:    none listed  Home Supplements / Herbals:    none noted  Food Restrictions / Cultural Requests:    none noted    Current Nutrition Therapies   Diet NPO, After Midnight Exceptions are:  Other (See Comment)     PO Intake: NPO  PO Supplement: NPO   PO Supplement Intake: NPO  IVF: D5@ 50ml/hr     NUTRITION RISK LEVEL: Risk Level: [address] [place], [place], [place]  [place]:  [phone]  Office:  [phone]

## 2021-02-10 NOTE — PROGRESS NOTES
Palliative Care Chart Review  and Check in Note:     NAME:  Radames Riedel  Admit Date: 2/2/2021  Hospital Day:  Hospital Day: 9   Current Code status: Full Code    Palliative care is continuing to following Ms. Canales for symptom management,  and goals of care discussion as needed. Patient's chart reviewed today 2/10/21. D/w Dr. Katia Montelongo. Saw pt at the bedside, she's more lethargic today. Called to speak with pt's daughter Renaldo Christianson, provided brief updates. She remains hopeful that the pt will improve and be able to go to ARU. The following are the currently established goals/code status, and Symptom management.      Goals of care: Continue with current management    Code status: Full Code    Discharge plan: possible ARU when medically ready for discharge      [address] [place] [address]  [phone]

## 2021-02-10 NOTE — PROGRESS NOTES
[address] [place] [address]  GI Progress Note          Tunde Saul is a 79 y.o. female patient. 1. Acute right MCA stroke ([address]        Admit Date: 2/2/2021    Subjective:       No acute events overnight. Pt seen at bedside resting comfortably. Unable to obtain ROS.     Scheduled Meds:   miconazole   Topical BID    levetiracetam  500 mg Intravenous Q12H    ferrous sulfate  325 mg Oral Daily with breakfast    pantoprazole  40 mg Intravenous BID    atorvastatin  80 mg Oral Nightly    carvedilol  6.25 mg Oral BID WC    insulin lispro  0-6 Units Subcutaneous TID WC    insulin lispro  0-3 Units Subcutaneous Nightly       Continuous Infusions:   dextrose         PRN Meds:  glucose, dextrose, glucagon (rDNA), dextrose, perflutren lipid microspheres, labetalol, albuterol sulfate HFA, hydrALAZINE, acetaminophen, promethazine **OR** ondansetron, polyethylene glycol      Objective:       Patient Vitals for the past 24 hrs:   BP Temp Temp src Pulse Resp SpO2   02/10/21 0636 (!) 144/85 98.7 °F (37.1 °C) Axillary 97 18 96 %   02/10/21 0247 132/76 99.6 °F (37.6 °C) Axillary 93 18 96 %   02/09/21 2312 121/70 98.4 °F (36.9 °C) Axillary 74 18 99 %   02/09/21 1915 (!) 146/67 98.6 °F (37 °C) Axillary 88 18 99 %   02/09/21 1909      99 %   02/09/21 1630 (!) 176/88 98.3 °F (36.8 °C) Axillary 72 18 99 %   02/09/21 1604 (!) 180/89   70 20 (!) 88 %   02/09/21 1547 (!) 174/89   96 20    02/09/21 1537 (!) 163/83   85 20 100 %   02/09/21 1403    73 18 100 %   02/09/21 1252 (!) 179/95 98.2 °F (36.8 °C) Axillary 79 24 94 %   02/09/21 1146 (!) 166/89   82 27 96 %   02/09/21 1100 (!) 182/91   90 22    02/09/21 1000 (!) 154/74   73 18 94 %       Exam:  VITALS:  BP (!) 144/85   Pulse 97   Temp 98.7 °F (37.1 °C) (Axillary)   Resp 18   Ht 5' 7\" (1.702 m)   Wt 218 lb 11.1 oz (99.2 kg)   SpO2 96%   BMI 34.25 kg/m² TEMPERATURE:  Current - Temp: 98.7 °F (37.1 °C); Max - Temp  Av.6 °F (37 °C)  Min: 98.2 °F (36.8 °C)  Max: 99.6 °F (37.6 °C)    NAD  General appearance: lethargic, cooperative and no distress  Head: Normocephalic, without obvious abnormality, atraumatic  Neck: supple, symmetrical, trachea midline and thyroid not enlarged, symmetric, no tenderness/mass/nodules  CVS:  irregularly irregular, Nl s1s2  Lungs CTA Bilaterally, normal effort  Abdomen: soft, non-tender; bowel sounds normal; no masses,  no organomegaly; PEG tube overlying LUQ with no surrounding erythema or drainage  AAOx3, No asterixis or encephalopathy  Extremities: No edema. Recent Labs     21  0545 21  0502 02/10/21  0637   WBC 6.0 8.5 9.0   HGB 8.2* 8.8* 9.0*   HCT 27.9* 30.8* 30.3*   MCV 83.6 85.9 83.5    185 205     Recent Labs     21  0545 21  0502 02/10/21  0637   * 152* 155*   K 3.7 3.8 3.9    110 113*   CO2 33* 33* 32   BUN 18 18 18   CREATININE 0.6 0.6 0.6     No results for input(s): AST, ALT, ALB, BILIDIR, BILITOT, ALKPHOS in the last 72 hours. No results for input(s): LIPASE, AMYLASE in the last 72 hours. Recent Labs     21  0502   INR 1.15*     No results for input(s): PTT in the last 72 hours. No results for input(s): OCCULTBLD in the last 72 hours. Assessment:       Active Problems:    Acute cerebrovascular accident (CVA) ([address]    Permanent atrial fibrillation (HCC)    Acute gastric ulcer with hemorrhage    Other pulmonary embolism without acute cor pulmonale (HCC)    Acute right MCA stroke (HCC)    Hypoxemia  Resolved Problems:    * No resolved hospital problems.  *      Dysphagia 2/2 to R MCA CVA s/p thrombectomy and thrombolysis    Recommendations:       Dysphagia 2/2 CVA  - PEG tube placed 21 (site CDI)  - start TF as per dietary  - Hold anticoagulation for AF for 2-3 wks per NSx    Normocytic anemia Iron studies indicative of AOCD but may also have a component of DAKOTA.   - change protonix 40mg to qD  - recommend FeSO4 everyother day     Rebecca Matt MD  PGY-3  2/10/2021  9:21 AM

## 2021-02-10 NOTE — CONSULTS
Nephrology Consult Note                                                                                                                                                                                                                                                                                                                                                               Office : [phone]     Fax :[phone]              Patient's Name: Abel Oviedo  2/10/2021    Reason for Consult:  Hypernatremia   Requesting Physician:  No primary care provider on file. Chief Complaint: CVA    History of Present Ilness:    Rolando Canales is a 79 y.o. adult, with a history of afib, HF, DM, asthma, pulm HTN (not oxygen dependent), HTN, HLD, EDDY on cpap, breast cancer s/p lumpectomy/radiation, colon polyps, obesity who presented with left-sided hemiplegia, severe expressive aphasia and dysarthria, right facial droop, and right-gaze deviation. She underwent M2 thrombectomy and thrombolysis with new new large acute parenchymal hemorrhage in the right cerebral hemisphere post TPA. Poor po intake   Na > 155     Past Medical History:   Diagnosis Date    Acute GI bleeding     recent admission 1/29/2021    Atrial fibrillation ([address]     Xarelto    Systolic CHF ([address]     Type 2 diabetes mellitus ([address]        Past Surgical History:   Procedure Laterality Date    GASTROSTOMY TUBE PLACEMENT N/A 2/9/2021    EGD PEG TUBE PLACEMENT performed by Narendra Suresh MD at [facility] IR IVC FILTER PLACEMENT W IMAGING  2/4/2021    IR IVC FILTER PLACEMENT W IMAGING 2/4/2021 [facility] SPECIAL PROCEDURES    UPPER GASTROINTESTINAL ENDOSCOPY  01/29/2021    Dr Tina Cates       No family history on file. reports that she has quit smoking. She does not have any smokeless tobacco history on file. Allergies:  Patient has no known allergies.     Current Medications:        pantoprazole (PROTONIX) injection 40 mg, Daily   [START ON 2/11/2021] ferrous sulfate 300 (60 Fe) MG/5ML syrup 300 mg, Every Other Day      glucose (GLUTOSE) 40 % oral gel 15 g, PRN      dextrose 50 % IV solution, PRN      glucagon (rDNA) injection 1 mg, PRN      dextrose 5 % solution, PRN      miconazole (MICOTIN) 2 % powder, BID      levETIRAcetam (KEPPRA) 500 mg in sodium chloride 0.9 % 100 mL IVPB, Q12H      perflutren lipid microspheres (DEFINITY) injection 1.65 mg, ONCE PRN      labetalol (NORMODYNE;TRANDATE) injection 10 mg, Q4H PRN      albuterol sulfate  (90 Base) MCG/ACT inhaler 2 puff, PRN      hydrALAZINE (APRESOLINE) injection 5 mg, Q6H PRN      acetaminophen (TYLENOL) tablet 650 mg, Q4H PRN      promethazine (PHENERGAN) tablet 12.5 mg, Q6H PRN    Or      ondansetron (ZOFRAN) injection 4 mg, Q6H PRN      polyethylene glycol (GLYCOLAX) packet 17 g, Daily PRN      atorvastatin (LIPITOR) tablet 80 mg, Nightly      carvedilol (COREG) tablet 6.25 mg, BID WC      insulin lispro (1 Unit Dial) 0-6 Units, TID WC      insulin lispro (1 Unit Dial) 0-3 Units, Nightly        Review of Systems:   Lethargic       Physical exam:     Vitals:  /77   Pulse 97   Temp 98.3 °F (36.8 °C) (Oral)   Resp 22   Ht 5' 7\" (1.702 m)   Wt 218 lb 11.1 oz (99.2 kg)   SpO2 100%   BMI 34.25 kg/m²   Constitutional:  Lethargic   Skin: no rash, turgor wnl  Heent:  eomi, mmm  Neck: no bruits or jvd noted  Cardiovascular:  S1, S2 without m/r/g  Respiratory: CTA B without w/r/r  Abdomen:  +bs, soft, nt, nd  Ext: no lower extremity edema  Psychiatric: mood and affect appropriate  Musculoskeletal:  Rom, muscular strength dec     Data:   Labs:  CBC:   Recent Labs     02/08/21  0545 02/09/21  0502 02/10/21  0637   WBC 6.0 8.5 9.0   HGB 8.2* 8.8* 9.0*    185 205     BMP:    Recent Labs     02/08/21  0545 02/09/21  0502 02/10/21  0637   * 152* 155*   K 3.7 3.8 3.9    110 113*   CO2 33* 33* 32   BUN 18 18 18   CREATININE 0.6 0.6 0.6 GLUCOSE 109* 105* 120*     Ca/Mg/Phos:   Recent Labs     02/08/21  0545 02/09/21  0502 02/10/21  0637   CALCIUM 9.4 9.4 9.2     Hepatic: No results for input(s): AST, ALT, ALB, BILITOT, ALKPHOS in the last 72 hours. Troponin: No results for input(s): TROPONINI in the last 72 hours. BNP: No results for input(s): BNP in the last 72 hours. Lipids: No results for input(s): CHOL, TRIG, HDL, LDLCALC, LABVLDL in the last 72 hours. ABGs: No results for input(s): PHART, PO2ART, OFK2HCN in the last 72 hours. INR:   Recent Labs     02/09/21  0502   INR 1.15*     UA:  Recent Labs     02/10/21  1254   COLORU Yellow   CLARITYU Clear   GLUCOSEU Negative   BILIRUBINUR MODERATE*   KETUA 40*   SPECGRAV >=1.030   BLOODU SMALL*   PHUR 6.0   PROTEINU 30*   UROBILINOGEN 0.2   NITRU POSITIVE*   LEUKOCYTESUR Negative   LABMICR YES   URINETYPE NotGiven      Urine Microscopic:   Recent Labs     02/10/21  1254   BACTERIA 3+*   WBCUA 3-5   RBCUA 0-2   EPIU 2-5     Urine Culture: No results for input(s): LABURIN in the last 72 hours. Urine Chemistry:   Recent Labs     02/10/21  1254   NAUR 23             IMAGING:  CT HEAD WO CONTRAST   Final Result   Interval progression of vasogenic edema at the right middle cerebral artery hemorrhagic infarction and interval development of right to left midline shift of about 3 mm as described above. Apparent slight decrease in the size of hemorrhage in its AP dimension. XR CHEST 1 VIEW   Final Result   1. Interval extubation. 2. Progression of bilateral airspace disease.       MRI BRAIN WO CONTRAST   Final Result      Region of right middle cerebral artery hemorrhagic infarction measures approximately 5.0 x 4.2 cm with surrounding mild vasogenic edema and diffusion restriction consistent with right middle cerebral artery distribution infarct with hemorrhagic    components No additional areas of hemorrhage or infarction identified. Mild local mass effect is identified with very minimal midline shift of 3 to 4 mm      XR CHEST PORTABLE   Final Result      Persistent bilateral infiltrates and cardiomegaly. IR GUIDED IVC FILTER PLACEMENT   Final Result   Impression:      Successful placement of retrievable IVC filter. CT HEAD WO CONTRAST   Final Result      1. Hemorrhagic transformation of right MCA infarct, without significant change. No new hemorrhage. CT head without contrast   Final Result         1. Stable large intraparenchymal hemorrhage located within the right posterior temporal parietal lobe with extension into the adjacent insula. There is also subarachnoid extension of hemorrhage into the overlying sulci as well as the right sylvian    fissure. Findings result in some mild right to left subfalcine herniation. 2.There is a 1.3 x 1.1 cm extra-axial mass identified within the left ordered aspect of the foramen magnum. This results in some mass effect on the adjacent medulla. This is without change from the prior study. Finding may represent a meningioma. This    could be further evaluated with a contrast-enhanced MRI of the head following resolution of the intracranial hemorrhage. CT HEAD WO CONTRAST   Final Result      1. Stable large intraparenchymal hemorrhage located within the right posterior temporal parietal lobe with extension into the adjacent insula. There is also subarachnoid extension of hemorrhage into the overlying sulci as well as the right sylvian    fissure. Findings result in some mild right to left subfalcine herniation. 2.There is a 1.3 x 1.1 cm extra-axial mass identified within the left ordered aspect of the foramen magnum. This results in some mass effect on the adjacent medulla. This is without change from the prior study. Finding may represent a meningioma.  This could be further evaluated with a contrast-enhanced MRI of the head following resolution of the intracranial hemorrhage. VL Extremity Venous Bilateral   Final Result      CT HEAD WO CONTRAST   Final Result      Post therapy large acute parenchymal hemorrhage in the right cerebral hemisphere with subarachnoid component. Extensive surrounding edema is associated with 4 cm leftward shift of midline structures. Critical finding of acute intracranial hemorrhage was called to Haim Cole of the neurosurgery department at 12:25 PM on 2/3/2021, with instructions to contact patient's attending physician with these results. CT Brain Perfusion   Final Result      1. Hypoperfusion in the right MCA territory with a central ischemic core of 8 mL, total volume of hypoperfusion of 79 mL and a penumbra of 71 mL. XR CHEST PORTABLE   Final Result   1. Limited evaluation because of patient rotation and underpenetration of the film. 2. No dense airspace consolidation. 3. Probable mild cardiomegaly. CTA HEAD NECK W CONTRAST   Final Result   1. Carotid and vertebral arteries are patent and without stenosis or acute abnormality. 2. Incidental partial imaging of the a segmental pulmonary embolism in the right middle lobe. 3. Partial imaging of small right greater than left pleural effusions. 4. Previous left suboccipital craniotomy with a residual 1.6 cm left-sided extra-axial mass at the foramen magnum with mass effect on the cord at the cervical medullary junction favored to represent meningioma. Recommend correlation with clinical history    and previous imaging. 5. Incidental multinodular goiter. CTA HEAD:      FINDINGS: The internal carotid arteries are patent and normal in caliber through the skull base. The middle cerebral arteries are patent.  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 The A2 and A3 segments of the ACAs are patent bilaterally. Left MCA is patent. There is focal occlusion of an M2 branch of the right MCA (series 2 images 427 and 428). However, other M2 branches and M3 branches in the right sylvian fissure are patent. The M1 segment of the right MCA is patent. The posterior cerebral arteries are    patent bilaterally. The basilar artery is patent and normal in caliber. It is supplied by the left vertebral artery. The small nondominant right vertebral artery terminates in right sided PICA. IMPRESSION:   1. 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 2. 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 Results were discussed with Dr. Jen Mcnally at 8:00 PM on 2/2/2021. CT HEAD WO CONTRAST   Final Result   1. Previous left suboccipital craniotomy with a partially calcified extra-axial mass at the left foramen magnum measuring 1.8 cm contacting the cord at the cervicomedullary junction with some degree of mass effect on the cord at the foramen magnum. This    most likely represents residual/recurrent meningioma or other extra-axial mass. Correlation with patient history and previous imaging recommended. 2. Mild global volume loss and mild chronic small vessel ischemic disease in the white matter. 3. No acute intracranial hemorrhage. IR ANGIOGRAM CAROTID CEREBRAL RIGHT    (Results Pending)       Assessment/Plan   1. Hypernatremia     2. CVA     3. Anemia    4. Acid- base/ Electrolyte imbalance     5.  Malnutrition     Plan   - replace free water   - Tube feeds   - Ur studies  - monitor NA   - CVA management   - PEG tube placed   - labs in am   - d/w Dr Joey Ornelas                 Thank you for allowing us to participate in care of Zita Canales       [address] [facility] free to contact me [facility] of [address] [place] [address] [address]  Office : [phone]  Fax :[phone]

## 2021-02-10 NOTE — PROGRESS NOTES
has a past medical history of Acute GI bleeding, Atrial fibrillation ([address] Systolic CHF ([address] [facility] [address] and Type 2 diabetes mellitus ([address] has a past surgical history that includes Upper gastrointestinal endoscopy (01/29/2021); IR GUIDED IVC FILTER PLACEMENT (2/4/2021); and Gastrostomy tube placement (N/A, 2/9/2021). Restrictions  Position Activity Restriction  Other position/activity restrictions: fall precautions, no activity restrictions noted  Subjective   General  Chart Reviewed: Yes  Patient assessed for rehabilitation services?: Yes  Additional Pertinent Hx: Maddie Thorpe is a 79 y.o. adult, with a history of afib, HF, DM, asthma, pulm HTN (not oxygen dependent), HTN, HLD, EDDY on cpap, breast cancer s/p lumpectomy/radiation, colon polyps, obesity who presented with left-sided hemiplegia, severe expressive aphasia and dysarthria, right facial droop, and right-gaze deviation. CT (+) 1. 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 2. 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 Pt was discharged from [facility] 2 days ago where she was treated for acute GI bleed  Family / Caregiver Present: No  Referring Practitioner: Simon Vigil MD  Diagnosis: acute CVA  Subjective  Subjective: Pt in bed with eyes closed.   General Comment  Comments: call daughter following therapy session with update: Florin Cyr - [phone] ; daughter updated via phone call 2/9      Orientation  Orientation  Overall Orientation Status: (pt is nonverbal)  Orientation Level: Unable to assess  Objective    ADL  Grooming: Dependent/Total(wiping face with cloth)        Balance  Sitting Balance: Dependent/Total(dep of 1, Luc of another-pt has severe poster and L sided lean)  Bed mobility  Rolling to Left: Dependent/Total(dep of 2)  Rolling to Right: Dependent/Total(dep of 2)  Supine to Sit: Dependent/Total(dep of 2)  Sit to Supine: Dependent/Total(dep of 2)  Scooting: Dependent/Total(dep of 2)                    Cognition Arousal/Alertness: Unresponsive to stimuli  Following Commands: Does not follow commands  Cognition Comment: Pt kept eyes closed, did not respond to any physical or verbal stimulation. Pt did not follow any commands. She groaned at times, during rolling/repositioning in bed. Plan   Plan  Times per week: 5-7x  Times per day: Daily  Current Treatment Recommendations: Neuromuscular Re-education, Functional Mobility Training, Endurance Training, Cognitive Reorientation, Self-Care / ADL, Safety Education & Training, ROM, Positioning, Strengthening, Balance Training, Patient/Caregiver Education & Training  G-Code     OutComes Score                                                  AM-PAC Score        AM-PAC Inpatient Daily Activity Raw Score: 6 (02/10/21 1116)  AM-PAC Inpatient ADL T-Scale Score : 17.07 (02/10/21 1116)  ADL Inpatient CMS 0-100% Score: 100 (02/10/21 1116)  ADL Inpatient CMS G-Code Modifier : CN (02/10/21 1116)    Goals  Short term goals  Time Frame for Short term goals: by d/c  Short term goal 1: Pt will perform supine to sit transfer with min A x 2- 2/10 not appropriate to attempt  Short term goal 2: Pt will perform rolling to left<>right side with min A- 2/10 goal not met  Short term goal 3: Pt will perform sit-stand transfer with mod A x 2-2/10 goal not attempted, not appropriate to attempt  Short term goal 4: Pt will open up eyes to display engagement following 3 verbal cues- goal met 2/9.  New goal: Pt will keep eyes open for >30 seconds during ADL grooming task with maximal verbal cues-2/10 goal not met  Patient Goals   Patient goals : not stated       Therapy Time   Individual Concurrent Group Co-treatment   Time In 1040         Time Out 1112         Minutes 32           Timed Code Treatment Minutes:   32    Total Treatment Minutes:  [address], OTR/L [address]

## 2021-02-10 NOTE — PROGRESS NOTES
Patient is not alert or oriented at this time, patient is very drowsy since her procedure yesterday. Patients vital signs are stable. Patient is voiding adequately. Patient does move right arm at times. Patient is resting in bed at this time with all fall precautions in place. Will continue to monitor.

## 2021-02-10 NOTE — PLAN OF CARE
Problem: HEMODYNAMIC STATUS  Goal: Patient has stable vital signs and fluid balance  Outcome: Ongoing     Problem: COMMUNICATION IMPAIRMENT  Goal: Ability to express needs and understand communication  Outcome: Ongoing     Problem: Falls - Risk of:  Goal: Will remain free from falls  Description: Will remain free from falls  Outcome: Ongoing     Problem: Injury - Risk of, Physical Injury:  Goal: Will remain free from falls  Description: Will remain free from falls  Outcome: Ongoing

## 2021-02-10 NOTE — PROGRESS NOTES
Hospitalist Progress Note      PCP: No primary care provider on file. Date of Admission: 2/2/2021    Chief Complaint:     Chief Complaint   Patient presents with    Cerebrovascular Accident       Subjective:  Patient still unresponsive. Moans to her sternal rub. Does not follow commands. Withdraws to pain in all extremities.     PFHS: reviewed as documented 2/2/2021, no changes    Medications:  Reviewed    Infusion Medications    dextrose      dextrose       Scheduled Medications    pantoprazole  40 mg Intravenous Daily    [START ON 2/11/2021] ferrous sulfate  300 mg Per G Tube Every Other Day    miconazole   Topical BID    levetiracetam  500 mg Intravenous Q12H    atorvastatin  80 mg Oral Nightly    carvedilol  6.25 mg Oral BID WC    insulin lispro  0-6 Units Subcutaneous TID WC    insulin lispro  0-3 Units Subcutaneous Nightly     PRN Meds: glucose, dextrose, glucagon (rDNA), dextrose, perflutren lipid microspheres, labetalol, albuterol sulfate HFA, hydrALAZINE, acetaminophen, promethazine **OR** ondansetron, polyethylene glycol      Intake/Output Summary (Last 24 hours) at 2/10/2021 1307  Last data filed at 2/10/2021 1251  Gross per 24 hour   Intake 210 ml   Output 1550 ml   Net -1340 ml       Physical Exam    BP (!) 149/77   Pulse 96   Temp 98.7 °F (37.1 °C) (Axillary)   Resp 24   Ht 5' 7\" (1.702 m)   Wt 218 lb 11.1 oz (99.2 kg)   SpO2 96%   BMI 34.25 kg/m²     General appearance: Unresponsive  Eyes: Pupils equal, round, reactive to light, conjunctiva/corneas clear  Ears/Nose/Mouth/Throat: No external lesions or scars, hearing intact to voice  Neck: Trachea midline, no masses noted, no thyromegaly  Respiratory:  Non-labored breathing, clear to auscultation bilaterally  Cardiovascular: Regular rate and rhythm, no murmurs, gallops, or rubs  Abdomen: soft, non-tender, non-distended  Musculoskeletal: Warm, well perfused, no cyanosis or edema  Skin: normal color, no wounds noted Neurological: Unresponsive, moans to painful stimuli. Withdraws to pain in all extremities. Labs:   Recent Labs     02/08/21  0545 02/09/21  0502 02/10/21  0637   WBC 6.0 8.5 9.0   HGB 8.2* 8.8* 9.0*   HCT 27.9* 30.8* 30.3*    185 205     Recent Labs     02/08/21  0545 02/09/21  0502 02/10/21  0637   * 152* 155*   K 3.7 3.8 3.9    110 113*   CO2 33* 33* 32   BUN 18 18 18   CREATININE 0.6 0.6 0.6   CALCIUM 9.4 9.4 9.2     No results for input(s): AST, ALT, BILIDIR, BILITOT, ALKPHOS in the last 72 hours. Recent Labs     02/09/21  0502   INR 1.15*     No results for input(s): Maryl Peek in the last 72 hours. Urinalysis:    No results found for: Ferny Belarusian, BACTERIA, RBCUA, BLOODU, SPECGRAV, [address]    Radiology:  XR CHEST 1 VIEW   Final Result   1. Interval extubation. 2. Progression of bilateral airspace disease. MRI BRAIN WO CONTRAST   Final Result      Region of right middle cerebral artery hemorrhagic infarction measures approximately 5.0 x 4.2 cm with surrounding mild vasogenic edema and diffusion restriction consistent with right middle cerebral artery distribution infarct with hemorrhagic    components      No additional areas of hemorrhage or infarction identified. Mild local mass effect is identified with very minimal midline shift of 3 to 4 mm      XR CHEST PORTABLE   Final Result      Persistent bilateral infiltrates and cardiomegaly. IR GUIDED IVC FILTER PLACEMENT   Final Result   Impression:      Successful placement of retrievable IVC filter. CT HEAD WO CONTRAST   Final Result      1. Hemorrhagic transformation of right MCA infarct, without significant change. No new hemorrhage.       CT head without contrast   Final Result 1. Stable large intraparenchymal hemorrhage located within the right posterior temporal parietal lobe with extension into the adjacent insula. There is also subarachnoid extension of hemorrhage into the overlying sulci as well as the right sylvian    fissure. Findings result in some mild right to left subfalcine herniation. 2.There is a 1.3 x 1.1 cm extra-axial mass identified within the left ordered aspect of the foramen magnum. This results in some mass effect on the adjacent medulla. This is without change from the prior study. Finding may represent a meningioma. This    could be further evaluated with a contrast-enhanced MRI of the head following resolution of the intracranial hemorrhage. CT HEAD WO CONTRAST   Final Result      1. Stable large intraparenchymal hemorrhage located within the right posterior temporal parietal lobe with extension into the adjacent insula. There is also subarachnoid extension of hemorrhage into the overlying sulci as well as the right sylvian    fissure. Findings result in some mild right to left subfalcine herniation. 2.There is a 1.3 x 1.1 cm extra-axial mass identified within the left ordered aspect of the foramen magnum. This results in some mass effect on the adjacent medulla. This is without change from the prior study. Finding may represent a meningioma. This    could be further evaluated with a contrast-enhanced MRI of the head following resolution of the intracranial hemorrhage. VL Extremity Venous Bilateral   Final Result      CT HEAD WO CONTRAST   Final Result      Post therapy large acute parenchymal hemorrhage in the right cerebral hemisphere with subarachnoid component. Extensive surrounding edema is associated with 4 cm leftward shift of midline structures. Critical finding of acute intracranial hemorrhage was called to Geoff Ochoa of the neurosurgery department at 12:25 PM on 2/3/2021, with instructions to contact patient's attending physician with these results. CT Brain Perfusion   Final Result      1. Hypoperfusion in the right MCA territory with a central ischemic core of 8 mL, total volume of hypoperfusion of 79 mL and a penumbra of 71 mL. XR CHEST PORTABLE   Final Result   1. Limited evaluation because of patient rotation and underpenetration of the film. 2. No dense airspace consolidation. 3. Probable mild cardiomegaly. CTA HEAD NECK W CONTRAST   Final Result   1. Carotid and vertebral arteries are patent and without stenosis or acute abnormality. 2. Incidental partial imaging of the a segmental pulmonary embolism in the right middle lobe. 3. Partial imaging of small right greater than left pleural effusions. 4. Previous left suboccipital craniotomy with a residual 1.6 cm left-sided extra-axial mass at the foramen magnum with mass effect on the cord at the cervical medullary junction favored to represent meningioma. Recommend correlation with clinical history    and previous imaging. 5. Incidental multinodular goiter. CTA HEAD:      FINDINGS: The internal carotid arteries are patent and normal in caliber through the skull base. The middle cerebral arteries are patent. 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 The A2 and A3 segments of the ACAs are patent bilaterally. Left MCA is patent. There is focal occlusion of an M2 branch of the right MCA (series 2 images 427 and 428). However, other M2 branches and M3 branches in the right sylvian fissure are patent. The M1 segment of the right MCA is patent. The posterior cerebral arteries are    patent bilaterally. The basilar artery is patent and normal in caliber. It is supplied by the left vertebral artery. The small nondominant right vertebral artery terminates in right sided PICA. IMPRESSION:   1. 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 2. 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 Results were discussed with Dr. Joe Morales at 8:00 PM on 2/2/2021. CT HEAD WO CONTRAST   Final Result   1. Previous left suboccipital craniotomy with a partially calcified extra-axial mass at the left foramen magnum measuring 1.8 cm contacting the cord at the cervicomedullary junction with some degree of mass effect on the cord at the foramen magnum. This    most likely represents residual/recurrent meningioma or other extra-axial mass. Correlation with patient history and previous imaging recommended. 2. Mild global volume loss and mild chronic small vessel ischemic disease in the white matter. 3. No acute intracranial hemorrhage.       IR ANGIOGRAM CAROTID CEREBRAL RIGHT    (Results Pending)   CT HEAD WO CONTRAST    (Results Pending)       Assessment/Plan:    Active Hospital Problems    Diagnosis Date Noted    Hypoxemia [R09.02] 02/06/2021    Permanent atrial fibrillation ([address] [I48.21] 02/03/2021    Acute gastric ulcer with hemorrhage [K25.0] 02/03/2021    Other pulmonary embolism without acute cor pulmonale (HCC) [I26.99] 02/03/2021    Acute right MCA stroke ([address] [I63.511]     Acute cerebrovascular accident (CVA) ([address] [I63.9] 02/02/2021       Plan:    # R MCA stroke, s/p thrombectomy and thrombolysis  -CT 2/4: new large acute parenchymal hemorrhage, repeat stable  -MRI w/ findings as above  -Frequent neuro checks

## 2021-02-10 NOTE — CARE COORDINATION
Patient here from home. TARIQ spoke with [facility] [phone]. Can accept patient. TARIQ placed follow up call to [facility] to verify a possible admission today if needed. VM left. TARIQ placed call to daughter Dom Abreu [phone]. Dom Abreu stated she had spoken to family and was wanting placement at [facility]. [facility] had also accepted as well. TARIQ spoke with C[facility] [phone]. Doris Barahona stated patient would be able to admit tomorrow. Will have a bed available. Per Doris Barahona, does not need any additional Covid testing.   Georgina Barr RN  RN Case Manager  [phone]

## 2021-02-10 NOTE — PROGRESS NOTES
Head of bed elevated , tube feed , aspirates zero , q 2 hours 5520 no changes pt status , pt still lethargic , does move right arm spontaneously, tube feed infusing , receiving water boluses with feeding , see cat scan , no real change from impression :  dr gregory will monitor

## 2021-02-11 LAB
ANION GAP SERPL CALCULATED.3IONS-SCNC: 4 MMOL/L (ref 3–16)
BACTERIA: ABNORMAL /HPF
BASOPHILS ABSOLUTE: 0 K/UL (ref 0–0.2)
BASOPHILS RELATIVE PERCENT: 0.3 %
BILIRUBIN URINE: ABNORMAL
BLOOD, URINE: NEGATIVE
BUN BLDV-MCNC: 17 MG/DL (ref 7–20)
CALCIUM SERPL-MCNC: 9.3 MG/DL (ref 8.3–10.6)
CHLORIDE BLD-SCNC: 108 MMOL/L (ref 99–110)
CLARITY: CLEAR
CO2: 35 MMOL/L (ref 21–32)
COLOR: YELLOW
CREAT SERPL-MCNC: 0.6 MG/DL (ref 0.6–1.2)
EOSINOPHILS ABSOLUTE: 0.1 K/UL (ref 0–0.6)
EOSINOPHILS RELATIVE PERCENT: 0.8 %
EPITHELIAL CELLS, UA: ABNORMAL /HPF (ref 0–5)
GFR AFRICAN AMERICAN: >60
GFR NON-AFRICAN AMERICAN: >60
GLUCOSE BLD-MCNC: 167 MG/DL (ref 70–99)
GLUCOSE BLD-MCNC: 175 MG/DL (ref 70–99)
GLUCOSE BLD-MCNC: 177 MG/DL (ref 70–99)
GLUCOSE BLD-MCNC: 189 MG/DL (ref 70–99)
GLUCOSE BLD-MCNC: 206 MG/DL (ref 70–99)
GLUCOSE URINE: NEGATIVE MG/DL
HCT VFR BLD CALC: 30.2 % (ref 36–48)
HEMOGLOBIN: 9 G/DL (ref 12–16)
KETONES, URINE: ABNORMAL MG/DL
LEUKOCYTE ESTERASE, URINE: NEGATIVE
LYMPHOCYTES ABSOLUTE: 0.7 K/UL (ref 1–5.1)
LYMPHOCYTES RELATIVE PERCENT: 7.3 %
MCH RBC QN AUTO: 24.8 PG (ref 26–34)
MCHC RBC AUTO-ENTMCNC: 29.6 G/DL (ref 31–36)
MCV RBC AUTO: 83.6 FL (ref 80–100)
MICROSCOPIC EXAMINATION: YES
MONOCYTES ABSOLUTE: 0.6 K/UL (ref 0–1.3)
MONOCYTES RELATIVE PERCENT: 5.9 %
NEUTROPHILS ABSOLUTE: 8.6 K/UL (ref 1.7–7.7)
NEUTROPHILS RELATIVE PERCENT: 85.7 %
NITRITE, URINE: NEGATIVE
OSMOLALITY URINE: 957 MOSM/KG (ref 390–1070)
PDW BLD-RTO: 24.8 % (ref 12.4–15.4)
PERFORMED ON: ABNORMAL
PH UA: 6 (ref 5–8)
PLATELET # BLD: 186 K/UL (ref 135–450)
PMV BLD AUTO: 9.8 FL (ref 5–10.5)
POTASSIUM REFLEX MAGNESIUM: 3.8 MMOL/L (ref 3.5–5.1)
PROTEIN UA: 30 MG/DL
RBC # BLD: 3.61 M/UL (ref 4–5.2)
RBC UA: ABNORMAL /HPF (ref 0–4)
SODIUM BLD-SCNC: 147 MMOL/L (ref 136–145)
SPECIFIC GRAVITY UA: 1.02 (ref 1–1.03)
URINE REFLEX TO CULTURE: ABNORMAL
URINE TYPE: ABNORMAL
UROBILINOGEN, URINE: 1 E.U./DL
WBC # BLD: 10 K/UL (ref 4–11)
WBC UA: ABNORMAL /HPF (ref 0–5)

## 2021-02-11 PROCEDURE — 36415 COLL VENOUS BLD VENIPUNCTURE: CPT

## 2021-02-11 PROCEDURE — 85025 COMPLETE CBC W/AUTO DIFF WBC: CPT

## 2021-02-11 PROCEDURE — 95819 EEG AWAKE AND ASLEEP: CPT

## 2021-02-11 PROCEDURE — 95813 EEG EXTND MNTR 61-119 MIN: CPT

## 2021-02-11 PROCEDURE — 2580000003 HC RX 258: Performed by: INTERNAL MEDICINE

## 2021-02-11 PROCEDURE — 6370000000 HC RX 637 (ALT 250 FOR IP): Performed by: COUNSELOR

## 2021-02-11 PROCEDURE — 6370000000 HC RX 637 (ALT 250 FOR IP): Performed by: STUDENT IN AN ORGANIZED HEALTH CARE EDUCATION/TRAINING PROGRAM

## 2021-02-11 PROCEDURE — 6370000000 HC RX 637 (ALT 250 FOR IP): Performed by: INTERNAL MEDICINE

## 2021-02-11 PROCEDURE — 97110 THERAPEUTIC EXERCISES: CPT

## 2021-02-11 PROCEDURE — 80048 BASIC METABOLIC PNL TOTAL CA: CPT

## 2021-02-11 PROCEDURE — 6360000002 HC RX W HCPCS: Performed by: INTERNAL MEDICINE

## 2021-02-11 PROCEDURE — 6360000002 HC RX W HCPCS: Performed by: STUDENT IN AN ORGANIZED HEALTH CARE EDUCATION/TRAINING PROGRAM

## 2021-02-11 PROCEDURE — 51701 INSERT BLADDER CATHETER: CPT

## 2021-02-11 PROCEDURE — 2580000003 HC RX 258: Performed by: NURSE PRACTITIONER

## 2021-02-11 PROCEDURE — 97530 THERAPEUTIC ACTIVITIES: CPT

## 2021-02-11 PROCEDURE — 6360000002 HC RX W HCPCS: Performed by: NURSE PRACTITIONER

## 2021-02-11 PROCEDURE — 97535 SELF CARE MNGMENT TRAINING: CPT

## 2021-02-11 PROCEDURE — 6360000002 HC RX W HCPCS: Performed by: COUNSELOR

## 2021-02-11 PROCEDURE — 81001 URINALYSIS AUTO W/SCOPE: CPT

## 2021-02-11 PROCEDURE — C9113 INJ PANTOPRAZOLE SODIUM, VIA: HCPCS | Performed by: STUDENT IN AN ORGANIZED HEALTH CARE EDUCATION/TRAINING PROGRAM

## 2021-02-11 PROCEDURE — 2060000000 HC ICU INTERMEDIATE R&B

## 2021-02-11 PROCEDURE — 2580000003 HC RX 258: Performed by: COUNSELOR

## 2021-02-11 RX ADMIN — CEFTRIAXONE 1000 MG: 1 INJECTION, POWDER, FOR SOLUTION INTRAMUSCULAR; INTRAVENOUS at 16:57

## 2021-02-11 RX ADMIN — LEVETIRACETAM 500 MG: 100 INJECTION, SOLUTION INTRAVENOUS at 17:41

## 2021-02-11 RX ADMIN — ATORVASTATIN CALCIUM 80 MG: 40 TABLET, FILM COATED ORAL at 21:29

## 2021-02-11 RX ADMIN — CARVEDILOL 6.25 MG: 6.25 TABLET, FILM COATED ORAL at 08:50

## 2021-02-11 RX ADMIN — CARVEDILOL 6.25 MG: 6.25 TABLET, FILM COATED ORAL at 17:13

## 2021-02-11 RX ADMIN — NYSTATIN 500000 UNITS: 100000 SUSPENSION ORAL at 12:11

## 2021-02-11 RX ADMIN — LEVETIRACETAM 500 MG: 100 INJECTION, SOLUTION INTRAVENOUS at 06:18

## 2021-02-11 RX ADMIN — MICONAZOLE NITRATE: 20 POWDER TOPICAL at 21:32

## 2021-02-11 RX ADMIN — LEVETIRACETAM 1000 MG: 100 INJECTION, SOLUTION INTRAVENOUS at 21:29

## 2021-02-11 RX ADMIN — PANTOPRAZOLE SODIUM 40 MG: 40 INJECTION, POWDER, FOR SOLUTION INTRAVENOUS at 08:49

## 2021-02-11 RX ADMIN — MINERAL SUPPLEMENT IRON 300 MG / 5 ML STRENGTH LIQUID 100 PER BOX UNFLAVORED 300 MG: at 11:04

## 2021-02-11 RX ADMIN — INSULIN LISPRO 1 UNITS: 100 INJECTION, SOLUTION INTRAVENOUS; SUBCUTANEOUS at 11:25

## 2021-02-11 RX ADMIN — NYSTATIN 500000 UNITS: 100000 SUSPENSION ORAL at 17:13

## 2021-02-11 RX ADMIN — INSULIN LISPRO 1 UNITS: 100 INJECTION, SOLUTION INTRAVENOUS; SUBCUTANEOUS at 21:41

## 2021-02-11 RX ADMIN — INSULIN LISPRO 1 UNITS: 100 INJECTION, SOLUTION INTRAVENOUS; SUBCUTANEOUS at 17:56

## 2021-02-11 RX ADMIN — MICONAZOLE NITRATE: 20 POWDER TOPICAL at 08:51

## 2021-02-11 RX ADMIN — NYSTATIN 500000 UNITS: 100000 SUSPENSION ORAL at 21:29

## 2021-02-11 RX ADMIN — INSULIN LISPRO 1 UNITS: 100 INJECTION, SOLUTION INTRAVENOUS; SUBCUTANEOUS at 08:51

## 2021-02-11 ASSESSMENT — PAIN SCALES - PAIN ASSESSMENT IN ADVANCED DEMENTIA (PAINAD)
NEGVOCALIZATION: 0
TOTALSCORE: 0
FACIALEXPRESSION: 0
BODYLANGUAGE: 0
BREATHING: 0
BODYLANGUAGE: 0
NEGVOCALIZATION: 0
TOTALSCORE: 0
BREATHING: 0
CONSOLABILITY: 0

## 2021-02-11 ASSESSMENT — PAIN SCALES - WONG BAKER
WONGBAKER_NUMERICALRESPONSE: 0
WONGBAKER_NUMERICALRESPONSE: 0

## 2021-02-11 NOTE — DISCHARGE INSTR - COC
Continuity of Care Form    Patient Name: Marga Crocker   :  1940  MRN:  [id number]    Admit date:  2021  Discharge date:  2021    Code Status Order: Full Code   Advance Directives:      Admitting Physician:  Norris Best MD  PCP: No primary care provider on file. Discharging Nurse: Milan Andrade [address] Unit/Room#: 2422/4298-85  Discharging Unit Phone Number: [phone]    Emergency Contact:   Extended Emergency Contact Information  Primary Emergency Contact: Rolando Pichardo  Home Phone: [phone]  Mobile Phone: [phone]  Relation: Child   needed? No  Secondary Emergency Contact: pauline ford  Mobile Phone: [phone]  Relation: Child  Preferred language: English   needed? No    Past Surgical History:  Past Surgical History:   Procedure Laterality Date    GASTROSTOMY TUBE PLACEMENT N/A 2021    EGD PEG TUBE PLACEMENT performed by Zachariah Tom MD at [facility] IR IVC FILTER PLACEMENT W IMAGING  2021    IR IVC FILTER PLACEMENT W IMAGING 2021 [facility]    UPPER GASTROINTESTINAL ENDOSCOPY  2021    Dr Ashley Frank       Immunization History: There is no immunization history on file for this patient.     Active Problems:  Patient Active Problem List   Diagnosis Code    Acute cerebrovascular accident (CVA) ([facility] I63.9    Permanent atrial fibrillation ([facility] I48.21    Acute gastric ulcer with hemorrhage K25.0    Other pulmonary embolism without acute cor pulmonale (HCC) I26.99    Acute right MCA stroke ([facility] I63.511    Hypoxemia R09.02       Isolation/Infection:   Isolation            No Isolation          Patient Infection Status       Infection Onset Added Last Indicated Last Indicated By Review Planned Expiration Resolved Resolved By    None active    Resolved    COVID-19 Rule Out 21 COVID-19 (Ordered)   21 Rule-Out Test Resulted            Nurse Assessment: Last Vital Signs: /73   Pulse 79   Temp 97.5 °F (36.4 °C) (Axillary)   Resp 18   Ht 5' 7\" (1.702 m)   Wt 218 lb 11.1 oz (99.2 kg)   SpO2 94%   BMI 34.25 kg/m²     Last documented pain score (0-10 scale): Pain Level: 0  Last Weight:   Wt Readings from Last 1 Encounters:   02/07/21 218 lb 11.1 oz (99.2 kg)     Mental Status:  alert    IV Access:  - None    Nursing Mobility/ADLs:  Walking   Dependent  Transfer  Dependent  Bathing  Dependent  Dressing  Dependent  Toileting  Dependent  Feeding  Dependent  Med Admin  Dependent  Med Delivery   crushed    Wound Care Documentation and Therapy:        Elimination:  Continence:   · Bowel: No  · Bladder: No  Urinary Catheter: None   Colostomy/Ileostomy/Ileal Conduit: No       Date of Last BM: 02/23/2021    Intake/Output Summary (Last 24 hours) at 2/11/2021 1126  Last data filed at 2/11/2021 0901  Gross per 24 hour   Intake 220 ml   Output 1050 ml   Net -830 ml     I/O last 3 completed shifts:   In: 110 [NG/GT:110]  Out: 1050 [Urine:1050]    Safety Concerns:     Aspiration Risk    Impairments/Disabilities:      Left side flaccid    Nutrition Therapy:  Current Nutrition Therapy:   - Tube Feedings:  Low Calorie/High Protein    Routes of Feeding: Gastrostomy Tube  Liquids: No Liquids  Daily Fluid Restriction: no  Last Modified Barium Swallow with Video (Video Swallowing Test): not done    Treatments at the Time of Hospital Discharge:   Respiratory Treatments: Ventolin & Albuterol  Oxygen Therapy:  Ventilator  Ventilator:    - Ventilator Settings:    Vt Ordered: 500 mL  Rate Set: 16 bmp  FiO2 : 35 %    PEEP/CPAP: 5  Pressure Support: 18 cmH20    Rehab Therapies: Physical Therapy and Occupational Therapy  Weight Bearing Status/Restrictions: No weight bearing restirctions  Other Medical Equipment (for information only, NOT a DME order):  hospital bed  Other Treatments: NA    Patient's personal belongings (please select all that are sent with patient):  NA RN SIGNATURE:  Electronically signed by Lakeshia Paul RN on 2/23/21 at 11:36 AM EST    CASE MANAGEMENT/SOCIAL WORK SECTION    Inpatient Status Date: 2-2-2021    Readmission Risk Assessment Score:  Readmission Risk              Risk of Unplanned Readmission:        13       Discharging to Facility/ Agency   Discharge facility: [facility] at [address] [place] [address] [phone]  Fax- [phone]        / signature: Electronically signed by Eh York RN on 2/19/2021 at 2:55 PM      PHYSICIAN SECTION    Prognosis: Guarded    Condition at Discharge: Stable    Rehab Potential (if transferring to Rehab): Guarded    Recommended Labs or Other Treatments After Discharge: Will need close follow up after discharge and within 2 weeks needs to be seen by Dr. Addi Sahu, [facility] neurosurgery, regarding resumption of anticoagulation. Physician Certification: I certify the above information and transfer of Micheal Lopez  is necessary for the continuing treatment of the diagnosis listed and that she requires LTAC for greater 30 days.      Update Admission H&P: No change in H&P    PHYSICIAN SIGNATURE:  Electronically signed by Aminata Stinson MD on 2/22/21 at 8:28 AM EST

## 2021-02-11 NOTE — PROGRESS NOTES
Hospitalist Progress Note      PCP: No primary care provider on file. Date of Admission: 2/2/2021    Chief Complaint:     Chief Complaint   Patient presents with    Cerebrovascular Accident       Subjective:  Patient still unresponsive. Moans to sternal rub. Does not follow commands. Withdraws to pain in all extremities.  Moves right side spontaneously    PFHS: reviewed as documented 2/2/2021, no changes    Medications:  Reviewed    Infusion Medications    dextrose       Scheduled Medications    pantoprazole  40 mg Intravenous Daily    ferrous sulfate  300 mg Per G Tube Every Other Day    miconazole   Topical BID    levetiracetam  500 mg Intravenous Q12H    atorvastatin  80 mg Oral Nightly    carvedilol  6.25 mg Oral BID WC    insulin lispro  0-6 Units Subcutaneous TID WC    insulin lispro  0-3 Units Subcutaneous Nightly     PRN Meds: glucose, dextrose, glucagon (rDNA), dextrose, perflutren lipid microspheres, labetalol, albuterol sulfate HFA, hydrALAZINE, acetaminophen, promethazine **OR** ondansetron, polyethylene glycol      Intake/Output Summary (Last 24 hours) at 2/11/2021 0913  Last data filed at 2/11/2021 0901  Gross per 24 hour   Intake 220 ml   Output 1050 ml   Net -830 ml       Physical Exam    BP (!) 150/76   Pulse 86   Temp 98.5 °F (36.9 °C) (Oral)   Resp 18   Ht 5' 7\" (1.702 m)   Wt 218 lb 11.1 oz (99.2 kg)   SpO2 98%   BMI 34.25 kg/m²     General appearance: Unresponsive  Eyes: Pupils equal, round, reactive to light, conjunctiva/corneas clear  Ears/Nose/Mouth/Throat: No external lesions or scars, hearing intact to voice  Neck: Trachea midline, no masses noted, no thyromegaly  Respiratory:  Non-labored breathing, clear to auscultation bilaterally  Cardiovascular: Regular rate and rhythm, no murmurs, gallops, or rubs  Abdomen: soft, non-tender, non-distended  Musculoskeletal: Warm, well perfused, no cyanosis or edema  Skin: normal color, no wounds noted Neurological: Unresponsive, moans to painful stimuli. Withdraws to pain in all extremities. Moves right side spontaneously. Labs:   Recent Labs     02/09/21  0502 02/10/21  0637 02/11/21  0630   WBC 8.5 9.0 10.0   HGB 8.8* 9.0* 9.0*   HCT 30.8* 30.3* 30.2*    205 186     Recent Labs     02/09/21  0502 02/10/21  0637 02/11/21  0630   * 155* 147*   K 3.8 3.9 3.8    113* 108   CO2 33* 32 35*   BUN 18 18 17   CREATININE 0.6 0.6 0.6   CALCIUM 9.4 9.2 9.3     No results for input(s): AST, ALT, BILIDIR, BILITOT, ALKPHOS in the last 72 hours. Recent Labs     02/09/21  0502   INR 1.15*     No results for input(s): [employer] in the last 72 hours. Urinalysis:      Lab Results   Component Value Date    NITRU POSITIVE 02/10/2021    WBCUA 3-5 02/10/2021    BACTERIA 3+ 02/10/2021    RBCUA 0-2 02/10/2021    BLOODU SMALL 02/10/2021    SPECGRAV >=1.030 02/10/2021    GLUCOSEU Negative 02/10/2021       Radiology:  CT HEAD WO CONTRAST   Final Result   Interval progression of vasogenic edema at the right middle cerebral artery hemorrhagic infarction and interval development of right to left midline shift of about 3 mm as described above. Apparent slight decrease in the size of hemorrhage in its AP dimension. XR CHEST 1 VIEW   Final Result   1. Interval extubation. 2. Progression of bilateral airspace disease. MRI BRAIN WO CONTRAST   Final Result      Region of right middle cerebral artery hemorrhagic infarction measures approximately 5.0 x 4.2 cm with surrounding mild vasogenic edema and diffusion restriction consistent with right middle cerebral artery distribution infarct with hemorrhagic    components      No additional areas of hemorrhage or infarction identified. Mild local mass effect is identified with very minimal midline shift of 3 to 4 mm      XR CHEST PORTABLE   Final Result      Persistent bilateral infiltrates and cardiomegaly.          IR GUIDED IVC FILTER PLACEMENT Final Result   Impression:      Successful placement of retrievable IVC filter. CT HEAD WO CONTRAST   Final Result      1. Hemorrhagic transformation of right MCA infarct, without significant change. No new hemorrhage. CT head without contrast   Final Result         1. Stable large intraparenchymal hemorrhage located within the right posterior temporal parietal lobe with extension into the adjacent insula. There is also subarachnoid extension of hemorrhage into the overlying sulci as well as the right sylvian    fissure. Findings result in some mild right to left subfalcine herniation. 2.There is a 1.3 x 1.1 cm extra-axial mass identified within the left ordered aspect of the foramen magnum. This results in some mass effect on the adjacent medulla. This is without change from the prior study. Finding may represent a meningioma. This    could be further evaluated with a contrast-enhanced MRI of the head following resolution of the intracranial hemorrhage. CT HEAD WO CONTRAST   Final Result      1. Stable large intraparenchymal hemorrhage located within the right posterior temporal parietal lobe with extension into the adjacent insula. There is also subarachnoid extension of hemorrhage into the overlying sulci as well as the right sylvian    fissure. Findings result in some mild right to left subfalcine herniation. 2.There is a 1.3 x 1.1 cm extra-axial mass identified within the left ordered aspect of the foramen magnum. This results in some mass effect on the adjacent medulla. This is without change from the prior study. Finding may represent a meningioma. This    could be further evaluated with a contrast-enhanced MRI of the head following resolution of the intracranial hemorrhage.       VL Extremity Venous Bilateral   Final Result      CT HEAD WO CONTRAST   Final Result Post therapy large acute parenchymal hemorrhage in the right cerebral hemisphere with subarachnoid component. Extensive surrounding edema is associated with 4 cm leftward shift of midline structures. Critical finding of acute intracranial hemorrhage was called to Dione Alpers of the neurosurgery department at 12:25 PM on 2/3/2021, with instructions to contact patient's attending physician with these results. CT Brain Perfusion   Final Result      1. Hypoperfusion in the right MCA territory with a central ischemic core of 8 mL, total volume of hypoperfusion of 79 mL and a penumbra of 71 mL. XR CHEST PORTABLE   Final Result   1. Limited evaluation because of patient rotation and underpenetration of the film. 2. No dense airspace consolidation. 3. Probable mild cardiomegaly. CTA HEAD NECK W CONTRAST   Final Result   1. Carotid and vertebral arteries are patent and without stenosis or acute abnormality. 2. Incidental partial imaging of the a segmental pulmonary embolism in the right middle lobe. 3. Partial imaging of small right greater than left pleural effusions. 4. Previous left suboccipital craniotomy with a residual 1.6 cm left-sided extra-axial mass at the foramen magnum with mass effect on the cord at the cervical medullary junction favored to represent meningioma. Recommend correlation with clinical history    and previous imaging. 5. Incidental multinodular goiter. CTA HEAD:      FINDINGS: The internal carotid arteries are patent and normal in caliber through the skull base. The middle cerebral arteries are patent. 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 The A2 and A3 segments of the ACAs are patent bilaterally. Left MCA is patent. There is focal occlusion of an M2 branch of the right MCA (series 2 images 427 and 428). However, other M2 branches and M3 branches in the right sylvian fissure are patent. The M1 segment of the right MCA is patent. The posterior cerebral arteries are    patent bilaterally. The basilar artery is patent and normal in caliber. It is supplied by the left vertebral artery. The small nondominant right vertebral artery terminates in right sided PICA. IMPRESSION:   1. 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 2. 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 Results were discussed with Dr. Kimberli Elias at 8:00 PM on 2/2/2021. CT HEAD WO CONTRAST   Final Result   1. Previous left suboccipital craniotomy with a partially calcified extra-axial mass at the left foramen magnum measuring 1.8 cm contacting the cord at the cervicomedullary junction with some degree of mass effect on the cord at the foramen magnum. This    most likely represents residual/recurrent meningioma or other extra-axial mass. Correlation with patient history and previous imaging recommended. 2. Mild global volume loss and mild chronic small vessel ischemic disease in the white matter. 3. No acute intracranial hemorrhage.       IR ANGIOGRAM CAROTID CEREBRAL RIGHT    (Results Pending)       Assessment/Plan:    Active Hospital Problems    Diagnosis Date Noted    Hypoxemia [R09.02] 02/06/2021    Permanent atrial fibrillation (HCC) [I48.21] 02/03/2021    Acute gastric ulcer with hemorrhage [K25.0] 02/03/2021    Other pulmonary embolism without acute cor pulmonale (HCC) [I26.99] 02/03/2021    Acute right MCA stroke ([address] [I63.511]     Acute cerebrovascular accident (CVA) ([address] [I63.9] 02/02/2021       Plan:    # R MCA stroke, s/p thrombectomy and thrombolysis  -CT 2/4: new large acute parenchymal hemorrhage, repeat stable  -MRI w/ findings as above  -Frequent neuro checks  -S/p PEG tube placement 02/9/21

## 2021-02-11 NOTE — PROGRESS NOTES
Occupational Therapy  Facility/Department: [facility] 5T ORTHO/NEURO  Daily Treatment Note  NAME: Eagle Prescott  : 1940  MRN: [id number]    Date of Service: 2021    Discharge Recommendations:  Eagle Prescott scored a 6/24 on the AM-PAC ADL Inpatient form. Current research shows that an AM-PAC score of 17 or less is typically not associated with a discharge to the patient's home setting. Based on the patient's AM-PAC score and their current ADL deficits, it is recommended that the patient have 3-5 sessions per week of Occupational Therapy at d/c to increase the patient's independence. Please see assessment section for further patient specific details. If patient discharges prior to next session this note will serve as a discharge summary. Please see below for the latest assessment towards goals. OT Equipment Recommendations  Other: defer to d/c location    Assessment   Performance deficits / Impairments: Decreased functional mobility ; Decreased ADL status; Decreased strength;Decreased endurance;Decreased cognition;Decreased balance;Decreased posture;Decreased fine motor control;Decreased high-level IADLs;Decreased ROM  Assessment: Pt on 4L O2. Pt continues to be Dependent x 2 for all bed mobility and is not responsive to tactile/vcs. RN present and aware of pt's status and pungent odor urine. Pt would benefit from inpt therapy prior to d/c home. Continue with POC. Treatment Diagnosis: decreased independence with ADLs and functional transfers, left hemiparesis  Prognosis: Fair  OT Education: OT Role  Patient Education: no learing noted, pt not responsive  Barriers to Learning: cog  REQUIRES OT FOLLOW UP: Yes  Activity Tolerance  Activity Tolerance: Treatment limited secondary to decreased cognition;Patient limited by fatigue  Activity Tolerance: pt with very minimal response, eyes closed ~2 sec  and groaning at times  Safety Devices  Safety Devices in place:  Yes Type of devices: Left in bed;Bed alarm in place;Call light within reach;Nurse notified         Patient Diagnosis(es): The encounter diagnosis was Acute right MCA stroke ([address] has a past medical history of Acute GI bleeding, Atrial fibrillation ([address] Systolic CHF ([address] and Type 2 diabetes mellitus ([address] has a past surgical history that includes Upper gastrointestinal endoscopy (01/29/2021); IR GUIDED IVC FILTER PLACEMENT (2/4/2021); and Gastrostomy tube placement (N/A, 2/9/2021). Restrictions  Position Activity Restriction  Other position/activity restrictions: fall precautions, no activity restrictions noted  Subjective   General  Chart Reviewed: Yes  Patient assessed for rehabilitation services?: Yes  Additional Pertinent Hx: Marga Crocker is a 79 y.o. adult, with a history of afib, HF, DM, asthma, pulm HTN (not oxygen dependent), HTN, HLD, EDDY on cpap, breast cancer s/p lumpectomy/radiation, colon polyps, obesity who presented with left-sided hemiplegia, severe expressive aphasia and dysarthria, right facial droop, and right-gaze deviation. CT (+) 1. 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 2. 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 Pt was discharged from [facility] 2 days ago where she was treated for acute GI bleed  Response to previous treatment: Patient with no complaints from previous session  Family / Caregiver Present: No  Referring Practitioner: Maurilio Vigil MD  Diagnosis: acute CVA  Subjective  Subjective: Pt supine in bed with eyes closed upon entry, RN approval for therapy session. Pt with very minimal response throughout 40 min therapy session: t opened eyes ~2 sec and several grunts. Music Doraine [employer]) played for pt at EOS, pt didn't respond.   General Comment Comments: Pt face washed with cool washcloth, no response. Pt with oral care kit with suction (Dependent). Pt supine to sit Dependent x 2. Pt sit EOB ~10 min Dependent + varying assist of +1, assist for head at midline. Pt sit to supine Dependent x 2. Pt incontinent, rolling right x 2 and rolling left x 2 (Dependent x2) for brief change and hygiene care. Dependent x 2 to scoot to HOB supine. Call light in reach and bed alarm on.   Pain Assessment  Pain Assessment: Faces  Vital Signs  Patient Currently in Pain: Denies(pt didn't appear in pain throughout therapy session)   Orientation  Orientation  Overall Orientation Status: (pt not alert and non verbal)  Orientation Level: Unable to assess  Objective    ADL  Grooming: Dependent/Total(oral care kit/comb hair)  LE Dressing: Dependent/Total  Toileting: Dependent/Total        Balance  Sitting Balance: Dependent/Total  Standing Balance: Unable to assess(comment)  Bed mobility  Rolling to Left: 2 Person assistance;Dependent/Total(Dependent x 2)  Rolling to Right: 2 Person assistance;Dependent/Total(Dependent x 2)  Supine to Sit: 2 Person assistance;Dependent/Total(Dependent x 2)  Sit to Supine: 2 Person assistance;Dependent/Total(Dependent x 2)  Scootin Person assistance;Dependent/Total(Dependent x 2)                          Cognition  Overall Cognitive Status: Exceptions  Arousal/Alertness: Unresponsive to stimuli  Following Commands: Does not follow commands  Attention Span: Unable to maintain attention  Memory: Decreased short term memory  Safety Judgement: Decreased awareness of need for safety  Problem Solving: Decreased awareness of errors;Assistance required to correct errors made;Assistance required to identify errors made  Insights: Not aware of deficits  Initiation: Requires cues for all  Sequencing: Requires cues for all

## 2021-02-11 NOTE — PROGRESS NOTES
Physical Therapy  Daily Treatment Note    Discharge Recommendations: Eagle Prescott scored a 6/24 on the AM-PAC short mobility form. Current research shows that an AM-PAC score of 17 or less is typically not associated with a discharge to the patient's home setting. Based on the patient's AM-PAC score and their current functional mobility deficits, it is recommended that the patient have 3-5 sessions per week of Physical Therapy at d/c to increase the patient's independence. Please see assessment section for further patient specific details. Assessment:  Pt with significant lethargy this PM, with no active participation noted this session. Needing dependent assist x 2 for mobility. Continues to be well below baseline for mobility at this time. Would benefit from continued IP PT (trial) to maximize strength and independence. Equipment Needs: Defer to next level of care    Chart Reviewed: Yes     Other position/activity restrictions: fall precautions, no activity restrictions noted   Additional Pertinent Hx: Admit 2/2 with L side weakness and speech deficits, (+) Acute stroke with large vessel (right M2) occlusion and large area of reversible ischemia on CTP; 2/2 Mechanical thrombectomy + Infusion therapy for thrombolysis, right middle cerebral artery; repeat head CT showed hemorrhagic transformation, stable on repeat imaging. neuro following; PMHx: CHF, DM, GI bleed, a-fib      Diagnosis: acute CVA   Treatment Diagnosis: impaired gait and transfers    Subjective: Pt in bed initially. Lethargic. Eyes closed throughout session. Occasional verbalization (groan) with stimulation/movement. Pain: No grimacing noted. Objective:    Bed mobility  Supine to sit: Dependent assist x 2  Sit to Supine: Dependent assist x 2  Rolling: Dependent assist x 2 to R and L (twice each) for cleanup & linen change s/p incontinent of urine and stool.     Balance Sat EOB ~ 10 minutes with Dependent assist x 1 (+ occasional additional assist of another) due to heavy lean/LOB to R and posteriorly. Exercises  10 reps B LE PROM for toe flex/ext, ankle dorsi/plantarflexion, heel slides, hip abd/add, hip flexion while in bed    Patient Education  Role of PT. No learning noted due to lethargy. Safety Devices  Pt left with needs in reach. In bed with bed alarm on. RN updated. AM-PAC score  AM-PAC Inpatient Mobility Raw Score : 6  AM-PAC Inpatient T-Scale Score : 23.55  Mobility Inpatient CMS 0-100% Score: 100  Mobility Inpatient CMS G-Code Modifier : CN    Goals: (as determined and assessed by primary PT)  Time Frame for Short term goals: discharge  Short term goal 1: Pt will transfer supine <--> sit with mod A x 2 ongoing   Short term goal 2: Pt will transfer sit <--> stand with mod A x 2   ongoing   Short term goal 3: Pt will sit EOB x 2 min with CGA or better   ongoing  Short term goal 4: Pt will demo static stance x 10 sec with min A x 1 or better   ongoing     Plan:  Times per week: 5-7;    Current Treatment Recommendations: Strengthening, Transfer Training, Endurance Training, Patient/Caregiver Education & Training, Balance Training, Functional Mobility Training, Gait Training, Home Exercise Program, Equipment Evaluation, Education, & procurement    Therapy Time    Individual  Concurrent  Group  Co-treatment    Time In  1102          Time Out  1143            Minutes  41              Timed Code Treatment Minutes: 41  Total Treatment Minutes: 41    Will continue per plan of care. If patient is discharged prior to next treatment, this note will serve as the discharge summary.     [facility] – [place], [place] [id number]

## 2021-02-11 NOTE — PROGRESS NOTES
Nutrition Note    RECOMMENDATIONS:   1. Advance EN to goal rate - Jevity @ 55 ml/hr to provide 100% of patient nutrition. 2. Free water flushes- 400 ml every 4 hours per Nephrology and RD to correct Na/replenish water deficit. Subjective:  RD quick review if pt had EN started and to monitor hyperNa and water deficit. RD notes no fluid flushes has been recorded per i/o. RD called RN, Pablo Carranza, who reported pt had water flushes running at 220 ml every 4 hours. RD reviewed no current MD order nor RD rec'd. RD confirmed with Dr. Cloud Hidden free water flushes and orders adjusted to reflect this. Current Na 147 today; 155 2/10. RD also notes EN was not at goal upon RD call- RN will advance.      Electronically signed by Jasmyne Wilburn RD, LD on 2/11/21 at 2:13 PM EST    Contact: [phone]

## 2021-02-11 NOTE — PROGRESS NOTES
Neurology Progress Note  Seen for acute ischemic stroke with hemorrhagic conversion and now possible seizures    Updates:  Last seen by Neurology on 2/8/21. Over the past few days, she has been less responsive. A routine EEG was obtained and revealed epileptiform discharges   Febrile this afternoon, 99.8 axillary    ROS:    Unable to assess given aphasia and encephalopathy    Exam:  Blood pressure 111/73, pulse 79, temperature 97.5 °F (36.4 °C), temperature source Axillary, resp. rate 18, height 5' 7\" (1.702 m), weight 218 lb 11.1 oz (99.2 kg), SpO2 94 %. Constitutional    Vital signs: BP, HR, and RR reviewed   General Depressed mental status, no distress, well-nourished  Eyes: fundoscopic exam not attempted  Cardiovascular: pulses symmetric in all 4 extremities. No peripheral edema. Psychiatric: Limited exam given encephalopathy   Neurologic  Mental status: Eyes open briefly to cental noxious stimuli  orientation Limited exam given encephalopathy   Attention Poor   Language Nonverbal   Comprehension Does not follow commands  Cranial nerves:   CN2: Blinks to threat on the right , not on the left  CN 3,4,6: Right gaze preference breakable with OCM, does not cross midline to the left  CN5: Limited exam given encephalopathy   CN7:Limited exam given encephalopathy   CN8: Limited exam given encephalopathy   CN9: Limited exam given encephalopathy   CN11: Limited exam given encephalopathy   CN12: Limited exam given encephalopathy   Strength: RUE with some spontaneous movement that is briefly antigravity, LUE and BLEs withdraw to peripheral noxious stimuli throughout   Sensory: Reacts to peripheral noxious stimuli throughout       Labs:  BUN: 17  Creatinine: 0.6  Glucose: 175  WBC: 10.0  LDL: 28  A1C: 5.2    UA:    Ref.  Range 2/10/2021 12:54   Nitrite, Urine Latest Ref Range: Negative  POSITIVE (A)   Leukocyte Esterase, Urine Latest Ref Range: Negative  Negative         Studies:  Routine EEG, 2/11/21 This is an abnormal awake and drowsy EEG due to the presence of epileptiform spikes in the right hemispheric leads. No generalized electrographic seizures during this recording.     CT head, 2/10/21  Interval progression of vasogenic edema at the right middle cerebral artery hemorrhagic infarction and interval development of right to left midline shift of about 3 mm as described above.       Apparent slight decrease in the size of hemorrhage in its AP dimension. ECHO 2/4/2021 - LV size normal, EF 55-60%, elevated PA pressures, No evidence of shunting      Studies:  MRI of brain w/o - 2/7/2021      Impression:  1. Acute ischemic stroke with hemorrhagic conversion  2. Encephalopathy  3. UTI, possible  4. Atrial fibrillation      Kedar Martinez is a 79 y.o. female with a history of afib, who presented with L-hemiparesis, aphasia, dysarthria & R gaze deviation, found to have R M2 segment occlusion, initial NIHSS of 14, not tpa candidate s/p thrombectomy w/ TICI 2b reperfusion. Initially improved NIHSS s/p thrombectomy w/ subsequent decline, repeat head CT showed hemorrhagic transformation, stable on repeat imaging. On empiric Keppra given hemorraghic transformation. Had some clinical improvement initially, however, has been less responsive the past few days. CT head was obtained on 2/10/21 and revealed mild worsening cerebral edema. Routine EEG completed today revealed the presence of epileptiform spikes in the right hemispheric leads. No generalized electrographic seizures . It is possible that seizures are contributing to her encephalopathy. UA also concerning for UTI which may also be contributing to her clinically worsening. Now on antibiotics.        Recommendations:  - Increase Keppra to 1000 mg BID(ordered)  - Will give 1 time dose 500 mg Keppra now  - cvEEG (ordered)  - Treatment of suspected UTI underway  - Continue high intensity statin

## 2021-02-11 NOTE — PROGRESS NOTES
Palliative Care Chart Review  and Check in Note:     NAME:  Carmen Govea  Admit Date: 2/2/2021  Hospital Day:  Hospital Day: 10   Current Code status: Full Code    Palliative care is continuing to following Ms. Canales for symptom management,  and goals of care discussion as needed. Patient's chart reviewed today 2/11/21. Saw pt at the bedside, she remains lethargic today. EEG tech starting EEG at the bedside. D/w Dr. Jani Beasley. Called daughter Gaudencio, no answer, left VM. The following are the currently established goals/code status, and Symptom management. Goals of care: Continue with current management.      Code status: Full Code    Discharge plan: [facility] when medically ready for discharge      Asa Finney [address] [facility]  [phone]

## 2021-02-11 NOTE — PROGRESS NOTES
Speech Language Pathology  Facility/Department: [facility]  Dysphagia Daily Treatment Note  No charge    NAME: Katt Pires  : 1940  MRN: [id number]    Patient Diagnosis(es):   Patient Active Problem List    Diagnosis Date Noted    Hypoxemia 2021    Permanent atrial fibrillation ([facility] 2021    Acute gastric ulcer with hemorrhage 2021    Other pulmonary embolism without acute cor pulmonale ([facility] 2021    Acute right MCA stroke ([facility]     [facility]     Allergies: No Known Allergies    Chart reviewed. CXR ():  Persistent bilateral infiltrates and cardiomegaly. MRI Brain ():  Region of right middle cerebral artery hemorrhagic infarction measures approximately 5.0 x 4.2 cm with surrounding mild vasogenic edema and diffusion restriction consistent with right middle cerebral artery distribution infarct with hemorrhagic components  No additional areas of hemorrhage or infarction identified. Mild local mass effect is identified with very minimal midline shift of 3 to 4 mm    Medical Diagnosis:  Acute CVA  Treatment Diagnosis:  Oral-pharyngeal Dysphagia (R13.12)    BSE Impression (21):  Dysphagia Diagnosis: Moderate oral stage dysphagia; Severe pharyngeal stage dysphagia  Dysphagia Impression: Severe oral-pharyngeal dysphagia. Pt with reduced labial/lingual strength & coordination. Impaired oral phase with poor oral awareness when spoon+ice chip presented; did accept when given verbal cues. Required verbal cues to initiate mastication. Pt unable to initiate swallow despite verbal and external tactile cues. PO trials discontinued out of concern for pt safety. Recommend NPO with ongoing re-assessment of swallow function. Prognosis for improvement in swallow function that will result in adequate PO intake within next 24 hours is guarded. Consider temporary alternative means of nutrition at this time.     MBS results: N/A at this time

## 2021-02-11 NOTE — PROCEDURES
George Ball is a 79 y.o. female patient. 1. Acute right MCA stroke [facility])      Past Medical History:   Diagnosis Date    Acute GI bleeding     recent admission 1/29/2021    Atrial fibrillation (HCC)     Xarelto    Systolic CHF (HCC)     Type 2 diabetes mellitus (HCC)      Blood pressure 111/73, pulse 79, temperature 97.5 °F (36.4 °C), temperature source Axillary, resp. rate 18, height 5' 7\" (1.702 m), weight 218 lb 11.1 oz (99.2 kg), SpO2 94 %. EEG awake and asleep    Date/Time: 2/11/2021 2:06 PM  Performed by: Amelia Borja MD  Authorized by: SU Gibbs NP       CLINICAL HISTORY:  George Ball is a 79 y.o. female who presented with L-hemiparesis, aphasia, dysarthria & R gaze deviation, found to have R M2 segment occlusion, initial NIHSS of 14, not a tpa candidate, s/p thrombectomy. Initially improved NIHSS s/p thrombectomy w/ subsequent decline, repeat head CT showed hemorrhagic transformation. Became more lethargic yesterday. CT head showed increased edema though hemorrhage itself was stable. Clinical concern is for complex partial or simple partial seizures or subclinical status epilepticus. FINDINGS: This is a routine 16 channel referential and bipolar video EEG. There is a background rhythm in the alpha range, without a posterior dominant rhythm. It does attenuate appropriately with eye opening. Photic stimulation is performed without driving or abnormality. There are frequent epileptiform discharges from the right hemispheric leads. No electrographic seizures are recorded. Though bursts last as long as 7 seconds, they do not generalize. Nic Bah IMPRESSION:  This is an abnormal awake and drowsy EEG due to the presence of epileptiform spikes in the right hemispheric leads. No generalized electrographic seizures during this recording.       Amelia Borja MD  2/11/2021

## 2021-02-12 ENCOUNTER — APPOINTMENT (OUTPATIENT)
Dept: CT IMAGING | Age: 81
DRG: 003 | End: 2021-02-12
Payer: COMMERCIAL

## 2021-02-12 ENCOUNTER — APPOINTMENT (OUTPATIENT)
Dept: GENERAL RADIOLOGY | Age: 81
DRG: 003 | End: 2021-02-12
Payer: COMMERCIAL

## 2021-02-12 LAB
ANION GAP SERPL CALCULATED.3IONS-SCNC: 2 MMOL/L (ref 3–16)
ANISOCYTOSIS: ABNORMAL
BASE EXCESS ARTERIAL: 11 (ref -3–3)
BASE EXCESS ARTERIAL: 13 (ref -3–3)
BASE EXCESS ARTERIAL: 13 (ref -3–3)
BASE EXCESS ARTERIAL: 14 (ref -3–3)
BASOPHILS ABSOLUTE: 0 K/UL (ref 0–0.2)
BASOPHILS RELATIVE PERCENT: 0.2 %
BUN BLDV-MCNC: 15 MG/DL (ref 7–20)
CALCIUM SERPL-MCNC: 9.2 MG/DL (ref 8.3–10.6)
CHLORIDE BLD-SCNC: 103 MMOL/L (ref 99–110)
CO2: 37 MMOL/L (ref 21–32)
CREAT SERPL-MCNC: <0.5 MG/DL (ref 0.6–1.2)
EKG ATRIAL RATE: 90 BPM
EKG DIAGNOSIS: NORMAL
EKG Q-T INTERVAL: 346 MS
EKG QRS DURATION: 74 MS
EKG QTC CALCULATION (BAZETT): 448 MS
EKG R AXIS: 149 DEGREES
EKG T AXIS: -4 DEGREES
EKG VENTRICULAR RATE: 101 BPM
EOSINOPHILS ABSOLUTE: 0.1 K/UL (ref 0–0.6)
EOSINOPHILS RELATIVE PERCENT: 0.9 %
GFR AFRICAN AMERICAN: >60
GFR NON-AFRICAN AMERICAN: >60
GLUCOSE BLD-MCNC: 125 MG/DL (ref 70–99)
GLUCOSE BLD-MCNC: 132 MG/DL (ref 70–99)
GLUCOSE BLD-MCNC: 163 MG/DL (ref 70–99)
GLUCOSE BLD-MCNC: 181 MG/DL (ref 70–99)
GLUCOSE BLD-MCNC: 225 MG/DL (ref 70–99)
GLUCOSE BLD-MCNC: 235 MG/DL (ref 70–99)
HCO3 ARTERIAL: 34.9 MMOL/L (ref 21–29)
HCO3 ARTERIAL: 35.7 MMOL/L (ref 21–29)
HCO3 ARTERIAL: 36.8 MMOL/L (ref 21–29)
HCO3 ARTERIAL: 41.3 MMOL/L (ref 21–29)
HCT VFR BLD CALC: 33.2 % (ref 36–48)
HEMOGLOBIN: 9.5 G/DL (ref 12–16)
LYMPHOCYTES ABSOLUTE: 0.7 K/UL (ref 1–5.1)
LYMPHOCYTES RELATIVE PERCENT: 6.9 %
MCH RBC QN AUTO: 24.7 PG (ref 26–34)
MCHC RBC AUTO-ENTMCNC: 28.7 G/DL (ref 31–36)
MCV RBC AUTO: 85.8 FL (ref 80–100)
MONOCYTES ABSOLUTE: 0.6 K/UL (ref 0–1.3)
MONOCYTES RELATIVE PERCENT: 6 %
NEUTROPHILS ABSOLUTE: 8.6 K/UL (ref 1.7–7.7)
NEUTROPHILS RELATIVE PERCENT: 86 %
O2 SAT, ARTERIAL: 100 % (ref 93–100)
O2 SAT, ARTERIAL: 98 % (ref 93–100)
O2 SAT, ARTERIAL: 98 % (ref 93–100)
O2 SAT, ARTERIAL: 99 % (ref 93–100)
OVALOCYTES: ABNORMAL
PCO2 ARTERIAL: 43 MM HG (ref 35–45)
PCO2 ARTERIAL: 49 MM HG (ref 35–45)
PCO2 ARTERIAL: 55.2 MM HG (ref 35–45)
PCO2 ARTERIAL: 99 MM HG (ref 35–45)
PDW BLD-RTO: 25.5 % (ref 12.4–15.4)
PERFORMED ON: ABNORMAL
PH ARTERIAL: 7.23 (ref 7.35–7.45)
PH ARTERIAL: 7.43 (ref 7.35–7.45)
PH ARTERIAL: 7.46 (ref 7.35–7.45)
PH ARTERIAL: 7.53 (ref 7.35–7.45)
PLATELET # BLD: 182 K/UL (ref 135–450)
PMV BLD AUTO: 10 FL (ref 5–10.5)
PO2 ARTERIAL: 109.5 MM HG (ref 75–108)
PO2 ARTERIAL: 128 MM HG (ref 75–108)
PO2 ARTERIAL: 132.6 MM HG (ref 75–108)
PO2 ARTERIAL: 399.5 MM HG (ref 75–108)
POC PATIENT TEMP: 37
POC PATIENT TEMP: 37
POC SAMPLE TYPE: ABNORMAL
POTASSIUM REFLEX MAGNESIUM: 4.1 MMOL/L (ref 3.5–5.1)
PRO-BNP: 1290 PG/ML (ref 0–449)
PROCALCITONIN: 0.13 NG/ML (ref 0–0.15)
RBC # BLD: 3.87 M/UL (ref 4–5.2)
SODIUM BLD-SCNC: 142 MMOL/L (ref 136–145)
TCO2 ARTERIAL: 37 MMOL/L
TCO2 ARTERIAL: 37 MMOL/L
TCO2 ARTERIAL: 39 MMOL/L
TCO2 ARTERIAL: 44 MMOL/L
WBC # BLD: 10 K/UL (ref 4–11)

## 2021-02-12 PROCEDURE — 87070 CULTURE OTHR SPECIMN AEROBIC: CPT

## 2021-02-12 PROCEDURE — 94002 VENT MGMT INPAT INIT DAY: CPT

## 2021-02-12 PROCEDURE — 2580000003 HC RX 258: Performed by: STUDENT IN AN ORGANIZED HEALTH CARE EDUCATION/TRAINING PROGRAM

## 2021-02-12 PROCEDURE — 87040 BLOOD CULTURE FOR BACTERIA: CPT

## 2021-02-12 PROCEDURE — 70450 CT HEAD/BRAIN W/O DYE: CPT

## 2021-02-12 PROCEDURE — 2500000003 HC RX 250 WO HCPCS: Performed by: COUNSELOR

## 2021-02-12 PROCEDURE — 6360000002 HC RX W HCPCS

## 2021-02-12 PROCEDURE — 6360000002 HC RX W HCPCS: Performed by: NURSE PRACTITIONER

## 2021-02-12 PROCEDURE — 83880 ASSAY OF NATRIURETIC PEPTIDE: CPT

## 2021-02-12 PROCEDURE — 6370000000 HC RX 637 (ALT 250 FOR IP): Performed by: COUNSELOR

## 2021-02-12 PROCEDURE — 6370000000 HC RX 637 (ALT 250 FOR IP): Performed by: INTERNAL MEDICINE

## 2021-02-12 PROCEDURE — 2580000003 HC RX 258: Performed by: NURSE PRACTITIONER

## 2021-02-12 PROCEDURE — 2500000003 HC RX 250 WO HCPCS

## 2021-02-12 PROCEDURE — 93005 ELECTROCARDIOGRAM TRACING: CPT | Performed by: STUDENT IN AN ORGANIZED HEALTH CARE EDUCATION/TRAINING PROGRAM

## 2021-02-12 PROCEDURE — 95813 EEG EXTND MNTR 61-119 MIN: CPT

## 2021-02-12 PROCEDURE — 87449 NOS EACH ORGANISM AG IA: CPT

## 2021-02-12 PROCEDURE — 80048 BASIC METABOLIC PNL TOTAL CA: CPT

## 2021-02-12 PROCEDURE — C9113 INJ PANTOPRAZOLE SODIUM, VIA: HCPCS | Performed by: STUDENT IN AN ORGANIZED HEALTH CARE EDUCATION/TRAINING PROGRAM

## 2021-02-12 PROCEDURE — 71045 X-RAY EXAM CHEST 1 VIEW: CPT

## 2021-02-12 PROCEDURE — 6370000000 HC RX 637 (ALT 250 FOR IP): Performed by: STUDENT IN AN ORGANIZED HEALTH CARE EDUCATION/TRAINING PROGRAM

## 2021-02-12 PROCEDURE — 31500 INSERT EMERGENCY AIRWAY: CPT | Performed by: INTERNAL MEDICINE

## 2021-02-12 PROCEDURE — 93010 ELECTROCARDIOGRAM REPORT: CPT | Performed by: INTERNAL MEDICINE

## 2021-02-12 PROCEDURE — 36415 COLL VENOUS BLD VENIPUNCTURE: CPT

## 2021-02-12 PROCEDURE — 89220 SPUTUM SPECIMEN COLLECTION: CPT

## 2021-02-12 PROCEDURE — 87186 SC STD MICRODIL/AGAR DIL: CPT

## 2021-02-12 PROCEDURE — 0BH18EZ INSERTION OF ENDOTRACHEAL AIRWAY INTO TRACHEA, VIA NATURAL OR ARTIFICIAL OPENING ENDOSCOPIC: ICD-10-PCS | Performed by: INTERNAL MEDICINE

## 2021-02-12 PROCEDURE — 87205 SMEAR GRAM STAIN: CPT

## 2021-02-12 PROCEDURE — 5A1955Z RESPIRATORY VENTILATION, GREATER THAN 96 CONSECUTIVE HOURS: ICD-10-PCS | Performed by: INTERNAL MEDICINE

## 2021-02-12 PROCEDURE — 2060000000 HC ICU INTERMEDIATE R&B

## 2021-02-12 PROCEDURE — 31500 INSERT EMERGENCY AIRWAY: CPT

## 2021-02-12 PROCEDURE — 2700000000 HC OXYGEN THERAPY PER DAY

## 2021-02-12 PROCEDURE — 87081 CULTURE SCREEN ONLY: CPT

## 2021-02-12 PROCEDURE — 84145 PROCALCITONIN (PCT): CPT

## 2021-02-12 PROCEDURE — 82803 BLOOD GASES ANY COMBINATION: CPT

## 2021-02-12 PROCEDURE — 6360000002 HC RX W HCPCS: Performed by: STUDENT IN AN ORGANIZED HEALTH CARE EDUCATION/TRAINING PROGRAM

## 2021-02-12 PROCEDURE — 94761 N-INVAS EAR/PLS OXIMETRY MLT: CPT

## 2021-02-12 PROCEDURE — 85025 COMPLETE CBC W/AUTO DIFF WBC: CPT

## 2021-02-12 PROCEDURE — 36600 WITHDRAWAL OF ARTERIAL BLOOD: CPT

## 2021-02-12 PROCEDURE — 82947 ASSAY GLUCOSE BLOOD QUANT: CPT

## 2021-02-12 PROCEDURE — 87077 CULTURE AEROBIC IDENTIFY: CPT

## 2021-02-12 PROCEDURE — 99291 CRITICAL CARE FIRST HOUR: CPT | Performed by: INTERNAL MEDICINE

## 2021-02-12 RX ORDER — SODIUM CHLORIDE 0.9 % (FLUSH) 0.9 %
10 SYRINGE (ML) INJECTION EVERY 12 HOURS SCHEDULED
Status: DISCONTINUED | OUTPATIENT
Start: 2021-02-12 | End: 2021-02-23 | Stop reason: HOSPADM

## 2021-02-12 RX ORDER — ETOMIDATE 2 MG/ML
INJECTION INTRAVENOUS
Status: COMPLETED
Start: 2021-02-12 | End: 2021-02-12

## 2021-02-12 RX ORDER — INSULIN LISPRO 100 [IU]/ML
0-12 INJECTION, SOLUTION INTRAVENOUS; SUBCUTANEOUS EVERY 4 HOURS
Status: DISCONTINUED | OUTPATIENT
Start: 2021-02-12 | End: 2021-02-23 | Stop reason: HOSPADM

## 2021-02-12 RX ORDER — SODIUM CHLORIDE 0.9 % (FLUSH) 0.9 %
10 SYRINGE (ML) INJECTION PRN
Status: DISCONTINUED | OUTPATIENT
Start: 2021-02-12 | End: 2021-02-23 | Stop reason: HOSPADM

## 2021-02-12 RX ORDER — LIDOCAINE HYDROCHLORIDE 10 MG/ML
5 INJECTION, SOLUTION EPIDURAL; INFILTRATION; INTRACAUDAL; PERINEURAL ONCE
Status: DISCONTINUED | OUTPATIENT
Start: 2021-02-12 | End: 2021-02-14

## 2021-02-12 RX ORDER — SUCCINYLCHOLINE CHLORIDE 20 MG/ML
INJECTION INTRAMUSCULAR; INTRAVENOUS
Status: COMPLETED
Start: 2021-02-12 | End: 2021-02-12

## 2021-02-12 RX ORDER — ETOMIDATE 2 MG/ML
0.3 INJECTION INTRAVENOUS ONCE
Status: COMPLETED | OUTPATIENT
Start: 2021-02-12 | End: 2021-02-12

## 2021-02-12 RX ORDER — PROPOFOL 10 MG/ML
5-50 INJECTION, EMULSION INTRAVENOUS
Status: DISCONTINUED | OUTPATIENT
Start: 2021-02-12 | End: 2021-02-17

## 2021-02-12 RX ORDER — SUCCINYLCHOLINE CHLORIDE 20 MG/ML
100 INJECTION INTRAMUSCULAR; INTRAVENOUS ONCE
Status: COMPLETED | OUTPATIENT
Start: 2021-02-12 | End: 2021-02-12

## 2021-02-12 RX ORDER — PROPOFOL 10 MG/ML
INJECTION, EMULSION INTRAVENOUS
Status: COMPLETED
Start: 2021-02-12 | End: 2021-02-12

## 2021-02-12 RX ADMIN — PANTOPRAZOLE SODIUM 40 MG: 40 INJECTION, POWDER, FOR SOLUTION INTRAVENOUS at 09:59

## 2021-02-12 RX ADMIN — PROPOFOL 20 MCG/KG/MIN: 10 INJECTION, EMULSION INTRAVENOUS at 08:15

## 2021-02-12 RX ADMIN — NYSTATIN 500000 UNITS: 100000 SUSPENSION ORAL at 12:01

## 2021-02-12 RX ADMIN — NYSTATIN 500000 UNITS: 100000 SUSPENSION ORAL at 16:33

## 2021-02-12 RX ADMIN — INSULIN LISPRO 2 UNITS: 100 INJECTION, SOLUTION INTRAVENOUS; SUBCUTANEOUS at 12:01

## 2021-02-12 RX ADMIN — LABETALOL HYDROCHLORIDE 10 MG: 5 INJECTION, SOLUTION INTRAVENOUS at 07:26

## 2021-02-12 RX ADMIN — MICONAZOLE NITRATE: 20 POWDER TOPICAL at 09:43

## 2021-02-12 RX ADMIN — VANCOMYCIN HYDROCHLORIDE 1500 MG: 10 INJECTION, POWDER, LYOPHILIZED, FOR SOLUTION INTRAVENOUS at 14:05

## 2021-02-12 RX ADMIN — ETOMIDATE 29.8 MG: 2 INJECTION INTRAVENOUS at 07:50

## 2021-02-12 RX ADMIN — SUCCINYLCHOLINE CHLORIDE 100 MG: 20 INJECTION, SOLUTION INTRAMUSCULAR; INTRAVENOUS at 07:50

## 2021-02-12 RX ADMIN — PROPOFOL 35 MCG/KG/MIN: 10 INJECTION, EMULSION INTRAVENOUS at 22:03

## 2021-02-12 RX ADMIN — PIPERACILLIN AND TAZOBACTAM 3375 MG: 3; .375 INJECTION, POWDER, LYOPHILIZED, FOR SOLUTION INTRAVENOUS at 18:04

## 2021-02-12 RX ADMIN — NYSTATIN 500000 UNITS: 100000 SUSPENSION ORAL at 09:59

## 2021-02-12 RX ADMIN — PROPOFOL 35 MCG/KG/MIN: 10 INJECTION, EMULSION INTRAVENOUS at 12:30

## 2021-02-12 RX ADMIN — INSULIN LISPRO 2 UNITS: 100 INJECTION, SOLUTION INTRAVENOUS; SUBCUTANEOUS at 09:38

## 2021-02-12 RX ADMIN — LEVETIRACETAM 1000 MG: 100 INJECTION, SOLUTION INTRAVENOUS at 11:45

## 2021-02-12 RX ADMIN — MICONAZOLE NITRATE: 20 POWDER TOPICAL at 20:13

## 2021-02-12 RX ADMIN — PROPOFOL 35 MCG/KG/MIN: 10 INJECTION, EMULSION INTRAVENOUS at 16:39

## 2021-02-12 RX ADMIN — CARVEDILOL 6.25 MG: 6.25 TABLET, FILM COATED ORAL at 16:33

## 2021-02-12 RX ADMIN — PIPERACILLIN AND TAZOBACTAM 3375 MG: 3; .375 INJECTION, POWDER, LYOPHILIZED, FOR SOLUTION INTRAVENOUS at 12:00

## 2021-02-12 RX ADMIN — SUCCINYLCHOLINE CHLORIDE 100 MG: 20 INJECTION INTRAMUSCULAR; INTRAVENOUS at 07:50

## 2021-02-12 RX ADMIN — CARVEDILOL 6.25 MG: 6.25 TABLET, FILM COATED ORAL at 09:59

## 2021-02-12 RX ADMIN — NYSTATIN 500000 UNITS: 100000 SUSPENSION ORAL at 20:32

## 2021-02-12 RX ADMIN — INSULIN LISPRO 2 UNITS: 100 INJECTION, SOLUTION INTRAVENOUS; SUBCUTANEOUS at 16:41

## 2021-02-12 RX ADMIN — ATORVASTATIN CALCIUM 80 MG: 40 TABLET, FILM COATED ORAL at 20:32

## 2021-02-12 RX ADMIN — LEVETIRACETAM 1000 MG: 100 INJECTION, SOLUTION INTRAVENOUS at 22:00

## 2021-02-12 ASSESSMENT — PULMONARY FUNCTION TESTS
PIF_VALUE: 22
PIF_VALUE: 36
PIF_VALUE: 22
PIF_VALUE: 22
PIF_VALUE: 25
PIF_VALUE: 22
PIF_VALUE: 25
PIF_VALUE: 27
PIF_VALUE: 22
PIF_VALUE: 22
PIF_VALUE: 25
PIF_VALUE: 25
PIF_VALUE: 22

## 2021-02-12 ASSESSMENT — PAIN SCALES - GENERAL
PAINLEVEL_OUTOF10: 0
PAINLEVEL_OUTOF10: 0

## 2021-02-12 NOTE — PROGRESS NOTES
ICU Progress Note    Admit Date: 2/2/2021  Day: 1  Vent Day: None  IV Access:Peripheral  IV Fluids:None  Vasopressors:None                Antibiotics: None  Diet: DIET TUBE FEED CONTINUOUS/CYCLIC NPO; STANDARD WITH FIBER; Gastrostomy; Continuous; 25; 55; 24    CC: R MCA stroke    Interval history:     Patient had a rapid response called on her today at 0705. On arrival patient was unresponsive, which is her baseline status post stroke, however she does withdraw to pain. According to nurse her saturations dropped down to the 60s, and was put on 15 L nasal cannula and non-rebreather, her SPO2 went to the 90s. EKG was ordered which shows atrial fibrillation, at a rate of 101. ABG showed 7.228/99/132/41. Repeat chest x-ray was ordered which showed worsening of left lung opacities with patchy opacities in the right lower lobe. With her acute hypercarbic respiratory failure patient was transferred back to the ICU to be intubated, follow-up ABG showed: 7.461/49.0/399.5/34.9.     Medications:     Scheduled Meds:   piperacillin-tazobactam  3,375 mg Intravenous Q8H    insulin lispro  0-12 Units Subcutaneous Q4H    [START ON 2/13/2021] vancomycin  1,250 mg Intravenous Q12H    lidocaine 1 % injection  5 mL Intradermal Once    sodium chloride flush  10 mL Intravenous 2 times per day    nystatin  5 mL Oral 4x Daily    levetiracetam  1,000 mg Intravenous Q12H    pantoprazole  40 mg Intravenous Daily    ferrous sulfate  300 mg Per G Tube Every Other Day    miconazole   Topical BID    atorvastatin  80 mg Oral Nightly    carvedilol  6.25 mg Oral BID WC     Continuous Infusions:   propofol 35 mcg/kg/min (02/12/21 1639)    dextrose       PRN Meds:sodium chloride flush, glucose, dextrose, glucagon (rDNA), dextrose, perflutren lipid microspheres, labetalol, albuterol sulfate HFA, hydrALAZINE, acetaminophen, promethazine **OR** ondansetron, polyethylene glycol    Objective:   Vitals:   T-max: Patient Vitals for the past 8 hrs:   BP Temp Temp src Pulse Resp SpO2   02/12/21 1515    71 18    02/12/21 1500 114/82   66 18    02/12/21 1400 102/71   77 18    02/12/21 1304    78 18    02/12/21 1300 110/85   77 18    02/12/21 1200 103/78 98.5 °F (36.9 °C) Axillary 75 18 100 %   02/12/21 1145 106/83   76 17 100 %   02/12/21 1140     18    02/12/21 1130 117/89   78 18    02/12/21 1115 118/80   78 16    02/12/21 1045 109/68   75 18    02/12/21 1030 111/82   74 18    02/12/21 1015 115/79   71 18    02/12/21 1000 102/89   78 22    02/12/21 0945 102/69   88 18    02/12/21 0930 106/70   84 17    02/12/21 0915 104/88   91 18    02/12/21 0900 102/81   83 18        Intake/Output Summary (Last 24 hours) at 2/12/2021 1648  Last data filed at 2/12/2021 1421  Gross per 24 hour   Intake 930 ml   Output 75 ml   Net 855 ml       Access:   -Peripheral Access Day#:3                               Gilman Day#:9  NGT Day#: 6                                            ETT Day#:1  Vent Settings: Vent Mode: AC/PRVC Rate Set: 18 bmp/Vt Ordered: 500 mL/ /FiO2 : 50 %      Physical Exam  Constitutional:       Interventions: She is sedated and intubated. HENT:      Head: Normocephalic and atraumatic. Mouth/Throat:      Mouth: Mucous membranes are moist.      Pharynx: Oropharynx is clear. Cardiovascular:      Rate and Rhythm: Tachycardia present. Rhythm irregular. Heart sounds: Normal heart sounds. No murmur. No friction rub. No gallop. Pulmonary:      Effort: She is intubated. Breath sounds: Rhonchi present. No wheezing or rales. Abdominal:      Palpations: Abdomen is soft. Tenderness: There is no abdominal tenderness. Musculoskeletal:      Right lower leg: No edema. Left lower leg: No edema. Skin:     General: Skin is warm and dry. Neurological:      Mental Status: Mental status is at baseline. Comments:  Withdraws to pain           LABS: CBC:   Recent Labs     02/10/21  0637 02/11/21  0630 02/12/21  0603   WBC 9.0 10.0 10.0   HGB 9.0* 9.0* 9.5*   HCT 30.3* 30.2* 33.2*    186 182   MCV 83.5 83.6 85.8     Renal:    Recent Labs     02/10/21  0637 02/11/21  0630 02/12/21  0603   * 147* 142   K 3.9 3.8 4.1   * 108 103   CO2 32 35* 37*   BUN 18 17 15   CREATININE 0.6 0.6 <0.5*   GLUCOSE 120* 189* 235*   CALCIUM 9.2 9.3 9.2   ANIONGAP 10 4 2*     Hepatic: No results for input(s): AST, ALT, BILITOT, BILIDIR, PROT, LABALBU, ALKPHOS in the last 72 hours. Troponin: No results for input(s): TROPONINI in the last 72 hours. BNP: No results for input(s): BNP in the last 72 hours. Lipids: No results for input(s): CHOL, HDL in the last 72 hours. Invalid input(s): LDLCALCU, TRIGLYCERIDE  ABGs:    Recent Labs     02/12/21  0713 02/12/21  1147   PHART 7.228* 7.461*   TGF1LPU 99.0* 49.0*   PO2ART 132.6* 399.5*   RFK0FFG 41.3* 34.9*   BEART 14* 11*   S3BPWPEJ 98 100   GEK6TBW 44 37       INR: No results for input(s): INR in the last 72 hours. Lactate: No results for input(s): LACTATE in the last 72 hours. Cultures:  -----------------------------------------------------------------  RAD:   XR CHEST PORTABLE   Final Result      Persistent bilateral infiltrates         XR CHEST PORTABLE   Final Result   Impression:          1. Increased opacities in the left lung base since the prior study. There may be a small left pleural effusion. 2. Persistent patchy right lung opacities. CT HEAD WO CONTRAST   Final Result   Interval progression of vasogenic edema at the right middle cerebral artery hemorrhagic infarction and interval development of right to left midline shift of about 3 mm as described above. Apparent slight decrease in the size of hemorrhage in its AP dimension. XR CHEST 1 VIEW   Final Result   1. Interval extubation. 2. Progression of bilateral airspace disease.       MRI BRAIN WO CONTRAST   Final Result Region of right middle cerebral artery hemorrhagic infarction measures approximately 5.0 x 4.2 cm with surrounding mild vasogenic edema and diffusion restriction consistent with right middle cerebral artery distribution infarct with hemorrhagic    components      No additional areas of hemorrhage or infarction identified. Mild local mass effect is identified with very minimal midline shift of 3 to 4 mm      XR CHEST PORTABLE   Final Result      Persistent bilateral infiltrates and cardiomegaly. IR GUIDED IVC FILTER PLACEMENT   Final Result   Impression:      Successful placement of retrievable IVC filter. CT HEAD WO CONTRAST   Final Result      1. Hemorrhagic transformation of right MCA infarct, without significant change. No new hemorrhage. CT head without contrast   Final Result         1. Stable large intraparenchymal hemorrhage located within the right posterior temporal parietal lobe with extension into the adjacent insula. There is also subarachnoid extension of hemorrhage into the overlying sulci as well as the right sylvian    fissure. Findings result in some mild right to left subfalcine herniation. 2.There is a 1.3 x 1.1 cm extra-axial mass identified within the left ordered aspect of the foramen magnum. This results in some mass effect on the adjacent medulla. This is without change from the prior study. Finding may represent a meningioma. This    could be further evaluated with a contrast-enhanced MRI of the head following resolution of the intracranial hemorrhage. CT HEAD WO CONTRAST   Final Result      1. Stable large intraparenchymal hemorrhage located within the right posterior temporal parietal lobe with extension into the adjacent insula. There is also subarachnoid extension of hemorrhage into the overlying sulci as well as the right sylvian    fissure. Findings result in some mild right to left subfalcine herniation. FINDINGS: The internal carotid arteries are patent and normal in caliber through the skull base. The middle cerebral arteries are patent. 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 The A2 and A3 segments of the ACAs are patent bilaterally. Left MCA is patent. There is focal occlusion of an M2 branch of the right MCA (series 2 images 427 and 428). However, other M2 branches and M3 branches in the right sylvian fissure are patent. The M1 segment of the right MCA is patent. The posterior cerebral arteries are    patent bilaterally. The basilar artery is patent and normal in caliber. It is supplied by the left vertebral artery. The small nondominant right vertebral artery terminates in right sided PICA. IMPRESSION:   1. 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 2. 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 Results were discussed with Dr. Etelvina Ayers at 8:00 PM on 2/2/2021. CT HEAD WO CONTRAST   Final Result   1. Previous left suboccipital craniotomy with a partially calcified extra-axial mass at the left foramen magnum measuring 1.8 cm contacting the cord at the cervicomedullary junction with some degree of mass effect on the cord at the foramen magnum. This    most likely represents residual/recurrent meningioma or other extra-axial mass. Correlation with patient history and previous imaging recommended. 2. Mild global volume loss and mild chronic small vessel ischemic disease in the white matter. 3. No acute intracranial hemorrhage.       IR ANGIOGRAM CAROTID CEREBRAL RIGHT    (Results Pending)   CT HEAD WO CONTRAST    (Results Pending)         Assessment/Plan:     Acute hypoxic respiratory failure 2/2 suspect aspiration Chest x-ray shows RLL opacities. Patient is s/p R MCA stroke, s/p thrombectomy with thrombolytics. ABG showed 7.228/99/132/4; repeat ABG shows 7.461/49.0/399.5/34.9.  - Patient is currently intubated, sedated with propofol  - Follow-up on procalcitonin, respiratory cultures, blood cultures, strep antigen, UA, MRSA DNA probe, and Legionella antigen.  - Started on vancomycin, and Zosyn.     R MCA stroke, s/p thrombectomy and thrombolysis  -CT 2/4: new large acute parenchymal hemorrhage, repeat stable  -MRI w/ findings as above  -Frequent neuro checks  -S/p PEG tube placement 02/9/21  -Cont tube feeds  -palliative care following     Intraparenchymal hemorrhage   -CT 02/04 showed hemorrhagic transformation of right MCA infarct  -stable  -keep SBP < 160.  Off nicardipine drip  -Holding anticoagulation and antiplatelets  -Continue keppra     Hypernatremia  -Likely secondary to decreased oral intake/dehydration  -Nephrology following  -Cont Water boluses with tube feeds   -Monitor sodium     Seizures  Likely sec to above   Routine EEG positive for epileptiform discharges  Keppra dose increased  Discussed with neurology, plan to start continuous video EEG     Possible UTI  Urine smelled pungent  UA and urine culture ordered  Ceftriaxone discontinued, on Vanco and Zosyn empirically as above     Metabolic encephalopathy:  -Likely multifactorial, secondary to above   -Management as above     Oral thrush:  -Nystatin started     Acute blood loss anemia  -Protonix BID     Chronic systolic heart failure  -holding home diuretics     Segmental PE  -incidental finding  -No AC due to above  -IVC filter placed 2/4     Atrial fibrillation  -Holding AC  -monitor HR     T2DM  -mealtime and correctional insulin     HTN  -as above     asthma  -breathing treatments as needed          Code Status:Full Code  FEN: DIET TUBE FEED CONTINUOUS/CYCLIC NPO; STANDARD WITH FIBER; Gastrostomy; Continuous; 25; 55; 24  PPX:  SCDs  DISPO: ICU Deepthi Shaw DO, PGY-1  02/12/21  4:48 PM    This patient has been staffed and discussed with Dr. Rishi Leija MD    [facility]    Patient seen and examined. I agree with Dr. Sophia Castro history, physical, lab findings, assessment and plan.      Naz Barksdale MD

## 2021-02-12 NOTE — SIGNIFICANT EVENT
Rapid Response was called on Radames Riedel at 4831 for acute hypoxia. In brief, this is Ms. Radames Riedel, who is a 79 y.o. female w/PMHx of HF, DM, EDDY on CPAP/BiPAP, pulm HTN, and asthma, who presented with L sided hemiplegia and expressive aphasia. She was found to have a R MCA stroke with incidental finding of RML segmental PE. She was off anticoagulation due to hemorrhagic conversion. Repeat imaging was stable. IVCF was placed 2/4. Acute hypoxic respiratory failure was noted 2/6 and patient was given two doses of Lasix with improvement in oxygenation. Patient was on 2-3LNC overnight. Suddenly saturations dipped into the 60s. Suction was attempted and oxygen was increased to 15L with improvement in saturations. On ICU Team arrival, patient was tachycardic in the low 100s with SBP in the 180s, She was saturating high 90s on 15LNC + NRB. She is unresponsive at baseline. Continues to withdraw to pain. EKG notable for atrial fibrillation with RVR. ABG 7.228/99/132/41. Labs were otherwise stable from yesterday. Respiratory exam was notable for reduced breath sounds bilaterally, worse on the L. CXR showed worsening of L lung opacities as compared to CXR on 2/8. Patient was transferred to the ICU for hypercarbic respiratory failure and intubated.       Laboratory Results:  Recent Labs     02/10/21  0637 02/11/21  0630 02/12/21  0603   * 147* 142   K 3.9 3.8 4.1   * 108 103   CO2 32 35* 37*   BUN 18 17 15   CREATININE 0.6 0.6 <0.5*   CALCIUM 9.2 9.3 9.2     Recent Labs     02/10/21  0637 02/11/21  0630 02/12/21  0603   WBC 9.0 10.0 10.0   HGB 9.0* 9.0* 9.5*   HCT 30.3* 30.2* 33.2*    186 182   MCV 83.5 83.6 85.8     Recent Labs     02/12/21  0713   PHART 7.228*   LEW6CLM 99.0*   PO2ART 132.6*   MQV9AXK 41.3*   BEART 14*   G3OORFKD 98   LMH8OZP 44       Radiology:  XR CHEST PORTABLE   Final Result   Impression: 1. Increased opacities in the left lung base since the prior study. There may be a small left pleural effusion. 2. Persistent patchy right lung opacities. CT HEAD WO CONTRAST   Final Result   Interval progression of vasogenic edema at the right middle cerebral artery hemorrhagic infarction and interval development of right to left midline shift of about 3 mm as described above. Apparent slight decrease in the size of hemorrhage in its AP dimension. XR CHEST 1 VIEW   Final Result   1. Interval extubation. 2. Progression of bilateral airspace disease. MRI BRAIN WO CONTRAST   Final Result      Region of right middle cerebral artery hemorrhagic infarction measures approximately 5.0 x 4.2 cm with surrounding mild vasogenic edema and diffusion restriction consistent with right middle cerebral artery distribution infarct with hemorrhagic    components      No additional areas of hemorrhage or infarction identified. Mild local mass effect is identified with very minimal midline shift of 3 to 4 mm      XR CHEST PORTABLE   Final Result      Persistent bilateral infiltrates and cardiomegaly. IR GUIDED IVC FILTER PLACEMENT   Final Result   Impression:      Successful placement of retrievable IVC filter. CT HEAD WO CONTRAST   Final Result      1. Hemorrhagic transformation of right MCA infarct, without significant change. No new hemorrhage. CT head without contrast   Final Result         1. Stable large intraparenchymal hemorrhage located within the right posterior temporal parietal lobe with extension into the adjacent insula. There is also subarachnoid extension of hemorrhage into the overlying sulci as well as the right sylvian    fissure. Findings result in some mild right to left subfalcine herniation. 2.There is a 1.3 x 1.1 cm extra-axial mass identified within the left ordered aspect of the foramen magnum. This results in some mass effect on the adjacent medulla. This is without change from the prior study. Finding may represent a meningioma. This    could be further evaluated with a contrast-enhanced MRI of the head following resolution of the intracranial hemorrhage. CT HEAD WO CONTRAST   Final Result      1. Stable large intraparenchymal hemorrhage located within the right posterior temporal parietal lobe with extension into the adjacent insula. There is also subarachnoid extension of hemorrhage into the overlying sulci as well as the right sylvian    fissure. Findings result in some mild right to left subfalcine herniation. 2.There is a 1.3 x 1.1 cm extra-axial mass identified within the left ordered aspect of the foramen magnum. This results in some mass effect on the adjacent medulla. This is without change from the prior study. Finding may represent a meningioma. This    could be further evaluated with a contrast-enhanced MRI of the head following resolution of the intracranial hemorrhage. VL Extremity Venous Bilateral   Final Result      CT HEAD WO CONTRAST   Final Result      Post therapy large acute parenchymal hemorrhage in the right cerebral hemisphere with subarachnoid component. Extensive surrounding edema is associated with 4 cm leftward shift of midline structures. Critical finding of acute intracranial hemorrhage was called to Helena Mayfield of the neurosurgery department at 12:25 PM on 2/3/2021, with instructions to contact patient's attending physician with these results. CT Brain Perfusion   Final Result      1. Hypoperfusion in the right MCA territory with a central ischemic core of 8 mL, total volume of hypoperfusion of 79 mL and a penumbra of 71 mL.       XR CHEST PORTABLE   Final Result 1. Limited evaluation because of patient rotation and underpenetration of the film. 2. No dense airspace consolidation. 3. Probable mild cardiomegaly. CTA HEAD NECK W CONTRAST   Final Result   1. Carotid and vertebral arteries are patent and without stenosis or acute abnormality. 2. Incidental partial imaging of the a segmental pulmonary embolism in the right middle lobe. 3. Partial imaging of small right greater than left pleural effusions. 4. Previous left suboccipital craniotomy with a residual 1.6 cm left-sided extra-axial mass at the foramen magnum with mass effect on the cord at the cervical medullary junction favored to represent meningioma. Recommend correlation with clinical history    and previous imaging. 5. Incidental multinodular goiter. CTA HEAD:      FINDINGS: The internal carotid arteries are patent and normal in caliber through the skull base. The middle cerebral arteries are patent. 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 The A2 and A3 segments of the ACAs are patent bilaterally. Left MCA is patent. There is focal occlusion of an M2 branch of the right MCA (series 2 images 427 and 428). However, other M2 branches and M3 branches in the right sylvian fissure are patent. The M1 segment of the right MCA is patent. The posterior cerebral arteries are    patent bilaterally. The basilar artery is patent and normal in caliber. It is supplied by the left vertebral artery. The small nondominant right vertebral artery terminates in right sided PICA. IMPRESSION:   1. 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 2. 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 Results were discussed with Dr. Jen Mcnally at 8:00 PM on 2/2/2021. CT HEAD WO CONTRAST   Final Result   1. Previous left suboccipital craniotomy with a partially calcified extra-axial mass at the left foramen magnum measuring 1.8 cm contacting the cord at the cervicomedullary junction with some degree of mass effect on the cord at the foramen magnum. This    most likely represents residual/recurrent meningioma or other extra-axial mass. Correlation with patient history and previous imaging recommended. 2. Mild global volume loss and mild chronic small vessel ischemic disease in the white matter. 3. No acute intracranial hemorrhage.       IR ANGIOGRAM CAROTID CEREBRAL RIGHT    (Results Pending)   XR CHEST PORTABLE    (Results Pending)       Bigg Mcleod MD  PGY-1, Internal Medicine  Contact via [employer]

## 2021-02-12 NOTE — PROCEDURES
Continuous EEG monitoring record    Patient name: Jazmin Gale    START: 12/11/2021 @ 18:15pm   END: 12/12/2021 @ 08:22am      Electroencephalographer: Alistair Westfall MD PhD      CLINICAL DETAILS:  This EEG was performed on this 79 y. o.yo female admitted for Altered mental Status and concer for subclinical seizures      TECHNICAL DETAILS:  Continuous video-EEG monitoring was performed with 27 surface scalp electrodes placed according to the International 10-20 electrode placement system, using a 32-channel [employer] headbox. All EEG and video information was acquired digitally, including the use of automated spike and seizure detection software to detect epileptiform activity. An event button was also available to be depressed during clinical events. 12/11/2021 22:00pm to 08:22am on 02/12/2021  SEIZURES:  None  INTERICTAL:  None  PUSHBUTTONS:  None  BACKGROUND:  Abundant generalized 6-7 Hz theta slowing of the background with frequent superimposed 1-2 Hz delta frequencies. EKG:  regular      12/11/2021 18:15pm - 22:00pm  SEIZURES:  None  INTERICTAL:  None  PUSHBUTTONS:  None  BACKGROUND:  Abundant generalized 6-7 Hz theta slowing of the background with frequent superimposed 1-2 Hz delta frequencies. EKG:  regular    CLINICAL INTERPRETATION:  This was an abnormal tracing for age and state due to a mild to moderate generalized slow wave abnormality indicating diffuse cerebral dysfunction which can be of multiple causes, including structural, or vascular abnormalities, toxic/metabolic conditions, hydrocephalus, or postictal conditions. No epileptiform discharge, focal slowing, or seizures were seen during this recording. Clinical correlation is advised.         Alistair Westfall MD PhD

## 2021-02-12 NOTE — PROGRESS NOTES
Hospitalist Progress Note      PCP: No primary care provider on file. Date of Admission: 2/2/2021    Chief Complaint:     Chief Complaint   Patient presents with    Cerebrovascular Accident       Subjective:  Patient transferred to ICU this drip and saturation to 60s. Patient was suctioned and required 15 L nonrebreather. ABG was 7.2 8/99/132/41. Chest x-ray showed worsening of left lung opacity. Patient was intubated for hypercarbic respiratory failure. On vent, sedated    Discussed patient condition with daughter over the phone, answered questions.      PFHS: reviewed as documented 2/2/2021, no changes    Medications:  Reviewed    Infusion Medications    dextrose       Scheduled Medications    nystatin  5 mL Oral 4x Daily    cefTRIAXone (ROCEPHIN) IV  1,000 mg Intravenous Q24H    levetiracetam  1,000 mg Intravenous Q12H    pantoprazole  40 mg Intravenous Daily    ferrous sulfate  300 mg Per G Tube Every Other Day    miconazole   Topical BID    atorvastatin  80 mg Oral Nightly    carvedilol  6.25 mg Oral BID WC    insulin lispro  0-6 Units Subcutaneous TID WC    insulin lispro  0-3 Units Subcutaneous Nightly     PRN Meds: glucose, dextrose, glucagon (rDNA), dextrose, perflutren lipid microspheres, labetalol, albuterol sulfate HFA, hydrALAZINE, acetaminophen, promethazine **OR** ondansetron, polyethylene glycol      Intake/Output Summary (Last 24 hours) at 2/12/2021 0727  Last data filed at 2/11/2021 2200  Gross per 24 hour   Intake 1440 ml   Output 250 ml   Net 1190 ml       Physical Exam    BP (!) 152/81   Pulse 78   Temp 98 °F (36.7 °C) (Axillary)   Resp 16   Ht 5' 7\" (1.702 m)   Wt 218 lb 11.1 oz (99.2 kg)   SpO2 91%   BMI 34.25 kg/m²     General appearance: Unresponsive  Eyes: Pupils equal, round, reactive to light, conjunctiva/corneas clear  Ears/Nose/Mouth/Throat: No external lesions or scars, hearing intact to voice  Neck: Trachea midline, no masses noted, no thyromegaly Respiratory: Intubated, coarse breath sounds bilaterally  Cardiovascular: Regular rate and rhythm, no murmurs, gallops, or rubs  Abdomen: soft, non-tender, non-distended  Musculoskeletal: Warm, well perfused, no cyanosis or edema  Skin: normal color, no wounds noted  Neurological: Sedated. Withdraws to pain in all extremities. Labs:   Recent Labs     02/10/21  0637 02/11/21  0630 02/12/21  0603   WBC 9.0 10.0 10.0   HGB 9.0* 9.0* 9.5*   HCT 30.3* 30.2* 33.2*    186 182     Recent Labs     02/10/21  0637 02/11/21  0630 02/12/21  0603   * 147* 142   K 3.9 3.8 4.1   * 108 103   CO2 32 35* 37*   BUN 18 17 15   CREATININE 0.6 0.6 <0.5*   CALCIUM 9.2 9.3 9.2     No results for input(s): AST, ALT, BILIDIR, BILITOT, ALKPHOS in the last 72 hours. No results for input(s): INR in the last 72 hours. No results for input(s): Towana Ricky in the last 72 hours. Urinalysis:      Lab Results   Component Value Date    NITRU Negative 02/11/2021    WBCUA None seen 02/11/2021    BACTERIA Rare 02/11/2021    RBCUA None seen 02/11/2021    BLOODU Negative 02/11/2021    SPECGRAV 1.025 02/11/2021    GLUCOSEU Negative 02/11/2021       Radiology:  CT HEAD WO CONTRAST   Final Result   Interval progression of vasogenic edema at the right middle cerebral artery hemorrhagic infarction and interval development of right to left midline shift of about 3 mm as described above. Apparent slight decrease in the size of hemorrhage in its AP dimension. XR CHEST 1 VIEW   Final Result   1. Interval extubation. 2. Progression of bilateral airspace disease.       MRI BRAIN WO CONTRAST   Final Result      Region of right middle cerebral artery hemorrhagic infarction measures approximately 5.0 x 4.2 cm with surrounding mild vasogenic edema and diffusion restriction consistent with right middle cerebral artery distribution infarct with hemorrhagic    components No additional areas of hemorrhage or infarction identified. Mild local mass effect is identified with very minimal midline shift of 3 to 4 mm      XR CHEST PORTABLE   Final Result      Persistent bilateral infiltrates and cardiomegaly. IR GUIDED IVC FILTER PLACEMENT   Final Result   Impression:      Successful placement of retrievable IVC filter. CT HEAD WO CONTRAST   Final Result      1. Hemorrhagic transformation of right MCA infarct, without significant change. No new hemorrhage. CT head without contrast   Final Result         1. Stable large intraparenchymal hemorrhage located within the right posterior temporal parietal lobe with extension into the adjacent insula. There is also subarachnoid extension of hemorrhage into the overlying sulci as well as the right sylvian    fissure. Findings result in some mild right to left subfalcine herniation. 2.There is a 1.3 x 1.1 cm extra-axial mass identified within the left ordered aspect of the foramen magnum. This results in some mass effect on the adjacent medulla. This is without change from the prior study. Finding may represent a meningioma. This    could be further evaluated with a contrast-enhanced MRI of the head following resolution of the intracranial hemorrhage. CT HEAD WO CONTRAST   Final Result      1. Stable large intraparenchymal hemorrhage located within the right posterior temporal parietal lobe with extension into the adjacent insula. There is also subarachnoid extension of hemorrhage into the overlying sulci as well as the right sylvian    fissure. Findings result in some mild right to left subfalcine herniation. 2.There is a 1.3 x 1.1 cm extra-axial mass identified within the left ordered aspect of the foramen magnum. This results in some mass effect on the adjacent medulla. This is without change from the prior study. Finding may represent a meningioma.  This could be further evaluated with a contrast-enhanced MRI of the head following resolution of the intracranial hemorrhage. VL Extremity Venous Bilateral   Final Result      CT HEAD WO CONTRAST   Final Result      Post therapy large acute parenchymal hemorrhage in the right cerebral hemisphere with subarachnoid component. Extensive surrounding edema is associated with 4 cm leftward shift of midline structures. Critical finding of acute intracranial hemorrhage was called to Geoff Ochoa of the neurosurgery department at 12:25 PM on 2/3/2021, with instructions to contact patient's attending physician with these results. CT Brain Perfusion   Final Result      1. Hypoperfusion in the right MCA territory with a central ischemic core of 8 mL, total volume of hypoperfusion of 79 mL and a penumbra of 71 mL. XR CHEST PORTABLE   Final Result   1. Limited evaluation because of patient rotation and underpenetration of the film. 2. No dense airspace consolidation. 3. Probable mild cardiomegaly. CTA HEAD NECK W CONTRAST   Final Result   1. Carotid and vertebral arteries are patent and without stenosis or acute abnormality. 2. Incidental partial imaging of the a segmental pulmonary embolism in the right middle lobe. 3. Partial imaging of small right greater than left pleural effusions. 4. Previous left suboccipital craniotomy with a residual 1.6 cm left-sided extra-axial mass at the foramen magnum with mass effect on the cord at the cervical medullary junction favored to represent meningioma. Recommend correlation with clinical history    and previous imaging. 5. Incidental multinodular goiter. CTA HEAD:      FINDINGS: The internal carotid arteries are patent and normal in caliber through the skull base. The middle cerebral arteries are patent.  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 The A2 and A3 segments of the ACAs are patent bilaterally. Left MCA is patent. There is focal occlusion of an M2 branch of the right MCA (series 2 images 427 and 428). However, other M2 branches and M3 branches in the right sylvian fissure are patent. The M1 segment of the right MCA is patent. The posterior cerebral arteries are    patent bilaterally. The basilar artery is patent and normal in caliber. It is supplied by the left vertebral artery. The small nondominant right vertebral artery terminates in right sided PICA. IMPRESSION:   1. 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 2. 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 Results were discussed with Dr. Claudia Martin at 8:00 PM on 2/2/2021. CT HEAD WO CONTRAST   Final Result   1. Previous left suboccipital craniotomy with a partially calcified extra-axial mass at the left foramen magnum measuring 1.8 cm contacting the cord at the cervicomedullary junction with some degree of mass effect on the cord at the foramen magnum. This    most likely represents residual/recurrent meningioma or other extra-axial mass. Correlation with patient history and previous imaging recommended. 2. Mild global volume loss and mild chronic small vessel ischemic disease in the white matter. 3. No acute intracranial hemorrhage.       IR ANGIOGRAM CAROTID CEREBRAL RIGHT    (Results Pending)   XR CHEST PORTABLE    (Results Pending)       Assessment/Plan:    Active Hospital Problems    Diagnosis Date Noted    Hypoxemia [R09.02] 02/06/2021    Permanent atrial fibrillation (HCC) [I48.21] 02/03/2021    Acute gastric ulcer with hemorrhage [K25.0] 02/03/2021    Other pulmonary embolism without acute cor pulmonale (HCC) [I26.99] 02/03/2021    Acute right MCA stroke ([facility] [address] [I63.511]  Acute cerebrovascular accident (CVA) ([facility] [address] [I63.9] 02/02/2021       Plan:    #Hypercarbic respiratory failure  Intubated, sedated  Continue vent management per intensivist    #Pneumonia, hospital-acquired or aspiration  Chest x-ray showing worsening of left opacity  Continue IV antibiotics    # R MCA stroke, s/p thrombectomy and thrombolysis  -CT 2/4: new large acute parenchymal hemorrhage, repeat stable  -MRI w/ findings as above  -Frequent neuro checks  -S/p PEG tube placement 02/9/21  -Cont tube feeds  -palliative care following  -CT 2/10 showed edema with midline shift-stable  -Discussed with neurology, stat CT head ordered     # Intraparenchymal hemorrhage  -CT 02/04 showed hemorrhagic transformation of right MCA infarct  -stable  -keep SBP < 160.   Off nicardipine drip  -Holding anticoagulation and antiplatelets  -Continue keppra    #Hypernatremia  -Likely secondary to decreased oral intake/dehydration  -Nephrology following  -Cont Water boluses with tube feeds   -Monitor sodium    #Seizures  Likely sec to above   Routine EEG positive for epileptiform discharges  Continue increased dose of Keppra  Continue continuous video EEG  Neurology following    #Possible UTI, ruled out  Urine smelled pungent  UA negative    #Metabolic encephalopathy:  -Likely multifactorial, secondary to above   -Management as above    #Oral thrush:  -Nystatin started     # Acute blood loss anemia  -Protonix BID     # Chronic systolic heart failure  -holding home diuretics     # Segmental PE  -incidental finding  -No AC due to above  -IVC filter placed 2/4     # Atrial fibrillation  -Holding AC  -monitor HR     # T2DM  -mealtime and correctional insulin     # HTN  -as above     # asthma  -breathing treatments as needed    DVT Prophylaxis: SCDs  Diet: DIET TUBE FEED CONTINUOUS/CYCLIC NPO; STANDARD WITH FIBER; Gastrostomy; Continuous; 25; 55; 24  Code Status: Full Code    Dispo: Pending clinical course    Julio Aponte MD

## 2021-02-12 NOTE — PLAN OF CARE
Nutrition Problem #1: Inadequate oral intake  Intervention: Food and/or Nutrient Delivery: Continue NPO, Start Tube Feeding  Nutritional Goals: pt will tolerate EN at goal rate to meet 100% of nutrition needs.

## 2021-02-12 NOTE — PROGRESS NOTES
Occupational Therapy   Discharge     Pt transferred to ICU and now intubated. Not appropriate for OT at this time. Will D/C from OT services. Please reorder if/when appropriate.     [address], [place]

## 2021-02-12 NOTE — PLAN OF CARE
Problem: HEMODYNAMIC STATUS  Goal: Patient has stable vital signs and fluid balance  Outcome: Ongoing     Problem: ACTIVITY INTOLERANCE/IMPAIRED MOBILITY  Goal: Mobility/activity is maintained at optimum level for patient  Outcome: Ongoing     Problem: COMMUNICATION IMPAIRMENT  Goal: Ability to express needs and understand communication  Outcome: Ongoing     Problem: Falls - Risk of:  Goal: Will remain free from falls  Description: Will remain free from falls  Outcome: Ongoing  Goal: Absence of physical injury  Description: Absence of physical injury  Outcome: Ongoing     Problem: Urinary Elimination:  Goal: Signs and symptoms of infection will decrease  Description: Signs and symptoms of infection will decrease  Outcome: Ongoing  Goal: Complications related to the disease process, condition or treatment will be avoided or minimized  Description: Complications related to the disease process, condition or treatment will be avoided or minimized  Outcome: Ongoing     Problem: Skin Integrity:  Goal: Will show no infection signs and symptoms  Description: Will show no infection signs and symptoms  Outcome: Ongoing  Goal: Absence of new skin breakdown  Description: Absence of new skin breakdown  Outcome: Ongoing     Problem: Nutrition  Goal: Optimal nutrition therapy  Outcome: Ongoing     Problem: Injury - Risk of, Physical Injury:  Goal: Will remain free from falls  Description: Will remain free from falls  Outcome: Ongoing  Goal: Absence of physical injury  Description: Absence of physical injury  Outcome: Ongoing

## 2021-02-12 NOTE — PROGRESS NOTES
Interval History:  Rapid response called for acute drop in 02 sats. She was intubated and transferred to ICU.     Current Medications:    Current Facility-Administered Medications:     propofol injection, 5-50 mcg/kg/min, Intravenous, Titrated, Guillermo Foster MD, Last Rate: 11.9 mL/hr at 02/12/21 0815, 20 mcg/kg/min at 02/12/21 0815    piperacillin-tazobactam (ZOSYN) 3,375 mg in dextrose 5 % 100 mL IVPB extended infusion (mini-bag), 3,375 mg, Intravenous, Q8H, Guillermo Foster MD    insulin lispro (1 Unit Dial) 0-12 Units, 0-12 Units, Subcutaneous, Q4H, Guillemro Foster MD    vancomycin (VANCOCIN) 1,500 mg in dextrose 5 % 250 mL IVPB, 1,500 mg, Intravenous, Once **FOLLOWED BY** [START ON 2/13/2021] vancomycin (VANCOCIN) 1,250 mg in dextrose 5 % 250 mL IVPB, 1,250 mg, Intravenous, Q12H, Guillermo Foster MD    lidocaine PF 1 % injection 5 mL, 5 mL, Intradermal, Once, Guillermo Foster MD    sodium chloride flush 0.9 % injection 10 mL, 10 mL, Intravenous, 2 times per day, Guillermo Foster MD    sodium chloride flush 0.9 % injection 10 mL, 10 mL, Intravenous, PRN, Guillermo Foster MD    nystatin (MYCOSTATIN) 553499 UNIT/ML suspension 500,000 Units, 5 mL, Oral, 4x Daily, Kristina Cardoza MD, 500,000 Units at 02/12/21 0959    levETIRAcetam (KEPPRA) 1,000 mg in sodium chloride 0.9 % 100 mL IVPB, 1,000 mg, Intravenous, Q12H, SU Aranda - CNP, Last Rate: 400 mL/hr at 02/12/21 1145, 1,000 mg at 02/12/21 1145    pantoprazole (PROTONIX) injection 40 mg, 40 mg, Intravenous, Daily, Matt Lovell MD, 40 mg at 02/12/21 0959    ferrous sulfate 300 (60 Fe) MG/5ML syrup 300 mg, 300 mg, Per G Tube, Every Other Day, Matt Lovell MD, 300 mg at 02/11/21 1104    glucose (GLUTOSE) 40 % oral gel 15 g, 15 g, Oral, PRN, America Del Valle MD    dextrose 50 % IV solution, 12.5 g, Intravenous, PRN, America Del Valle MD    glucagon (rDNA) injection 1 mg, 1 mg, Intramuscular, PRN, America Del Valle MD   dextrose 5 % solution, 100 mL/hr, Intravenous, PRN, Christina De La Fuente MD    miconazole (MICOTIN) 2 % powder, , Topical, BID, Christina De La Fuente MD, Given at 02/12/21 7543    perflutren lipid microspheres (DEFINITY) injection 1.65 mg, 1.5 mL, Intravenous, ONCE PRN, Christina De La Fuente MD    labetalol (NORMODYNE;TRANDATE) injection 10 mg, 10 mg, Intravenous, Q4H PRN, Christina De La Fuente MD, 10 mg at 02/12/21 0726    albuterol sulfate  (90 Base) MCG/ACT inhaler 2 puff, 2 puff, Inhalation, PRN, Christina De La Fuente MD    hydrALAZINE (APRESOLINE) injection 5 mg, 5 mg, Intravenous, Q6H PRN, Christina De La Fuente MD, 5 mg at 02/09/21 0800    acetaminophen (TYLENOL) tablet 650 mg, 650 mg, Oral, Q4H PRN, Christina De La Fuente MD    promethazine (PHENERGAN) tablet 12.5 mg, 12.5 mg, Oral, Q6H PRN **OR** ondansetron (ZOFRAN) injection 4 mg, 4 mg, Intravenous, Q6H PRN, Christina De La Fuente MD    polyethylene glycol [facility]) packet 17 g, 17 g, Oral, Daily PRN, Christina De La Fuente MD    atorvastatin (LIPITOR) tablet 80 mg, 80 mg, Oral, Nightly, Christina De La Fuente MD, 80 mg at 02/11/21 2129    carvedilol (COREG) tablet 6.25 mg, 6.25 mg, Oral, BID WC, Carrie Rivers MD, 6.25 mg at 02/12/21 1095      Physical Exam  Constitutional  BP (!) 152/81   Pulse 86   Temp 98 °F (36.7 °C) (Axillary)   Resp 26   Ht 5' 7\" (1.702 m)   Wt 218 lb 11.1 oz (99.2 kg)   SpO2 96%   BMI 34.25 kg/m²       Constitutional    Vital signs: BP, HR, and RR reviewed   General depressed mental status, no distress, well-nourished  Eyes: fundoscopic exam difficult given inability to cooperate  Cardiovascular: pulses symmetric in all 4 extremities. No peripheral edema. Psychiatric: limited exam given encephalopathy but not agitated and no signs of hallucinations  Neurologic  Mental status:   Sedated and received RSI for intubation.    CN2: Visual Fields: no blink to either side with threat  CN 3,4,6:  extraocular muscles intact with dolls maneuver CN5: facial sensation limited exam given encephalopathy  CN7:mild left facial but ET tube in situ  CN8: hearing limited exam given encephalopathy  CN9: limited exam given encephalopathy  CN11: limited exam given encephalopathy  CN12: limited exam given encephalopathy  Strength:   Abnormal flexion in RUE to pain  No movement to pain in LUE  Triple flexion to pain in BLE    Sensory: grimace to pain in all 4 extremities. Cerebellar/coordination:limited exam given encephalopathy  Tone: normal in all 4 extremities  Gait: limited exam given encephalopathy and intubated with ventilator. CvEE2021 22:00pm to 08:00am on 2021  SEIZURES:  None  INTERICTAL:  None  PUSHBUTTONS:  None  BACKGROUND:  Abundant generalized 6-7 Hz theta slowing of the background with frequent superimposed 1-2 Hz delta frequencies. EKG:  regular        2021 18:15pm - 22:00pm  SEIZURES:  None  INTERICTAL:  None  PUSHBUTTONS:  None  BACKGROUND:  Abundant generalized 6-7 Hz theta slowing of the background with frequent superimposed 1-2 Hz delta frequencies. EKG:  regular     CLINICAL INTERPRETATION:  This was an abnormal tracing for age and state due to a mild to moderate generalized slow wave abnormality indicating diffuse cerebral dysfunction which can be of multiple causes, including structural, or vascular abnormalities, toxic/metabolic conditions, hydrocephalus, or postictal conditions. No epileptiform discharge, focal slowing, or seizures were seen during this recording. Clinical correlation is advised. Routine EEG, 21  This is an abnormal awake and drowsy EEG due to the presence of epileptiform spikes in the right hemispheric leads. No generalized electrographic seizures during this recording.      CT head, 2/10/21 Interval progression of vasogenic edema at the right middle cerebral artery hemorrhagic infarction and interval development of right to left midline shift of about 3 mm as described above. Apparent slight decrease in the size of hemorrhage in its AP dimension. ECHO 2/4/2021 - LV size normal, EF 55-60%, elevated PA pressures, No evidence of shunting      Studies:  MRI of brain w/o - 2/7/2021             Marlene Arreaga is a 79 y.o. female with a history of afib, who presented with L-hemiparesis, aphasia, dysarthria & R gaze deviation, found to have R M2 segment occlusion, initial NIHSS of 14, not tpa candidate s/p thrombectomy w/ TICI 2b reperfusion. Initially improved NIHSS s/p thrombectomy w/ subsequent decline, repeat head CT showed hemorrhagic transformation, stable on repeat imaging. On empiric Keppra given hemorraghic transformation. Had some clinical improvement initially, however, has been less responsive the past few days. CT head was obtained on 2/10/21 and revealed mild worsening cerebral edema. Routine EEG completed today revealed the presence of epileptiform spikes in the right hemispheric leads. No generalized electrographic seizures . It is possible that seizures are contributing to her encephalopathy. UA also concerning for UTI which may also be contributing to her clinically worsening. Now on antibiotics. CXR also concerning for aspiration pneumononitis. Rapid response called today for acute resp failure. Intubated and transferred to ICU.      Plan:  -Continue on cvEEG until tomorrow  -CXR looks like aspiration pneumonitis, likely cannot protect airway and will need trach  -Family wants to be aggressive with care so plan on trach and placement  -Keppra 1000mg IV BID  - Treatment of suspected UTI underway  - Continue high intensity statin  - Continue to hold antiplatelets at this time d/t hemorrhagic transformation - Patient w/ hx of Afib - will need anticoagulation resumed, likely in 2-3 weeks from time of stroke (after clearance from neurosurgery)   - SCDs for DVT prophylaxis  - PT/OT/ST, rehab as able  - Follow up with Neurology shortly after discharge       Ailyn Guajardo, [facility]

## 2021-02-12 NOTE — PROGRESS NOTES
Palliative Care Chart Review  and Check in Note:     NAME:  Shelly Rankin  Admit Date: 2/2/2021  Hospital Day:  Hospital Day: 11   Current Code status: Full Code    Palliative care is continuing to following Ms. Canales for symptom management, and goals of care discussion as needed. Patient's chart reviewed today 2/12/21. Spoke with pt's daughter Aravind Hensley. Discussed intubation today and potential need for tracheostomy. Discussed what a tracheostomy would entail. She will discuss further with her sister Golden Melchor. They will come up tomorrow for a brief visit. D/w Dr. Joseph Quezada and RN Leena Andre       The following are the currently established goals/code status, and Symptom management. Goals of care: Continue with aggressive management    Code status: Full Code    Discharge plan:  Will need placement when medically ready for discharge       [address] [place] [address]  [phone]

## 2021-02-12 NOTE — PROGRESS NOTES
Patient non-verbal. Vitals stable. No signs of pain. TF infusing. Oral care performed overnight. No significant changes.

## 2021-02-12 NOTE — PROGRESS NOTES
Physical Therapy  Discharge    Pt transferred to ICU and now intubated. Not appropriate for PT at this time. Will D/C from PT services. Please reorder if/when appropriate.      Daniel Sahu #[phone]

## 2021-02-12 NOTE — CONSULTS
Clinical Pharmacy Consult Note    Admit date: 2/2/2021    Subjective/Objective:  77yof with PMHx of Afib on Xarelto, HF, DM, pulm HTN, breast ca s/p lumpectomy/radiation who presented with left-sided hemiplegia, severe expressive aphasia and dysarthria, right facial droop and right-sided gaze deviation. Patient received tPA for R MCA M2 occlusion and is s/p thrombectomy. IVCF was placed 2/4, and patient has been off AC d/t hemorrhagic conversion. On 2/12 a rapid response was called for acute hypoxia, O2 sats in 60s. Patient was transferred to ICU for acute care, placed on 15L NRB and remains unresponsive. Pharmacy is consulted to dose Vancomycin per Dr. Irvin Palafox for HAP. Pertinent Medications:  Zosyn 3.375g IV q8h -- day # 1  Vancomycin, pharmacy to dose -- day # 1   1.5g IV x1 (2/12)   1.25g IV q12h (to begin 2/13)       Recent Labs     02/11/21  0630 02/12/21  0603   * 142   K 3.8 4.1    103   CO2 35* 37*   BUN 17 15   CREATININE 0.6 <0.5*       CrCl cannot be calculated (This lab value cannot be used to calculate CrCl because it is not a number: <0.5). Recent Labs     02/11/21  0630 02/12/21  0603   WBC 10.0 10.0   HGB 9.0* 9.5*   HCT 30.2* 33.2*   MCV 83.6 85.8    182       Height:  5' 7\" (170.2 cm)  Weight: 218 lb 11.1 oz (99.2 kg)    Micro:  Rapid flu (2/2): negative  MRSA PCR (2/12): ordered    Assessment/Plan:  1. HAP:  Vancomycin + Zosyn - day #1  Vancomycin - pharmacy to dose  · Will send loading dose of 1.5g followed by 1.25g IV q12h. Provides ~ 12.5 mg/kg and is based on a half-life elimination of 7 hours. · Trough will be ordered when appropriate. Target 15-20 mcg/mL for HAP. · Renal function will be monitored closely and dosing will be adjusted as appropriate. Please call with any questions.   Spring Linker, PharmD, BCPS  Main pharmacy: [id number]  2/12/2021 11:18 AM

## 2021-02-12 NOTE — PROCEDURES
ENDOTRACHEAL INTUBATION    INDICATION: Life threatening respiratory failure    TIME OUT: taken    SEDATION: Etomidate 30 mg IV push            Succinylcholine 100 mg IV push    PROCEDURE: Using video laryngoscopy, the vocal cords were well visualized and an 8 mm endotracheal tube was place directly through the cords. Good breath sounds auscultated bilaterally without sounds over abdomen. Appropriate CO2 waveform on the monitor. CXR shows endotracheal tube in mid trachea without pneumothorax. . Vent settings are 100% FIO2, PEEP 5, Tidal Volume 500, Rate 16.   ABG in 1 hour    Philip Mosley MD

## 2021-02-12 NOTE — CONSULTS
Monitoring: TF Intake  or TF Tolerance      OBJECTIVE DATA:  · Nutrition-Focused Physical Findings: +BM 2/11  · Wounds None      Past Medical History:   Diagnosis Date    Acute GI bleeding     recent admission 1/29/2021    Atrial fibrillation ([facility] [address] [address]     Xarelto    Systolic CHF (HCC)     Type 2 diabetes mellitus (HCC)         ANTHROPOMETRICS  Current Height: 5' 7\" (170.2 cm)  Current Weight: 218 lb 11.1 oz (99.2 kg)    Admission weight: 225 lb (102.1 kg)  Ideal Bodyweight 135 lb    Usual Bodyweight  No wt hx; pt unable to provide   Weight Changes mirlande d/t limited wt hx       BMI BMI (Calculated): 34.3    Wt Readings from Last 50 Encounters:   02/07/21 218 lb 11.1 oz (99.2 kg)       COMPARATIVE STANDARDS  Estimated Total Kcals/Day : 11-14  Current Bodyweight (99.2 kg) ~4733-3572 kcal    Estimated Total Protein (g/day) : 1.3-1.5 Ideal Bodyweight  (61.4 kg) 79-91 g/day  Estimated Daily Total Fluid (ml/day): 1500 ml    Food / Nutrition-Related History  Pre-Admission / Home Diet:    none listed  Home Supplements / Herbals:    none noted  Food Restrictions / Cultural Requests:    none noted    Current Nutrition Therapies   DIET TUBE FEED CONTINUOUS/CYCLIC NPO; STANDARD WITH FIBER; Gastrostomy; Continuous; 25; 55; 24     PO Intake: NPO  PO Supplement: NPO   PO Supplement Intake: NPO  IVF: N/A    NUTRITION RISK LEVEL: Risk Level: High     Lalito Feliciano RD, SHEEBA  [place]:  [phone]  Office:  [phone]

## 2021-02-12 NOTE — CARE COORDINATION
Case Management Assessment           Daily Note                 Date/ Time of Note: 2/12/2021 10:00 AM         Note completed by: Luz Ontiveros    Patient Name: Jazmin Gale  YOB: 1940    Diagnosis:Acute cerebrovascular accident (CVA) [facility]) [I63.9]  Patient Admission Status: Inpatient    Date of Admission:2/2/2021  7:22 PM Length of Stay: 10 GLOS:      Current Plan of Care: transferred to ICU and intubated  ________________________________________________________________________________________  PT AM-PAC: 6 / 24 per last evaluation on: 02/11    OT AM-PAC: 6 / 24 per last evaluation on: 02/11    DME Needs for discharge: pending  ________________________________________________________________________________________  Discharge Plan: Inpatient Rehab: [facility] at [facility]    Tentative discharge date: TBD    Current barriers to discharge: transferred to ICU and intubated and sedated    Referrals completed: Not Applicable    Resources/ information provided: Not indicated at this time  ________________________________________________________________________________________  Case Management Notes:Patient transferred from  to ICU and is now intubated and sedated. [facility] is following. Samson Ford and her family were provided with choice of provider; she and her family are in agreement with the discharge plan.     Care Transition Patient: Arianne Ontiveros RN  [facility]  Case Management Department  Ph: [phone]  Fax: [phone]

## 2021-02-12 NOTE — PROGRESS NOTES
Speech Language Pathology      Chart reviewed. Pt intubated this AM. SLP will sign off at this time - please re-consult as pt appropriate 24 hrs s/p extubation.      Nina Irby MA CCC-SLP; [id number]  Speech-Language Pathologist  Pager # [phone]  Phone # [phone]  Office # [phone]

## 2021-02-12 NOTE — PROGRESS NOTES
Rapid called @ 0705- SpO2 60's on 3.5L NC  Suctioned performed  SpO2 dropped to 40's  Vitals-    BP: 183/106    HR: 115- Afib   Temp 98.5  STAT ABG  0712-   BP: 180/125   HR: 107- afib   SpO2: 100% 15L non-rebreather   0713-   BP: 188/100    HR: 111  10mg Labetalol given  Unhooked from cEEG  CXR  Transferred to ICU

## 2021-02-12 NOTE — PLAN OF CARE
Problem: HEMODYNAMIC STATUS  Goal: Patient has stable vital signs and fluid balance  Outcome: Ongoing   Vitals stable. Fluid balance stable. Problem: ACTIVITY INTOLERANCE/IMPAIRED MOBILITY  Goal: Mobility/activity is maintained at optimum level for patient  Outcome: Ongoing  Patient is a complete assist. Able to move R extremities randomly- not on command. Left extremities do respond to painful stimuli. Problem: COMMUNICATION IMPAIRMENT  Goal: Ability to express needs and understand communication  Outcome: Ongoing   Non-verbal. Patient unable to express needs or understand communication.

## 2021-02-12 NOTE — PROGRESS NOTES
Patient transferred to ICU after rapid called for low SpO2. Report given to Wilton Savage RN at bedside.

## 2021-02-12 NOTE — PROGRESS NOTES
Nephrology Note                                                                                                                                                                                                                                                                                                                                                               Office : [phone]     Fax :[phone]              Patient's Name: Cheyenne Nielson    Reason for Consult:  Hypernatremia   Requesting Physician:  No primary care provider on file. Na better   On TF with Free water     Past Medical History:   Diagnosis Date    Acute GI bleeding     recent admission 1/29/2021    Atrial fibrillation ([facility] [address]     Xarelto    Systolic CHF ([facility] [address]     Type 2 diabetes mellitus ([facility] [address]        Past Surgical History:   Procedure Laterality Date    GASTROSTOMY TUBE PLACEMENT N/A 2/9/2021    EGD PEG TUBE PLACEMENT performed by Jennie Ratliff MD at [facility] [address] [place]  2/4/2021    IR IVC FILTER PLACEMENT W IMAGING 2/4/2021 [facility]    UPPER GASTROINTESTINAL ENDOSCOPY  01/29/2021    Dr Patricia Chavez       No family history on file. reports that she has quit smoking. She does not have any smokeless tobacco history on file. Allergies:  Patient has no known allergies.     Current Medications:        nystatin (MYCOSTATIN) 715520 UNIT/ML suspension 500,000 Units, 4x Daily      cefTRIAXone (ROCEPHIN) 1000 mg IVPB in 50 mL D5W minibag, Q24H      levETIRAcetam (KEPPRA) 1,000 mg in sodium chloride 0.9 % 100 mL IVPB, Q12H      pantoprazole (PROTONIX) injection 40 mg, Daily      ferrous sulfate 300 (60 Fe) MG/5ML syrup 300 mg, Every Other Day      glucose (GLUTOSE) 40 % oral gel 15 g, PRN      dextrose 50 % IV solution, PRN      glucagon (rDNA) injection 1 mg, PRN      dextrose 5 % solution, PRN      miconazole (MICOTIN) 2 % powder, BID   perflutren lipid microspheres (DEFINITY) injection 1.65 mg, ONCE PRN      labetalol (NORMODYNE;TRANDATE) injection 10 mg, Q4H PRN      albuterol sulfate  (90 Base) MCG/ACT inhaler 2 puff, PRN      hydrALAZINE (APRESOLINE) injection 5 mg, Q6H PRN      acetaminophen (TYLENOL) tablet 650 mg, Q4H PRN      promethazine (PHENERGAN) tablet 12.5 mg, Q6H PRN    Or      ondansetron (ZOFRAN) injection 4 mg, Q6H PRN      polyethylene glycol (GLYCOLAX) packet 17 g, Daily PRN      atorvastatin (LIPITOR) tablet 80 mg, Nightly      carvedilol (COREG) tablet 6.25 mg, BID WC      insulin lispro (1 Unit Dial) 0-6 Units, TID WC      insulin lispro (1 Unit Dial) 0-3 Units, Nightly        Review of Systems:   Lethargic       Physical exam:     Vitals:  BP (!) 155/83   Pulse 75   Temp 98.7 °F (37.1 °C) (Axillary)   Resp 16   Ht 5' 7\" (1.702 m)   Wt 218 lb 11.1 oz (99.2 kg)   SpO2 99%   BMI 34.25 kg/m²   Constitutional:  Lethargic   Skin: no rash, turgor wnl  Heent:  eomi, mmm  Neck: no bruits or jvd noted  Cardiovascular:  S1, S2 without m/r/g  Respiratory: CTA B without w/r/r  Abdomen:  +bs, soft, nt, nd  Ext: no lower extremity edema  Psychiatric: mood and affect appropriate  Musculoskeletal:  Rom, muscular strength dec     Data:   Labs:  CBC:   Recent Labs     02/09/21  0502 02/10/21  0637 02/11/21  0630   WBC 8.5 9.0 10.0   HGB 8.8* 9.0* 9.0*    205 186     BMP:    Recent Labs     02/09/21  0502 02/10/21  0637 02/11/21  0630   * 155* 147*   K 3.8 3.9 3.8    113* 108   CO2 33* 32 35*   BUN 18 18 17   CREATININE 0.6 0.6 0.6   GLUCOSE 105* 120* 189*     Ca/Mg/Phos:   Recent Labs     02/09/21  0502 02/10/21  0637 02/11/21  0630   CALCIUM 9.4 9.2 9.3     Hepatic: No results for input(s): AST, ALT, ALB, BILITOT, ALKPHOS in the last 72 hours. Troponin: No results for input(s): TROPONINI in the last 72 hours. BNP: No results for input(s): BNP in the last 72 hours. Lipids: No results for input(s): CHOL, TRIG, HDL, LDLCALC, LABVLDL in the last 72 hours. ABGs: No results for input(s): PHART, PO2ART, HIL3GFL in the last 72 hours. INR:   Recent Labs     02/09/21  0502   INR 1.15*     UA:  Recent Labs     02/11/21  1605   COLORU Yellow   CLARITYU Clear   GLUCOSEU Negative   BILIRUBINUR MODERATE*   KETUA TRACE*   SPECGRAV 1.025   BLOODU Negative   PHUR 6.0   PROTEINU 30*   UROBILINOGEN 1.0   NITRU Negative   LEUKOCYTESUR Negative   LABMICR YES   URINETYPE NotGiven      Urine Microscopic:   Recent Labs     02/11/21  1605   BACTERIA Rare*   WBCUA None seen   RBCUA None seen   EPIU 0-1     Urine Culture: No results for input(s): LABURIN in the last 72 hours. Urine Chemistry:   Recent Labs     02/10/21  1254   NAUR 23             IMAGING:  CT HEAD WO CONTRAST   Final Result   Interval progression of vasogenic edema at the right middle cerebral artery hemorrhagic infarction and interval development of right to left midline shift of about 3 mm as described above. Apparent slight decrease in the size of hemorrhage in its AP dimension. XR CHEST 1 VIEW   Final Result   1. Interval extubation. 2. Progression of bilateral airspace disease. MRI BRAIN WO CONTRAST   Final Result      Region of right middle cerebral artery hemorrhagic infarction measures approximately 5.0 x 4.2 cm with surrounding mild vasogenic edema and diffusion restriction consistent with right middle cerebral artery distribution infarct with hemorrhagic    components      No additional areas of hemorrhage or infarction identified. Mild local mass effect is identified with very minimal midline shift of 3 to 4 mm      XR CHEST PORTABLE   Final Result      Persistent bilateral infiltrates and cardiomegaly. IR GUIDED IVC FILTER PLACEMENT   Final Result   Impression:      Successful placement of retrievable IVC filter.          CT HEAD WO CONTRAST   Final Result Critical finding of acute intracranial hemorrhage was called to Yuniel Mondragon of the neurosurgery department at 12:25 PM on 2/3/2021, with instructions to contact patient's attending physician with these results. CT Brain Perfusion   Final Result      1. Hypoperfusion in the right MCA territory with a central ischemic core of 8 mL, total volume of hypoperfusion of 79 mL and a penumbra of 71 mL. XR CHEST PORTABLE   Final Result   1. Limited evaluation because of patient rotation and underpenetration of the film. 2. No dense airspace consolidation. 3. Probable mild cardiomegaly. CTA HEAD NECK W CONTRAST   Final Result   1. Carotid and vertebral arteries are patent and without stenosis or acute abnormality. 2. Incidental partial imaging of the a segmental pulmonary embolism in the right middle lobe. 3. Partial imaging of small right greater than left pleural effusions. 4. Previous left suboccipital craniotomy with a residual 1.6 cm left-sided extra-axial mass at the foramen magnum with mass effect on the cord at the cervical medullary junction favored to represent meningioma. Recommend correlation with clinical history    and previous imaging. 5. Incidental multinodular goiter. CTA HEAD:      FINDINGS: The internal carotid arteries are patent and normal in caliber through the skull base. The middle cerebral arteries are patent. 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 The A2 and A3 segments of the ACAs are patent bilaterally. Left MCA is patent. There is focal occlusion of an M2 branch of the right MCA (series 2 images 427 and 428). However, other M2 branches and M3 branches in the right sylvian fissure are patent. The M1 segment of the right MCA is patent. The posterior cerebral arteries are    patent bilaterally. The basilar artery is patent and normal in caliber. It is supplied by the left vertebral artery. The small nondominant right vertebral artery terminates in right sided PICA. IMPRESSION:   1. 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 2. 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 Results were discussed with Dr. Nabeel Valdes at 8:00 PM on 2/2/2021. CT HEAD WO CONTRAST   Final Result   1. Previous left suboccipital craniotomy with a partially calcified extra-axial mass at the left foramen magnum measuring 1.8 cm contacting the cord at the cervicomedullary junction with some degree of mass effect on the cord at the foramen magnum. This    most likely represents residual/recurrent meningioma or other extra-axial mass. Correlation with patient history and previous imaging recommended. 2. Mild global volume loss and mild chronic small vessel ischemic disease in the white matter. 3. No acute intracranial hemorrhage. IR ANGIOGRAM CAROTID CEREBRAL RIGHT    (Results Pending)       Assessment/Plan   1. Hypernatremia     2. CVA     3. Anemia    4. Acid- base/ Electrolyte imbalance     5.  Malnutrition     Plan   - replace free water   - Na better   - Tube feeds to cont   - Ur studies  - monitor NA   - CVA management   - PEG tube placed   - labs in am                 Thank you for allowing us to participate in care of [address] [place] [address] free to contact me   [facility] [address]  Office : [phone]  Fax :[phone]

## 2021-02-12 NOTE — PROGRESS NOTES
Pt intubated due to respiratory failure. Pt given 30mg atomadate and 100 succinolcholine for sedation pre-procedure. Size 4 glidescope used to visualize vocal chords. Size 8mm ET tube placed through the vocal chords to 23 cm at the teeth. ET tube cuff inflated to MOV. Positive color change on the etco2 detector and waveform capnography of 44 mmHg with good waveform noted. Bilateral breath sounds heard. X-ray called to confirm placement. Pt placed on mechanical ventilator with ordered settings. No visible trauma noted. Pt resting comfortably.

## 2021-02-13 LAB
ANION GAP SERPL CALCULATED.3IONS-SCNC: 6 MMOL/L (ref 3–16)
BASOPHILS ABSOLUTE: 0 K/UL (ref 0–0.2)
BASOPHILS RELATIVE PERCENT: 0.5 %
BUN BLDV-MCNC: 15 MG/DL (ref 7–20)
CALCIUM SERPL-MCNC: 8.6 MG/DL (ref 8.3–10.6)
CHLORIDE BLD-SCNC: 102 MMOL/L (ref 99–110)
CO2: 32 MMOL/L (ref 21–32)
CREAT SERPL-MCNC: <0.5 MG/DL (ref 0.6–1.2)
EOSINOPHILS ABSOLUTE: 0.1 K/UL (ref 0–0.6)
EOSINOPHILS RELATIVE PERCENT: 1.1 %
GFR AFRICAN AMERICAN: >60
GFR NON-AFRICAN AMERICAN: >60
GLUCOSE BLD-MCNC: 109 MG/DL (ref 70–99)
GLUCOSE BLD-MCNC: 131 MG/DL (ref 70–99)
GLUCOSE BLD-MCNC: 141 MG/DL (ref 70–99)
GLUCOSE BLD-MCNC: 144 MG/DL (ref 70–99)
GLUCOSE BLD-MCNC: 146 MG/DL (ref 70–99)
GLUCOSE BLD-MCNC: 154 MG/DL (ref 70–99)
HCT VFR BLD CALC: 28.8 % (ref 36–48)
HEMOGLOBIN: 8.6 G/DL (ref 12–16)
L. PNEUMOPHILA SEROGP 1 UR AG: NORMAL
LYMPHOCYTES ABSOLUTE: 1 K/UL (ref 1–5.1)
LYMPHOCYTES RELATIVE PERCENT: 13.8 %
MCH RBC QN AUTO: 25.1 PG (ref 26–34)
MCHC RBC AUTO-ENTMCNC: 29.9 G/DL (ref 31–36)
MCV RBC AUTO: 83.8 FL (ref 80–100)
MONOCYTES ABSOLUTE: 0.6 K/UL (ref 0–1.3)
MONOCYTES RELATIVE PERCENT: 7.7 %
NEUTROPHILS ABSOLUTE: 5.6 K/UL (ref 1.7–7.7)
NEUTROPHILS RELATIVE PERCENT: 76.9 %
PDW BLD-RTO: 25.1 % (ref 12.4–15.4)
PERFORMED ON: ABNORMAL
PLATELET # BLD: 129 K/UL (ref 135–450)
PMV BLD AUTO: 10.4 FL (ref 5–10.5)
POTASSIUM REFLEX MAGNESIUM: 4.2 MMOL/L (ref 3.5–5.1)
RBC # BLD: 3.44 M/UL (ref 4–5.2)
REASON FOR REJECTION: NORMAL
REJECTED TEST: NORMAL
SODIUM BLD-SCNC: 140 MMOL/L (ref 136–145)
STREP PNEUMONIAE ANTIGEN, URINE: NORMAL
WBC # BLD: 7.3 K/UL (ref 4–11)

## 2021-02-13 PROCEDURE — 95813 EEG EXTND MNTR 61-119 MIN: CPT

## 2021-02-13 PROCEDURE — 6360000002 HC RX W HCPCS: Performed by: STUDENT IN AN ORGANIZED HEALTH CARE EDUCATION/TRAINING PROGRAM

## 2021-02-13 PROCEDURE — 2700000000 HC OXYGEN THERAPY PER DAY

## 2021-02-13 PROCEDURE — C9113 INJ PANTOPRAZOLE SODIUM, VIA: HCPCS | Performed by: STUDENT IN AN ORGANIZED HEALTH CARE EDUCATION/TRAINING PROGRAM

## 2021-02-13 PROCEDURE — 6370000000 HC RX 637 (ALT 250 FOR IP): Performed by: INTERNAL MEDICINE

## 2021-02-13 PROCEDURE — 2580000003 HC RX 258: Performed by: STUDENT IN AN ORGANIZED HEALTH CARE EDUCATION/TRAINING PROGRAM

## 2021-02-13 PROCEDURE — 94003 VENT MGMT INPAT SUBQ DAY: CPT

## 2021-02-13 PROCEDURE — 94761 N-INVAS EAR/PLS OXIMETRY MLT: CPT

## 2021-02-13 PROCEDURE — 6360000002 HC RX W HCPCS: Performed by: NURSE PRACTITIONER

## 2021-02-13 PROCEDURE — 85025 COMPLETE CBC W/AUTO DIFF WBC: CPT

## 2021-02-13 PROCEDURE — 6370000000 HC RX 637 (ALT 250 FOR IP): Performed by: COUNSELOR

## 2021-02-13 PROCEDURE — 2000000000 HC ICU R&B

## 2021-02-13 PROCEDURE — 36415 COLL VENOUS BLD VENIPUNCTURE: CPT

## 2021-02-13 PROCEDURE — 6370000000 HC RX 637 (ALT 250 FOR IP): Performed by: STUDENT IN AN ORGANIZED HEALTH CARE EDUCATION/TRAINING PROGRAM

## 2021-02-13 PROCEDURE — 80048 BASIC METABOLIC PNL TOTAL CA: CPT

## 2021-02-13 PROCEDURE — 2580000003 HC RX 258: Performed by: NURSE PRACTITIONER

## 2021-02-13 PROCEDURE — 99291 CRITICAL CARE FIRST HOUR: CPT | Performed by: INTERNAL MEDICINE

## 2021-02-13 RX ORDER — CARVEDILOL 6.25 MG/1
6.25 TABLET ORAL 2 TIMES DAILY WITH MEALS
COMMUNITY
End: 2021-04-28 | Stop reason: ALTCHOICE

## 2021-02-13 RX ORDER — DILTIAZEM HYDROCHLORIDE 180 MG/1
180 CAPSULE, EXTENDED RELEASE ORAL DAILY
Status: ON HOLD | COMMUNITY
End: 2021-02-22 | Stop reason: HOSPADM

## 2021-02-13 RX ORDER — POTASSIUM CHLORIDE 20 MEQ/1
20 TABLET, EXTENDED RELEASE ORAL 2 TIMES DAILY
Status: ON HOLD | COMMUNITY
End: 2021-02-22 | Stop reason: HOSPADM

## 2021-02-13 RX ORDER — PRAVASTATIN SODIUM 10 MG
10 TABLET ORAL DAILY
COMMUNITY
End: 2021-04-28 | Stop reason: ALTCHOICE

## 2021-02-13 RX ORDER — ACETAMINOPHEN 325 MG/1
650 TABLET ORAL EVERY 6 HOURS PRN
COMMUNITY
End: 2021-04-28 | Stop reason: ALTCHOICE

## 2021-02-13 RX ORDER — FERROUS SULFATE 325(65) MG
325 TABLET ORAL
COMMUNITY
End: 2021-04-28 | Stop reason: ALTCHOICE

## 2021-02-13 RX ORDER — ALBUTEROL SULFATE 90 UG/1
2 AEROSOL, METERED RESPIRATORY (INHALATION) EVERY 6 HOURS PRN
COMMUNITY
End: 2021-04-28 | Stop reason: ALTCHOICE

## 2021-02-13 RX ORDER — LOSARTAN POTASSIUM 25 MG/1
25 TABLET ORAL 2 TIMES DAILY
Status: ON HOLD | COMMUNITY
End: 2021-02-22 | Stop reason: HOSPADM

## 2021-02-13 RX ORDER — FUROSEMIDE 10 MG/ML
20 INJECTION INTRAMUSCULAR; INTRAVENOUS ONCE
Status: COMPLETED | OUTPATIENT
Start: 2021-02-13 | End: 2021-02-13

## 2021-02-13 RX ORDER — PANTOPRAZOLE SODIUM 40 MG/1
40 TABLET, DELAYED RELEASE ORAL
COMMUNITY
End: 2021-04-28 | Stop reason: ALTCHOICE

## 2021-02-13 RX ORDER — 0.9 % SODIUM CHLORIDE 0.9 %
500 INTRAVENOUS SOLUTION INTRAVENOUS ONCE
Status: COMPLETED | OUTPATIENT
Start: 2021-02-13 | End: 2021-02-13

## 2021-02-13 RX ORDER — FUROSEMIDE 40 MG/1
40 TABLET ORAL DAILY
COMMUNITY

## 2021-02-13 RX ORDER — LETROZOLE 2.5 MG/1
2.5 TABLET, FILM COATED ORAL DAILY
COMMUNITY
End: 2021-04-28 | Stop reason: ALTCHOICE

## 2021-02-13 RX ORDER — OYSTER SHELL CALCIUM WITH VITAMIN D 500; 200 MG/1; [IU]/1
1 TABLET, FILM COATED ORAL DAILY
COMMUNITY
End: 2021-04-28 | Stop reason: ALTCHOICE

## 2021-02-13 RX ADMIN — PROPOFOL 34.95 MCG/KG/MIN: 10 INJECTION, EMULSION INTRAVENOUS at 07:49

## 2021-02-13 RX ADMIN — MICONAZOLE NITRATE: 20 POWDER TOPICAL at 20:09

## 2021-02-13 RX ADMIN — SODIUM CHLORIDE 500 ML: 9 INJECTION, SOLUTION INTRAVENOUS at 03:51

## 2021-02-13 RX ADMIN — ATORVASTATIN CALCIUM 80 MG: 40 TABLET, FILM COATED ORAL at 21:51

## 2021-02-13 RX ADMIN — PROPOFOL 35 MCG/KG/MIN: 10 INJECTION, EMULSION INTRAVENOUS at 03:50

## 2021-02-13 RX ADMIN — PROPOFOL 25.03 MCG/KG/MIN: 10 INJECTION, EMULSION INTRAVENOUS at 15:16

## 2021-02-13 RX ADMIN — LEVETIRACETAM 1000 MG: 100 INJECTION, SOLUTION INTRAVENOUS at 08:01

## 2021-02-13 RX ADMIN — NYSTATIN 500000 UNITS: 100000 SUSPENSION ORAL at 20:09

## 2021-02-13 RX ADMIN — PIPERACILLIN AND TAZOBACTAM 3375 MG: 3; .375 INJECTION, POWDER, LYOPHILIZED, FOR SOLUTION INTRAVENOUS at 18:27

## 2021-02-13 RX ADMIN — PIPERACILLIN AND TAZOBACTAM 3375 MG: 3; .375 INJECTION, POWDER, LYOPHILIZED, FOR SOLUTION INTRAVENOUS at 11:15

## 2021-02-13 RX ADMIN — Medication 10 ML: at 08:01

## 2021-02-13 RX ADMIN — FUROSEMIDE 20 MG: 10 INJECTION, SOLUTION INTRAMUSCULAR; INTRAVENOUS at 07:50

## 2021-02-13 RX ADMIN — CARVEDILOL 6.25 MG: 6.25 TABLET, FILM COATED ORAL at 08:00

## 2021-02-13 RX ADMIN — INSULIN LISPRO 2 UNITS: 100 INJECTION, SOLUTION INTRAVENOUS; SUBCUTANEOUS at 20:24

## 2021-02-13 RX ADMIN — NYSTATIN 500000 UNITS: 100000 SUSPENSION ORAL at 08:00

## 2021-02-13 RX ADMIN — NYSTATIN 500000 UNITS: 100000 SUSPENSION ORAL at 17:35

## 2021-02-13 RX ADMIN — PANTOPRAZOLE SODIUM 40 MG: 40 INJECTION, POWDER, FOR SOLUTION INTRAVENOUS at 08:00

## 2021-02-13 RX ADMIN — INSULIN LISPRO 2 UNITS: 100 INJECTION, SOLUTION INTRAVENOUS; SUBCUTANEOUS at 03:00

## 2021-02-13 RX ADMIN — LEVETIRACETAM 1000 MG: 100 INJECTION, SOLUTION INTRAVENOUS at 20:11

## 2021-02-13 RX ADMIN — NYSTATIN 500000 UNITS: 100000 SUSPENSION ORAL at 13:50

## 2021-02-13 RX ADMIN — MICONAZOLE NITRATE: 20 POWDER TOPICAL at 08:01

## 2021-02-13 RX ADMIN — INSULIN LISPRO 2 UNITS: 100 INJECTION, SOLUTION INTRAVENOUS; SUBCUTANEOUS at 15:22

## 2021-02-13 RX ADMIN — MINERAL SUPPLEMENT IRON 300 MG / 5 ML STRENGTH LIQUID 100 PER BOX UNFLAVORED 300 MG: at 08:00

## 2021-02-13 RX ADMIN — VANCOMYCIN HYDROCHLORIDE 1250 MG: 10 INJECTION, POWDER, LYOPHILIZED, FOR SOLUTION INTRAVENOUS at 15:16

## 2021-02-13 RX ADMIN — VANCOMYCIN HYDROCHLORIDE 1250 MG: 10 INJECTION, POWDER, LYOPHILIZED, FOR SOLUTION INTRAVENOUS at 03:02

## 2021-02-13 RX ADMIN — PIPERACILLIN AND TAZOBACTAM 3375 MG: 3; .375 INJECTION, POWDER, LYOPHILIZED, FOR SOLUTION INTRAVENOUS at 03:58

## 2021-02-13 RX ADMIN — PROPOFOL 25 MCG/KG/MIN: 10 INJECTION, EMULSION INTRAVENOUS at 23:30

## 2021-02-13 ASSESSMENT — PULMONARY FUNCTION TESTS
PIF_VALUE: 21
PIF_VALUE: 22
PIF_VALUE: 25
PIF_VALUE: 22
PIF_VALUE: 22
PIF_VALUE: 23
PIF_VALUE: 24
PIF_VALUE: 22
PIF_VALUE: 21
PIF_VALUE: 22
PIF_VALUE: 21
PIF_VALUE: 22

## 2021-02-13 ASSESSMENT — PAIN SCALES - GENERAL: PAINLEVEL_OUTOF10: 0

## 2021-02-13 NOTE — PROGRESS NOTES
Chart check - patient not directly examined today    cEEG with moderate generalized slowing, no epileptiform discharges or seizures    Head CT without new hemorrhage. Stable midline shift. No other significant interval changes.      No change to neurology plan today    Myesha Stratton NP

## 2021-02-13 NOTE — PROGRESS NOTES
She had a rapid response called on 2/12 on the floors for hypoxic respiratory failure. She was intubated and transferred back to the ICU. CXR identified worsening basilar infiltrates. It is suspected she had aspiration pneumonitis due to inability to protect airway. Willam Lainez was discussed with patient's daughter yesterday. Most recent ABG yesterday identified (7.431/55/109).      Medications:     Scheduled Meds:   piperacillin-tazobactam  3,375 mg Intravenous Q8H    insulin lispro  0-12 Units Subcutaneous Q4H    vancomycin  1,250 mg Intravenous Q12H    lidocaine 1 % injection  5 mL Intradermal Once    sodium chloride flush  10 mL Intravenous 2 times per day    nystatin  5 mL Oral 4x Daily    levetiracetam  1,000 mg Intravenous Q12H    pantoprazole  40 mg Intravenous Daily    ferrous sulfate  300 mg Per G Tube Every Other Day    miconazole   Topical BID    atorvastatin  80 mg Oral Nightly    carvedilol  6.25 mg Oral BID WC     Continuous Infusions:   propofol 20 mcg/kg/min (02/13/21 0941)    dextrose       PRN Meds:sodium chloride flush, glucose, dextrose, glucagon (rDNA), dextrose, perflutren lipid microspheres, labetalol, albuterol sulfate HFA, hydrALAZINE, acetaminophen, promethazine **OR** ondansetron, polyethylene glycol    Objective:   Vitals:   T-max:  Patient Vitals for the past 8 hrs:   BP Temp Temp src Pulse Resp SpO2   02/13/21 1232    59 12    02/13/21 1200 112/71   58 12    02/13/21 1100 (!) 115/102   64 13    02/13/21 1000 114/64   62 12    02/13/21 0900 121/71   57 12    02/13/21 0835    68 12    02/13/21 0800 124/75 98.9 °F (37.2 °C) Axillary 68 18 100 %   02/13/21 0700 110/83   68 17    02/13/21 0600 100/67   63 12 98 %   02/13/21 0500    56 12 98 %   02/13/21 0457    58 12        Intake/Output Summary (Last 24 hours) at 2/13/2021 1243  Last data filed at 2/13/2021 1230  Gross per 24 hour   Intake 735.01 ml   Output 75 ml   Net 660.01 ml       Access: -Peripheral Access Day#:3                               Gilman Day#:9  NGT Day#: 6                                            ETT Day#:1  Vent Settings: Vent Mode: AC/PRVC Rate Set: 12 bmp/Vt Ordered: 500 mL/ /FiO2 : 30 %      Physical Exam  Constitutional:       Interventions: She is sedated and intubated. HENT:      Head: Normocephalic and atraumatic. Mouth/Throat:      Mouth: Mucous membranes are moist.      Pharynx: Oropharynx is clear. Eyes:      Conjunctiva/sclera: Conjunctivae normal.      Pupils: Pupils are equal, round, and reactive to light. Cardiovascular:      Rate and Rhythm: Normal rate. Rhythm irregular. Heart sounds: Normal heart sounds. No murmur. No friction rub. No gallop. Comments: No BLLE edema noted  Pulmonary:      Effort: She is intubated. Breath sounds: Rhonchi present. No wheezing or rales. Abdominal:      Palpations: Abdomen is soft. Tenderness: There is no abdominal tenderness. Comments: PEG tube C/DI  Abdomen soft, NT, non-distended   Musculoskeletal:      Right lower leg: No edema. Left lower leg: No edema. Skin:     General: Skin is warm and dry. Neurological:      Mental Status: Mental status is at baseline. Comments: Off sedation: Withdraws to pain in BLLE  Does not withdraw to pain in upper extremities. Right upper extremity flaccid. Left upper extremity with rhythmic contractions. Strength 0/5 bilateral upper and lower extremities  Lip trembling. Unable to follow commands. PEERL.           LABS:    CBC:   Recent Labs     02/11/21  0630 02/12/21  0603 02/13/21  0527   WBC 10.0 10.0 7.3   HGB 9.0* 9.5* 8.6*   HCT 30.2* 33.2* 28.8*    182 129*   MCV 83.6 85.8 83.8     Renal:    Recent Labs     02/11/21  0630 02/12/21  0603 02/13/21  0443   * 142 140   K 3.8 4.1 4.2    103 102   CO2 35* 37* 32   BUN 17 15 15   CREATININE 0.6 <0.5* <0.5*   GLUCOSE 189* 235* 146*   CALCIUM 9.3 9.2 8.6   ANIONGAP 4 2* 6 Hepatic: No results for input(s): AST, ALT, BILITOT, BILIDIR, PROT, LABALBU, ALKPHOS in the last 72 hours. Troponin: No results for input(s): TROPONINI in the last 72 hours. BNP: No results for input(s): BNP in the last 72 hours. Lipids: No results for input(s): CHOL, HDL in the last 72 hours. Invalid input(s): LDLCALCU, TRIGLYCERIDE  ABGs:    Recent Labs     02/12/21  1147 02/12/21 2010 02/12/21  2300   PHART 7.461* 7.526* 7.431   XGE6CBT 49.0* 43.0 55.2*   PO2ART 399.5* 128.0* 109.5*   QYK2FPK 34.9* 35.7* 36.8*   BEART 11* 13* 13*   X0DMULBU 100 99 98   TWU1HOA 37 37 39       INR: No results for input(s): INR in the last 72 hours. Lactate: No results for input(s): LACTATE in the last 72 hours. Cultures:  -----------------------------------------------------------------  RAD:   CT HEAD WO CONTRAST   Final Result      XR CHEST PORTABLE   Final Result      Persistent bilateral infiltrates         XR CHEST PORTABLE   Final Result   Impression:          1. Increased opacities in the left lung base since the prior study. There may be a small left pleural effusion. 2. Persistent patchy right lung opacities. CT HEAD WO CONTRAST   Final Result   Interval progression of vasogenic edema at the right middle cerebral artery hemorrhagic infarction and interval development of right to left midline shift of about 3 mm as described above. Apparent slight decrease in the size of hemorrhage in its AP dimension. XR CHEST 1 VIEW   Final Result   1. Interval extubation. 2. Progression of bilateral airspace disease.       MRI BRAIN WO CONTRAST   Final Result      Region of right middle cerebral artery hemorrhagic infarction measures approximately 5.0 x 4.2 cm with surrounding mild vasogenic edema and diffusion restriction consistent with right middle cerebral artery distribution infarct with hemorrhagic    components No additional areas of hemorrhage or infarction identified. Mild local mass effect is identified with very minimal midline shift of 3 to 4 mm      XR CHEST PORTABLE   Final Result      Persistent bilateral infiltrates and cardiomegaly. IR GUIDED IVC FILTER PLACEMENT   Final Result   Impression:      Successful placement of retrievable IVC filter. CT HEAD WO CONTRAST   Final Result      1. Hemorrhagic transformation of right MCA infarct, without significant change. No new hemorrhage. CT head without contrast   Final Result         1. Stable large intraparenchymal hemorrhage located within the right posterior temporal parietal lobe with extension into the adjacent insula. There is also subarachnoid extension of hemorrhage into the overlying sulci as well as the right sylvian    fissure. Findings result in some mild right to left subfalcine herniation. 2.There is a 1.3 x 1.1 cm extra-axial mass identified within the left ordered aspect of the foramen magnum. This results in some mass effect on the adjacent medulla. This is without change from the prior study. Finding may represent a meningioma. This    could be further evaluated with a contrast-enhanced MRI of the head following resolution of the intracranial hemorrhage. CT HEAD WO CONTRAST   Final Result      1. Stable large intraparenchymal hemorrhage located within the right posterior temporal parietal lobe with extension into the adjacent insula. There is also subarachnoid extension of hemorrhage into the overlying sulci as well as the right sylvian    fissure. Findings result in some mild right to left subfalcine herniation. 2.There is a 1.3 x 1.1 cm extra-axial mass identified within the left ordered aspect of the foramen magnum. This results in some mass effect on the adjacent medulla. This is without change from the prior study. Finding may represent a meningioma.  This could be further evaluated with a contrast-enhanced MRI of the head following resolution of the intracranial hemorrhage. VL Extremity Venous Bilateral   Final Result      CT HEAD WO CONTRAST   Final Result      Post therapy large acute parenchymal hemorrhage in the right cerebral hemisphere with subarachnoid component. Extensive surrounding edema is associated with 4 cm leftward shift of midline structures. Critical finding of acute intracranial hemorrhage was called to Madelaine Stephens of the neurosurgery department at 12:25 PM on 2/3/2021, with instructions to contact patient's attending physician with these results. CT Brain Perfusion   Final Result      1. Hypoperfusion in the right MCA territory with a central ischemic core of 8 mL, total volume of hypoperfusion of 79 mL and a penumbra of 71 mL. XR CHEST PORTABLE   Final Result   1. Limited evaluation because of patient rotation and underpenetration of the film. 2. No dense airspace consolidation. 3. Probable mild cardiomegaly. CTA HEAD NECK W CONTRAST   Final Result   1. Carotid and vertebral arteries are patent and without stenosis or acute abnormality. 2. Incidental partial imaging of the a segmental pulmonary embolism in the right middle lobe. 3. Partial imaging of small right greater than left pleural effusions. 4. Previous left suboccipital craniotomy with a residual 1.6 cm left-sided extra-axial mass at the foramen magnum with mass effect on the cord at the cervical medullary junction favored to represent meningioma. Recommend correlation with clinical history    and previous imaging. 5. Incidental multinodular goiter. CTA HEAD:      FINDINGS: The internal carotid arteries are patent and normal in caliber through the skull base. The middle cerebral arteries are patent.  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 The A2 and A3 segments of the ACAs are patent bilaterally. Left MCA is patent. There is focal occlusion of an M2 branch of the right MCA (series 2 images 427 and 428). However, other M2 branches and M3 branches in the right sylvian fissure are patent. The M1 segment of the right MCA is patent. The posterior cerebral arteries are    patent bilaterally. The basilar artery is patent and normal in caliber. It is supplied by the left vertebral artery. The small nondominant right vertebral artery terminates in right sided PICA. IMPRESSION:   1. 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 2. 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 Results were discussed with Dr. Cate Vazquez at 8:00 PM on 2/2/2021. CT HEAD WO CONTRAST   Final Result   1. Previous left suboccipital craniotomy with a partially calcified extra-axial mass at the left foramen magnum measuring 1.8 cm contacting the cord at the cervicomedullary junction with some degree of mass effect on the cord at the foramen magnum. This    most likely represents residual/recurrent meningioma or other extra-axial mass. Correlation with patient history and previous imaging recommended. 2. Mild global volume loss and mild chronic small vessel ischemic disease in the white matter. 3. No acute intracranial hemorrhage. IR ANGIOGRAM CAROTID CEREBRAL RIGHT    (Results Pending)         Assessment/Plan:   78 yo F with PMHx afib, UGIB, recent MCA stroke with hemorrhagic conversion post-thrombectomy, now with respiratory failure. Acute hypoxic respiratory failure 2/2 suspect aspiration pneumonitis  Chest x-ray shows RLL opacities. Patient is s/p R MCA stroke, s/p thrombectomy with thrombolytics.       -Most recent ABG yesterday identified (7.431/55/109) - Patient is currently intubated, sedated with propofol  - Follow-up on procalcitonin negative  - F/u Respiratory cultures, blood cultures, strep antigen, UA, MRSA DNA probe, and Legionella antigen pending.  - Currently on D2 vancomycin, and Zosyn, given negative procal, no leukocytosis and afebrile, may be aspiration pneumonitis as opposed to aspiration pneumonia, however will continue abx until cultures come back  -d/c vanc if MRSA returns negative  -daughters agreeable to tracheostomy early this week   - on minimal vent settings: FiO2 30%/12/500/5    R MCA stroke, s/p thrombectomy and thrombolysis  - CT 2/4 showed hemorrhagic transformation of right MCA infarct, repeat stable  - MRI w/ findings as above  - S/p PEG tube placement 02/9/21  - keppra 1000 mg IV BID  - lipitor 80 mg PO qHs  - holding antiplatelets due to hemorrhagic conversion  - will need neurosurgery clearance to resume a/c for afib  - Cont tube feeds  - palliative care following  - s/p PEG.  Plan for Nelda Priest prior to discharge to [facility], [facility]  -resume tube feeds EN @25 ml/hr and increase by 10 ml Q4 hrs to goal rate of 35 ml/hr & 75 ml water bolus every 4 hour per dietary recs     Intraparenchymal hemorrhage   -CT 02/04   -stable  -keep SBP < 160.  Off nicardipine drip  -Holding anticoagulation and antiplatelets  -Continue keppra     Hypernatremia, resolved  -Likely secondary to decreased oral intake/dehydration  -Nephrology following  -Cont Water boluses with tube feeds   -Monitor sodium daily      Seizures  Likely sec to above   Routine EEG positive for epileptiform discharges  Keppra dose increased  Discussed with neurology, remains on continuous video EEG     Possible UTI, treated  -Urine smelled pungent  -UA and urine culture ordered, UA with 3+ wbc, 3+ bacteria and positive nitrate  -completed 3 days antibiotics, remains on Vanc and Zosyn for aspiration event as above     Metabolic encephalopathy:  -Likely multifactorial, secondary to above -Management as above     Oral thrush:  -Nystatin started     Acute blood loss anemia  -Protonix BID     Chronic systolic heart failure  -most recent echo 2/4/2020 with EF 55-60%, indeterminate diastolic function, PASP 53 mmHg, dilated IVC and collapses <50%  -good response to lasix this morning, will continue with spot doses of lasix as needed  -takes lasix 40 mg PO qD coreg 6.25 mg PO qD, diltiazem 180 mg PO qD and losartan 25 mg PO qD, will ask pharmacy to do med rec, as unsure if patient is taking these at home     Segmental PE  -incidental finding  -No AC due to above  -IVC filter placed 2/4     Atrial fibrillation  -Holding AC, will need neurosurg clearance prior to resuming  -monitor HR     T2DM  -mealtime and correctional insulin     HTN  -as above     asthma  -breathing treatments as needed        Code Status:Full Code  FEN: DIET TUBE FEED CONTINUOUS/CYCLIC NPO; STANDARD WITH FIBER; Gastrostomy; Continuous; 25; 35; 24  PPX:  SCDs  DISPO: ICU    Juliana Mosher MD, PGY-3  02/13/21  12:43 PM    This patient has been staffed and discussed with Dr. Pau Mcfarland MD    [facility]     Patient seen and examined. I agree with Dr. Grace Benjamin history, physical, lab findings, assessment and plan. Patient transferred to ICU yesterday for acute hypercapnic/hypoxemic respiratory failure. This is hospital day number 11 for acute CVA. She is unable to protect her airway and likely aspirated. Family was present today and desires continued aggressive care including tracheostomy. I think this should be done early next week as I do not foresee her coming off staying off the ventilator easily. Await cultures. Continue Zosyn but discontinue vancomycin. She is on continuous video EEG.   Continue Keppra    Continue tube feeds    Full Code Pt has a high probability of imminent or life-threatening deterioration requiring close monitoring, and highly complex decision-making and/or interventions of high intensity to assess, manipulate, and support his critical organ systems to prevent a likely inevitable decline which could occur if left untreated. A total critical care time 32 minutes was used. This includes but not limited to examining patient, collaborating with other physicians, monitoring vital signs, telemetry, continuous pulse oximetry, and clinical response to IV medications, documentation time, review and interpretation of laboratory and radiological data, review of nursing notes and old record review.  This time excludes any time that may have been spent performing procedures for life threatening organ failure.       Angelina Leonardo MD

## 2021-02-13 NOTE — PROGRESS NOTES
Hospitalist Progress Note      PCP: No primary care provider on file. Date of Admission: 2/2/2021    Chief Complaint:     Chief Complaint   Patient presents with    Cerebrovascular Accident       Subjective:  Patient on vent and sedated. Unresponsive, Left corner of the mouth and left hand fasciculation noted. Withdraws to pain on the right side, withdraws to pain in left lower extremity but no response noted in left upper extremity    Discussed patient condition with both daughters at bedside, agreeable to proceed with trach and LTAC placement.           PFHS: reviewed as documented 2/2/2021, no changes    Medications:  Reviewed    Infusion Medications    propofol 34.946 mcg/kg/min (02/13/21 0749)    dextrose       Scheduled Medications    piperacillin-tazobactam  3,375 mg Intravenous Q8H    insulin lispro  0-12 Units Subcutaneous Q4H    vancomycin  1,250 mg Intravenous Q12H    lidocaine 1 % injection  5 mL Intradermal Once    sodium chloride flush  10 mL Intravenous 2 times per day    nystatin  5 mL Oral 4x Daily    levetiracetam  1,000 mg Intravenous Q12H    pantoprazole  40 mg Intravenous Daily    ferrous sulfate  300 mg Per G Tube Every Other Day    miconazole   Topical BID    atorvastatin  80 mg Oral Nightly    carvedilol  6.25 mg Oral BID WC     PRN Meds: sodium chloride flush, glucose, dextrose, glucagon (rDNA), dextrose, perflutren lipid microspheres, labetalol, albuterol sulfate HFA, hydrALAZINE, acetaminophen, promethazine **OR** ondansetron, polyethylene glycol      Intake/Output Summary (Last 24 hours) at 2/13/2021 0928  Last data filed at 2/12/2021 1804  Gross per 24 hour   Intake 660.01 ml   Output 75 ml   Net 585.01 ml       Physical Exam    /75   Pulse 68   Temp 98.9 °F (37.2 °C) (Axillary)   Resp 12   Ht 5' 7\" (1.702 m)   Wt 218 lb 11.1 oz (99.2 kg)   SpO2 100%   BMI 34.25 kg/m²     General appearance: Unresponsive Interval progression of vasogenic edema at the right middle cerebral artery hemorrhagic infarction and interval development of right to left midline shift of about 3 mm as described above. Apparent slight decrease in the size of hemorrhage in its AP dimension. XR CHEST 1 VIEW   Final Result   1. Interval extubation. 2. Progression of bilateral airspace disease. MRI BRAIN WO CONTRAST   Final Result      Region of right middle cerebral artery hemorrhagic infarction measures approximately 5.0 x 4.2 cm with surrounding mild vasogenic edema and diffusion restriction consistent with right middle cerebral artery distribution infarct with hemorrhagic    components      No additional areas of hemorrhage or infarction identified. Mild local mass effect is identified with very minimal midline shift of 3 to 4 mm      XR CHEST PORTABLE   Final Result      Persistent bilateral infiltrates and cardiomegaly. IR GUIDED IVC FILTER PLACEMENT   Final Result   Impression:      Successful placement of retrievable IVC filter. CT HEAD WO CONTRAST   Final Result      1. Hemorrhagic transformation of right MCA infarct, without significant change. No new hemorrhage. CT head without contrast   Final Result         1. Stable large intraparenchymal hemorrhage located within the right posterior temporal parietal lobe with extension into the adjacent insula. There is also subarachnoid extension of hemorrhage into the overlying sulci as well as the right sylvian    fissure. Findings result in some mild right to left subfalcine herniation. 2.There is a 1.3 x 1.1 cm extra-axial mass identified within the left ordered aspect of the foramen magnum. This results in some mass effect on the adjacent medulla. This is without change from the prior study. Finding may represent a meningioma. This    could be further evaluated with a contrast-enhanced MRI of the head following resolution of the intracranial hemorrhage. CT HEAD WO CONTRAST   Final Result      1. Stable large intraparenchymal hemorrhage located within the right posterior temporal parietal lobe with extension into the adjacent insula. There is also subarachnoid extension of hemorrhage into the overlying sulci as well as the right sylvian    fissure. Findings result in some mild right to left subfalcine herniation. 2.There is a 1.3 x 1.1 cm extra-axial mass identified within the left ordered aspect of the foramen magnum. This results in some mass effect on the adjacent medulla. This is without change from the prior study. Finding may represent a meningioma. This    could be further evaluated with a contrast-enhanced MRI of the head following resolution of the intracranial hemorrhage. VL Extremity Venous Bilateral   Final Result      CT HEAD WO CONTRAST   Final Result      Post therapy large acute parenchymal hemorrhage in the right cerebral hemisphere with subarachnoid component. Extensive surrounding edema is associated with 4 cm leftward shift of midline structures. Critical finding of acute intracranial hemorrhage was called to David West of the neurosurgery department at 12:25 PM on 2/3/2021, with instructions to contact patient's attending physician with these results. CT Brain Perfusion   Final Result      1. Hypoperfusion in the right MCA territory with a central ischemic core of 8 mL, total volume of hypoperfusion of 79 mL and a penumbra of 71 mL. XR CHEST PORTABLE   Final Result   1. Limited evaluation because of patient rotation and underpenetration of the film. 2. No dense airspace consolidation. 3. Probable mild cardiomegaly. CTA HEAD NECK W CONTRAST   Final Result   1. Carotid and vertebral arteries are patent and without stenosis or acute abnormality. 2. Incidental partial imaging of the a segmental pulmonary embolism in the right middle lobe. most likely represents residual/recurrent meningioma or other extra-axial mass. Correlation with patient history and previous imaging recommended. 2. Mild global volume loss and mild chronic small vessel ischemic disease in the white matter. 3. No acute intracranial hemorrhage. IR ANGIOGRAM CAROTID CEREBRAL RIGHT    (Results Pending)       Assessment/Plan:    Active Hospital Problems    Diagnosis Date Noted    Acute respiratory failure with hypoxia and hypercapnia (HCC) [J96.01, J96.02]     Aspiration pneumonia of right lower lobe due to gastric secretions ([address] [J69.0]     Hypoxemia [R09.02] 02/06/2021    Permanent atrial fibrillation (HCC) [I48.21] 02/03/2021    Acute gastric ulcer with hemorrhage [K25.0] 02/03/2021    Other pulmonary embolism without acute cor pulmonale (HCC) [I26.99] 02/03/2021    Acute right MCA stroke ([address] [I63.511]     Acute cerebrovascular accident (CVA) ([address] [I63.9] 02/02/2021       Plan:    #Acute hypoxic/Hypercarbic respiratory failure  Intubated, sedated  Continue vent management per intensivist  Plan for tracheostomy early next week  Discussed with daughters    #Pneumonia, hospital-acquired or aspiration  Chest x-ray showing worsening of left opacity  Continue IV antibiotics    # R MCA stroke, s/p thrombectomy and thrombolysis  -CT 2/4: new large acute parenchymal hemorrhage, repeat stable  -MRI w/ findings as above  -Frequent neuro checks  -S/p PEG tube placement 02/9/21  -Cont tube feeds  -palliative care following  -CT 2/10 showed edema with midline shift-stable  -CT 02/13 stable  -Neurology following    # Intraparenchymal hemorrhage  -CT 02/04 showed hemorrhagic transformation of right MCA infarct  -stable  -keep SBP < 160.   Off nicardipine drip  -Holding anticoagulation and antiplatelets  -Continue keppra    #Hypernatremia  -Likely secondary to decreased oral intake/dehydration  -Nephrology following  -Cont Water boluses with tube feeds   -Monitor sodium #Seizures  Likely sec to above   Routine EEG positive for epileptiform discharges  Continue increased dose of Keppra  Continue continuous video EEG  Neurology following    #Possible UTI, ruled out  Urine smelled pungent  UA negative    #Metabolic encephalopathy:  -Likely multifactorial, secondary to above   -Management as above    #Oral thrush:  -Nystatin started     # Acute blood loss anemia  -Protonix BID     # Chronic systolic heart failure  -holding home diuretics     # Segmental PE  -incidental finding  -No AC due to above  -IVC filter placed 2/4     # Atrial fibrillation  -Holding AC  -monitor HR     # T2DM  -mealtime and correctional insulin     # HTN  -as above     # asthma  -breathing treatments as needed    DVT Prophylaxis: SCDs  Diet: DIET TUBE FEED CONTINUOUS/CYCLIC NPO; STANDARD WITH FIBER; Gastrostomy; Continuous; 25; 35; 24  Code Status: Full Code    Dispo: Pending clinical course, tracheostomy    Bianca Gaston MD

## 2021-02-13 NOTE — PROGRESS NOTES
Hospitalist Progress Note      PCP: No primary care provider on file. Date of Admission: 2/2/2021    Chief Complaint:     Chief Complaint   Patient presents with    Cerebrovascular Accident       Subjective:  Patient on vent and sedated. Unresponsive, Left corner of the mouth and left hand fasciculation noted. Withdraws to pain on the right side, withdraws to pain in left lower extremity but no response noted in left upper extremity    Discussed patient condition with both daughters at bedside, agreeable to proceed with trach and LTAC placement.           PFHS: reviewed as documented 2/2/2021, no changes    Medications:  Reviewed    Infusion Medications    propofol 25.034 mcg/kg/min (02/13/21 1516)    dextrose       Scheduled Medications    piperacillin-tazobactam  3,375 mg Intravenous Q8H    insulin lispro  0-12 Units Subcutaneous Q4H    vancomycin  1,250 mg Intravenous Q12H    lidocaine 1 % injection  5 mL Intradermal Once    sodium chloride flush  10 mL Intravenous 2 times per day    nystatin  5 mL Oral 4x Daily    levetiracetam  1,000 mg Intravenous Q12H    pantoprazole  40 mg Intravenous Daily    ferrous sulfate  300 mg Per G Tube Every Other Day    miconazole   Topical BID    atorvastatin  80 mg Oral Nightly    carvedilol  6.25 mg Oral BID WC     PRN Meds: sodium chloride flush, glucose, dextrose, glucagon (rDNA), dextrose, perflutren lipid microspheres, labetalol, albuterol sulfate HFA, hydrALAZINE, acetaminophen, promethazine **OR** ondansetron, polyethylene glycol      Intake/Output Summary (Last 24 hours) at 2/13/2021 1608  Last data filed at 2/13/2021 1517  Gross per 24 hour   Intake 835.01 ml   Output    Net 835.01 ml       Physical Exam    /62   Pulse 62   Temp 98.9 °F (37.2 °C) (Axillary)   Resp 13   Ht 5' 7\" (1.702 m)   Wt 218 lb 11.1 oz (99.2 kg)   SpO2 98%   BMI 34.25 kg/m²     General appearance: Unresponsive Interval progression of vasogenic edema at the right middle cerebral artery hemorrhagic infarction and interval development of right to left midline shift of about 3 mm as described above. Apparent slight decrease in the size of hemorrhage in its AP dimension. XR CHEST 1 VIEW   Final Result   1. Interval extubation. 2. Progression of bilateral airspace disease. MRI BRAIN WO CONTRAST   Final Result      Region of right middle cerebral artery hemorrhagic infarction measures approximately 5.0 x 4.2 cm with surrounding mild vasogenic edema and diffusion restriction consistent with right middle cerebral artery distribution infarct with hemorrhagic    components      No additional areas of hemorrhage or infarction identified. Mild local mass effect is identified with very minimal midline shift of 3 to 4 mm      XR CHEST PORTABLE   Final Result      Persistent bilateral infiltrates and cardiomegaly. IR GUIDED IVC FILTER PLACEMENT   Final Result   Impression:      Successful placement of retrievable IVC filter. CT HEAD WO CONTRAST   Final Result      1. Hemorrhagic transformation of right MCA infarct, without significant change. No new hemorrhage. CT head without contrast   Final Result         1. Stable large intraparenchymal hemorrhage located within the right posterior temporal parietal lobe with extension into the adjacent insula. There is also subarachnoid extension of hemorrhage into the overlying sulci as well as the right sylvian    fissure. Findings result in some mild right to left subfalcine herniation. 2.There is a 1.3 x 1.1 cm extra-axial mass identified within the left ordered aspect of the foramen magnum. This results in some mass effect on the adjacent medulla. This is without change from the prior study. Finding may represent a meningioma. This    could be further evaluated with a contrast-enhanced MRI of the head following resolution of the intracranial hemorrhage. CT HEAD WO CONTRAST   Final Result      1. Stable large intraparenchymal hemorrhage located within the right posterior temporal parietal lobe with extension into the adjacent insula. There is also subarachnoid extension of hemorrhage into the overlying sulci as well as the right sylvian    fissure. Findings result in some mild right to left subfalcine herniation. 2.There is a 1.3 x 1.1 cm extra-axial mass identified within the left ordered aspect of the foramen magnum. This results in some mass effect on the adjacent medulla. This is without change from the prior study. Finding may represent a meningioma. This    could be further evaluated with a contrast-enhanced MRI of the head following resolution of the intracranial hemorrhage. VL Extremity Venous Bilateral   Final Result      CT HEAD WO CONTRAST   Final Result      Post therapy large acute parenchymal hemorrhage in the right cerebral hemisphere with subarachnoid component. Extensive surrounding edema is associated with 4 cm leftward shift of midline structures. Critical finding of acute intracranial hemorrhage was called to Julia Costello of the neurosurgery department at 12:25 PM on 2/3/2021, with instructions to contact patient's attending physician with these results. CT Brain Perfusion   Final Result      1. Hypoperfusion in the right MCA territory with a central ischemic core of 8 mL, total volume of hypoperfusion of 79 mL and a penumbra of 71 mL. XR CHEST PORTABLE   Final Result   1. Limited evaluation because of patient rotation and underpenetration of the film. 2. No dense airspace consolidation. 3. Probable mild cardiomegaly. CTA HEAD NECK W CONTRAST   Final Result   1. Carotid and vertebral arteries are patent and without stenosis or acute abnormality. 2. Incidental partial imaging of the a segmental pulmonary embolism in the right middle lobe. 3. Partial imaging of small right greater than left pleural effusions. 4. Previous left suboccipital craniotomy with a residual 1.6 cm left-sided extra-axial mass at the foramen magnum with mass effect on the cord at the cervical medullary junction favored to represent meningioma. Recommend correlation with clinical history    and previous imaging. 5. Incidental multinodular goiter. CTA HEAD:      FINDINGS: The internal carotid arteries are patent and normal in caliber through the skull base. The middle cerebral arteries are patent. 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 The A2 and A3 segments of the ACAs are patent bilaterally. Left MCA is patent. There is focal occlusion of an M2 branch of the right MCA (series 2 images 427 and 428). However, other M2 branches and M3 branches in the right sylvian fissure are patent. The M1 segment of the right MCA is patent. The posterior cerebral arteries are    patent bilaterally. The basilar artery is patent and normal in caliber. It is supplied by the left vertebral artery. The small nondominant right vertebral artery terminates in right sided PICA. IMPRESSION:   1. 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 2. 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 Results were discussed with Dr. Binta Alan at 8:00 PM on 2/2/2021. CT HEAD WO CONTRAST   Final Result   1. Previous left suboccipital craniotomy with a partially calcified extra-axial mass at the left foramen magnum measuring 1.8 cm contacting the cord at the cervicomedullary junction with some degree of mass effect on the cord at the foramen magnum.  This most likely represents residual/recurrent meningioma or other extra-axial mass. Correlation with patient history and previous imaging recommended. 2. Mild global volume loss and mild chronic small vessel ischemic disease in the white matter. 3. No acute intracranial hemorrhage. IR ANGIOGRAM CAROTID CEREBRAL RIGHT    (Results Pending)       Assessment/Plan:    Active Hospital Problems    Diagnosis Date Noted    Acute respiratory failure with hypoxia and hypercapnia (HCC) [J96.01, J96.02]     Aspiration pneumonia of right lower lobe due to gastric secretions ([address] [J69.0]     Hypoxemia [R09.02] 02/06/2021    Permanent atrial fibrillation (HCC) [I48.21] 02/03/2021    Acute gastric ulcer with hemorrhage [K25.0] 02/03/2021    Other pulmonary embolism without acute cor pulmonale (HCC) [I26.99] 02/03/2021    Acute right MCA stroke ([address] [I63.511]     Acute cerebrovascular accident (CVA) ([address] [I63.9] 02/02/2021       Plan:    #Acute hypoxic/Hypercarbic respiratory failure  Intubated, sedated  Continue vent management per intensivist  Plan for tracheostomy early next week  Discussed with daughters    #Pneumonia, hospital-acquired or aspiration  Chest x-ray showing worsening of left opacity  Continue IV antibiotics    # R MCA stroke, s/p thrombectomy and thrombolysis  -CT 2/4: new large acute parenchymal hemorrhage, repeat stable  -MRI w/ findings as above  -Frequent neuro checks  -S/p PEG tube placement 02/9/21  -Cont tube feeds  -palliative care following  -CT 2/10 showed edema with midline shift-stable  -CT 02/13 stable  -Neurology following    # Intraparenchymal hemorrhage  -CT 02/04 showed hemorrhagic transformation of right MCA infarct  -stable  -keep SBP < 160.   Off nicardipine drip  -Holding anticoagulation and antiplatelets  -Continue keppra    #Hypernatremia  -Likely secondary to decreased oral intake/dehydration  -Nephrology following  -Cont Water boluses with tube feeds   -Monitor sodium #Seizures  Likely sec to above   Routine EEG positive for epileptiform discharges  Continue increased dose of Keppra  Continue continuous video EEG  Neurology following    #Possible UTI, ruled out  Urine smelled pungent  UA negative    #Metabolic encephalopathy:  -Likely multifactorial, secondary to above   -Management as above    #Oral thrush:  -Cont Nystatin     # Acute blood loss anemia  -Protonix BID     # Chronic systolic heart failure  -holding home diuretics     # Segmental PE  -incidental finding  -No AC due to above  -IVC filter placed 2/4     # Atrial fibrillation  -Holding AC  -monitor HR     # T2DM  -mealtime and correctional insulin     # HTN  -as above     # asthma  -breathing treatments as needed    DVT Prophylaxis: SCDs  Diet: DIET TUBE FEED CONTINUOUS/CYCLIC NPO; STANDARD WITH FIBER; Gastrostomy; Continuous; 25; 35; 24  Code Status: Full Code    Dispo: Pending clinical course, tracheostomy    Baylee Hardwick MD

## 2021-02-13 NOTE — PROGRESS NOTES
Clinical Pharmacy Consult Note    Admit date: 2/2/2021    Subjective/Objective:  77yof with PMHx of Afib on Xarelto, HF, DM, pulm HTN, breast ca s/p lumpectomy/radiation who presented with left-sided hemiplegia, severe expressive aphasia and dysarthria, right facial droop and right-sided gaze deviation. Patient received tPA for R MCA M2 occlusion and is s/p thrombectomy. IVCF was placed 2/4, and patient has been off AC d/t hemorrhagic conversion. On 2/12 a rapid response was called for acute hypoxia, O2 sats in 60s. Patient was transferred to ICU for acute care, placed on 15L NRB and remains unresponsive. Pharmacy is consulted to dose Vancomycin per Dr. Vanesa Coronel for HAP. Pertinent Medications:  Zosyn 3.375g IV q8h -- day #2  Vancomycin, pharmacy to dose -- day #2   1.5g IV x1 (2/12)   1.25g IV q12h (2/13-current)       Recent Labs     02/12/21  0603 02/13/21  0443    140   K 4.1 4.2    102   CO2 37* 32   BUN 15 15   CREATININE <0.5* <0.5*     CrCl cannot be calculated (This lab value cannot be used to calculate CrCl because it is not a number: <0.5). Recent Labs     02/12/21  0603 02/13/21  0527   WBC 10.0 7.3   HGB 9.5* 8.6*   HCT 33.2* 28.8*   MCV 85.8 83.8    129*     Height:  5' 7\" (170.2 cm)  Weight: 218 lb 11.1 oz (99.2 kg)    Micro:  2/2 Rapid flu: Negative  2/2 Rapid Covid-19: Negative  2/12 MRSA PCR: In process  2/12 Strep Pneumoniae AG: In process  2/12 Legionella urine AG: In process  2/12 Tracheal aspirate: In process  2/12 Blood x2: In process    Assessment/Plan:  1. HAP:  Vancomycin + Zosyn - day #2  Vancomycin - pharmacy to dose  · Started on 1.25g IV q12h. Provides ~ 12.5 mg/kg and is based on a half-life elimination of 7 hours. · Trough ordered for tomorrow AM. Target 15-20 mcg/mL for HAP. · Renal function will be monitored closely and dosing will be adjusted as appropriate. Please call with any questions.   Karla Schmitt, PharmD  PGY1 Pharmacy Resident 2/13/2021 12:30 PM  [id number]

## 2021-02-13 NOTE — PROCEDURES
Continuous EEG monitoring record    Patient name: Antonia Bell    START: 02/13/2021 @ 00:09am  END: 02/13/2021 @ 10:00am      Electroencephalographer: Fide Domínguez MD PhD      CLINICAL DETAILS:  This EEG was performed on this 79 y. o.yo female admitted for Altered mental Status and concer for subclinical seizures      TECHNICAL DETAILS:  Continuous video-EEG monitoring was performed with 27 surface scalp electrodes placed according to the [employer] 10-20 electrode placement system, using a 32-channel [employer] headbox. All EEG and video information was acquired digitally, including the use of automated spike and seizure detection software to detect epileptiform activity. An event button was also available to be depressed during clinical events. 02/13/2021 00:09am to 10:00am   SEIZURES:  None  INTERICTAL:  None  PUSHBUTTONS:  None  BACKGROUND:  Abundant polymorphic medium amplitude symmetric 6-7 Hz generalized theta frequencies with frequent superimposed 2-3 Hz delta frequencies that is reactive. EKG:  regular    CLINICAL INTERPRETATION:  This was an abnormal tracing for age and state due to a moderate generalized slow wave abnormality indicating diffuse cerebral dysfunction which can be of multiple causes, including structural, or vascular abnormalities, toxic/metabolic conditions, hydrocephalus, or postictal conditions. No epileptiform discharge, focal slowing, or seizures were seen during this recording. Clinical correlation is advised.         Fide Domínguez MD PhD

## 2021-02-13 NOTE — PROGRESS NOTES
Nephrology Note                                                                                                                                                                                                                                                                                                                                                               Office : [phone]     Fax :[phone]              Patient's Name: Denise Francois    Reason for Consult:  Hypernatremia   Requesting Physician:  No primary care provider on file. Na stable   On TF with Free water     Past Medical History:   Diagnosis Date    Acute GI bleeding     recent admission 1/29/2021    Atrial fibrillation ([facility]     Xarelto    Systolic CHF ([facility]     Type 2 diabetes mellitus ([facility]        Past Surgical History:   Procedure Laterality Date    GASTROSTOMY TUBE PLACEMENT N/A 2/9/2021    EGD PEG TUBE PLACEMENT performed by Pablo Morales MD at [facility] [address]  2/4/2021    IR IVC FILTER PLACEMENT W IMAGING 2/4/2021 [facility] SPECIAL PROCEDURES    UPPER GASTROINTESTINAL ENDOSCOPY  01/29/2021    Dr Danielito Mendosa       No family history on file. reports that she has quit smoking. She does not have any smokeless tobacco history on file. Allergies:  Patient has no known allergies.     Current Medications:        propofol injection, Titrated      piperacillin-tazobactam (ZOSYN) 3,375 mg in dextrose 5 % 100 mL IVPB extended infusion (mini-bag), Q8H      insulin lispro (1 Unit Dial) 0-12 Units, Q4H      vancomycin (VANCOCIN) 1,250 mg in dextrose 5 % 250 mL IVPB, Q12H      lidocaine PF 1 % injection 5 mL, Once      sodium chloride flush 0.9 % injection 10 mL, 2 times per day      sodium chloride flush 0.9 % injection 10 mL, PRN      nystatin (MYCOSTATIN) 186314 UNIT/ML suspension 500,000 Units, 4x Daily      levETIRAcetam (KEPPRA) 1,000 mg in sodium chloride 0.9 % 100 mL IVPB, Q12H   pantoprazole (PROTONIX) injection 40 mg, Daily      ferrous sulfate 300 (60 Fe) MG/5ML syrup 300 mg, Every Other Day      glucose (GLUTOSE) 40 % oral gel 15 g, PRN      dextrose 50 % IV solution, PRN      glucagon (rDNA) injection 1 mg, PRN      dextrose 5 % solution, PRN      miconazole (MICOTIN) 2 % powder, BID      perflutren lipid microspheres (DEFINITY) injection 1.65 mg, ONCE PRN      labetalol (NORMODYNE;TRANDATE) injection 10 mg, Q4H PRN      albuterol sulfate  (90 Base) MCG/ACT inhaler 2 puff, PRN      hydrALAZINE (APRESOLINE) injection 5 mg, Q6H PRN      acetaminophen (TYLENOL) tablet 650 mg, Q4H PRN      promethazine (PHENERGAN) tablet 12.5 mg, Q6H PRN    Or      ondansetron (ZOFRAN) injection 4 mg, Q6H PRN      polyethylene glycol (GLYCOLAX) packet 17 g, Daily PRN      atorvastatin (LIPITOR) tablet 80 mg, Nightly      carvedilol (COREG) tablet 6.25 mg, BID WC        Review of Systems:   Lethargic       Physical exam:     Vitals:  /62   Pulse 65   Temp 98.9 °F (37.2 °C) (Axillary)   Resp 12   Ht 5' 7\" (1.702 m)   Wt 218 lb 11.1 oz (99.2 kg)   SpO2 100%   BMI 34.25 kg/m²   Constitutional:  Lethargic   Skin: no rash, turgor wnl  Heent:  eomi, mmm  Neck: no bruits or jvd noted  Cardiovascular:  S1, S2 without m/r/g  Respiratory: CTA B without w/r/r  Abdomen:  +bs, soft, nt, nd  Ext: no lower extremity edema  Psychiatric: mood and affect appropriate  Musculoskeletal:  Rom, muscular strength dec     Data:   Labs:  CBC:   Recent Labs     02/11/21 0630 02/12/21  0603 02/13/21  0527   WBC 10.0 10.0 7.3   HGB 9.0* 9.5* 8.6*    182 129*     BMP:    Recent Labs     02/11/21 0630 02/12/21  0603 02/13/21  0443   * 142 140   K 3.8 4.1 4.2    103 102   CO2 35* 37* 32   BUN 17 15 15   CREATININE 0.6 <0.5* <0.5*   GLUCOSE 189* 235* 146*     Ca/Mg/Phos:   Recent Labs     02/11/21  0630 02/12/21  0603 02/13/21  0443   CALCIUM 9.3 9.2 8.6 Hepatic: No results for input(s): AST, ALT, ALB, BILITOT, ALKPHOS in the last 72 hours. Troponin: No results for input(s): TROPONINI in the last 72 hours. BNP: No results for input(s): BNP in the last 72 hours. Lipids: No results for input(s): CHOL, TRIG, HDL, LDLCALC, LABVLDL in the last 72 hours. ABGs:   Recent Labs     02/12/21  2300   PHART 7.431   PO2ART 109.5*   EFH4OSR 55.2*     INR:   No results for input(s): INR in the last 72 hours. UA:  Recent Labs     02/11/21  1605   COLORU Yellow   CLARITYU Clear   GLUCOSEU Negative   BILIRUBINUR MODERATE*   KETUA TRACE*   SPECGRAV 1.025   BLOODU Negative   PHUR 6.0   PROTEINU 30*   UROBILINOGEN 1.0   NITRU Negative   LEUKOCYTESUR Negative   LABMICR YES   URINETYPE NotGiven      Urine Microscopic:   Recent Labs     02/11/21  1605   BACTERIA Rare*   WBCUA None seen   RBCUA None seen   EPIU 0-1     Urine Culture: No results for input(s): LABURIN in the last 72 hours. Urine Chemistry:   No results for input(s): Flynn Lack, PROTEINUR, NAUR in the last 72 hours. IMAGING:  CT HEAD WO CONTRAST   Final Result      XR CHEST PORTABLE   Final Result      Persistent bilateral infiltrates         XR CHEST PORTABLE   Final Result   Impression:          1. Increased opacities in the left lung base since the prior study. There may be a small left pleural effusion. 2. Persistent patchy right lung opacities. CT HEAD WO CONTRAST   Final Result   Interval progression of vasogenic edema at the right middle cerebral artery hemorrhagic infarction and interval development of right to left midline shift of about 3 mm as described above. Apparent slight decrease in the size of hemorrhage in its AP dimension. XR CHEST 1 VIEW   Final Result   1. Interval extubation. 2. Progression of bilateral airspace disease.       MRI BRAIN WO CONTRAST   Final Result Region of right middle cerebral artery hemorrhagic infarction measures approximately 5.0 x 4.2 cm with surrounding mild vasogenic edema and diffusion restriction consistent with right middle cerebral artery distribution infarct with hemorrhagic    components      No additional areas of hemorrhage or infarction identified. Mild local mass effect is identified with very minimal midline shift of 3 to 4 mm      XR CHEST PORTABLE   Final Result      Persistent bilateral infiltrates and cardiomegaly. IR GUIDED IVC FILTER PLACEMENT   Final Result   Impression:      Successful placement of retrievable IVC filter. CT HEAD WO CONTRAST   Final Result      1. Hemorrhagic transformation of right MCA infarct, without significant change. No new hemorrhage. CT head without contrast   Final Result         1. Stable large intraparenchymal hemorrhage located within the right posterior temporal parietal lobe with extension into the adjacent insula. There is also subarachnoid extension of hemorrhage into the overlying sulci as well as the right sylvian    fissure. Findings result in some mild right to left subfalcine herniation. 2.There is a 1.3 x 1.1 cm extra-axial mass identified within the left ordered aspect of the foramen magnum. This results in some mass effect on the adjacent medulla. This is without change from the prior study. Finding may represent a meningioma. This    could be further evaluated with a contrast-enhanced MRI of the head following resolution of the intracranial hemorrhage. CT HEAD WO CONTRAST   Final Result      1. Stable large intraparenchymal hemorrhage located within the right posterior temporal parietal lobe with extension into the adjacent insula. There is also subarachnoid extension of hemorrhage into the overlying sulci as well as the right sylvian    fissure. Findings result in some mild right to left subfalcine herniation. 2.There is a 1.3 x 1.1 cm extra-axial mass identified within the left ordered aspect of the foramen magnum. This results in some mass effect on the adjacent medulla. This is without change from the prior study. Finding may represent a meningioma. This    could be further evaluated with a contrast-enhanced MRI of the head following resolution of the intracranial hemorrhage. VL Extremity Venous Bilateral   Final Result      CT HEAD WO CONTRAST   Final Result      Post therapy large acute parenchymal hemorrhage in the right cerebral hemisphere with subarachnoid component. Extensive surrounding edema is associated with 4 cm leftward shift of midline structures. Critical finding of acute intracranial hemorrhage was called to [facility] of the neurosurgery department at 12:25 PM on 2/3/2021, with instructions to contact patient's attending physician with these results. CT Brain Perfusion   Final Result      1. Hypoperfusion in the right MCA territory with a central ischemic core of 8 mL, total volume of hypoperfusion of 79 mL and a penumbra of 71 mL. XR CHEST PORTABLE   Final Result   1. Limited evaluation because of patient rotation and underpenetration of the film. 2. No dense airspace consolidation. 3. Probable mild cardiomegaly. CTA HEAD NECK W CONTRAST   Final Result   1. Carotid and vertebral arteries are patent and without stenosis or acute abnormality. 2. Incidental partial imaging of the a segmental pulmonary embolism in the right middle lobe. 3. Partial imaging of small right greater than left pleural effusions. 4. Previous left suboccipital craniotomy with a residual 1.6 cm left-sided extra-axial mass at the foramen magnum with mass effect on the cord at the cervical medullary junction favored to represent meningioma. Recommend correlation with clinical history    and previous imaging. 5. Incidental multinodular goiter.       CTA HEAD: FINDINGS: The internal carotid arteries are patent and normal in caliber through the skull base. The middle cerebral arteries are patent. 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 The A2 and A3 segments of the ACAs are patent bilaterally. Left MCA is patent. There is focal occlusion of an M2 branch of the right MCA (series 2 images 427 and 428). However, other M2 branches and M3 branches in the right sylvian fissure are patent. The M1 segment of the right MCA is patent. The posterior cerebral arteries are    patent bilaterally. The basilar artery is patent and normal in caliber. It is supplied by the left vertebral artery. The small nondominant right vertebral artery terminates in right sided PICA. IMPRESSION:   1. 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 2. 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 Results were discussed with Dr. Mei Llanes at 8:00 PM on 2/2/2021. CT HEAD WO CONTRAST   Final Result   1. Previous left suboccipital craniotomy with a partially calcified extra-axial mass at the left foramen magnum measuring 1.8 cm contacting the cord at the cervicomedullary junction with some degree of mass effect on the cord at the foramen magnum. This    most likely represents residual/recurrent meningioma or other extra-axial mass. Correlation with patient history and previous imaging recommended. 2. Mild global volume loss and mild chronic small vessel ischemic disease in the white matter. 3. No acute intracranial hemorrhage. IR ANGIOGRAM CAROTID CEREBRAL RIGHT    (Results Pending)       Assessment/Plan   1. Hypernatremia     2. CVA     3. Anemia    4. Acid- base/ Electrolyte imbalance     5.  Malnutrition     Plan - replace free water   - Na better   - Tube feeds to cont   - Ur studies- reviewed   - monitor NA   - CVA management   - Bp control   - PEG tube placed   - labs in am                 Thank you for allowing us to participate in care of [address] [place] [address] free to contact me   [facility] [address]  Office : [phone]  Fax :[phone]

## 2021-02-13 NOTE — CONSULTS
Clinical Pharmacy Progress Note  Medication History     Admit Date: 2/2/21    List of of current medications patient is taking is complete. Home Medication list in EPIC updated to reflect changes noted below. Source of information: EMR - patient was recently admitted 1/27-1/31 for bleeding GI ulcers and has EMR under [id number]. Changes made to med list: added all meds  Of note, Xarelto 20 mg daily has been held since last discharge on 1/27/21.     Gemma Ba, PharmD  PGY1 Pharmacy Resident  2/13/2021 3:00 PM  [id number]      Complete Home Medication List:  Current Outpatient Medications on File Prior to Encounter   Medication Sig    ferrous sulfate (IRON 325) 325 (65 Fe) MG tablet Take 325 mg by mouth daily (with breakfast)    pantoprazole (PROTONIX) 40 MG tablet Take 40 mg by mouth 2 times daily (before meals) For bleeding ulcers (prescribed 1/31/21)    furosemide (LASIX) 20 MG tablet Take 20 mg by mouth daily    acetaminophen (TYLENOL) 325 MG tablet Take 650 mg by mouth every 6 hours as needed for Pain    calcium-vitamin D (OSCAL-500) 500-200 MG-UNIT per tablet Take 1 tablet by mouth daily    albuterol sulfate HFA (VENTOLIN HFA) 108 (90 Base) MCG/ACT inhaler Inhale 2 puffs into the lungs every 6 hours as needed for Wheezing    carvedilol (COREG) 6.25 MG tablet Take 6.25 mg by mouth 2 times daily (with meals)    insulin lispro protamine & lispro (HUMALOG MIX) (75-25) 100 UNIT per ML SUSP injection vial Inject into the skin 2 times daily (with meals) INJECT 14 UNITS IN THE MORNING AND 10 UNITS IN THE EVENING    dilTIAZem (DILACOR XR) 180 MG extended release capsule Take 180 mg by mouth daily    letrozole (FEMARA) 2.5 MG tablet Take 2.5 mg by mouth daily    losartan (COZAAR) 25 MG tablet Take 25 mg by mouth 2 times daily    potassium chloride (KLOR-CON M) 20 MEQ extended release tablet Take 20 mEq by mouth 2 times daily    pravastatin (PRAVACHOL) 10 MG tablet Take 10 mg by mouth daily

## 2021-02-13 NOTE — PROGRESS NOTES
  pantoprazole (PROTONIX) injection 40 mg, Daily      ferrous sulfate 300 (60 Fe) MG/5ML syrup 300 mg, Every Other Day      glucose (GLUTOSE) 40 % oral gel 15 g, PRN      dextrose 50 % IV solution, PRN      glucagon (rDNA) injection 1 mg, PRN      dextrose 5 % solution, PRN      miconazole (MICOTIN) 2 % powder, BID      perflutren lipid microspheres (DEFINITY) injection 1.65 mg, ONCE PRN      labetalol (NORMODYNE;TRANDATE) injection 10 mg, Q4H PRN      albuterol sulfate  (90 Base) MCG/ACT inhaler 2 puff, PRN      hydrALAZINE (APRESOLINE) injection 5 mg, Q6H PRN      acetaminophen (TYLENOL) tablet 650 mg, Q4H PRN      promethazine (PHENERGAN) tablet 12.5 mg, Q6H PRN    Or      ondansetron (ZOFRAN) injection 4 mg, Q6H PRN      polyethylene glycol (GLYCOLAX) packet 17 g, Daily PRN      atorvastatin (LIPITOR) tablet 80 mg, Nightly      carvedilol (COREG) tablet 6.25 mg, BID WC        Review of Systems:   Lethargic       Physical exam:     Vitals:  /75   Pulse 68   Temp 98.9 °F (37.2 °C) (Axillary)   Resp 12   Ht 5' 7\" (1.702 m)   Wt 218 lb 11.1 oz (99.2 kg)   SpO2 100%   BMI 34.25 kg/m²   Constitutional:  Lethargic   Skin: no rash, turgor wnl  Heent:  eomi, mmm  Neck: no bruits or jvd noted  Cardiovascular:  S1, S2 without m/r/g  Respiratory: CTA B without w/r/r  Abdomen:  +bs, soft, nt, nd  Ext: no lower extremity edema  Psychiatric: mood and affect appropriate  Musculoskeletal:  Rom, muscular strength dec     Data:   Labs:  CBC:   Recent Labs     02/11/21 0630 02/12/21  0603 02/13/21  0527   WBC 10.0 10.0 7.3   HGB 9.0* 9.5* 8.6*    182 129*     BMP:    Recent Labs     02/11/21 0630 02/12/21  0603 02/13/21  0443   * 142 140   K 3.8 4.1 4.2    103 102   CO2 35* 37* 32   BUN 17 15 15   CREATININE 0.6 <0.5* <0.5*   GLUCOSE 189* 235* 146*     Ca/Mg/Phos:   Recent Labs     02/11/21  0630 02/12/21  0603 02/13/21  0443   CALCIUM 9.3 9.2 8.6 Hepatic: No results for input(s): AST, ALT, ALB, BILITOT, ALKPHOS in the last 72 hours. Troponin: No results for input(s): TROPONINI in the last 72 hours. BNP: No results for input(s): BNP in the last 72 hours. Lipids: No results for input(s): CHOL, TRIG, HDL, LDLCALC, LABVLDL in the last 72 hours. ABGs:   Recent Labs     02/12/21  2300   PHART 7.431   PO2ART 109.5*   CHG0IRM 55.2*     INR:   No results for input(s): INR in the last 72 hours. UA:  Recent Labs     02/11/21  1605   COLORU Yellow   CLARITYU Clear   GLUCOSEU Negative   BILIRUBINUR MODERATE*   KETUA TRACE*   SPECGRAV 1.025   BLOODU Negative   PHUR 6.0   PROTEINU 30*   UROBILINOGEN 1.0   NITRU Negative   LEUKOCYTESUR Negative   LABMICR YES   URINETYPE NotGiven      Urine Microscopic:   Recent Labs     02/11/21  1605   BACTERIA Rare*   WBCUA None seen   RBCUA None seen   EPIU 0-1     Urine Culture: No results for input(s): LABURIN in the last 72 hours. Urine Chemistry:   Recent Labs     02/10/21  1254   NAUR 23             IMAGING:  CT HEAD WO CONTRAST   Final Result      XR CHEST PORTABLE   Final Result      Persistent bilateral infiltrates         XR CHEST PORTABLE   Final Result   Impression:          1. Increased opacities in the left lung base since the prior study. There may be a small left pleural effusion. 2. Persistent patchy right lung opacities. CT HEAD WO CONTRAST   Final Result   Interval progression of vasogenic edema at the right middle cerebral artery hemorrhagic infarction and interval development of right to left midline shift of about 3 mm as described above. Apparent slight decrease in the size of hemorrhage in its AP dimension. XR CHEST 1 VIEW   Final Result   1. Interval extubation. 2. Progression of bilateral airspace disease.       MRI BRAIN WO CONTRAST   Final Result 2.There is a 1.3 x 1.1 cm extra-axial mass identified within the left ordered aspect of the foramen magnum. This results in some mass effect on the adjacent medulla. This is without change from the prior study. Finding may represent a meningioma. This    could be further evaluated with a contrast-enhanced MRI of the head following resolution of the intracranial hemorrhage. VL Extremity Venous Bilateral   Final Result      CT HEAD WO CONTRAST   Final Result      Post therapy large acute parenchymal hemorrhage in the right cerebral hemisphere with subarachnoid component. Extensive surrounding edema is associated with 4 cm leftward shift of midline structures. Critical finding of acute intracranial hemorrhage was called to [facility] of the neurosurgery department at 12:25 PM on 2/3/2021, with instructions to contact patient's attending physician with these results. CT Brain Perfusion   Final Result      1. Hypoperfusion in the right MCA territory with a central ischemic core of 8 mL, total volume of hypoperfusion of 79 mL and a penumbra of 71 mL. XR CHEST PORTABLE   Final Result   1. Limited evaluation because of patient rotation and underpenetration of the film. 2. No dense airspace consolidation. 3. Probable mild cardiomegaly. CTA HEAD NECK W CONTRAST   Final Result   1. Carotid and vertebral arteries are patent and without stenosis or acute abnormality. 2. Incidental partial imaging of the a segmental pulmonary embolism in the right middle lobe. 3. Partial imaging of small right greater than left pleural effusions. 4. Previous left suboccipital craniotomy with a residual 1.6 cm left-sided extra-axial mass at the foramen magnum with mass effect on the cord at the cervical medullary junction favored to represent meningioma. Recommend correlation with clinical history    and previous imaging. 5. Incidental multinodular goiter.       CTA HEAD: FINDINGS: The internal carotid arteries are patent and normal in caliber through the skull base. The middle cerebral arteries are patent. 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 The A2 and A3 segments of the ACAs are patent bilaterally. Left MCA is patent. There is focal occlusion of an M2 branch of the right MCA (series 2 images 427 and 428). However, other M2 branches and M3 branches in the right sylvian fissure are patent. The M1 segment of the right MCA is patent. The posterior cerebral arteries are    patent bilaterally. The basilar artery is patent and normal in caliber. It is supplied by the left vertebral artery. The small nondominant right vertebral artery terminates in right sided PICA. IMPRESSION:   1. 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 2. 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 Results were discussed with Dr. Riki Wolfe at 8:00 PM on 2/2/2021. CT HEAD WO CONTRAST   Final Result   1. Previous left suboccipital craniotomy with a partially calcified extra-axial mass at the left foramen magnum measuring 1.8 cm contacting the cord at the cervicomedullary junction with some degree of mass effect on the cord at the foramen magnum. This    most likely represents residual/recurrent meningioma or other extra-axial mass. Correlation with patient history and previous imaging recommended. 2. Mild global volume loss and mild chronic small vessel ischemic disease in the white matter. 3. No acute intracranial hemorrhage. IR ANGIOGRAM CAROTID CEREBRAL RIGHT    (Results Pending)       Assessment/Plan   1. Hypernatremia     2. CVA     3. Anemia    4. Acid- base/ Electrolyte imbalance     5.  Malnutrition     Plan - replace free water   - Na better   - Tube feeds to cont   - Ur studies  - monitor NA   - CVA management   - PEG tube placed   - labs in am                 Thank you for allowing us to participate in care of [address] [place] [address] free to contact me   [facility] [address]  Office : [phone]  Fax :[phone]

## 2021-02-14 LAB
ANION GAP SERPL CALCULATED.3IONS-SCNC: 6 MMOL/L (ref 3–16)
ANTI-XA UNFRAC HEPARIN: <0.04 IU/ML (ref 0.3–0.7)
BASOPHILS ABSOLUTE: 0 K/UL (ref 0–0.2)
BASOPHILS RELATIVE PERCENT: 0.4 %
BUN BLDV-MCNC: 15 MG/DL (ref 7–20)
CALCIUM SERPL-MCNC: 8.7 MG/DL (ref 8.3–10.6)
CHLORIDE BLD-SCNC: 101 MMOL/L (ref 99–110)
CO2: 34 MMOL/L (ref 21–32)
CREAT SERPL-MCNC: <0.5 MG/DL (ref 0.6–1.2)
EOSINOPHILS ABSOLUTE: 0.1 K/UL (ref 0–0.6)
EOSINOPHILS RELATIVE PERCENT: 1.2 %
GFR AFRICAN AMERICAN: >60
GFR NON-AFRICAN AMERICAN: >60
GLUCOSE BLD-MCNC: 155 MG/DL (ref 70–99)
GLUCOSE BLD-MCNC: 158 MG/DL (ref 70–99)
GLUCOSE BLD-MCNC: 161 MG/DL (ref 70–99)
GLUCOSE BLD-MCNC: 165 MG/DL (ref 70–99)
GLUCOSE BLD-MCNC: 170 MG/DL (ref 70–99)
GLUCOSE BLD-MCNC: 172 MG/DL (ref 70–99)
GLUCOSE BLD-MCNC: 172 MG/DL (ref 70–99)
GLUCOSE BLD-MCNC: 179 MG/DL (ref 70–99)
GLUCOSE BLD-MCNC: 198 MG/DL (ref 70–99)
HCT VFR BLD CALC: 28 % (ref 36–48)
HEMOGLOBIN: 8.5 G/DL (ref 12–16)
LYMPHOCYTES ABSOLUTE: 0.7 K/UL (ref 1–5.1)
LYMPHOCYTES RELATIVE PERCENT: 11 %
MAGNESIUM: 1.6 MG/DL (ref 1.8–2.4)
MCH RBC QN AUTO: 25 PG (ref 26–34)
MCHC RBC AUTO-ENTMCNC: 30.5 G/DL (ref 31–36)
MCV RBC AUTO: 81.9 FL (ref 80–100)
MONOCYTES ABSOLUTE: 0.5 K/UL (ref 0–1.3)
MONOCYTES RELATIVE PERCENT: 8.4 %
MRSA CULTURE ONLY: NORMAL
NEUTROPHILS ABSOLUTE: 4.8 K/UL (ref 1.7–7.7)
NEUTROPHILS RELATIVE PERCENT: 79 %
PDW BLD-RTO: 26.2 % (ref 12.4–15.4)
PERFORMED ON: ABNORMAL
PLATELET # BLD: 145 K/UL (ref 135–450)
PMV BLD AUTO: 10.8 FL (ref 5–10.5)
POTASSIUM REFLEX MAGNESIUM: 3.5 MMOL/L (ref 3.5–5.1)
RBC # BLD: 3.42 M/UL (ref 4–5.2)
SODIUM BLD-SCNC: 141 MMOL/L (ref 136–145)
VANCOMYCIN TROUGH: 12.8 UG/ML (ref 10–20)
WBC # BLD: 6.1 K/UL (ref 4–11)

## 2021-02-14 PROCEDURE — 80048 BASIC METABOLIC PNL TOTAL CA: CPT

## 2021-02-14 PROCEDURE — 6360000002 HC RX W HCPCS: Performed by: NURSE PRACTITIONER

## 2021-02-14 PROCEDURE — 6360000002 HC RX W HCPCS: Performed by: STUDENT IN AN ORGANIZED HEALTH CARE EDUCATION/TRAINING PROGRAM

## 2021-02-14 PROCEDURE — 2580000003 HC RX 258: Performed by: NURSE PRACTITIONER

## 2021-02-14 PROCEDURE — 83735 ASSAY OF MAGNESIUM: CPT

## 2021-02-14 PROCEDURE — 6370000000 HC RX 637 (ALT 250 FOR IP): Performed by: STUDENT IN AN ORGANIZED HEALTH CARE EDUCATION/TRAINING PROGRAM

## 2021-02-14 PROCEDURE — 80202 ASSAY OF VANCOMYCIN: CPT

## 2021-02-14 PROCEDURE — 2580000003 HC RX 258: Performed by: STUDENT IN AN ORGANIZED HEALTH CARE EDUCATION/TRAINING PROGRAM

## 2021-02-14 PROCEDURE — 94003 VENT MGMT INPAT SUBQ DAY: CPT

## 2021-02-14 PROCEDURE — 6370000000 HC RX 637 (ALT 250 FOR IP): Performed by: COUNSELOR

## 2021-02-14 PROCEDURE — 6370000000 HC RX 637 (ALT 250 FOR IP): Performed by: INTERNAL MEDICINE

## 2021-02-14 PROCEDURE — 36415 COLL VENOUS BLD VENIPUNCTURE: CPT

## 2021-02-14 PROCEDURE — 85025 COMPLETE CBC W/AUTO DIFF WBC: CPT

## 2021-02-14 PROCEDURE — C9113 INJ PANTOPRAZOLE SODIUM, VIA: HCPCS | Performed by: STUDENT IN AN ORGANIZED HEALTH CARE EDUCATION/TRAINING PROGRAM

## 2021-02-14 PROCEDURE — 94761 N-INVAS EAR/PLS OXIMETRY MLT: CPT

## 2021-02-14 PROCEDURE — 99291 CRITICAL CARE FIRST HOUR: CPT | Performed by: INTERNAL MEDICINE

## 2021-02-14 PROCEDURE — 95813 EEG EXTND MNTR 61-119 MIN: CPT

## 2021-02-14 PROCEDURE — 2000000000 HC ICU R&B

## 2021-02-14 PROCEDURE — 85520 HEPARIN ASSAY: CPT

## 2021-02-14 PROCEDURE — 2700000000 HC OXYGEN THERAPY PER DAY

## 2021-02-14 RX ORDER — LIDOCAINE HYDROCHLORIDE 10 MG/ML
5 INJECTION, SOLUTION EPIDURAL; INFILTRATION; INTRACAUDAL; PERINEURAL ONCE
Status: DISCONTINUED | OUTPATIENT
Start: 2021-02-14 | End: 2021-02-18

## 2021-02-14 RX ORDER — MAGNESIUM SULFATE IN WATER 40 MG/ML
2000 INJECTION, SOLUTION INTRAVENOUS ONCE
Status: COMPLETED | OUTPATIENT
Start: 2021-02-14 | End: 2021-02-14

## 2021-02-14 RX ORDER — SODIUM CHLORIDE 0.9 % (FLUSH) 0.9 %
10 SYRINGE (ML) INJECTION EVERY 12 HOURS SCHEDULED
Status: DISCONTINUED | OUTPATIENT
Start: 2021-02-14 | End: 2021-02-23 | Stop reason: HOSPADM

## 2021-02-14 RX ORDER — SODIUM CHLORIDE 0.9 % (FLUSH) 0.9 %
10 SYRINGE (ML) INJECTION PRN
Status: DISCONTINUED | OUTPATIENT
Start: 2021-02-14 | End: 2021-02-23 | Stop reason: HOSPADM

## 2021-02-14 RX ADMIN — LEVETIRACETAM 1000 MG: 100 INJECTION, SOLUTION INTRAVENOUS at 08:30

## 2021-02-14 RX ADMIN — INSULIN LISPRO 2 UNITS: 100 INJECTION, SOLUTION INTRAVENOUS; SUBCUTANEOUS at 07:50

## 2021-02-14 RX ADMIN — Medication 10 ML: at 07:53

## 2021-02-14 RX ADMIN — Medication 10 ML: at 21:10

## 2021-02-14 RX ADMIN — PROPOFOL 25 MCG/KG/MIN: 10 INJECTION, EMULSION INTRAVENOUS at 18:49

## 2021-02-14 RX ADMIN — MAGNESIUM SULFATE IN WATER 2000 MG: 40 INJECTION, SOLUTION INTRAVENOUS at 07:50

## 2021-02-14 RX ADMIN — INSULIN LISPRO 2 UNITS: 100 INJECTION, SOLUTION INTRAVENOUS; SUBCUTANEOUS at 11:12

## 2021-02-14 RX ADMIN — INSULIN LISPRO 2 UNITS: 100 INJECTION, SOLUTION INTRAVENOUS; SUBCUTANEOUS at 01:30

## 2021-02-14 RX ADMIN — INSULIN LISPRO 2 UNITS: 100 INJECTION, SOLUTION INTRAVENOUS; SUBCUTANEOUS at 15:53

## 2021-02-14 RX ADMIN — PIPERACILLIN AND TAZOBACTAM 3375 MG: 3; .375 INJECTION, POWDER, LYOPHILIZED, FOR SOLUTION INTRAVENOUS at 18:38

## 2021-02-14 RX ADMIN — PIPERACILLIN AND TAZOBACTAM 3375 MG: 3; .375 INJECTION, POWDER, LYOPHILIZED, FOR SOLUTION INTRAVENOUS at 11:17

## 2021-02-14 RX ADMIN — NYSTATIN 500000 UNITS: 100000 SUSPENSION ORAL at 21:01

## 2021-02-14 RX ADMIN — POTASSIUM BICARBONATE 20 MEQ: 782 TABLET, EFFERVESCENT ORAL at 15:29

## 2021-02-14 RX ADMIN — NYSTATIN 500000 UNITS: 100000 SUSPENSION ORAL at 12:11

## 2021-02-14 RX ADMIN — INSULIN LISPRO 2 UNITS: 100 INJECTION, SOLUTION INTRAVENOUS; SUBCUTANEOUS at 18:42

## 2021-02-14 RX ADMIN — ATORVASTATIN CALCIUM 80 MG: 40 TABLET, FILM COATED ORAL at 21:01

## 2021-02-14 RX ADMIN — INSULIN LISPRO 2 UNITS: 100 INJECTION, SOLUTION INTRAVENOUS; SUBCUTANEOUS at 04:19

## 2021-02-14 RX ADMIN — PROPOFOL 25 MCG/KG/MIN: 10 INJECTION, EMULSION INTRAVENOUS at 12:04

## 2021-02-14 RX ADMIN — MICONAZOLE NITRATE: 20 POWDER TOPICAL at 08:35

## 2021-02-14 RX ADMIN — VANCOMYCIN HYDROCHLORIDE 1250 MG: 10 INJECTION, POWDER, LYOPHILIZED, FOR SOLUTION INTRAVENOUS at 04:17

## 2021-02-14 RX ADMIN — NYSTATIN 500000 UNITS: 100000 SUSPENSION ORAL at 08:30

## 2021-02-14 RX ADMIN — LEVETIRACETAM 1000 MG: 100 INJECTION, SOLUTION INTRAVENOUS at 21:01

## 2021-02-14 RX ADMIN — POTASSIUM BICARBONATE 20 MEQ: 782 TABLET, EFFERVESCENT ORAL at 11:17

## 2021-02-14 RX ADMIN — Medication 10 ML: at 12:11

## 2021-02-14 RX ADMIN — PIPERACILLIN AND TAZOBACTAM 3375 MG: 3; .375 INJECTION, POWDER, LYOPHILIZED, FOR SOLUTION INTRAVENOUS at 04:17

## 2021-02-14 RX ADMIN — PANTOPRAZOLE SODIUM 40 MG: 40 INJECTION, POWDER, FOR SOLUTION INTRAVENOUS at 07:54

## 2021-02-14 RX ADMIN — INSULIN LISPRO 2 UNITS: 100 INJECTION, SOLUTION INTRAVENOUS; SUBCUTANEOUS at 23:05

## 2021-02-14 RX ADMIN — POTASSIUM BICARBONATE 20 MEQ: 782 TABLET, EFFERVESCENT ORAL at 08:30

## 2021-02-14 RX ADMIN — PROPOFOL 25 MCG/KG/MIN: 10 INJECTION, EMULSION INTRAVENOUS at 05:17

## 2021-02-14 RX ADMIN — MICONAZOLE NITRATE: 20 POWDER TOPICAL at 21:10

## 2021-02-14 RX ADMIN — NYSTATIN 500000 UNITS: 100000 SUSPENSION ORAL at 18:32

## 2021-02-14 ASSESSMENT — PULMONARY FUNCTION TESTS
PIF_VALUE: 23
PIF_VALUE: 25
PIF_VALUE: 23
PIF_VALUE: 26

## 2021-02-14 NOTE — PROGRESS NOTES
RN obtained consent for PICC line via phone with patient's daughter Gaudencio. Placed in chart at this time.

## 2021-02-14 NOTE — PROCEDURES
Continuous EEG monitoring record    Patient name: Rosa Ware    START: 02/13/2021 @ 10:00am  END: 02/14/2021 @ 09:00am      Electroencephalographer: George Beauchamp MD PhD      CLINICAL DETAILS:  This EEG was performed on this 79 y. o.yo female admitted for Altered mental Status and concer for subclinical seizures      TECHNICAL DETAILS:  Continuous video-EEG monitoring was performed with 27 surface scalp electrodes placed according to the International 10-20 electrode placement system, using a 32-channel [employer] headbox. All EEG and video information was acquired digitally, including the use of automated spike and seizure detection software to detect epileptiform activity. An event button was also available to be depressed during clinical events. 02/13/2021 23:00pm to 09:00am on 02/14/2021  SEIZURES:  None  INTERICTAL:  None  PUSHBUTTONS:  None  BACKGROUND:  Abundant polymorphic medium amplitude symmetric 6-7 Hz generalized theta frequencies with frequent superimposed 2-3 Hz delta and other faster  frequencies that is reactive. EKG:  regular      02/13/2021 10:00am to 23:00pm   SEIZURES:  None  INTERICTAL:  None  PUSHBUTTONS:  None  BACKGROUND:  Abundant polymorphic medium amplitude symmetric 6-7 Hz generalized theta frequencies with frequent superimposed 2-3 Hz delta frequencies that is reactive. EKG:  regular    CLINICAL INTERPRETATION:  This was an abnormal tracing for age and state due to a moderate generalized slow wave abnormality indicating diffuse cerebral dysfunction which can be of multiple causes, including structural, or vascular abnormalities, toxic/metabolic conditions, hydrocephalus, or postictal conditions. No epileptiform discharge, focal slowing, or seizures were seen during this recording. Clinical correlation is advised.         George Beauchmap MD PhD

## 2021-02-14 NOTE — PROGRESS NOTES
Clinical Pharmacy Consult Note    Admit date: 2/2/2021    Subjective/Objective:  77yof with PMHx of Afib on Xarelto, HF, DM, pulm HTN, breast ca s/p lumpectomy/radiation who presented with left-sided hemiplegia, severe expressive aphasia and dysarthria, right facial droop and right-sided gaze deviation. Patient received tPA for R MCA M2 occlusion and is s/p thrombectomy. IVCF was placed 2/4, and patient has been off AC d/t hemorrhagic conversion. On 2/12 a rapid response was called for acute hypoxia, O2 sats in 60s. Patient was transferred to ICU for acute care, placed on 15L NRB and remains unresponsive. Pharmacy is consulted to dose Vancomycin per Dr. Krissy Das for HAP. Pertinent Medications:  Zosyn 3.375g IV q8h -- day #3  Vancomycin, pharmacy to dose -- day #3    (2/12)    (2/13-2/14)   1.5g IV q12h (2/14-current)    Recent Labs     02/13/21  0443 02/14/21  0417    141   K 4.2 3.5    101   CO2 32 34*   BUN 15 15   CREATININE <0.5* <0.5*     CrCl cannot be calculated (This lab value cannot be used to calculate CrCl because it is not a number: <0.5). Recent Labs     02/13/21  0527 02/14/21  0417   WBC 7.3 6.1   HGB 8.6* 8.5*   HCT 28.8* 28.0*   MCV 83.8 81.9   * 145     Height:  5' 7\" (170.2 cm)  Weight: 218 lb 11.1 oz (99.2 kg)    Levels:  2/14 Trough: 12.8 mcg/mL (13 hr level on 1.25g IV q12h)    Micro:  2/2 Rapid flu: Negative  2/2 Rapid Covid-19: Negative  2/12 MRSA nasal cx: Negative  2/12 Strep Pneumoniae AG: Negative  2/12 Legionella urine AG: Negative  2/12 Tracheal aspirate: Gram neg obed, pending  2/12 Blood x2: NGTD    Assessment/Plan:  1. HAP:  Vancomycin + Zosyn - day #3  Vancomycin - pharmacy to dose  · Trough this AM is 12.8 mcg/mL. Target 15-20 mcg/mL for HAP. · Will increase to 1.5g IV q12h. · MRSA nares culture result is negative. Can consider discontinuing vancomycin. · Renal function will be monitored closely and dosing will be adjusted as appropriate. Please call with any questions.   Derek Talley, PharmD  PGY1 Pharmacy Resident  2/14/2021 7:10 AM  [id number]

## 2021-02-14 NOTE — PROGRESS NOTES
Clinical Pharmacy Progress Note    - Vancomycin has been discontinued - Pharmacy will sign off vancomycin dosing  - If vancomycin is resumed, please re-consult Pharmacy    Please call with any questions.     Peri Romero PharmD, [facility]  Wireless: [phone]  2/14/2021 8:48 AM

## 2021-02-14 NOTE — PROGRESS NOTES
 pantoprazole  40 mg Intravenous Daily    ferrous sulfate  300 mg Per G Tube Every Other Day    miconazole   Topical BID    atorvastatin  80 mg Oral Nightly    carvedilol  6.25 mg Oral BID WC     Continuous Infusions:   propofol 25 mcg/kg/min (02/14/21 0517)    dextrose       PRN Meds:sodium chloride flush, glucose, dextrose, glucagon (rDNA), dextrose, perflutren lipid microspheres, labetalol, albuterol sulfate HFA, hydrALAZINE, acetaminophen, promethazine **OR** ondansetron, polyethylene glycol    Objective:   Vitals:   T-max:  Patient Vitals for the past 8 hrs:   BP Temp Temp src Pulse Resp SpO2   02/14/21 0829 128/78   64     02/14/21 0800 128/78   64 16    02/14/21 0745 115/72 97.8 °F (36.6 °C) Axillary 62 15 100 %   02/14/21 0700 115/72   63 16 98 %   02/14/21 0600 119/64   66 14 99 %   02/14/21 0500 137/77   67 13 100 %   02/14/21 0401    69 14    02/14/21 0400 129/74 98.1 °F (36.7 °C) Axillary 61 14 100 %   02/14/21 0300 (!) 148/68   66 15 99 %   02/14/21 0200 (!) 129/93   68 16 98 %       Intake/Output Summary (Last 24 hours) at 2/14/2021 0906  Last data filed at 2/14/2021 0600  Gross per 24 hour   Intake 1069 ml   Output 1100 ml   Net -31 ml       Access:   -Peripheral Access Day#:3                               Gliman Day#:9  NGT Day#: 6                                            ETT Day#:1  Vent Settings: Vent Mode: AC/PRVC Rate Set: 12 bmp/Vt Ordered: 500 mL/ /FiO2 : 30 %      Physical Exam  Constitutional:       Interventions: She is sedated and intubated. HENT:      Head: Normocephalic and atraumatic. Mouth/Throat:      Mouth: Mucous membranes are moist.      Pharynx: Oropharynx is clear. Eyes:      Conjunctiva/sclera: Conjunctivae normal.      Pupils: Pupils are equal, round, and reactive to light. Cardiovascular:      Rate and Rhythm: Normal rate and regular rhythm. Heart sounds: Normal heart sounds. No murmur. No friction rub. No gallop. Comments: No BLLE edema noted  Pulmonary:      Effort: She is intubated. Breath sounds: Rhonchi present. No wheezing or rales. Abdominal:      Palpations: Abdomen is soft. Tenderness: There is no abdominal tenderness. Comments: PEG tube C/DI  Abdomen soft, NT, non-distended   Skin:     General: Skin is warm and dry. Neurological:      Comments: Off sedation: Withdraws to pain in BLLE  Does not withdraw to pain in upper extremities. Right upper extremity flaccid. Left upper extremity with rhythmic contractions. Strength 0/5 bilateral upper and lower extremities  Lip trembling. Unable to follow commands. PEERL. LABS:    CBC:   Recent Labs     02/12/21  0603 02/13/21  0527 02/14/21 0417   WBC 10.0 7.3 6.1   HGB 9.5* 8.6* 8.5*   HCT 33.2* 28.8* 28.0*    129* 145   MCV 85.8 83.8 81.9     Renal:    Recent Labs     02/12/21  0603 02/13/21  0443 02/14/21 0417    140 141   K 4.1 4.2 3.5    102 101   CO2 37* 32 34*   BUN 15 15 15   CREATININE <0.5* <0.5* <0.5*   GLUCOSE 235* 146* 172*   CALCIUM 9.2 8.6 8.7   MG  --   --  1.60*   ANIONGAP 2* 6 6     Hepatic: No results for input(s): AST, ALT, BILITOT, BILIDIR, PROT, LABALBU, ALKPHOS in the last 72 hours. Troponin: No results for input(s): TROPONINI in the last 72 hours. BNP: No results for input(s): BNP in the last 72 hours. Lipids: No results for input(s): CHOL, HDL in the last 72 hours. Invalid input(s): LDLCALCU, TRIGLYCERIDE  ABGs:    Recent Labs     02/12/21  1147 02/12/21 2010 02/12/21  2300   PHART 7.461* 7.526* 7.431   [id number] 49.0* 43.0 55.2*   PO2ART 399.5* 128.0* 109.5*   [id number] 34.9* 35.7* 36.8*   BEART 11* 13* 13*   [id number] 100 99 98   FWW7VNE 37 37 39       INR: No results for input(s): INR in the last 72 hours. Lactate: No results for input(s): LACTATE in the last 72 hours.   Cultures:  -----------------------------------------------------------------  RAD:   CT HEAD WO CONTRAST   Final Result XR CHEST PORTABLE   Final Result      Persistent bilateral infiltrates         XR CHEST PORTABLE   Final Result   Impression:          1. Increased opacities in the left lung base since the prior study. There may be a small left pleural effusion. 2. Persistent patchy right lung opacities. CT HEAD WO CONTRAST   Final Result   Interval progression of vasogenic edema at the right middle cerebral artery hemorrhagic infarction and interval development of right to left midline shift of about 3 mm as described above. Apparent slight decrease in the size of hemorrhage in its AP dimension. XR CHEST 1 VIEW   Final Result   1. Interval extubation. 2. Progression of bilateral airspace disease. MRI BRAIN WO CONTRAST   Final Result      Region of right middle cerebral artery hemorrhagic infarction measures approximately 5.0 x 4.2 cm with surrounding mild vasogenic edema and diffusion restriction consistent with right middle cerebral artery distribution infarct with hemorrhagic    components      No additional areas of hemorrhage or infarction identified. Mild local mass effect is identified with very minimal midline shift of 3 to 4 mm      XR CHEST PORTABLE   Final Result      Persistent bilateral infiltrates and cardiomegaly. IR GUIDED IVC FILTER PLACEMENT   Final Result   Impression:      Successful placement of retrievable IVC filter. CT HEAD WO CONTRAST   Final Result      1. Hemorrhagic transformation of right MCA infarct, without significant change. No new hemorrhage. CT head without contrast   Final Result         1. Stable large intraparenchymal hemorrhage located within the right posterior temporal parietal lobe with extension into the adjacent insula. There is also subarachnoid extension of hemorrhage into the overlying sulci as well as the right sylvian    fissure. Findings result in some mild right to left subfalcine herniation. 2.There is a 1.3 x 1.1 cm extra-axial mass identified within the left ordered aspect of the foramen magnum. This results in some mass effect on the adjacent medulla. This is without change from the prior study. Finding may represent a meningioma. This    could be further evaluated with a contrast-enhanced MRI of the head following resolution of the intracranial hemorrhage. CT HEAD WO CONTRAST   Final Result      1. Stable large intraparenchymal hemorrhage located within the right posterior temporal parietal lobe with extension into the adjacent insula. There is also subarachnoid extension of hemorrhage into the overlying sulci as well as the right sylvian    fissure. Findings result in some mild right to left subfalcine herniation. 2.There is a 1.3 x 1.1 cm extra-axial mass identified within the left ordered aspect of the foramen magnum. This results in some mass effect on the adjacent medulla. This is without change from the prior study. Finding may represent a meningioma. This    could be further evaluated with a contrast-enhanced MRI of the head following resolution of the intracranial hemorrhage. VL Extremity Venous Bilateral   Final Result      CT HEAD WO CONTRAST   Final Result      Post therapy large acute parenchymal hemorrhage in the right cerebral hemisphere with subarachnoid component. Extensive surrounding edema is associated with 4 cm leftward shift of midline structures. Critical finding of acute intracranial hemorrhage was called to David Washburn of the neurosurgery department at 12:25 PM on 2/3/2021, with instructions to contact patient's attending physician with these results. CT Brain Perfusion   Final Result      1. Hypoperfusion in the right MCA territory with a central ischemic core of 8 mL, total volume of hypoperfusion of 79 mL and a penumbra of 71 mL.       XR CHEST PORTABLE   Final Result 1. Limited evaluation because of patient rotation and underpenetration of the film. 2. No dense airspace consolidation. 3. Probable mild cardiomegaly. CTA HEAD NECK W CONTRAST   Final Result   1. Carotid and vertebral arteries are patent and without stenosis or acute abnormality. 2. Incidental partial imaging of the a segmental pulmonary embolism in the right middle lobe. 3. Partial imaging of small right greater than left pleural effusions. 4. Previous left suboccipital craniotomy with a residual 1.6 cm left-sided extra-axial mass at the foramen magnum with mass effect on the cord at the cervical medullary junction favored to represent meningioma. Recommend correlation with clinical history    and previous imaging. 5. Incidental multinodular goiter. CTA HEAD:      FINDINGS: The internal carotid arteries are patent and normal in caliber through the skull base. The middle cerebral arteries are patent. 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 The A2 and A3 segments of the ACAs are patent bilaterally. Left MCA is patent. There is focal occlusion of an M2 branch of the right MCA (series 2 images 427 and 428). However, other M2 branches and M3 branches in the right sylvian fissure are patent. The M1 segment of the right MCA is patent. The posterior cerebral arteries are    patent bilaterally. The basilar artery is patent and normal in caliber. It is supplied by the left vertebral artery. The small nondominant right vertebral artery terminates in right sided PICA. IMPRESSION:   1. 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 2. 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 -Holding anticoagulation and antiplatelets  -Continue keppra     Hypernatremia, resolved  -Likely secondary to decreased oral intake/dehydration  -Nephrology following  -Cont Water boluses with tube feeds   -Monitor sodium daily      Seizures  Likely sec to above   - Routine EEG positive for epileptiform discharges  - Keppra 1g bid  - Discussed with neurology, remains on continuous video EEG     Possible UTI, treated  -Urine smelled pungent  -UA and urine culture ordered, UA with 3+ wbc, 3+ bacteria and positive nitrate  -completed 3 days antibiotics, remains on Zosyn for aspiration event as above     Metabolic encephalopathy:  -Likely multifactorial, secondary to above   -Management as above     Oral thrush:  -Nystatin (day 4)     Acute blood loss anemia  -Protonix BID     Chronic systolic heart failure  echo 2/4/2020 with EF 55-60%, indeterminate diastolic function, PASP 53 mmHg, dilated IVC and collapses <50%  - good response to lasix this morning, will continue with spot doses of lasix as needed  - on coreg 6.25mg bid     Segmental PE  -incidental finding  -No AC due to above  -IVC filter placed 2/4     Atrial fibrillation  -Holding AC, will need neurosurg clearance prior to resuming  -monitor HR     T2DM  -mealtime and correctional insulin     HTN  -as above     asthma  -breathing treatments as needed        Code Status:Full Code  FEN: DIET TUBE FEED CONTINUOUS/CYCLIC NPO; STANDARD WITH FIBER; Gastrostomy; Continuous; 25; 35; 24  PPX:  SCDs  DISPO: ICU    Nikita Mcguire MD, PGY-1  02/14/21  9:06 AM    Pulm/CC     Patient seen and examined.  I agree with Dr. Beavers's history, physical, lab findings, assessment and plan.     Patient transferred to ICU Friday for acute hypercapnic/hypoxemic respiratory failure. This is hospital day number 12 for acute right MCA ischemic CVA status post embolectomy complicated by cerebral bleed. She is unable to protect her airway and likely aspirated. Respiratory culture growing gram-negative obed. Await identification and sensitivities. Continue Zosyn. Oxygenating well on current vent settings. No changes needed     Family visited yesterday and desires continued aggressive care including tracheostomy. I think this should be done early next week as I do not foresee her coming off or staying off the ventilator. I will make her n.p.o. in case tracheostomy can be performed tomorrow     She is on continuous video EEG. Continue Keppra     PICC line ordered for tomorrow as well. Case management should start working on LTAC disposition     Full Code     Pt has a high probability of imminent or life-threatening deterioration requiring close monitoring, and highly complex decision-making and/or interventions of high intensity to assess, manipulate, and support his critical organ systems to prevent a likely inevitable decline which could occur if left untreated.      A total critical care time 32 minutes was used. This includes but not limited to examining patient, collaborating with other physicians, monitoring vital signs, telemetry, continuous pulse oximetry, and clinical response to IV medications, documentation time, review and interpretation of laboratory and radiological data, review of nursing notes and old record review.  This time excludes any time that may have been spent performing procedures for life threatening organ failure.        Jordan Norwood MD

## 2021-02-14 NOTE — PROGRESS NOTES
Hospitalist Progress Note      PCP: No primary care provider on file. Date of Admission: 2/2/2021    Chief Complaint:     Chief Complaint   Patient presents with    Cerebrovascular Accident       Subjective:  Patient on vent and sedated. Unresponsive.  Withdraws to pain on the right side, withdraws to pain in left lower extremity and minimally to pain in left upper extremity    PFHS: reviewed as documented 2/2/2021, no changes    Medications:  Reviewed    Infusion Medications    propofol 25 mcg/kg/min (02/14/21 5088)    dextrose       Scheduled Medications    magnesium sulfate  2,000 mg Intravenous Once    potassium bicarb-citric acid  20 mEq Per NG tube Q2H    piperacillin-tazobactam  3,375 mg Intravenous Q8H    insulin lispro  0-12 Units Subcutaneous Q4H    lidocaine 1 % injection  5 mL Intradermal Once    sodium chloride flush  10 mL Intravenous 2 times per day    nystatin  5 mL Oral 4x Daily    levetiracetam  1,000 mg Intravenous Q12H    pantoprazole  40 mg Intravenous Daily    ferrous sulfate  300 mg Per G Tube Every Other Day    miconazole   Topical BID    atorvastatin  80 mg Oral Nightly    carvedilol  6.25 mg Oral BID WC     PRN Meds: sodium chloride flush, glucose, dextrose, glucagon (rDNA), dextrose, perflutren lipid microspheres, labetalol, albuterol sulfate HFA, hydrALAZINE, acetaminophen, promethazine **OR** ondansetron, polyethylene glycol      Intake/Output Summary (Last 24 hours) at 2/14/2021 0929  Last data filed at 2/14/2021 0600  Gross per 24 hour   Intake 1069 ml   Output 1100 ml   Net -31 ml       Physical Exam    /78   Pulse 50   Temp 97.8 °F (36.6 °C) (Axillary)   Resp 15   Ht 5' 7\" (1.702 m)   Wt 218 lb 11.1 oz (99.2 kg)   SpO2 100%   BMI 34.25 kg/m²     General appearance: Unresponsive  Eyes: Pupils equal, round, reactive to light, conjunctiva/corneas clear  Ears/Nose/Mouth/Throat: No external lesions or scars, hearing intact to voice Neck: Trachea midline, no masses noted, no thyromegaly  Respiratory: Intubated, coarse breath sounds bilaterally  Cardiovascular: Regular rate and rhythm, no murmurs, gallops, or rubs  Abdomen: soft, non-tender, non-distended  Musculoskeletal: Warm, well perfused, no cyanosis or edema  Skin: normal color, no wounds noted  Neurological: Sedated. Unresponsive. Withdraws to pain on the right side, withdraws to pain in left lower extremity and minimally to pain in left upper extremity     Labs:   Recent Labs     02/12/21  0603 02/13/21  0527 02/14/21  0417   WBC 10.0 7.3 6.1   HGB 9.5* 8.6* 8.5*   HCT 33.2* 28.8* 28.0*    129* 145     Recent Labs     02/12/21  0603 02/13/21  0443 02/14/21  0417    140 141   K 4.1 4.2 3.5    102 101   CO2 37* 32 34*   BUN 15 15 15   CREATININE <0.5* <0.5* <0.5*   CALCIUM 9.2 8.6 8.7     No results for input(s): AST, ALT, BILIDIR, BILITOT, ALKPHOS in the last 72 hours. No results for input(s): INR in the last 72 hours. No results for input(s): Rashmi Older in the last 72 hours. Urinalysis:      Lab Results   Component Value Date    NITRU Negative 02/11/2021    WBCUA None seen 02/11/2021    BACTERIA Rare 02/11/2021    RBCUA None seen 02/11/2021    BLOODU Negative 02/11/2021    SPECGRAV 1.025 02/11/2021    GLUCOSEU Negative 02/11/2021       Radiology:  CT HEAD WO CONTRAST   Final Result      XR CHEST PORTABLE   Final Result      Persistent bilateral infiltrates         XR CHEST PORTABLE   Final Result   Impression:          1. Increased opacities in the left lung base since the prior study. There may be a small left pleural effusion. 2. Persistent patchy right lung opacities. CT HEAD WO CONTRAST   Final Result   Interval progression of vasogenic edema at the right middle cerebral artery hemorrhagic infarction and interval development of right to left midline shift of about 3 mm as described above. Apparent slight decrease in the size of hemorrhage in its AP dimension. XR CHEST 1 VIEW   Final Result   1. Interval extubation. 2. Progression of bilateral airspace disease. MRI BRAIN WO CONTRAST   Final Result      Region of right middle cerebral artery hemorrhagic infarction measures approximately 5.0 x 4.2 cm with surrounding mild vasogenic edema and diffusion restriction consistent with right middle cerebral artery distribution infarct with hemorrhagic    components      No additional areas of hemorrhage or infarction identified. Mild local mass effect is identified with very minimal midline shift of 3 to 4 mm      XR CHEST PORTABLE   Final Result      Persistent bilateral infiltrates and cardiomegaly. IR GUIDED IVC FILTER PLACEMENT   Final Result   Impression:      Successful placement of retrievable IVC filter. CT HEAD WO CONTRAST   Final Result      1. Hemorrhagic transformation of right MCA infarct, without significant change. No new hemorrhage. CT head without contrast   Final Result         1. Stable large intraparenchymal hemorrhage located within the right posterior temporal parietal lobe with extension into the adjacent insula. There is also subarachnoid extension of hemorrhage into the overlying sulci as well as the right sylvian    fissure. Findings result in some mild right to left subfalcine herniation. 2.There is a 1.3 x 1.1 cm extra-axial mass identified within the left ordered aspect of the foramen magnum. This results in some mass effect on the adjacent medulla. This is without change from the prior study. Finding may represent a meningioma. This    could be further evaluated with a contrast-enhanced MRI of the head following resolution of the intracranial hemorrhage.                    CT HEAD WO CONTRAST   Final Result 1. Stable large intraparenchymal hemorrhage located within the right posterior temporal parietal lobe with extension into the adjacent insula. There is also subarachnoid extension of hemorrhage into the overlying sulci as well as the right sylvian    fissure. Findings result in some mild right to left subfalcine herniation. 2.There is a 1.3 x 1.1 cm extra-axial mass identified within the left ordered aspect of the foramen magnum. This results in some mass effect on the adjacent medulla. This is without change from the prior study. Finding may represent a meningioma. This    could be further evaluated with a contrast-enhanced MRI of the head following resolution of the intracranial hemorrhage. VL Extremity Venous Bilateral   Final Result      CT HEAD WO CONTRAST   Final Result      Post therapy large acute parenchymal hemorrhage in the right cerebral hemisphere with subarachnoid component. Extensive surrounding edema is associated with 4 cm leftward shift of midline structures. Critical finding of acute intracranial hemorrhage was called to Isauro Javier of the neurosurgery department at 12:25 PM on 2/3/2021, with instructions to contact patient's attending physician with these results. CT Brain Perfusion   Final Result      1. Hypoperfusion in the right MCA territory with a central ischemic core of 8 mL, total volume of hypoperfusion of 79 mL and a penumbra of 71 mL. XR CHEST PORTABLE   Final Result   1. Limited evaluation because of patient rotation and underpenetration of the film. 2. No dense airspace consolidation. 3. Probable mild cardiomegaly. CTA HEAD NECK W CONTRAST   Final Result   1. Carotid and vertebral arteries are patent and without stenosis or acute abnormality. 2. Incidental partial imaging of the a segmental pulmonary embolism in the right middle lobe. 3. Partial imaging of small right greater than left pleural effusions. 4. Previous left suboccipital craniotomy with a residual 1.6 cm left-sided extra-axial mass at the foramen magnum with mass effect on the cord at the cervical medullary junction favored to represent meningioma. Recommend correlation with clinical history    and previous imaging. 5. Incidental multinodular goiter. CTA HEAD:      FINDINGS: The internal carotid arteries are patent and normal in caliber through the skull base. The middle cerebral arteries are patent. 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 The A2 and A3 segments of the ACAs are patent bilaterally. Left MCA is patent. There is focal occlusion of an M2 branch of the right MCA (series 2 images 427 and 428). However, other M2 branches and M3 branches in the right sylvian fissure are patent. The M1 segment of the right MCA is patent. The posterior cerebral arteries are    patent bilaterally. The basilar artery is patent and normal in caliber. It is supplied by the left vertebral artery. The small nondominant right vertebral artery terminates in right sided PICA. IMPRESSION:   1. 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 2. 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 Results were discussed with Dr. Keith Lagos at 8:00 PM on 2/2/2021. CT HEAD WO CONTRAST   Final Result   1. Previous left suboccipital craniotomy with a partially calcified extra-axial mass at the left foramen magnum measuring 1.8 cm contacting the cord at the cervicomedullary junction with some degree of mass effect on the cord at the foramen magnum. This    most likely represents residual/recurrent meningioma or other extra-axial mass. Correlation with patient history and previous imaging recommended. 2. Mild global volume loss and mild chronic small vessel ischemic disease in the white matter. 3. No acute intracranial hemorrhage. IR ANGIOGRAM CAROTID CEREBRAL RIGHT    (Results Pending)       Assessment/Plan:    Active Hospital Problems    Diagnosis Date Noted    Acute respiratory failure with hypoxia and hypercapnia (HCC) [J96.01, J96.02]     Aspiration pneumonia of right lower lobe due to gastric secretions ([address] [J69.0]     Hypoxemia [R09.02] 02/06/2021    Permanent atrial fibrillation (HCC) [I48.21] 02/03/2021    Acute gastric ulcer with hemorrhage [K25.0] 02/03/2021    Other pulmonary embolism without acute cor pulmonale (HCC) [I26.99] 02/03/2021    Acute right MCA stroke ([address] [I63.511]     Acute cerebrovascular accident (CVA) ([address] [I63.9] 02/02/2021       Plan:    #Acute hypoxic/Hypercarbic respiratory failure  Intubated, sedated  Continue vent management per intensivist  Plan for tracheostomy early next week  Discussed with daughters    #Aspiration pneumonia  Chest x-ray showing worsening of left opacity  Respiratory culture positive for gram-negative obed  Continue IV Zosyn    # R MCA stroke, s/p thrombectomy and thrombolysis  -CT 2/4: new large acute parenchymal hemorrhage, repeat stable  -MRI w/ findings as above  -Frequent neuro checks  -S/p PEG tube placement 02/9/21  -Cont tube feeds  -palliative care following  -CT 2/10 showed edema with midline shift-stable  -CT 02/13 stable  -Neurology following    # Intraparenchymal hemorrhage  -CT 02/04 showed hemorrhagic transformation of right MCA infarct  -stable  -keep SBP < 160.   Off nicardipine drip  -Holding anticoagulation and antiplatelets  -Continue keppra    #Hypernatremia  -Likely secondary to decreased oral intake/dehydration  -Nephrology following  -Cont Water boluses with tube feeds   -Monitor sodium    #Seizures  Likely sec to above   Routine EEG positive for epileptiform discharges  Continue increased dose of Keppra Continue continuous video EEG  Neurology following    #Metabolic encephalopathy:  -Likely multifactorial, secondary to above   -Management as above    #Oral thrush:  -Continue nystatin     # Acute blood loss anemia  -At admission prior to current admission for GI bleed  -Anticoagulation on hold given history of GI bleed and intraparenchymal hemorrhage  -Protonix BID     # Chronic systolic heart failure  -holding home diuretics     # Segmental PE  -incidental finding  -No AC due to above  -IVC filter placed 2/4     # Atrial fibrillation  -Holding AC  -monitor HR     # T2DM  -mealtime and correctional insulin     # HTN  -as above     # asthma  -breathing treatments as needed    #Possible UTI, ruled out  Urine smelled pungent  UA negative    DVT Prophylaxis: SCDs  Diet: DIET TUBE FEED CONTINUOUS/CYCLIC NPO; STANDARD WITH FIBER; Gastrostomy; Continuous; 25; 35; 24  Code Status: Full Code    Dispo: Pending clinical course, tracheostomy, LTAC placement    Ary Chicas MD

## 2021-02-15 LAB
ANION GAP SERPL CALCULATED.3IONS-SCNC: 5 MMOL/L (ref 3–16)
ANISOCYTOSIS: ABNORMAL
ANTI-XA UNFRAC HEPARIN: <0.04 IU/ML (ref 0.3–0.7)
BASOPHILS ABSOLUTE: 0 K/UL (ref 0–0.2)
BASOPHILS RELATIVE PERCENT: 0.7 %
BUN BLDV-MCNC: 12 MG/DL (ref 7–20)
CALCIUM SERPL-MCNC: 8.7 MG/DL (ref 8.3–10.6)
CHLORIDE BLD-SCNC: 99 MMOL/L (ref 99–110)
CO2: 34 MMOL/L (ref 21–32)
CREAT SERPL-MCNC: <0.5 MG/DL (ref 0.6–1.2)
CULTURE, RESPIRATORY: ABNORMAL
CULTURE, RESPIRATORY: ABNORMAL
EOSINOPHILS ABSOLUTE: 0.1 K/UL (ref 0–0.6)
EOSINOPHILS RELATIVE PERCENT: 1.8 %
GFR AFRICAN AMERICAN: >60
GFR NON-AFRICAN AMERICAN: >60
GLUCOSE BLD-MCNC: 122 MG/DL (ref 70–99)
GLUCOSE BLD-MCNC: 123 MG/DL (ref 70–99)
GLUCOSE BLD-MCNC: 126 MG/DL (ref 70–99)
GLUCOSE BLD-MCNC: 131 MG/DL (ref 70–99)
GLUCOSE BLD-MCNC: 134 MG/DL (ref 70–99)
GLUCOSE BLD-MCNC: 160 MG/DL (ref 70–99)
GRAM STAIN RESULT: ABNORMAL
HCT VFR BLD CALC: 28.6 % (ref 36–48)
HEMOGLOBIN: 8.7 G/DL (ref 12–16)
LYMPHOCYTES ABSOLUTE: 0.7 K/UL (ref 1–5.1)
LYMPHOCYTES RELATIVE PERCENT: 11.9 %
MCH RBC QN AUTO: 24.8 PG (ref 26–34)
MCHC RBC AUTO-ENTMCNC: 30.3 G/DL (ref 31–36)
MCV RBC AUTO: 81.8 FL (ref 80–100)
MICROCYTES: ABNORMAL
MONOCYTES ABSOLUTE: 0.6 K/UL (ref 0–1.3)
MONOCYTES RELATIVE PERCENT: 9.6 %
NEUTROPHILS ABSOLUTE: 4.6 K/UL (ref 1.7–7.7)
NEUTROPHILS RELATIVE PERCENT: 76 %
ORGANISM: ABNORMAL
OVALOCYTES: ABNORMAL
PDW BLD-RTO: 26.3 % (ref 12.4–15.4)
PERFORMED ON: ABNORMAL
PLATELET # BLD: 160 K/UL (ref 135–450)
PLATELET SLIDE REVIEW: ADEQUATE
PMV BLD AUTO: 11.1 FL (ref 5–10.5)
POTASSIUM REFLEX MAGNESIUM: 4 MMOL/L (ref 3.5–5.1)
RBC # BLD: 3.5 M/UL (ref 4–5.2)
SCHISTOCYTES: ABNORMAL
SODIUM BLD-SCNC: 138 MMOL/L (ref 136–145)
WBC # BLD: 6 K/UL (ref 4–11)

## 2021-02-15 PROCEDURE — 6360000002 HC RX W HCPCS: Performed by: STUDENT IN AN ORGANIZED HEALTH CARE EDUCATION/TRAINING PROGRAM

## 2021-02-15 PROCEDURE — 2580000003 HC RX 258: Performed by: NURSE PRACTITIONER

## 2021-02-15 PROCEDURE — 6360000002 HC RX W HCPCS: Performed by: NURSE PRACTITIONER

## 2021-02-15 PROCEDURE — 94003 VENT MGMT INPAT SUBQ DAY: CPT

## 2021-02-15 PROCEDURE — 36415 COLL VENOUS BLD VENIPUNCTURE: CPT

## 2021-02-15 PROCEDURE — 2000000000 HC ICU R&B

## 2021-02-15 PROCEDURE — 2580000003 HC RX 258: Performed by: STUDENT IN AN ORGANIZED HEALTH CARE EDUCATION/TRAINING PROGRAM

## 2021-02-15 PROCEDURE — 80048 BASIC METABOLIC PNL TOTAL CA: CPT

## 2021-02-15 PROCEDURE — 2500000003 HC RX 250 WO HCPCS: Performed by: STUDENT IN AN ORGANIZED HEALTH CARE EDUCATION/TRAINING PROGRAM

## 2021-02-15 PROCEDURE — 6370000000 HC RX 637 (ALT 250 FOR IP): Performed by: STUDENT IN AN ORGANIZED HEALTH CARE EDUCATION/TRAINING PROGRAM

## 2021-02-15 PROCEDURE — C1751 CATH, INF, PER/CENT/MIDLINE: HCPCS

## 2021-02-15 PROCEDURE — 85025 COMPLETE CBC W/AUTO DIFF WBC: CPT

## 2021-02-15 PROCEDURE — 6370000000 HC RX 637 (ALT 250 FOR IP): Performed by: INTERNAL MEDICINE

## 2021-02-15 PROCEDURE — 2700000000 HC OXYGEN THERAPY PER DAY

## 2021-02-15 PROCEDURE — 36569 INSJ PICC 5 YR+ W/O IMAGING: CPT

## 2021-02-15 PROCEDURE — C9113 INJ PANTOPRAZOLE SODIUM, VIA: HCPCS | Performed by: STUDENT IN AN ORGANIZED HEALTH CARE EDUCATION/TRAINING PROGRAM

## 2021-02-15 PROCEDURE — 99291 CRITICAL CARE FIRST HOUR: CPT | Performed by: INTERNAL MEDICINE

## 2021-02-15 PROCEDURE — 6370000000 HC RX 637 (ALT 250 FOR IP): Performed by: COUNSELOR

## 2021-02-15 PROCEDURE — 94761 N-INVAS EAR/PLS OXIMETRY MLT: CPT

## 2021-02-15 PROCEDURE — 85520 HEPARIN ASSAY: CPT

## 2021-02-15 RX ADMIN — NYSTATIN 500000 UNITS: 100000 SUSPENSION ORAL at 13:13

## 2021-02-15 RX ADMIN — SODIUM CHLORIDE 0.2 MCG/KG/HR: 9 INJECTION, SOLUTION INTRAVENOUS at 17:29

## 2021-02-15 RX ADMIN — INSULIN LISPRO 2 UNITS: 100 INJECTION, SOLUTION INTRAVENOUS; SUBCUTANEOUS at 03:01

## 2021-02-15 RX ADMIN — NYSTATIN 500000 UNITS: 100000 SUSPENSION ORAL at 20:48

## 2021-02-15 RX ADMIN — Medication 10 ML: at 20:49

## 2021-02-15 RX ADMIN — PIPERACILLIN AND TAZOBACTAM 3375 MG: 3; .375 INJECTION, POWDER, LYOPHILIZED, FOR SOLUTION INTRAVENOUS at 18:18

## 2021-02-15 RX ADMIN — ATORVASTATIN CALCIUM 80 MG: 40 TABLET, FILM COATED ORAL at 20:48

## 2021-02-15 RX ADMIN — LEVETIRACETAM 1000 MG: 100 INJECTION, SOLUTION INTRAVENOUS at 09:54

## 2021-02-15 RX ADMIN — NYSTATIN 500000 UNITS: 100000 SUSPENSION ORAL at 16:24

## 2021-02-15 RX ADMIN — PANTOPRAZOLE SODIUM 40 MG: 40 INJECTION, POWDER, FOR SOLUTION INTRAVENOUS at 10:16

## 2021-02-15 RX ADMIN — PIPERACILLIN AND TAZOBACTAM 3375 MG: 3; .375 INJECTION, POWDER, LYOPHILIZED, FOR SOLUTION INTRAVENOUS at 10:16

## 2021-02-15 RX ADMIN — NYSTATIN 500000 UNITS: 100000 SUSPENSION ORAL at 09:50

## 2021-02-15 RX ADMIN — MINERAL SUPPLEMENT IRON 300 MG / 5 ML STRENGTH LIQUID 100 PER BOX UNFLAVORED 300 MG: at 10:16

## 2021-02-15 RX ADMIN — PROPOFOL 30 MCG/KG/MIN: 10 INJECTION, EMULSION INTRAVENOUS at 12:30

## 2021-02-15 RX ADMIN — Medication 10 ML: at 10:19

## 2021-02-15 RX ADMIN — PROPOFOL 30 MCG/KG/MIN: 10 INJECTION, EMULSION INTRAVENOUS at 01:55

## 2021-02-15 RX ADMIN — MICONAZOLE NITRATE: 20 POWDER TOPICAL at 10:18

## 2021-02-15 RX ADMIN — LEVETIRACETAM 1000 MG: 100 INJECTION, SOLUTION INTRAVENOUS at 20:49

## 2021-02-15 RX ADMIN — MICONAZOLE NITRATE: 20 POWDER TOPICAL at 20:49

## 2021-02-15 RX ADMIN — PIPERACILLIN AND TAZOBACTAM 3375 MG: 3; .375 INJECTION, POWDER, LYOPHILIZED, FOR SOLUTION INTRAVENOUS at 03:01

## 2021-02-15 RX ADMIN — PROPOFOL 30 MCG/KG/MIN: 10 INJECTION, EMULSION INTRAVENOUS at 06:32

## 2021-02-15 RX ADMIN — Medication 10 ML: at 09:54

## 2021-02-15 ASSESSMENT — PAIN SCALES - GENERAL
PAINLEVEL_OUTOF10: 0
PAINLEVEL_OUTOF10: 0

## 2021-02-15 ASSESSMENT — PULMONARY FUNCTION TESTS
PIF_VALUE: 23
PIF_VALUE: 23
PIF_VALUE: 26
PIF_VALUE: 23
PIF_VALUE: 44
PIF_VALUE: 25
PIF_VALUE: 24
PIF_VALUE: 30
PIF_VALUE: 26
PIF_VALUE: 28
PIF_VALUE: 21
PIF_VALUE: 24
PIF_VALUE: 24

## 2021-02-15 NOTE — PROGRESS NOTES
ICU Progress Note    Admit Date: 2/2/2021  ICU Day:2  Vent Day: 2  IV Access:Peripheral  IV Fluids:None. FEN: Currently NPO. Restart TF goal rate 35 cc/hr, free water 75 cc q4h  Sedation: Propofol at rate of 35 mcg/kg/min  Vasopressors:None                Antibiotics: Vancomycin and Zosyn   Diet: Diet NPO, After Midnight    CC: R MCA stroke    Interval history:     No acute events overnight. Patient is sedated on propofol. Rashad Herman. Pt is NPO and TF's are being held in preparation for Tracheostomy today. HPI: Kaleb Sin a 79 y.o. female, with a history of afib (previously on Xarelto, d/c'd due to recent GI bleed 1/31), HF, DM, asthma, pulm HTN (not oxygen dependent), HTN, HLD, EDDY on cpap, recent GI bleed breast cancer s/p lumpectomy/radiation, colon polyps, obesity who presented with left-sided hemiplegia, severe expressive aphasia and dysarthria, right facial droop, and right-gaze deviation. She underwent M2 thrombectomy and thrombolysis with significant improvement of hemiplegia, but still with other aforementioned symptoms. Was admitted to ICU on 2/2. On 2/3, CT head done for neuro status change of pt not lifting or holding up left arm as previously, revealed acute hemorrhagic conversion of new large acute parenchymal hemorrhage in the right cerebral hemisphere. On 2/3, cardene drip started for BP control. On 2/4, IVC filter placed for acute LLE DVT, given contraindications for anticoagulation. PEG tube placed on 2/9 and was started on TF 2/11. She had a rapid response called on 2/12 on the floors for hypoxic respiratory failure. She was intubated and transferred back to the ICU. CXR identified worsening basilar infiltrates. It is suspected she had aspiration pneumonitis due to inability to protect airway. Gladis Neil was discussed with patient's daughter yesterday. Most recent ABG yesterday identified (7.431/[phone]/109).      Medications:     Scheduled Meds:  lidocaine 1 % injection  5 mL Intradermal Once    sodium chloride flush  10 mL Intravenous 2 times per day    piperacillin-tazobactam  3,375 mg Intravenous Q8H    insulin lispro  0-12 Units Subcutaneous Q4H    sodium chloride flush  10 mL Intravenous 2 times per day    nystatin  5 mL Oral 4x Daily    levetiracetam  1,000 mg Intravenous Q12H    pantoprazole  40 mg Intravenous Daily    ferrous sulfate  300 mg Per G Tube Every Other Day    miconazole   Topical BID    atorvastatin  80 mg Oral Nightly    carvedilol  6.25 mg Oral BID WC     Continuous Infusions:   propofol 30 mcg/kg/min (02/15/21 6387)    dextrose       PRN Meds:sodium chloride flush, sodium chloride flush, glucose, dextrose, glucagon (rDNA), dextrose, perflutren lipid microspheres, labetalol, albuterol sulfate HFA, hydrALAZINE, acetaminophen, promethazine **OR** ondansetron, polyethylene glycol    Objective:   Vitals:   T-max:  Patient Vitals for the past 8 hrs:   BP Temp Temp src Pulse Resp SpO2 Weight   02/15/21 0851    (!) 47 13 99 %    02/15/21 0600 (!) 126/92   64 15     02/15/21 0500 (!) 141/74   73 13     02/15/21 0401    78 14     02/15/21 0400 (!) 140/79 97.7 °F (36.5 °C) Axillary 69 12 100 % 249 lb 9 oz (113.2 kg)   02/15/21 0345    60 15     02/15/21 0300 127/72   67 15     02/15/21 0100 127/82   64 15         Intake/Output Summary (Last 24 hours) at 2/15/2021 0859  Last data filed at 2/14/2021 2258  Gross per 24 hour   Intake 3172 ml   Output 975 ml   Net 2197 ml       Access:   -Peripheral Access Day#:3                               Nagel Day#:9  NGT Day#: 6                                            ETT Day#:1  Vent Settings: Vent Mode: AC/PRVC Rate Set: 12 bmp/Vt Ordered: 500 mL/ /FiO2 : 30 %  -pt currently does not have nagel in place     Physical Exam  Constitutional:       Interventions: She is sedated and intubated. HENT:      Head: Normocephalic and atraumatic.       Mouth/Throat: Pharynx: Oropharyngeal exudate present. Comments: Presence of white exudate near ET   Eyes:      Conjunctiva/sclera: Conjunctivae normal.      Pupils: Pupils are equal, round, and reactive to light. Cardiovascular:      Rate and Rhythm: Normal rate. Rhythm irregular. Heart sounds: Normal heart sounds. No murmur. No friction rub. No gallop. Comments: No BLLE edema noted  Pulmonary:      Effort: She is intubated. Breath sounds: Normal breath sounds. No wheezing or rales. Abdominal:      General: There is no distension. Palpations: Abdomen is soft. Tenderness: There is no abdominal tenderness. Comments: PEG tube C/DI  Abdomen soft, NT, non-distended   Musculoskeletal:      Right lower leg: No edema. Left lower leg: No edema. Skin:     General: Skin is warm and dry. Neurological:      Mental Status: Mental status is at baseline. LABS:    CBC:   Recent Labs     02/13/21  0527 02/14/21  0417 02/15/21  0430   WBC 7.3 6.1 6.0   HGB 8.6* 8.5* 8.7*   HCT 28.8* 28.0* 28.6*   * 145 160   MCV 83.8 81.9 81.8     Renal:    Recent Labs     02/13/21  0443 02/14/21  0417 02/15/21  0430    141 138   K 4.2 3.5 4.0    101 99   CO2 32 34* 34*   BUN 15 15 12   CREATININE <0.5* <0.5* <0.5*   GLUCOSE 146* 172* 131*   CALCIUM 8.6 8.7 8.7   MG  --  1.60*  --    ANIONGAP 6 6 5     Hepatic: No results for input(s): AST, ALT, BILITOT, BILIDIR, PROT, LABALBU, ALKPHOS in the last 72 hours. Troponin: No results for input(s): TROPONINI in the last 72 hours. BNP: No results for input(s): BNP in the last 72 hours. Lipids: No results for input(s): CHOL, HDL in the last 72 hours.     Invalid input(s): LDLCALCU, TRIGLYCERIDE  ABGs:    Recent Labs     02/12/21  1147 02/12/21 2010 02/12/21  2300   PHART 7.461* 7.526* 7.431   EYW1PDD 49.0* 43.0 55.2*   PO2ART 399.5* 128.0* 109.5*   DLM0JCA 34.9* 35.7* 36.8*   BEART 11* 13* 13*   P1RXNSYY 100 99 98   JOA5PAP 37 37 39 INR: No results for input(s): INR in the last 72 hours. Lactate: No results for input(s): LACTATE in the last 72 hours. Cultures:  -----------------------------------------------------------------  RAD:   CT HEAD WO CONTRAST   Final Result      XR CHEST PORTABLE   Final Result      Persistent bilateral infiltrates         XR CHEST PORTABLE   Final Result   Impression:          1. Increased opacities in the left lung base since the prior study. There may be a small left pleural effusion. 2. Persistent patchy right lung opacities. CT HEAD WO CONTRAST   Final Result   Interval progression of vasogenic edema at the right middle cerebral artery hemorrhagic infarction and interval development of right to left midline shift of about 3 mm as described above. Apparent slight decrease in the size of hemorrhage in its AP dimension. XR CHEST 1 VIEW   Final Result   1. Interval extubation. 2. Progression of bilateral airspace disease. MRI BRAIN WO CONTRAST   Final Result      Region of right middle cerebral artery hemorrhagic infarction measures approximately 5.0 x 4.2 cm with surrounding mild vasogenic edema and diffusion restriction consistent with right middle cerebral artery distribution infarct with hemorrhagic    components      No additional areas of hemorrhage or infarction identified. Mild local mass effect is identified with very minimal midline shift of 3 to 4 mm      XR CHEST PORTABLE   Final Result      Persistent bilateral infiltrates and cardiomegaly. IR GUIDED IVC FILTER PLACEMENT   Final Result   Impression:      Successful placement of retrievable IVC filter. CT HEAD WO CONTRAST   Final Result      1. Hemorrhagic transformation of right MCA infarct, without significant change. No new hemorrhage.       CT head without contrast   Final Result 1. Stable large intraparenchymal hemorrhage located within the right posterior temporal parietal lobe with extension into the adjacent insula. There is also subarachnoid extension of hemorrhage into the overlying sulci as well as the right sylvian    fissure. Findings result in some mild right to left subfalcine herniation. 2.There is a 1.3 x 1.1 cm extra-axial mass identified within the left ordered aspect of the foramen magnum. This results in some mass effect on the adjacent medulla. This is without change from the prior study. Finding may represent a meningioma. This    could be further evaluated with a contrast-enhanced MRI of the head following resolution of the intracranial hemorrhage. CT HEAD WO CONTRAST   Final Result      1. Stable large intraparenchymal hemorrhage located within the right posterior temporal parietal lobe with extension into the adjacent insula. There is also subarachnoid extension of hemorrhage into the overlying sulci as well as the right sylvian    fissure. Findings result in some mild right to left subfalcine herniation. 2.There is a 1.3 x 1.1 cm extra-axial mass identified within the left ordered aspect of the foramen magnum. This results in some mass effect on the adjacent medulla. This is without change from the prior study. Finding may represent a meningioma. This    could be further evaluated with a contrast-enhanced MRI of the head following resolution of the intracranial hemorrhage. VL Extremity Venous Bilateral   Final Result      CT HEAD WO CONTRAST   Final Result      Post therapy large acute parenchymal hemorrhage in the right cerebral hemisphere with subarachnoid component. Extensive surrounding edema is associated with 4 cm leftward shift of midline structures. Critical finding of acute intracranial hemorrhage was called to Mariah Evans of the neurosurgery department at 12:25 PM on 2/3/2021, with instructions to contact patient's attending physician with these results. CT Brain Perfusion   Final Result      1. Hypoperfusion in the right MCA territory with a central ischemic core of 8 mL, total volume of hypoperfusion of 79 mL and a penumbra of 71 mL. XR CHEST PORTABLE   Final Result   1. Limited evaluation because of patient rotation and underpenetration of the film. 2. No dense airspace consolidation. 3. Probable mild cardiomegaly. CTA HEAD NECK W CONTRAST   Final Result   1. Carotid and vertebral arteries are patent and without stenosis or acute abnormality. 2. Incidental partial imaging of the a segmental pulmonary embolism in the right middle lobe. 3. Partial imaging of small right greater than left pleural effusions. 4. Previous left suboccipital craniotomy with a residual 1.6 cm left-sided extra-axial mass at the foramen magnum with mass effect on the cord at the cervical medullary junction favored to represent meningioma. Recommend correlation with clinical history    and previous imaging. 5. Incidental multinodular goiter. CTA HEAD:      FINDINGS: The internal carotid arteries are patent and normal in caliber through the skull base. The middle cerebral arteries are patent. 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 The A2 and A3 segments of the ACAs are patent bilaterally. Left MCA is patent. There is focal occlusion of an M2 branch of the right MCA (series 2 images 427 and 428). However, other M2 branches and M3 branches in the right sylvian fissure are patent. The M1 segment of the right MCA is patent. The posterior cerebral arteries are    patent bilaterally. The basilar artery is patent and normal in caliber. It is supplied by the left vertebral artery. The small nondominant right vertebral artery terminates in right sided PICA. IMPRESSION:   1. 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 2. 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 Results were discussed with Dr. Roselyn Campos at 8:00 PM on 2/2/2021. CT HEAD WO CONTRAST   Final Result   1. Previous left suboccipital craniotomy with a partially calcified extra-axial mass at the left foramen magnum measuring 1.8 cm contacting the cord at the cervicomedullary junction with some degree of mass effect on the cord at the foramen magnum. This    most likely represents residual/recurrent meningioma or other extra-axial mass. Correlation with patient history and previous imaging recommended. 2. Mild global volume loss and mild chronic small vessel ischemic disease in the white matter. 3. No acute intracranial hemorrhage. IR ANGIOGRAM CAROTID CEREBRAL RIGHT    (Results Pending)         Assessment/Plan:   80 yo F with PMHx afib, UGIB, recent MCA stroke with hemorrhagic conversion post-thrombectomy, now with respiratory failure. Acute hypoxic respiratory failure 2/2 suspect aspiration pneumonitis  Chest x-ray shows RLL opacities. Patient is s/p R MCA stroke, s/p thrombectomy with thrombolytics.       -Patient is currently intubated, sedated with propofol  -resp cultures positive for serratia  -Zosyn (first dose 2/12) appropriate for culture results, continue treatment  -daughters agreeable to tracheostomy, planned for 2/15   - on minimal vent settings: FiO2 30%/12/500/5 R MCA stroke, s/p thrombectomy and thrombolysis  - CT 2/4 showed hemorrhagic transformation of right MCA infarct, repeat stable  - MRI w/ findings as above  - S/p PEG tube placement 02/9/21  - keppra 1000 mg IV BID  - lipitor 80 mg PO qD  - holding antiplatelets due to hemorrhagic conversion  - will need neurosurgery clearance to resume a/c for afib  - Cont tube feeds-NPO 2/15 for trach placement   - palliative care following  - s/p PEG.  Plan for [facility] Blanks prior to discharge to [facility]  -resume tube feeds EN @25 ml/hr and increase by 10 ml Q4 hrs to goal rate of 35 ml/hr & 75 ml water bolus every 4 hour per dietary recs     Intraparenchymal hemorrhage   -CT 02/04   -stable  -keep SBP < 160.  Off nicardipine drip  -Holding anticoagulation and antiplatelets       Hypernatremia, resolved  -Likely secondary to decreased oral intake/dehydration  -Nephrology following  -Cont Water boluses with tube feeds   -Monitor sodium daily      Seizures  -Likely 2/2 above   -Routine EEG positive for epileptiform discharges  -Keppra 1000mg BID     Possible UTI, treated  -Urine smelled pungent  -UA and urine culture ordered, UA with 3+ wbc, 3+ bacteria and positive nitrate  -completed 3 days antibiotics, remains onZosyn for aspiration event as above     Metabolic encephalopathy:  -Likely multifactorial, secondary to above   -Management as above     Oral thrush:  -continued presence of oral film/exudate  -Nystatin first dose 2/11s   Acute blood loss anemia  -Protonix BID     Chronic systolic heart failure  -most recent echo 2/4/2020 with EF 55-60%, indeterminate diastolic function, PASP 53 mmHg, dilated IVC and collapses <50%  -spot doses of lasix as needed  -takes lasix 40 mg PO qD coreg 6.25 mg PO qD, diltiazem 180 mg PO qD and losartan 25 mg PO qD, will ask pharmacy to do med rec, as unsure if patient is taking these at home     Segmental PE  -incidental finding  -No AC due to above  -IVC filter placed 2/4     Atrial fibrillation -Holding AC, will need neurosurg clearance prior to resuming  -monitor HR  -carvedilol 6.25 mg BID      T2DM  -mealtime and correctional insulin     HTN  -as above    Asthma  -breathing treatments PRN        Code Status:Full Code  FEN: Diet NPO, After Midnight  PPX:  SCDs  DISPO: ICU    Josee Mccarthy, MS4   02/15/21  8:59 AM

## 2021-02-15 NOTE — CONSULTS
NUTRITION ASSESSMENT  Admission Date: 2/2/2021     Type and Reason for Visit: Reassess    NUTRITION RECOMMENDATIONS:  **Patients EN currently held for possible trach  1. Recommend EN formula Low Calorie High Protein  Vital High Protein  @ goal rate 35 ml/hr to provide 840 ml total volume, 1385 kcal (w/propofol), 73 g protein and 1065 ml free water. Initiate EN @25 ml/hr and increase by 10 ml Q4 hrs to goal rate of 35 ml/hr  2. Recommend 75 ml water bolus every 4 hour    NUTRITION ASSESSMENT:  Patient remains intubated, EN started however currently held for possible trach placement. Will monitor goals of care and nutritional status. MALNUTRITION ASSESSMENT  Context of Malnutrition: Acute Illness   Malnutrition Status: At risk for malnutrition (Comment)  Findings of the 6 clinical characteristics of malnutrition (Minimum of 2 out of 6 clinical characteristics is required to make the diagnosis of moderate or severe Protein Calorie Malnutrition based on AND/ASPEN Guidelines):  Energy Intake: Less than/equal to 50% of estimated energy requirements    Energy Intake Time: Greater than or equal to 7 days    Weight Loss %: Unable to assess    Weight loss Time: Unable to assess   Due to current CDC guidelines recommending 6-ft distancing for social isolation for COVID19 prevention, physical aspects of the malnutrition assessment were withheld at this time. NUTRITION DIAGNOSIS   Problem: Problem #1: Inadequate oral intake  Etiology: Cognitive or neurological impairment  Signs & Symptoms: NPO status due to medical condition and Nutrition Support-EN    NUTRITION INTERVENTION  Food and/or Nutrient Delivery:Continue NPO or Start Tube Feeding   Nutrition education/counseling/coordination of care: Continue Inpatient Monitoring     NUTRITION MONITORING & EVALUATION:  Evaluation:Progress towards goal declining   Goals:Goals: pt will tolerate EN at goal rate to meet 100% of nutrition needs. Monitoring: TF Intake  or TF Tolerance      OBJECTIVE DATA:  · Nutrition-Focused Physical Findings: +BM 2/11  · Wounds None      Past Medical History:   Diagnosis Date    Acute GI bleeding     recent admission 1/29/2021    Atrial fibrillation ([address]     Xarelto    Systolic CHF (HCC)     Type 2 diabetes mellitus (HCC)         ANTHROPOMETRICS  Current Height: 5' 7\" (170.2 cm)  Current Weight: 249 lb 9 oz (113.2 kg)    Admission weight: 225 lb (102.1 kg)  Ideal Bodyweight 135 lb    Usual Bodyweight  No wt hx; pt unable to provide   Weight Changes mirlande d/t limited wt hx       BMI BMI (Calculated): 39.2    Wt Readings from Last 50 Encounters:   02/15/21 249 lb 9 oz (113.2 kg)       COMPARATIVE STANDARDS  Estimated Total Kcals/Day : 11-14  Current Bodyweight (99.2 kg) ~0012-1719 kcal    Estimated Total Protein (g/day) : 1.3-1.5 Ideal Bodyweight  (61.4 kg) 79-91 g/day  Estimated Daily Total Fluid (ml/day): 1500 ml    Food / Nutrition-Related History  Pre-Admission / Home Diet:    none listed  Home Supplements / Herbals:    none noted  Food Restrictions / Cultural Requests:    none noted    Current Nutrition Therapies   Diet NPO, After Midnight     PO Intake: NPO  PO Supplement: NPO   PO Supplement Intake: NPO  IVF: N/A    NUTRITION RISK LEVEL: Risk Level: High     Colon Ami, NOEMI, SHEEBA  [place]:  [phone]  Office:  [phone]

## 2021-02-15 NOTE — FLOWSHEET NOTE
02/15/21 0950   Encounter Summary   Services provided to: Patient not available  (patient not communicative)   Continue Visiting   (es 2/15 pt not communicative/ on vent)   Complexity of Encounter Low   Length of Encounter 15 minutes   Routine   Intervention Prayer   as per ita in palliative care, there was a request for prayer from family.   I prayed at bedside

## 2021-02-15 NOTE — PROGRESS NOTES
Nephrology Note                                                                                                                                                                                                                                                                                                                                                               Office : [phone]     Fax :[phone]              Patient's Name: Nadia Ortiz    Reason for Consult:  Hypernatremia   Requesting Physician:  No primary care provider on file. Na stable   On TF with Free water     Past Medical History:   Diagnosis Date    Acute GI bleeding     recent admission 1/29/2021    Atrial fibrillation ([address]     Xarelto    [facility] [address]     Type 2 diabetes mellitus ([facility] [address]        Past Surgical History:   Procedure Laterality Date    GASTROSTOMY TUBE PLACEMENT N/A 2/9/2021    EGD PEG TUBE PLACEMENT performed by José Miguel Patel MD at [facility]  2/4/2021    IR IVC FILTER PLACEMENT W IMAGING 2/4/2021 [facility] SPECIAL PROCEDURES    UPPER GASTROINTESTINAL ENDOSCOPY  01/29/2021    Dr Anna Bonilla       No family history on file. reports that she has quit smoking. She does not have any smokeless tobacco history on file. Allergies:  Patient has no known allergies.     Current Medications:        lidocaine PF 1 % injection 5 mL, Once      sodium chloride flush 0.9 % injection 10 mL, 2 times per day      sodium chloride flush 0.9 % injection 10 mL, PRN      propofol injection, Titrated      piperacillin-tazobactam (ZOSYN) 3,375 mg in dextrose 5 % 100 mL IVPB extended infusion (mini-bag), Q8H      insulin lispro (1 Unit Dial) 0-12 Units, Q4H      sodium chloride flush 0.9 % injection 10 mL, 2 times per day      sodium chloride flush 0.9 % injection 10 mL, PRN      nystatin (MYCOSTATIN) 688234 UNIT/ML suspension 500,000 Units, 4x Daily   levETIRAcetam (KEPPRA) 1,000 mg in sodium chloride 0.9 % 100 mL IVPB, Q12H      pantoprazole (PROTONIX) injection 40 mg, Daily      ferrous sulfate 300 (60 Fe) MG/5ML syrup 300 mg, Every Other Day      glucose (GLUTOSE) 40 % oral gel 15 g, PRN      dextrose 50 % IV solution, PRN      glucagon (rDNA) injection 1 mg, PRN      dextrose 5 % solution, PRN      miconazole (MICOTIN) 2 % powder, BID      perflutren lipid microspheres (DEFINITY) injection 1.65 mg, ONCE PRN      labetalol (NORMODYNE;TRANDATE) injection 10 mg, Q4H PRN      albuterol sulfate  (90 Base) MCG/ACT inhaler 2 puff, PRN      hydrALAZINE (APRESOLINE) injection 5 mg, Q6H PRN      acetaminophen (TYLENOL) tablet 650 mg, Q4H PRN      promethazine (PHENERGAN) tablet 12.5 mg, Q6H PRN    Or      ondansetron (ZOFRAN) injection 4 mg, Q6H PRN      polyethylene glycol (GLYCOLAX) packet 17 g, Daily PRN      atorvastatin (LIPITOR) tablet 80 mg, Nightly      carvedilol (COREG) tablet 6.25 mg, BID WC        Review of Systems:   Lethargic       Physical exam:     Vitals:  /75   Pulse 64   Temp 98.4 °F (36.9 °C) (Axillary)   Resp 14   Ht 5' 7\" (1.702 m)   Wt 218 lb 11.1 oz (99.2 kg)   SpO2 100%   BMI 34.25 kg/m²   Constitutional:  Lethargic   Skin: no rash, turgor wnl  Heent:  eomi, mmm  Neck: no bruits or jvd noted  Cardiovascular:  S1, S2 without m/r/g  Respiratory: CTA B without w/r/r  Abdomen:  +bs, soft, nt, nd  Ext: no lower extremity edema  Psychiatric: mood and affect appropriate  Musculoskeletal:  Rom, muscular strength dec     Data:   Labs:  CBC:   Recent Labs     02/12/21  0603 02/13/21  0527 02/14/21  0417   WBC 10.0 7.3 6.1   HGB 9.5* 8.6* 8.5*    129* 145     BMP:    Recent Labs     02/12/21  0603 02/13/21  0443 02/14/21  0417    140 141   K 4.1 4.2 3.5    102 101   CO2 37* 32 34*   BUN 15 15 15   CREATININE <0.5* <0.5* <0.5*   GLUCOSE 235* 146* 172*     Ca/Mg/Phos:   Recent Labs     02/12/21 2570 02/13/21  0443 02/14/21  0417   CALCIUM 9.2 8.6 8.7   MG  --   --  1.60*     Hepatic: No results for input(s): AST, ALT, ALB, BILITOT, ALKPHOS in the last 72 hours. Troponin: No results for input(s): TROPONINI in the last 72 hours. BNP: No results for input(s): BNP in the last 72 hours. Lipids: No results for input(s): CHOL, TRIG, HDL, LDLCALC, LABVLDL in the last 72 hours. ABGs:   Recent Labs     02/12/21  2300   PHART 7.431   PO2ART 109.5*   BMV4ZOV 55.2*     INR:   No results for input(s): INR in the last 72 hours. UA:  No results for input(s): Jay Jay Starcher, GLUCOSEU, BILIRUBINUR, KETUA, SPECGRAV, BLOODU, PHUR, PROTEINU, UROBILINOGEN, NITRU, LEUKOCYTESUR, Vernette Siva in the last 72 hours. Urine Microscopic:   No results for input(s): LABCAST, BACTERIA, COMU, HYALCAST, WBCUA, RBCUA, EPIU in the last 72 hours. Urine Culture: No results for input(s): LABURIN in the last 72 hours. Urine Chemistry:   No results for input(s): Arturo Screws, PROTEINUR, NAUR in the last 72 hours. IMAGING:  CT HEAD WO CONTRAST   Final Result      XR CHEST PORTABLE   Final Result      Persistent bilateral infiltrates         XR CHEST PORTABLE   Final Result   Impression:          1. Increased opacities in the left lung base since the prior study. There may be a small left pleural effusion. 2. Persistent patchy right lung opacities. CT HEAD WO CONTRAST   Final Result   Interval progression of vasogenic edema at the right middle cerebral artery hemorrhagic infarction and interval development of right to left midline shift of about 3 mm as described above. Apparent slight decrease in the size of hemorrhage in its AP dimension. XR CHEST 1 VIEW   Final Result   1. Interval extubation. 2. Progression of bilateral airspace disease.       MRI BRAIN WO CONTRAST   Final Result Region of right middle cerebral artery hemorrhagic infarction measures approximately 5.0 x 4.2 cm with surrounding mild vasogenic edema and diffusion restriction consistent with right middle cerebral artery distribution infarct with hemorrhagic    components      No additional areas of hemorrhage or infarction identified. Mild local mass effect is identified with very minimal midline shift of 3 to 4 mm      XR CHEST PORTABLE   Final Result      Persistent bilateral infiltrates and cardiomegaly. IR GUIDED IVC FILTER PLACEMENT   Final Result   Impression:      Successful placement of retrievable IVC filter. CT HEAD WO CONTRAST   Final Result      1. Hemorrhagic transformation of right MCA infarct, without significant change. No new hemorrhage. CT head without contrast   Final Result         1. Stable large intraparenchymal hemorrhage located within the right posterior temporal parietal lobe with extension into the adjacent insula. There is also subarachnoid extension of hemorrhage into the overlying sulci as well as the right sylvian    fissure. Findings result in some mild right to left subfalcine herniation. 2.There is a 1.3 x 1.1 cm extra-axial mass identified within the left ordered aspect of the foramen magnum. This results in some mass effect on the adjacent medulla. This is without change from the prior study. Finding may represent a meningioma. This    could be further evaluated with a contrast-enhanced MRI of the head following resolution of the intracranial hemorrhage. CT HEAD WO CONTRAST   Final Result      1. Stable large intraparenchymal hemorrhage located within the right posterior temporal parietal lobe with extension into the adjacent insula. There is also subarachnoid extension of hemorrhage into the overlying sulci as well as the right sylvian    fissure. Findings result in some mild right to left subfalcine herniation. 2.There is a 1.3 x 1.1 cm extra-axial mass identified within the left ordered aspect of the foramen magnum. This results in some mass effect on the adjacent medulla. This is without change from the prior study. Finding may represent a meningioma. This    could be further evaluated with a contrast-enhanced MRI of the head following resolution of the intracranial hemorrhage. VL Extremity Venous Bilateral   Final Result      CT HEAD WO CONTRAST   Final Result      Post therapy large acute parenchymal hemorrhage in the right cerebral hemisphere with subarachnoid component. Extensive surrounding edema is associated with 4 cm leftward shift of midline structures. Critical finding of acute intracranial hemorrhage was called to Evelyn Sicard of the neurosurgery department at 12:25 PM on 2/3/2021, with instructions to contact patient's attending physician with these results. CT Brain Perfusion   Final Result      1. Hypoperfusion in the right MCA territory with a central ischemic core of 8 mL, total volume of hypoperfusion of 79 mL and a penumbra of 71 mL. XR CHEST PORTABLE   Final Result   1. Limited evaluation because of patient rotation and underpenetration of the film. 2. No dense airspace consolidation. 3. Probable mild cardiomegaly. CTA HEAD NECK W CONTRAST   Final Result   1. Carotid and vertebral arteries are patent and without stenosis or acute abnormality. 2. Incidental partial imaging of the a segmental pulmonary embolism in the right middle lobe. 3. Partial imaging of small right greater than left pleural effusions. 4. Previous left suboccipital craniotomy with a residual 1.6 cm left-sided extra-axial mass at the foramen magnum with mass effect on the cord at the cervical medullary junction favored to represent meningioma. Recommend correlation with clinical history    and previous imaging. 5. Incidental multinodular goiter.       CTA HEAD: FINDINGS: The internal carotid arteries are patent and normal in caliber through the skull base. The middle cerebral arteries are patent. 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 The A2 and A3 segments of the ACAs are patent bilaterally. Left MCA is patent. There is focal occlusion of an M2 branch of the right MCA (series 2 images 427 and 428). However, other M2 branches and M3 branches in the right sylvian fissure are patent. The M1 segment of the right MCA is patent. The posterior cerebral arteries are    patent bilaterally. The basilar artery is patent and normal in caliber. It is supplied by the left vertebral artery. The small nondominant right vertebral artery terminates in right sided PICA. IMPRESSION:   1. 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 2. 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 Results were discussed with Dr. Nabeel Valdes at 8:00 PM on 2/2/2021. CT HEAD WO CONTRAST   Final Result   1. Previous left suboccipital craniotomy with a partially calcified extra-axial mass at the left foramen magnum measuring 1.8 cm contacting the cord at the cervicomedullary junction with some degree of mass effect on the cord at the foramen magnum. This    most likely represents residual/recurrent meningioma or other extra-axial mass. Correlation with patient history and previous imaging recommended. 2. Mild global volume loss and mild chronic small vessel ischemic disease in the white matter. 3. No acute intracranial hemorrhage. IR ANGIOGRAM CAROTID CEREBRAL RIGHT    (Results Pending)       Assessment/Plan   1. Hypernatremia     2. CVA     3. Anemia    4. Acid- base/ Electrolyte imbalance     5.  Malnutrition     Plan

## 2021-02-15 NOTE — PROGRESS NOTES
Palliative Care Chart Review  and Check in Note:     NAME:  Ronn Saeed  Admit Date: 2/2/2021  Hospital Day:  Hospital Day: 14   Current Code status: Full Code    Palliative care is continuing to following Ms. Canales for symptom management,  and goals of care discussion as needed. Patient's chart reviewed today 2/15/21. Noted that family was able to visit over the weekend and requested to move forward with aggressive measures including tracheostomy, per progress notes. The following are the currently established goals/code status, and Symptom management. Goals of care: Continue with aggressive management    Code status:  Full Code    Discharge plan:  Will need placement when medically ready for discharge      [address] [place] [address]  [phone]

## 2021-02-15 NOTE — PROGRESS NOTES
Neurology Progress Note  Seen for acute ischemic stroke with hemorrhagic conversion and now possible seizures     Updates:  - No major changes      ROS:    Unable to assess given aphasia and encephalopathy      Exam: On 30 mcgs Propofol   Blood pressure (!) 126/92, pulse (!) 47, temperature 97.7 °F (36.5 °C), temperature source Axillary, resp. rate 13, height 5' 7\" (1.702 m), weight 249 lb 9 oz (113.2 kg), SpO2 99 %. Constitutional                          Vital signs: BP, HR, and RR reviewed            General Depressed mental status/sedated, no distress, well-nourished  Eyes: fundoscopic exam not attempted  Cardiovascular: pulses symmetric in all 4 extremities. No peripheral edema.   Psychiatric: Limited exam given encephalopathy   Neurologic  Mental status: Eyes do not open to central noxious stimuli  orientation Limited exam given encephalopathy              Attention Poor              Language Intubated, does not attempt to speak around ETT              Comprehension Does not follow commands  Cranial nerves:   CN2: No clear blink to threat right or left  CN 3,4,6: OCR present   CN5: Limited exam given encephalopathy   CN7: Mild left facial weakness, limited exam given ETT/josue  CN8: Limited exam given encephalopathy   CN9: Limited exam given encephalopathy   CN11: Limited exam given encephalopathy   CN12: Limited exam given encephalopathy   Strength: RUE withdraws to peripheral noxious stimuli, LUE with no movement to peripheral noxious stimuli, RLE withdraws to peripheral noxious stimuli, LLE with slight withdraw to peripheral noxious stimuli   Sensory: Reacts to peripheral noxious stimuli throughout    Tone: normal in all 4 extremities     Labs:  WBC: 6.0  LDL: 28  A1C: 5.2       Studies:  cvEEG  14/2021 23:00pm to 09:00am on 02/15/2021  SEIZURES:  None  INTERICTAL:  None  PUSHBUTTONS:  None  BACKGROUND: Abundant polymorphic medium amplitude symmetric 6-7 Hz generalized theta frequencies with frequent superimposed 2-3 Hz delta and other faster  frequencies that is reactive. EKG:  regular    02/14/2021 09:00am to 23:00pm  SEIZURES:  None  INTERICTAL:  None  PUSHBUTTONS:  None  BACKGROUND:  Abundant polymorphic medium amplitude symmetric 6-7 Hz generalized theta frequencies with frequent superimposed 2-3 Hz delta and other faster  frequencies that is reactive. EKG:  regular  CLINICAL INTERPRETATION:  This was an abnormal tracing for age and state due to a moderate generalized slow wave abnormality indicating diffuse cerebral dysfunction which can be of multiple causes, including structural, or vascular abnormalities, toxic/metabolic conditions, hydrocephalus, or postictal conditions. No epileptiform discharge, focal slowing, or seizures were seen during this recording. Clinical correlation is advised.         Routine EEG, 2/11/21  This is an abnormal awake and drowsy EEG due to the presence of epileptiform spikes in the right hemispheric leads. No generalized electrographic seizures during this recording.     CT head, 2/10/21  Interval progression of vasogenic edema at the right middle cerebral artery hemorrhagic infarction and interval development of right to left midline shift of about 3 mm as described above.       Apparent slight decrease in the size of hemorrhage in its AP dimension.         ECHO 2/4/2021 - LV size normal, EF 55-60%, elevated PA pressures, No evidence of shunting      Studies:  MRI of brain w/o - 2/7/2021       Impression:  1. Acute ischemic stroke with hemorrhagic conversion  2. Encephalopathy  3. UTI, possible  4.  Atrial fibrillation       Ely Canales is a 79 y.o. female with a history of afib, who presented with L-hemiparesis, aphasia, dysarthria & R gaze deviation, found to have R M2 segment occlusion, initial NIHSS of 14, not tpa candidate s/p thrombectomy w/ TICI 2b reperfusion. Initially improved NIHSS s/p thrombectomy w/ subsequent decline, repeat head CT showed hemorrhagic transformation, stable on repeat imaging. On empiric Keppra given hemorraghic transformation.      Had some clinical improvement initially, however, has been less responsive the past few days. CT head was obtained on 2/10/21 and revealed mild worsening cerebral edema. Routine EEG completed today revealed the presence of epileptiform spikes in the right hemispheric leads. No generalized electrographic seizures . It is possible that seizures are contributing to her encephalopathy. Aspiration pneumonia likely contributing as well. cvEEG unrevealing for electrographic seizures. Rapid response called for acute drop in O2 sats last week. She was intubated and transferred to ICU.     Recommendations:  - Discontinue cvEEG  - Continue Keppra 1000mg IV BID  - CXR looks like aspiration pneumonitis, likely cannot protect airway and will need trach.  Family wants to be aggressive with care so plan on trach and placement  - Continue high intensity statin  - Continue to hold antiplatelets at this time d/t hemorrhagic transformation  - Patient w/ hx of Afib - will need anticoagulation resumed, likely in 2-3 weeks from time of stroke (after clearance from neurosurgery)   - SCDs for DVT prophylaxis  - PT/OT/ST, rehab as able  - Follow up with Neurology shortly after discharge         A copy of this note was provided for MD Mariela Sow [address] Neurology  [phone]

## 2021-02-15 NOTE — PROGRESS NOTES
ICU Progress Note    Admit Date: 2/2/2021  ICU Day:2  Vent Day: 2  IV Access:Peripheral  IV Fluids:None. FEN: Currently NPO. Restart TF goal rate 35 cc/hr, free water 75 cc q4h  Sedation: Propofol at rate of 35 mcg/kg/min  Vasopressors:None                Antibiotics: Vancomycin and Zosyn   Diet: Diet NPO, After Midnight    CC: R MCA stroke    Interval history:     No acute events overnight. Patient is sedated on propofol. Caprice Chris. Respiratory cultures grew Serratia currently on Zosyn. Discussed with family about tracheostomy and long-term goals of care of patient, patient family will discuss amongst themselves if they want to go forward with tracheostomy and will follow up tomorrow. Midline in place. HPI: Candida Portillo a 79 y.o. female, with a history of afib (previously on Xarelto, d/c'd due to recent GI bleed 1/31), HF, DM, asthma, pulm HTN (not oxygen dependent), HTN, HLD, EDDY on cpap, recent GI bleed breast cancer s/p lumpectomy/radiation, colon polyps, obesity who presented with left-sided hemiplegia, severe expressive aphasia and dysarthria, right facial droop, and right-gaze deviation. She underwent M2 thrombectomy and thrombolysis with significant improvement of hemiplegia, but still with other aforementioned symptoms. Was admitted to ICU on 2/2. On 2/3, CT head done for neuro status change of pt not lifting or holding up left arm as previously, revealed acute hemorrhagic conversion of new large acute parenchymal hemorrhage in the right cerebral hemisphere. On 2/3, cardene drip started for BP control. On 2/4, IVC filter placed for acute LLE DVT, given contraindications for anticoagulation. PEG tube placed on 2/9 and was started on TF 2/11. She had a rapid response called on 2/12 on the floors for hypoxic respiratory failure. She was intubated and transferred back to the ICU. CXR identified worsening basilar infiltrates. It is suspected she had aspiration pneumonitis due to inability to protect airway. Marcie Mcgee was discussed with patient's daughter yesterday. Most recent ABG yesterday identified ([phone]).      Medications:     Scheduled Meds:   lidocaine 1 % injection  5 mL Intradermal Once    sodium chloride flush  10 mL Intravenous 2 times per day    piperacillin-tazobactam  3,375 mg Intravenous Q8H    insulin lispro  0-12 Units Subcutaneous Q4H    sodium chloride flush  10 mL Intravenous 2 times per day    nystatin  5 mL Oral 4x Daily    levetiracetam  1,000 mg Intravenous Q12H    pantoprazole  40 mg Intravenous Daily    ferrous sulfate  300 mg Per G Tube Every Other Day    miconazole   Topical BID    atorvastatin  80 mg Oral Nightly    carvedilol  6.25 mg Oral BID WC     Continuous Infusions:   propofol 5 mcg/kg/min (02/15/21 1313)    dextrose       PRN Meds:sodium chloride flush, sodium chloride flush, glucose, dextrose, glucagon (rDNA), dextrose, perflutren lipid microspheres, labetalol, albuterol sulfate HFA, hydrALAZINE, acetaminophen, promethazine **OR** ondansetron, polyethylene glycol    Objective:   Vitals:   T-max:  Patient Vitals for the past 8 hrs:   BP Temp Temp src Pulse Resp SpO2   02/15/21 1535     17 98 %   02/15/21 1500 115/85   64 15 100 %   02/15/21 1400 119/79   66 12 98 %   02/15/21 1300 (!) 142/83   51 13 100 %   02/15/21 1226    57 16 94 %   02/15/21 1200 117/71   69 13 99 %   02/15/21 1100    68 13    02/15/21 1000 129/79   64 9 100 %   02/15/21 0900 125/74   63 12 100 %   02/15/21 0851    (!) 47 13 99 %   02/15/21 0800 119/70 97.7 °F (36.5 °C) Axillary 73 15 99 %       Intake/Output Summary (Last 24 hours) at 2/15/2021 1551  Last data filed at 2/15/2021 1545  Gross per 24 hour Intake 1408 ml   Output 1125 ml   Net 283 ml       Access:   -Peripheral Access Day#:3                               Nagel Day#:9  NGT Day#: 6                                            ETT Day#:1  Vent Settings: Vent Mode: AC/PRVC Rate Set: 12 bmp/Vt Ordered: 500 mL/ /FiO2 : 30 %  -pt currently does not have nagel in place     Physical Exam  Constitutional:       Interventions: She is sedated and intubated. HENT:      Head: Normocephalic and atraumatic. Mouth/Throat:      Pharynx: Oropharyngeal exudate present. Comments: Presence of white exudate near ET   Eyes:      Conjunctiva/sclera: Conjunctivae normal.      Pupils: Pupils are equal, round, and reactive to light. Cardiovascular:      Rate and Rhythm: Normal rate. Rhythm irregular. Heart sounds: Normal heart sounds. No murmur. No friction rub. No gallop. Comments: No BLLE edema noted  Pulmonary:      Effort: She is intubated. Breath sounds: Normal breath sounds. No wheezing or rales. Abdominal:      General: There is no distension. Palpations: Abdomen is soft. Tenderness: There is no abdominal tenderness. Comments: PEG tube C/DI  Abdomen soft, NT, non-distended   Musculoskeletal:      Right lower leg: No edema. Left lower leg: No edema. Skin:     General: Skin is warm and dry. Neurological:      Mental Status: Mental status is at baseline. LABS:    CBC:   Recent Labs     02/13/21  0527 02/14/21  0417 02/15/21  0430   WBC 7.3 6.1 6.0   HGB 8.6* 8.5* 8.7*   HCT 28.8* 28.0* 28.6*   * 145 160   MCV 83.8 81.9 81.8     Renal:    Recent Labs     02/13/21  0443 02/14/21  0417 02/15/21  0430    141 138   K 4.2 3.5 4.0    101 99   CO2 32 34* 34*   BUN 15 15 12   CREATININE <0.5* <0.5* <0.5*   GLUCOSE 146* 172* 131*   CALCIUM 8.6 8.7 8.7   MG  --  1.60*  --    ANIONGAP 6 6 5     Hepatic: No results for input(s): AST, ALT, BILITOT, BILIDIR, PROT, LABALBU, ALKPHOS in the last 72 hours. IR GUIDED IVC FILTER PLACEMENT   Final Result   Impression:      Successful placement of retrievable IVC filter. CT HEAD WO CONTRAST   Final Result      1. Hemorrhagic transformation of right MCA infarct, without significant change. No new hemorrhage. CT head without contrast   Final Result         1. Stable large intraparenchymal hemorrhage located within the right posterior temporal parietal lobe with extension into the adjacent insula. There is also subarachnoid extension of hemorrhage into the overlying sulci as well as the right sylvian    fissure. Findings result in some mild right to left subfalcine herniation. 2.There is a 1.3 x 1.1 cm extra-axial mass identified within the left ordered aspect of the foramen magnum. This results in some mass effect on the adjacent medulla. This is without change from the prior study. Finding may represent a meningioma. This    could be further evaluated with a contrast-enhanced MRI of the head following resolution of the intracranial hemorrhage. CT HEAD WO CONTRAST   Final Result      1. Stable large intraparenchymal hemorrhage located within the right posterior temporal parietal lobe with extension into the adjacent insula. There is also subarachnoid extension of hemorrhage into the overlying sulci as well as the right sylvian    fissure. Findings result in some mild right to left subfalcine herniation. 2.There is a 1.3 x 1.1 cm extra-axial mass identified within the left ordered aspect of the foramen magnum. This results in some mass effect on the adjacent medulla. This is without change from the prior study. Finding may represent a meningioma. This    could be further evaluated with a contrast-enhanced MRI of the head following resolution of the intracranial hemorrhage.       VL Extremity Venous Bilateral   Final Result      CT HEAD WO CONTRAST   Final Result Post therapy large acute parenchymal hemorrhage in the right cerebral hemisphere with subarachnoid component. Extensive surrounding edema is associated with 4 cm leftward shift of midline structures. Critical finding of acute intracranial hemorrhage was called to Quirino Meek of the neurosurgery department at 12:25 PM on 2/3/2021, with instructions to contact patient's attending physician with these results. CT Brain Perfusion   Final Result      1. Hypoperfusion in the right MCA territory with a central ischemic core of 8 mL, total volume of hypoperfusion of 79 mL and a penumbra of 71 mL. XR CHEST PORTABLE   Final Result   1. Limited evaluation because of patient rotation and underpenetration of the film. 2. No dense airspace consolidation. 3. Probable mild cardiomegaly. CTA HEAD NECK W CONTRAST   Final Result   1. Carotid and vertebral arteries are patent and without stenosis or acute abnormality. 2. Incidental partial imaging of the a segmental pulmonary embolism in the right middle lobe. 3. Partial imaging of small right greater than left pleural effusions. 4. Previous left suboccipital craniotomy with a residual 1.6 cm left-sided extra-axial mass at the foramen magnum with mass effect on the cord at the cervical medullary junction favored to represent meningioma. Recommend correlation with clinical history    and previous imaging. 5. Incidental multinodular goiter. CTA HEAD:      FINDINGS: The internal carotid arteries are patent and normal in caliber through the skull base. The middle cerebral arteries are patent. 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 The A2 and A3 segments of the ACAs are patent bilaterally. Left MCA is patent. There is focal occlusion of an M2 branch of the right MCA (series 2 images 427 and 428). However, other M2 branches and M3 branches in the right sylvian fissure are patent. The M1 segment of the right MCA is patent. The posterior cerebral arteries are    patent bilaterally. The basilar artery is patent and normal in caliber. It is supplied by the left vertebral artery. The small nondominant right vertebral artery terminates in right sided PICA. IMPRESSION:   1. 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 2. 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 Results were discussed with Dr. Alexa Gomez at 8:00 PM on 2/2/2021. CT HEAD WO CONTRAST   Final Result   1. Previous left suboccipital craniotomy with a partially calcified extra-axial mass at the left foramen magnum measuring 1.8 cm contacting the cord at the cervicomedullary junction with some degree of mass effect on the cord at the foramen magnum. This    most likely represents residual/recurrent meningioma or other extra-axial mass. Correlation with patient history and previous imaging recommended. 2. Mild global volume loss and mild chronic small vessel ischemic disease in the white matter. 3. No acute intracranial hemorrhage. IR ANGIOGRAM CAROTID CEREBRAL RIGHT    (Results Pending)         Assessment/Plan:   78 yo F with PMHx afib, UGIB, recent MCA stroke with hemorrhagic conversion post-thrombectomy, now with respiratory failure. Acute hypoxic respiratory failure 2/2 suspect aspiration pneumonitis  Chest x-ray shows RLL opacities. Patient is s/p R MCA stroke, s/p thrombectomy with thrombolytics.       -Patient is currently intubated, sedated with propofol  -resp cultures positive for serratia  -Zosyn (first dose 2/12) appropriate for culture results, continue treatment  -daughters agreeable to tracheostomy, planned for 2/15   - on minimal vent settings: FiO2 30%/RR 12//PEEP 5 R MCA stroke, s/p thrombectomy and thrombolysis  - CT 2/4 showed hemorrhagic transformation of right MCA infarct, repeat stable  - MRI w/ findings as above  - S/p PEG tube placement 02/9/21  - keppra 1000 mg IV BID  - lipitor 80 mg PO qD  - holding antiplatelets due to hemorrhagic conversion  - will need neurosurgery clearance to resume a/c for afib  - Cont tube feeds-NPO 2/15 for trach placement   - palliative care following  - s/p PEG.  Plan for [facility] Valdes prior to discharge to [facility]  -resume tube feeds EN @25 ml/hr and increase by 10 ml Q4 hrs to goal rate of 35 ml/hr & 75 ml water bolus every 4 hour per dietary recs     Intraparenchymal hemorrhage   -CT 02/04   -stable  -keep SBP < 160.  Off nicardipine drip  -Holding anticoagulation and antiplatelets       Hypernatremia, resolved  -Likely secondary to decreased oral intake/dehydration  -Nephrology following  -Cont Water boluses with tube feeds   -Monitor sodium daily      Seizures  -Likely 2/2 above   -Routine EEG positive for epileptiform discharges  -Keppra 1000mg BID     Possible UTI, treated  -Urine smelled pungent  -UA and urine culture ordered, UA with 3+ wbc, 3+ bacteria and positive nitrate  -completed 3 days antibiotics, remains onZosyn for aspiration event as above     Metabolic encephalopathy:  -Likely multifactorial, secondary to above   -Management as above     Oral thrush:  -continued presence of oral film/exudate  -Nystatin first dose 2/11s     Acute blood loss anemia  -Protonix BID     Chronic systolic heart failure  -most recent echo 2/4/2020 with EF 55-60%, indeterminate diastolic function, PASP 53 mmHg, dilated IVC and collapses <50%  -spot doses of lasix as needed  -takes lasix 40 mg PO qD coreg 6.25 mg PO qD, diltiazem 180 mg PO qD and losartan 25 mg PO qD, will ask pharmacy to do med rec, as unsure if patient is taking these at home     Segmental PE  -incidental finding  -No AC due to above  -IVC filter placed 2/4     Atrial fibrillation -Holding AC, will need neurosurg clearance prior to resuming  -monitor HR  -carvedilol 6.25 mg BID      T2DM  -mealtime and correctional insulin     HTN  -as above    Asthma  -breathing treatments PRN        Code Status:Full Code  FEN: Diet NPO, After Midnight  PPX:  SCDs  DISPO: ICU    Richard Grewal, DO  02/15/21  3:51 PM    Patient was seen, examined and discussed with Dr. Doretha Mcintosh. I agree with the interval history. My physical exam confirms the findings listed below  Chart was reviewed including labs, CXR and medical records confirm the findings noted     Acute respiratory failure on mechanical vent support. , RR 12, FiO2 30, PEEP 5  Rt MCA stroke s/p thrombectomy on 2/2, complicated by ICH on 2/3  Aspiration pneumonia   She remains obtunded. However she remains on propofol at 30    Switch propofol to precedex   Discuss with family option of  Trach vs taking more palliative approach      Pt has a high probability of imminent or life-threatening deterioration requiring close monitoring, and highly complex decision-making and/or interventions of high intensity to assess, manipulate, and support his critical organ systems to prevent a likely inevitable decline which could occur if left untreated.      A total critical care time 35 minutes was used. This includes but not limited to examining patient, collaborating with other physicians, monitoring vital signs, telemetry, continuous pulse oximetry, and clinical response to IV medications, documentation time, review and interpretation of laboratory and radiological data, review of nursing notes and old record review.  This time excludes any time that may have been spent performing procedures for life threatening organ failure.

## 2021-02-15 NOTE — FLOWSHEET NOTE
02/15/21 0950   Encounter Summary   Services provided to: Patient not available  (patient not communicative)   Continue Visiting   (es 2/15 pt not communicative/ on vent)   Complexity of Encounter Low   Length of Encounter 15 minutes

## 2021-02-15 NOTE — PROCEDURES
Continuous EEG monitoring record    Patient name: Meagan Frias    START: 02/14/2021 @ 09:00am  END: 02/15/2021 @ 10:32am      Electroencephalographer: Alex Shaw MD PhD      CLINICAL DETAILS:  This EEG was performed on this 79 y. o.yo female admitted for Altered mental Status and concer for subclinical seizures      TECHNICAL DETAILS:  Continuous video-EEG monitoring was performed with 27 surface scalp electrodes placed according to the International 10-20 electrode placement system, using a 32-channel [employer] headbox. All EEG and video information was acquired digitally, including the use of automated spike and seizure detection software to detect epileptiform activity. An event button was also available to be depressed during clinical events. 02/14/2021 23:00pm to 10:32am on 02/15/2021  SEIZURES:  None  INTERICTAL:  None  PUSHBUTTONS:  None  BACKGROUND:  Abundant polymorphic medium amplitude symmetric 6-7 Hz generalized theta frequencies with frequent superimposed 2-3 Hz delta and other faster  frequencies that is reactive. EKG:  regular      02/14/2021 09:00am to 23:00pm  SEIZURES:  None  INTERICTAL:  None  PUSHBUTTONS:  None  BACKGROUND:  Abundant polymorphic medium amplitude symmetric 6-7 Hz generalized theta frequencies with frequent superimposed 2-3 Hz delta and other faster  frequencies that is reactive. EKG:  regular    CLINICAL INTERPRETATION:  This was an abnormal tracing for age and state due to a moderate generalized slow wave abnormality indicating diffuse cerebral dysfunction which can be of multiple causes, including structural, or vascular abnormalities, toxic/metabolic conditions, hydrocephalus, or postictal conditions. No epileptiform discharge, focal slowing, or seizures were seen during this recording. Clinical correlation is advised.         Alex Shaw MD PhD

## 2021-02-15 NOTE — ADT AUTH CERT
Utilization Reviews       Stroke: Ischemic - Care Day 13 (2/14/2021) by Mercedes Adler RN       Review Status Review Entered   Completed 2/15/2021 14:10      Criteria Review      Care Day: 13 Care Date: 2/14/2021 Level of Care: ICU    Guideline Day 2    Level Of Care    (X) Stroke unit, ICU, telemetry, or floor    2/15/2021 2:10 PM EST by [employer]      ICU    Clinical Status    (X) * Hemodynamic stability    2/15/2021 2:10 PM EST by [employer]      72  112/77    ( ) * Mental status at baseline or stable    (X) * Neurologic deficits absent or stable    2/15/2021 2:10 PM EST by [employer]      stable    ( ) * Unimpaired swallowing    ( ) * Up to chair    ( ) * Feeding with or without assistance    Activity    ( ) * Up to chair    Routes    ( ) * Oral hydration, medications    ( ) * Advance diet as tolerated    Interventions    (X) Neurologic checks    2/15/2021 2:10 PM EST by [employer]      yes    (X) Possible oxygen    2/15/2021 2:10 PM EST by [employer]      vent    Medications    (X) Blood pressure control    2/15/2021 2:10 PM EST by [employer]      yes    * Milestone   Additional Notes   2-14-21      ICU       DIET TUBE FEED CONTINUOUS/CYCLIC NPO      58.4  10  72  112/77  96 %  vent a fio2 30      Co2- 34 , h/hp-8.5/28.0, mg- 1.60      , iv keppra 1g q 12 , iv protonix 40 mg qd, iv zosyn 3.375 mg q 8 , iv vanc 1250 mg q 12 , propofol gtt, iv magnesium sulfate 2 g x 1 , per NG effer- k   20 meq x 3      Per ICU phy- Physical Exam   Constitutional:        Interventions: She is sedated and intubated. HENT:       Head: Normocephalic and atraumatic.       Mouth/Throat:       Mouth: Mucous membranes are moist.       Pharynx: Oropharynx is clear. Eyes:       Conjunctiva/sclera: Conjunctivae normal.       Pupils: Pupils are equal, round, and reactive to light. Cardiovascular:       Rate and Rhythm: Normal rate and regular rhythm.    Heart sounds: Normal heart sounds. No murmur. No friction rub. No gallop.        Comments: No BLLE edema noted   Pulmonary:       Effort: She is intubated.       Breath sounds: Rhonchi present. No wheezing or rales. Abdominal:       Palpations: Abdomen is soft.       Tenderness: There is no abdominal tenderness.       Comments: PEG tube C/DI   Abdomen soft, NT, non-distended    Skin:      General: Skin is warm and dry. Neurological:       Comments: Off sedation: Withdraws to pain in BLLE   Does not withdraw to pain in upper extremities. Right upper extremity flaccid. Left upper extremity with rhythmic contractions. Strength 0/5 bilateral upper and lower extremities   Lip trembling. Unable to follow commands. PEERL.       Assessment/Plan:   78 yo F with PMHx afib, UGIB, recent MCA stroke with hemorrhagic conversion post-thrombectomy, now with respiratory failure.        Acute hypoxic respiratory failure 2/2 suspect aspiration pneumonitis   Chest x-ray shows RLL opacities. Patient is s/p R MCA stroke, s/p thrombectomy with thrombolytics.       -Most recent ABG2/12 identified (7.431/55/109)   - Patient is currently intubated, sedated with propofol   - F/u Respiratory cultures, blood cultures, strep antigen, UA, and Legionella antigen.    - Currently on zosyn (day 3)   - d/c vanc - MRSA negative   - daughters agreeable to tracheostomy early this week    - on minimal vent settings: FiO2 30%/12/500/5       R MCA stroke, s/p thrombectomy and thrombolysis   - CT 2/4 and MRI showed hemorrhagic transformation of right MCA infarct, repeat stable   - S/p PEG tube placement 02/9/21   - keppra 1000 mg IV BID   - lipitor 80 mg PO qHs   - holding antiplatelets due to hemorrhagic conversion   - will need neurosurgery clearance to resume a/c for afib   - Cont tube feeds   - palliative care following   - Plan for Trach prior to discharge to [facility]       Intraparenchymal hemorrhage    -CT 02/04    -stable -keep SBP < 160.  Off nicardipine drip   -Holding anticoagulation and antiplatelets   -Continue keppra       Hypernatremia, resolved   -Likely secondary to decreased oral intake/dehydration   -Nephrology following   -Cont Water boluses with tube feeds    -Monitor sodium daily        Seizures   Likely sec to above    - Routine EEG positive for epileptiform discharges   - Keppra 1g bid   - Discussed with neurology, remains on continuous video EEG       Possible UTI, treated   -Urine smelled pungent   -UA and urine culture ordered, UA with 3+ wbc, 3+ bacteria and positive nitrate   -completed 3 days antibiotics, remains on Zosyn for aspiration event as above       Metabolic encephalopathy:   -Likely multifactorial, secondary to above    -Management as above       Oral thrush:   -Nystatin (day 4)       Acute blood loss anemia   -Protonix BID       Chronic systolic heart failure   echo 2/4/2020 with EF 55-60%, indeterminate diastolic function, PASP 53 mmHg, dilated IVC and collapses <50%   - good response to lasix this morning, will continue with spot doses of lasix as needed   - on coreg 6.25mg bid       Segmental PE   -incidental finding   -No AC due to above   -IVC filter placed 2/4       Atrial fibrillation   -Holding AC, will need neurosurg clearance prior to resuming   -monitor HR       T2DM   -mealtime and correctional insulin       HTN   -as above       asthma   -breathing treatments as needed    Code Status:Full Code   FEN: DIET TUBE FEED CONTINUOUS/CYCLIC NPO; STANDARD WITH FIBER; Gastrostomy; Continuous; 25; 35; 24   PPX:  SCDs   DISPO: ICU         Per nephrol-  Assessment/Plan    1. Hypernatremia        2. CVA        3. Anemia       4. Acid- base/ Electrolyte imbalance        5.  Malnutrition        Plan    - replace free water    - Na better    - Tube feeds to cont    - Ur studies- reviewed    - monitor NA    - CVA management    - Bp control    - PEG tube placed    - labs in am Per internal med phy- Plan:       #Acute hypoxic/Hypercarbic respiratory failure   Intubated, sedated   Continue vent management per intensivist   Plan for tracheostomy early next week   Discussed with daughters       #Aspiration pneumonia   Chest x-ray showing worsening of left opacity   Respiratory culture positive for gram-negative obed   Continue IV Zosyn       # R MCA stroke, s/p thrombectomy and thrombolysis   -CT 2/4: new large acute parenchymal hemorrhage, repeat stable   -MRI w/ findings as above   -Frequent neuro checks   -S/p PEG tube placement 02/9/21   -Cont tube feeds   -palliative care following   -CT 2/10 showed edema with midline shift-stable   -CT 02/13 stable   -Neurology following       # Intraparenchymal hemorrhage   -CT 02/04 showed hemorrhagic transformation of right MCA infarct   -stable   -keep SBP < 160.  Off nicardipine drip   -Holding anticoagulation and antiplatelets   -Continue keppra       #Hypernatremia   -Likely secondary to decreased oral intake/dehydration   -Nephrology following   -Cont Water boluses with tube feeds    -Monitor sodium       #Seizures   Likely sec to above    Routine EEG positive for epileptiform discharges   Continue increased dose of Keppra   Continue continuous video EEG   Neurology following       #Metabolic encephalopathy:   -Likely multifactorial, secondary to above    -Management as above       #Oral thrush:   -Continue nystatin       # Acute blood loss anemia   -At admission prior to current admission for GI bleed   -Anticoagulation on hold given history of GI bleed and intraparenchymal hemorrhage   -Protonix BID       # Chronic systolic heart failure   -holding home diuretics       # Segmental PE   -incidental finding   -No AC due to above   -IVC filter placed 2/4       # Atrial fibrillation   -Holding AC   -monitor HR       # T2DM   -mealtime and correctional insulin       # HTN   -as above       # asthma   -breathing treatments as needed     #Possible UTI, ruled out   Urine smelled pungent   UA negative       DVT Prophylaxis: SCDs   Diet: DIET TUBE FEED CONTINUOUS/CYCLIC NPO; STANDARD WITH FIBER; Gastrostomy; Continuous; 25; 35; 24   Code Status: Full Code    Dispo: Pending clinical course, tracheostomy, LTAC placement                             2-13 by Aida Samuels RN       Review Status Review Entered   In Primary 2/15/2021 13:34      Criteria Review   2-13  additional note      98.4   18  68  124//75   vent 98 %  fio2 40     Ivf bulus 500 mls x 1 , iv lasix 20 mg x 1 , iv keppra 1g q 12 , iv protonix 40 mg qd, iv zosyn 3.375 mg q 8 , iv vanc 1250 mg q 12 , propofol gtt,         ICU phy note- ICU Day:2  Vent Day: 2  IV Access:Peripheral  IV Fluids:None. FEN: Currently NPO. Restart TF goal rate 35 cc/hr, free water 75 cc q4h  Sedation: Propofol at rate of 35 mcg/kg/min  Vasopressors:None                Antibiotics: Vancomycin and Zosyn   Diet: DIET TUBE FEED CONTINUOUS/CYCLIC NPO; STANDARD WITH FIBER; Gastrostomy; Continuous; 25; 35; 24  Physical Exam  Constitutional:       Interventions: She is sedated and intubated. HENT:      Head: Normocephalic and atraumatic. Mouth/Throat:      Mouth: Mucous membranes are moist.      Pharynx: Oropharynx is clear. Eyes:      Conjunctiva/sclera: Conjunctivae normal.      Pupils: Pupils are equal, round, and reactive to light. Cardiovascular:      Rate and Rhythm: Normal rate. Rhythm irregular. Heart sounds: Normal heart sounds. No murmur. No friction rub. No gallop. Comments: No BLLE edema noted  Pulmonary:      Effort: She is intubated. Breath sounds: Rhonchi present. No wheezing or rales. Abdominal:      Palpations: Abdomen is soft. Tenderness: There is no abdominal tenderness. Comments: PEG tube C/DI  Abdomen soft, NT, non-distended   Musculoskeletal:      Right lower leg: No edema. Left lower leg: No edema. Skin:     General: Skin is warm and dry. Respiratory: Intubated, coarse breath sounds bilaterally  Cardiovascular: Regular rate and rhythm, no murmurs, gallops, or rubs  Abdomen: soft, non-tender, non-distended  Musculoskeletal: Warm, well perfused, no cyanosis or edema  Skin: normal color, no wounds noted  Neurological: Unresponsive, Left corner of the mouth and left hand fasciculation noted.   Withdraws to pain on the right side, withdraws to pain in left lower extremity but no response noted in left upper extremity  Assessment/Plan:     Active Hospital Problems               Diagnosis        Date Noted  ·           Acute respiratory failure with hypoxia and hypercapnia (HCC) [J96.01, J96.02]         ·           Aspiration pneumonia of right lower lobe due to gastric secretions (HCC) [J69.0]      ·           Hypoxemia [R09.02]   02/06/2021  ·           Permanent atrial fibrillation ([address] [I48.21]    02/03/2021  ·           Acute gastric ulcer with hemorrhage [K25.0] 02/03/2021  ·           Other pulmonary embolism without acute cor pulmonale (HCC) [I26.99]     02/03/2021  ·           Acute right MCA stroke ([address] [I63.511]           ·           Acute cerebrovascular accident (CVA) ([address] [I63.9]            02/02/2021        Plan:     #Acute hypoxic/Hypercarbic respiratory failure  Intubated, sedated  Continue vent management per intensivist  Plan for tracheostomy early next week  Discussed with daughters     #Pneumonia, hospital-acquired or aspiration  Chest x-ray showing worsening of left opacity  Continue IV antibiotics     # R MCA stroke, s/p thrombectomy and thrombolysis  -CT 2/4: new large acute parenchymal hemorrhage, repeat stable  -MRI w/ findings as above  -Frequent neuro checks  -S/p PEG tube placement 02/9/21  -Cont tube feeds  -palliative care following  -CT 2/10 showed edema with midline shift-stable  -CT 02/13 stable  -Neurology following     # Intraparenchymal hemorrhage  -CT 02/04 showed hemorrhagic transformation of right MCA infarct -stable  -keep SBP < 160.  Off nicardipine drip  -Holding anticoagulation and antiplatelets  -Continue keppra     #Hypernatremia  -Likely secondary to decreased oral intake/dehydration  -Nephrology following  -Cont Water boluses with tube feeds   -Monitor sodium     #Seizures  Likely sec to above   Routine EEG positive for epileptiform discharges  Continue increased dose of Keppra  Continue continuous video EEG  Neurology following     #Possible UTI, ruled out  Urine smelled pungent  UA negative     #Metabolic encephalopathy:  -Likely multifactorial, secondary to above   -Management as above     #Oral thrush:  -Cont Nystatin     # Acute blood loss anemia  -Protonix BID     # Chronic systolic heart failure  -holding home diuretics     # Segmental PE  -incidental finding  -No AC due to above  -IVC filter placed 2/4     # Atrial fibrillation  -Holding AC  -monitor HR     # T2DM  -mealtime and correctional insulin   # HTN  -as above   # asthma  -breathing treatments as needed   DVT Prophylaxis: SCDs  Diet: DIET TUBE FEED CONTINUOUS/CYCLIC NPO; STANDARD WITH FIBER; Gastrostomy; Continuous; 25; 35; 24  Code Status: Full Code   Dispo: Pending clinical course, tracheostomy

## 2021-02-15 NOTE — PROGRESS NOTES
Pt continues to tolerate vent settings. Pt sedated to RASS score of -1 with Propofol gtt as ordered. During sedation holiday pt coughs and moves head side to side non-purposefully, will withdraw to painful stimuli, does not follow commands. Pt may have trach today, ICU resident to speak with pt's daughter. Will continue to monitor.

## 2021-02-16 ENCOUNTER — APPOINTMENT (OUTPATIENT)
Dept: GENERAL RADIOLOGY | Age: 81
DRG: 003 | End: 2021-02-16
Payer: COMMERCIAL

## 2021-02-16 LAB
ANION GAP SERPL CALCULATED.3IONS-SCNC: 8 MMOL/L (ref 3–16)
ANTI-XA UNFRAC HEPARIN: <0.04 IU/ML (ref 0.3–0.7)
BASOPHILS ABSOLUTE: 0 K/UL (ref 0–0.2)
BASOPHILS RELATIVE PERCENT: 0.7 %
BLOOD CULTURE, ROUTINE: NORMAL
BUN BLDV-MCNC: 10 MG/DL (ref 7–20)
CALCIUM SERPL-MCNC: 8.9 MG/DL (ref 8.3–10.6)
CHLORIDE BLD-SCNC: 98 MMOL/L (ref 99–110)
CO2: 33 MMOL/L (ref 21–32)
CREAT SERPL-MCNC: <0.5 MG/DL (ref 0.6–1.2)
CULTURE, BLOOD 2: NORMAL
EOSINOPHILS ABSOLUTE: 0.1 K/UL (ref 0–0.6)
EOSINOPHILS RELATIVE PERCENT: 1.2 %
GFR AFRICAN AMERICAN: >60
GFR NON-AFRICAN AMERICAN: >60
GLUCOSE BLD-MCNC: 113 MG/DL (ref 70–99)
GLUCOSE BLD-MCNC: 114 MG/DL (ref 70–99)
GLUCOSE BLD-MCNC: 116 MG/DL (ref 70–99)
GLUCOSE BLD-MCNC: 119 MG/DL (ref 70–99)
GLUCOSE BLD-MCNC: 123 MG/DL (ref 70–99)
GLUCOSE BLD-MCNC: 138 MG/DL (ref 70–99)
GLUCOSE BLD-MCNC: 147 MG/DL (ref 70–99)
HCT VFR BLD CALC: 27.7 % (ref 36–48)
HEMOGLOBIN: 8.5 G/DL (ref 12–16)
LYMPHOCYTES ABSOLUTE: 0.8 K/UL (ref 1–5.1)
LYMPHOCYTES RELATIVE PERCENT: 13 %
MCH RBC QN AUTO: 25.1 PG (ref 26–34)
MCHC RBC AUTO-ENTMCNC: 30.8 G/DL (ref 31–36)
MCV RBC AUTO: 81.4 FL (ref 80–100)
MONOCYTES ABSOLUTE: 0.7 K/UL (ref 0–1.3)
MONOCYTES RELATIVE PERCENT: 11.8 %
NEUTROPHILS ABSOLUTE: 4.3 K/UL (ref 1.7–7.7)
NEUTROPHILS RELATIVE PERCENT: 73.3 %
PDW BLD-RTO: 25.7 % (ref 12.4–15.4)
PERFORMED ON: ABNORMAL
PLATELET # BLD: 189 K/UL (ref 135–450)
PMV BLD AUTO: 11.1 FL (ref 5–10.5)
POTASSIUM REFLEX MAGNESIUM: 3.8 MMOL/L (ref 3.5–5.1)
RBC # BLD: 3.4 M/UL (ref 4–5.2)
SODIUM BLD-SCNC: 139 MMOL/L (ref 136–145)
WBC # BLD: 5.9 K/UL (ref 4–11)

## 2021-02-16 PROCEDURE — C9113 INJ PANTOPRAZOLE SODIUM, VIA: HCPCS | Performed by: STUDENT IN AN ORGANIZED HEALTH CARE EDUCATION/TRAINING PROGRAM

## 2021-02-16 PROCEDURE — 0B113F4 BYPASS TRACHEA TO CUTANEOUS WITH TRACHEOSTOMY DEVICE, PERCUTANEOUS APPROACH: ICD-10-PCS | Performed by: INTERNAL MEDICINE

## 2021-02-16 PROCEDURE — 31600 PLANNED TRACHEOSTOMY: CPT | Performed by: INTERNAL MEDICINE

## 2021-02-16 PROCEDURE — 85520 HEPARIN ASSAY: CPT

## 2021-02-16 PROCEDURE — 6360000002 HC RX W HCPCS: Performed by: STUDENT IN AN ORGANIZED HEALTH CARE EDUCATION/TRAINING PROGRAM

## 2021-02-16 PROCEDURE — 2500000003 HC RX 250 WO HCPCS

## 2021-02-16 PROCEDURE — 94003 VENT MGMT INPAT SUBQ DAY: CPT

## 2021-02-16 PROCEDURE — 2580000003 HC RX 258: Performed by: STUDENT IN AN ORGANIZED HEALTH CARE EDUCATION/TRAINING PROGRAM

## 2021-02-16 PROCEDURE — 2700000000 HC OXYGEN THERAPY PER DAY

## 2021-02-16 PROCEDURE — 2000000000 HC ICU R&B

## 2021-02-16 PROCEDURE — 51798 US URINE CAPACITY MEASURE: CPT

## 2021-02-16 PROCEDURE — 6370000000 HC RX 637 (ALT 250 FOR IP): Performed by: STUDENT IN AN ORGANIZED HEALTH CARE EDUCATION/TRAINING PROGRAM

## 2021-02-16 PROCEDURE — 6360000002 HC RX W HCPCS: Performed by: INTERNAL MEDICINE

## 2021-02-16 PROCEDURE — 31502 CHANGE OF WINDPIPE AIRWAY: CPT

## 2021-02-16 PROCEDURE — 99291 CRITICAL CARE FIRST HOUR: CPT | Performed by: INTERNAL MEDICINE

## 2021-02-16 PROCEDURE — 99232 SBSQ HOSP IP/OBS MODERATE 35: CPT | Performed by: NURSE PRACTITIONER

## 2021-02-16 PROCEDURE — 80048 BASIC METABOLIC PNL TOTAL CA: CPT

## 2021-02-16 PROCEDURE — 6360000002 HC RX W HCPCS: Performed by: NURSE PRACTITIONER

## 2021-02-16 PROCEDURE — 71045 X-RAY EXAM CHEST 1 VIEW: CPT

## 2021-02-16 PROCEDURE — 36415 COLL VENOUS BLD VENIPUNCTURE: CPT

## 2021-02-16 PROCEDURE — 31622 DX BRONCHOSCOPE/WASH: CPT | Performed by: INTERNAL MEDICINE

## 2021-02-16 PROCEDURE — 6370000000 HC RX 637 (ALT 250 FOR IP): Performed by: INTERNAL MEDICINE

## 2021-02-16 PROCEDURE — 2580000003 HC RX 258: Performed by: NURSE PRACTITIONER

## 2021-02-16 PROCEDURE — 51701 INSERT BLADDER CATHETER: CPT

## 2021-02-16 PROCEDURE — 94761 N-INVAS EAR/PLS OXIMETRY MLT: CPT

## 2021-02-16 PROCEDURE — 85025 COMPLETE CBC W/AUTO DIFF WBC: CPT

## 2021-02-16 PROCEDURE — 0BJ08ZZ INSPECTION OF TRACHEOBRONCHIAL TREE, VIA NATURAL OR ARTIFICIAL OPENING ENDOSCOPIC: ICD-10-PCS | Performed by: INTERNAL MEDICINE

## 2021-02-16 PROCEDURE — 6370000000 HC RX 637 (ALT 250 FOR IP): Performed by: COUNSELOR

## 2021-02-16 RX ORDER — FENTANYL CITRATE 50 UG/ML
200 INJECTION, SOLUTION INTRAMUSCULAR; INTRAVENOUS ONCE
Status: COMPLETED | OUTPATIENT
Start: 2021-02-16 | End: 2021-02-16

## 2021-02-16 RX ORDER — ROCURONIUM BROMIDE 10 MG/ML
INJECTION, SOLUTION INTRAVENOUS
Status: COMPLETED
Start: 2021-02-16 | End: 2021-02-16

## 2021-02-16 RX ADMIN — PROPOFOL 20 MCG/KG/MIN: 10 INJECTION, EMULSION INTRAVENOUS at 16:06

## 2021-02-16 RX ADMIN — FENTANYL CITRATE 100 MCG: 50 INJECTION INTRAMUSCULAR; INTRAVENOUS at 16:37

## 2021-02-16 RX ADMIN — CARVEDILOL 6.25 MG: 6.25 TABLET, FILM COATED ORAL at 09:04

## 2021-02-16 RX ADMIN — PIPERACILLIN AND TAZOBACTAM 3375 MG: 3; .375 INJECTION, POWDER, LYOPHILIZED, FOR SOLUTION INTRAVENOUS at 18:39

## 2021-02-16 RX ADMIN — NYSTATIN 500000 UNITS: 100000 SUSPENSION ORAL at 09:07

## 2021-02-16 RX ADMIN — MICONAZOLE NITRATE: 20 POWDER TOPICAL at 09:06

## 2021-02-16 RX ADMIN — Medication 10 ML: at 09:08

## 2021-02-16 RX ADMIN — MICONAZOLE NITRATE: 20 POWDER TOPICAL at 20:52

## 2021-02-16 RX ADMIN — ENOXAPARIN SODIUM 40 MG: 40 INJECTION SUBCUTANEOUS at 12:06

## 2021-02-16 RX ADMIN — PIPERACILLIN AND TAZOBACTAM 3375 MG: 3; .375 INJECTION, POWDER, LYOPHILIZED, FOR SOLUTION INTRAVENOUS at 04:31

## 2021-02-16 RX ADMIN — PROPOFOL 30 MCG/KG/MIN: 10 INJECTION, EMULSION INTRAVENOUS at 18:51

## 2021-02-16 RX ADMIN — PANTOPRAZOLE SODIUM 40 MG: 40 INJECTION, POWDER, FOR SOLUTION INTRAVENOUS at 09:20

## 2021-02-16 RX ADMIN — LEVETIRACETAM 1000 MG: 100 INJECTION, SOLUTION INTRAVENOUS at 20:52

## 2021-02-16 RX ADMIN — NYSTATIN 500000 UNITS: 100000 SUSPENSION ORAL at 20:52

## 2021-02-16 RX ADMIN — NYSTATIN 500000 UNITS: 100000 SUSPENSION ORAL at 12:06

## 2021-02-16 RX ADMIN — CARVEDILOL 6.25 MG: 6.25 TABLET, FILM COATED ORAL at 16:11

## 2021-02-16 RX ADMIN — NYSTATIN 500000 UNITS: 100000 SUSPENSION ORAL at 16:11

## 2021-02-16 RX ADMIN — ATORVASTATIN CALCIUM 80 MG: 40 TABLET, FILM COATED ORAL at 20:52

## 2021-02-16 RX ADMIN — Medication 10 ML: at 20:53

## 2021-02-16 RX ADMIN — Medication 10 ML: at 12:07

## 2021-02-16 RX ADMIN — LEVETIRACETAM 1000 MG: 100 INJECTION, SOLUTION INTRAVENOUS at 09:05

## 2021-02-16 RX ADMIN — ROCURONIUM BROMIDE 50 MG: 10 INJECTION, SOLUTION INTRAVENOUS at 16:53

## 2021-02-16 RX ADMIN — PIPERACILLIN AND TAZOBACTAM 3375 MG: 3; .375 INJECTION, POWDER, LYOPHILIZED, FOR SOLUTION INTRAVENOUS at 12:06

## 2021-02-16 ASSESSMENT — PAIN SCALES - GENERAL: PAINLEVEL_OUTOF10: 0

## 2021-02-16 ASSESSMENT — PULMONARY FUNCTION TESTS
PIF_VALUE: 24
PIF_VALUE: 29
PIF_VALUE: 30
PIF_VALUE: 24
PIF_VALUE: 23
PIF_VALUE: 26
PIF_VALUE: 28
PIF_VALUE: 33
PIF_VALUE: 33
PIF_VALUE: 24
PIF_VALUE: 43
PIF_VALUE: 24

## 2021-02-16 NOTE — PROGRESS NOTES
Propofol gtt on hold, changed sedation to Precedex gtt, pt tolerating well. Neuro status remains unchanged at this time. Family to make decision re: trach placement tomorrow. VSS at this time. PEG tube remains clamped and NPO due to possible trach placement tomorrow. Will continue to monitor closely.

## 2021-02-16 NOTE — PROGRESS NOTES
Nephrology Note                                                                                                                                                                                                                                                                                                                                                               Office : [phone]     Fax :[phone]              Patient's Name: Shelly Rankin    Reason for Consult:  Hypernatremia   Requesting Physician:  No primary care provider on file. Na stable   On TF with Free water     Past Medical History:   Diagnosis Date    Acute GI bleeding     recent admission 1/29/2021    Atrial fibrillation ([facility]     Xarelto    Systolic CHF ([facility]     Type 2 diabetes mellitus ([facility]        Past Surgical History:   Procedure Laterality Date    GASTROSTOMY TUBE PLACEMENT N/A 2/9/2021    EGD PEG TUBE PLACEMENT performed by Maria L Franklin MD at [facility] [address]  2/4/2021    IR IVC FILTER PLACEMENT W IMAGING 2/4/2021 [facility]    UPPER GASTROINTESTINAL ENDOSCOPY  01/29/2021    Dr Herman Huge       No family history on file. reports that she has quit smoking. She does not have any smokeless tobacco history on file. Allergies:  Patient has no known allergies.     Current Medications:        dexmedetomidine (PRECEDEX) 400 mcg in sodium chloride 0.9 % 100 mL infusion, Continuous      lidocaine PF 1 % injection 5 mL, Once      sodium chloride flush 0.9 % injection 10 mL, 2 times per day      sodium chloride flush 0.9 % injection 10 mL, PRN      [Held by provider] propofol injection, Titrated      piperacillin-tazobactam (ZOSYN) 3,375 mg in dextrose 5 % 100 mL IVPB extended infusion (mini-bag), Q8H      insulin lispro (1 Unit Dial) 0-12 Units, Q4H      sodium chloride flush 0.9 % injection 10 mL, 2 times per day      sodium chloride flush 0.9 % injection 10 mL, PRN CREATININE <0.5* <0.5* <0.5*   GLUCOSE 146* 172* 131*     Ca/Mg/Phos:   Recent Labs     02/13/21  0443 02/14/21  0417 02/15/21  0430   CALCIUM 8.6 8.7 8.7   MG  --  1.60*  --      Hepatic: No results for input(s): AST, ALT, ALB, BILITOT, ALKPHOS in the last 72 hours. Troponin: No results for input(s): TROPONINI in the last 72 hours. BNP: No results for input(s): BNP in the last 72 hours. Lipids: No results for input(s): CHOL, TRIG, HDL, LDLCALC, LABVLDL in the last 72 hours. ABGs:   Recent Labs     02/12/21  2300   PHART 7.431   PO2ART 109.5*   [id number] 55.2*     INR:   No results for input(s): INR in the last 72 hours. UA:  No results for input(s): Clista Bamberger, GLUCOSEU, BILIRUBINUR, KETUA, SPECGRAV, BLOODU, PHUR, PROTEINU, UROBILINOGEN, NITRU, LEUKOCYTESUR, [place] Distance in the last 72 hours. Urine Microscopic:   No results for input(s): LABCAST, BACTERIA, COMU, HYALCAST, WBCUA, RBCUA, EPIU in the last 72 hours. Urine Culture: No results for input(s): LABURIN in the last 72 hours. Urine Chemistry:   No results for input(s): Natalie Jimmie, PROTEINUR, NAUR in the last 72 hours. IMAGING:  CT HEAD WO CONTRAST   Final Result      XR CHEST PORTABLE   Final Result      Persistent bilateral infiltrates         XR CHEST PORTABLE   Final Result   Impression:          1. Increased opacities in the left lung base since the prior study. There may be a small left pleural effusion. 2. Persistent patchy right lung opacities. CT HEAD WO CONTRAST   Final Result   Interval progression of vasogenic edema at the right middle cerebral artery hemorrhagic infarction and interval development of right to left midline shift of about 3 mm as described above. Apparent slight decrease in the size of hemorrhage in its AP dimension. XR CHEST 1 VIEW   Final Result   1. Interval extubation. 2. Progression of bilateral airspace disease.       MRI BRAIN WO CONTRAST   Final Result Region of right middle cerebral artery hemorrhagic infarction measures approximately 5.0 x 4.2 cm with surrounding mild vasogenic edema and diffusion restriction consistent with right middle cerebral artery distribution infarct with hemorrhagic    components      No additional areas of hemorrhage or infarction identified. Mild local mass effect is identified with very minimal midline shift of 3 to 4 mm      XR CHEST PORTABLE   Final Result      Persistent bilateral infiltrates and cardiomegaly. IR GUIDED IVC FILTER PLACEMENT   Final Result   Impression:      Successful placement of retrievable IVC filter. CT HEAD WO CONTRAST   Final Result      1. Hemorrhagic transformation of right MCA infarct, without significant change. No new hemorrhage. CT head without contrast   Final Result         1. Stable large intraparenchymal hemorrhage located within the right posterior temporal parietal lobe with extension into the adjacent insula. There is also subarachnoid extension of hemorrhage into the overlying sulci as well as the right sylvian    fissure. Findings result in some mild right to left subfalcine herniation. 2.There is a 1.3 x 1.1 cm extra-axial mass identified within the left ordered aspect of the foramen magnum. This results in some mass effect on the adjacent medulla. This is without change from the prior study. Finding may represent a meningioma. This    could be further evaluated with a contrast-enhanced MRI of the head following resolution of the intracranial hemorrhage. CT HEAD WO CONTRAST   Final Result      1. Stable large intraparenchymal hemorrhage located within the right posterior temporal parietal lobe with extension into the adjacent insula. There is also subarachnoid extension of hemorrhage into the overlying sulci as well as the right sylvian    fissure. Findings result in some mild right to left subfalcine herniation. 2.There is a 1.3 x 1.1 cm extra-axial mass identified within the left ordered aspect of the foramen magnum. This results in some mass effect on the adjacent medulla. This is without change from the prior study. Finding may represent a meningioma. This    could be further evaluated with a contrast-enhanced MRI of the head following resolution of the intracranial hemorrhage. VL Extremity Venous Bilateral   Final Result      CT HEAD WO CONTRAST   Final Result      Post therapy large acute parenchymal hemorrhage in the right cerebral hemisphere with subarachnoid component. Extensive surrounding edema is associated with 4 cm leftward shift of midline structures. Critical finding of acute intracranial hemorrhage was called to Joshua Caceres of the neurosurgery department at 12:25 PM on 2/3/2021, with instructions to contact patient's attending physician with these results. CT Brain Perfusion   Final Result      1. Hypoperfusion in the right MCA territory with a central ischemic core of 8 mL, total volume of hypoperfusion of 79 mL and a penumbra of 71 mL. XR CHEST PORTABLE   Final Result   1. Limited evaluation because of patient rotation and underpenetration of the film. 2. No dense airspace consolidation. 3. Probable mild cardiomegaly. CTA HEAD NECK W CONTRAST   Final Result   1. Carotid and vertebral arteries are patent and without stenosis or acute abnormality. 2. Incidental partial imaging of the a segmental pulmonary embolism in the right middle lobe. 3. Partial imaging of small right greater than left pleural effusions. 4. Previous left suboccipital craniotomy with a residual 1.6 cm left-sided extra-axial mass at the foramen magnum with mass effect on the cord at the cervical medullary junction favored to represent meningioma. Recommend correlation with clinical history    and previous imaging. 5. Incidental multinodular goiter.       CTA HEAD: FINDINGS: The internal carotid arteries are patent and normal in caliber through the skull base. The middle cerebral arteries are patent. 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 The A2 and A3 segments of the ACAs are patent bilaterally. Left MCA is patent. There is focal occlusion of an M2 branch of the right MCA (series 2 images 427 and 428). However, other M2 branches and M3 branches in the right sylvian fissure are patent. The M1 segment of the right MCA is patent. The posterior cerebral arteries are    patent bilaterally. The basilar artery is patent and normal in caliber. It is supplied by the left vertebral artery. The small nondominant right vertebral artery terminates in right sided PICA. IMPRESSION:   1. 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 2. 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 Results were discussed with Dr. Violet Treadwell at 8:00 PM on 2/2/2021. CT HEAD WO CONTRAST   Final Result   1. Previous left suboccipital craniotomy with a partially calcified extra-axial mass at the left foramen magnum measuring 1.8 cm contacting the cord at the cervicomedullary junction with some degree of mass effect on the cord at the foramen magnum. This    most likely represents residual/recurrent meningioma or other extra-axial mass. Correlation with patient history and previous imaging recommended. 2. Mild global volume loss and mild chronic small vessel ischemic disease in the white matter. 3. No acute intracranial hemorrhage. IR ANGIOGRAM CAROTID CEREBRAL RIGHT    (Results Pending)       Assessment/Plan   1. Hypernatremia     2. CVA     3. Anemia    4. Acid- base/ Electrolyte imbalance     5.  Malnutrition     Plan - replace free water   - Na better   - Tube feeds to cont   - Ur studies- reviewed   - monitor NA   - CVA management   - Bp control   - PEG tube placed   - labs in am                 Thank you for allowing us to participate in care of [address] [place] [address] free to contact me   [facility] [address]  Office : [phone]  Fax :[phone]

## 2021-02-16 NOTE — PROGRESS NOTES
ICU Progress Note    Admit Date: 2/2/2021  ICU Day:15  Vent Day:   IV Access:Peripheral  IV Fluids:None. FEN: Currently NPO. Restart TF goal rate 35 cc/hr, free water 75 cc q4h  Sedation:None   Vasopressors:None                Antibiotics: Vancomycin and Zosyn   Diet: Diet NPO, After Midnight    CC: R MCA stroke    Interval history:   Pt remains NPO in preparation for possible Tracheostomy. Palliative care is planning a discussion w/ family regarding goals of care. Pt has been weaned from all sedation. Remains somnolent but does open eyes to touch. Family has decided to go forward with a tracheostomy. HPI: Larissa Miramontes a 79 y.o. female, with a history of afib (previously on Xarelto, d/c'd due to recent GI bleed 1/31), HF, DM, asthma, pulm HTN (not oxygen dependent), HTN, HLD, EDDY on cpap, recent GI bleed breast cancer s/p lumpectomy/radiation, colon polyps, obesity who presented with left-sided hemiplegia, severe expressive aphasia and dysarthria, right facial droop, and right-gaze deviation. She underwent M2 thrombectomy and thrombolysis with significant improvement of hemiplegia, but still with other aforementioned symptoms. Was admitted to ICU on 2/2. On 2/3, CT head done for neuro status change of pt not lifting or holding up left arm as previously, revealed acute hemorrhagic conversion of new large acute parenchymal hemorrhage in the right cerebral hemisphere. On 2/3, cardene drip started for BP control. On 2/4, IVC filter placed for acute LLE DVT, given contraindications for anticoagulation. PEG tube placed on 2/9 and was started on TF 2/11. She had a rapid response called on 2/12 on the floors for hypoxic respiratory failure. She was intubated and transferred back to the ICU. CXR identified worsening basilar infiltrates. It is suspected she had aspiration pneumonitis due to inability to protect airway.      Medications:     Scheduled Meds:   enoxaparin  40 mg Subcutaneous Daily  lidocaine 1 % injection  5 mL Intradermal Once    sodium chloride flush  10 mL Intravenous 2 times per day    piperacillin-tazobactam  3,375 mg Intravenous Q8H    insulin lispro  0-12 Units Subcutaneous Q4H    sodium chloride flush  10 mL Intravenous 2 times per day    nystatin  5 mL Oral 4x Daily    levetiracetam  1,000 mg Intravenous Q12H    pantoprazole  40 mg Intravenous Daily    ferrous sulfate  300 mg Per G Tube Every Other Day    miconazole   Topical BID    atorvastatin  80 mg Oral Nightly    carvedilol  6.25 mg Oral BID WC     Continuous Infusions:   dexmedetomidine (PRECEDEX) IV infusion Stopped (02/15/21 2012)    [Held by provider] propofol 30 mcg/kg/min (02/15/21 1330)    dextrose       PRN Meds:sodium chloride flush, sodium chloride flush, glucose, dextrose, glucagon (rDNA), dextrose, perflutren lipid microspheres, labetalol, albuterol sulfate HFA, hydrALAZINE, acetaminophen, promethazine **OR** ondansetron, polyethylene glycol    Objective:   Vitals:   T-max:  Patient Vitals for the past 8 hrs:   BP Temp Temp src Pulse Resp SpO2   02/16/21 1222     13 96 %   02/16/21 1200 (!) 150/60 99.1 °F (37.3 °C) Axillary 68 12 96 %   02/16/21 1100 (!) 150/76   62 14 98 %   02/16/21 1000 134/73   80 15 98 %   02/16/21 0917 (!) 139/58 98.5 °F (36.9 °C) Axillary 77 15 98 %   02/16/21 0900 (!) 139/58   69 16 98 %   02/16/21 0832    69 25 97 %   02/16/21 0800 (!) 149/95   69 12 98 %   02/16/21 0700 138/85   68 15 99 %   02/16/21 0630 (!) 149/71   59 15    02/16/21 0600 (!) 130/90   73 17    02/16/21 0530 112/70   72 17    02/16/21 0500 137/88   67 20        Intake/Output Summary (Last 24 hours) at 2/16/2021 1256  Last data filed at 2/16/2021 0441  Gross per 24 hour   Intake 1012 ml   Output 1050 ml   Net -38 ml       Access:   -Peripheral Access Day#:3                               Gilman Day#:9  NGT Day#: 6                                            ETT Day#:1 Vent Settings: Vent Mode: AC/PRVC Rate Set: 12 bmp/Vt Ordered: 500 mL/ /FiO2 : 30 %  -pt currently does not have nagel in place     Physical Exam  Constitutional:       Interventions: She is sedated and intubated. HENT:      Head: Normocephalic and atraumatic. Mouth/Throat:      Pharynx: Oropharyngeal exudate present. No posterior oropharyngeal erythema. Comments: Presence of white exudate near ET   Eyes:      General:         Right eye: No discharge. Left eye: No discharge. Conjunctiva/sclera: Conjunctivae normal.      Pupils: Pupils are equal, round, and reactive to light. Cardiovascular:      Rate and Rhythm: Normal rate. Rhythm irregular. Heart sounds: Normal heart sounds. No murmur. No friction rub. No gallop. Comments: No BLLE edema noted. Pt in Afib at time of exam     Pulmonary:      Effort: She is intubated. Breath sounds: Normal breath sounds. No wheezing or rales. Abdominal:      General: Abdomen is flat. There is no distension. Palpations: Abdomen is soft. Tenderness: There is no abdominal tenderness. There is no guarding. Comments: PEG tube C/DI  Abdomen soft, NT, non-distended   Musculoskeletal:      Right lower leg: No edema. Left lower leg: No edema. Skin:     General: Skin is warm and dry. Neurological:      Mental Status: Mental status is at baseline. Comments: Pt opens eyes to touch. Sedation has been removed, unable to respond to verbal commands.           LABS:    CBC:   Recent Labs     02/14/21  0417 02/15/21  0430 02/16/21  0341   WBC 6.1 6.0 5.9   HGB 8.5* 8.7* 8.5*   HCT 28.0* 28.6* 27.7*    160 189   MCV 81.9 81.8 81.4     Renal:    Recent Labs     02/14/21  0417 02/15/21  0430 02/16/21  0341    138 139   K 3.5 4.0 3.8    99 98*   CO2 34* 34* 33*   BUN 15 12 10   CREATININE <0.5* <0.5* <0.5*   GLUCOSE 172* 131* 138*   CALCIUM 8.7 8.7 8.9   MG 1.60*  --   --    ANIONGAP 6 5 8 Hepatic: No results for input(s): AST, ALT, BILITOT, BILIDIR, PROT, LABALBU, ALKPHOS in the last 72 hours. Troponin: No results for input(s): TROPONINI in the last 72 hours. BNP: No results for input(s): BNP in the last 72 hours. Lipids: No results for input(s): CHOL, HDL in the last 72 hours. Invalid input(s): LDLCALCU, TRIGLYCERIDE  ABGs:    No results for input(s): PHART, HUG6RDX, PO2ART, ORT7JAR, BEART, THGBART, L8EBUTPE, TTH4EZW in the last 72 hours. INR: No results for input(s): INR in the last 72 hours. Lactate: No results for input(s): LACTATE in the last 72 hours. Cultures:  -----------------------------------------------------------------  RAD:   CT HEAD WO CONTRAST   Final Result      XR CHEST PORTABLE   Final Result      Persistent bilateral infiltrates         XR CHEST PORTABLE   Final Result   Impression:          1. Increased opacities in the left lung base since the prior study. There may be a small left pleural effusion. 2. Persistent patchy right lung opacities. CT HEAD WO CONTRAST   Final Result   Interval progression of vasogenic edema at the right middle cerebral artery hemorrhagic infarction and interval development of right to left midline shift of about 3 mm as described above. Apparent slight decrease in the size of hemorrhage in its AP dimension. XR CHEST 1 VIEW   Final Result   1. Interval extubation. 2. Progression of bilateral airspace disease. MRI BRAIN WO CONTRAST   Final Result      Region of right middle cerebral artery hemorrhagic infarction measures approximately 5.0 x 4.2 cm with surrounding mild vasogenic edema and diffusion restriction consistent with right middle cerebral artery distribution infarct with hemorrhagic    components      No additional areas of hemorrhage or infarction identified.  Mild local mass effect is identified with very minimal midline shift of 3 to 4 mm      XR CHEST PORTABLE   Final Result Persistent bilateral infiltrates and cardiomegaly. IR GUIDED IVC FILTER PLACEMENT   Final Result   Impression:      Successful placement of retrievable IVC filter. CT HEAD WO CONTRAST   Final Result      1. Hemorrhagic transformation of right MCA infarct, without significant change. No new hemorrhage. CT head without contrast   Final Result         1. Stable large intraparenchymal hemorrhage located within the right posterior temporal parietal lobe with extension into the adjacent insula. There is also subarachnoid extension of hemorrhage into the overlying sulci as well as the right sylvian    fissure. Findings result in some mild right to left subfalcine herniation. 2.There is a 1.3 x 1.1 cm extra-axial mass identified within the left ordered aspect of the foramen magnum. This results in some mass effect on the adjacent medulla. This is without change from the prior study. Finding may represent a meningioma. This    could be further evaluated with a contrast-enhanced MRI of the head following resolution of the intracranial hemorrhage. CT HEAD WO CONTRAST   Final Result      1. Stable large intraparenchymal hemorrhage located within the right posterior temporal parietal lobe with extension into the adjacent insula. There is also subarachnoid extension of hemorrhage into the overlying sulci as well as the right sylvian    fissure. Findings result in some mild right to left subfalcine herniation. 2.There is a 1.3 x 1.1 cm extra-axial mass identified within the left ordered aspect of the foramen magnum. This results in some mass effect on the adjacent medulla. This is without change from the prior study. Finding may represent a meningioma. This    could be further evaluated with a contrast-enhanced MRI of the head following resolution of the intracranial hemorrhage.       VL Extremity Venous Bilateral   Final Result      CT HEAD WO CONTRAST   Final Result There is focal occlusion of an M2 branch of the right MCA (series 2 images 427 and 428). However, other M2 branches and M3 branches in the right sylvian fissure are patent. The M1 segment of the right MCA is patent. The posterior cerebral arteries are    patent bilaterally. The basilar artery is patent and normal in caliber. It is supplied by the left vertebral artery. The small nondominant right vertebral artery terminates in right sided PICA. IMPRESSION:   1. 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 2. 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 Results were discussed with Dr. Joe Morales at 8:00 PM on 2/2/2021. CT HEAD WO CONTRAST   Final Result   1. Previous left suboccipital craniotomy with a partially calcified extra-axial mass at the left foramen magnum measuring 1.8 cm contacting the cord at the cervicomedullary junction with some degree of mass effect on the cord at the foramen magnum. This    most likely represents residual/recurrent meningioma or other extra-axial mass. Correlation with patient history and previous imaging recommended. 2. Mild global volume loss and mild chronic small vessel ischemic disease in the white matter. 3. No acute intracranial hemorrhage. IR ANGIOGRAM CAROTID CEREBRAL RIGHT    (Results Pending)         Assessment/Plan:   78 yo F with PMHx afib, UGIB, recent MCA stroke with hemorrhagic conversion post-thrombectomy, now with respiratory failure. Acute hypoxic respiratory failure 2/2 suspect aspiration pneumonitis  Chest x-ray shows RLL opacities. Patient is s/p R MCA stroke, s/p thrombectomy with thrombolytics. -Patient is currently intubated, sedated with propofol  -resp cultures positive for serratia  -Zosyn (first dose 2/12) appropriate for culture results (Serratia)  - on minimal vent settings: FiO2 30%/RR 12//PEEP 5  -discussions regarding tracheostomy are ongoing.  FU w/ recs from palliative care -breathing treatments PRN        Code Status:Full Code  FEN: Diet NPO, After Midnight  PPX:  SCDs  DISPO: ICU    [facility] [place], [place]  02/16/21  12:56 PM    Patient was seen, examined and discussed with Dr. Lazcano. I agree with the interval history. My physical exam confirms the findings listed below  Chart was reviewed including labs and medical records confirm the findings noted     Acute respiratory failure on mechanical vent support.  , RR 12, FiO2 30, PEEP 5  Rt MCA stroke s/p thrombectomy on 2/2, complicated by ICH on 2/3  Aspiration pneumonia   Open her eyes, doesn't follow commands     Keep off sedation   Continue TF - now on hold for possible trach. Family discussion ongoing  Zosyn day #5     Pt has a high probability of imminent or life-threatening deterioration requiring close monitoring, and highly complex decision-making and/or interventions of high intensity to assess, manipulate, and support his critical organ systems to prevent a likely inevitable decline which could occur if left untreated.      A total critical care time 32 minutes was used. This includes but not limited to examining patient, collaborating with other physicians, monitoring vital signs, telemetry, continuous pulse oximetry, and clinical response to IV medications, documentation time, review and interpretation of laboratory and radiological data, review of nursing notes and old record review.  This time excludes any time that may have been spent performing procedures for life threatening organ failure

## 2021-02-16 NOTE — PROCEDURES
PROCEDURE: Fiberoptic bronchoscopy for trach placement     Preprocedure diagnosis: Respiratory failure, failed extubation  Post procedure diagnosis: same      DESCRIPTION OF PROCEDURE: Informed consent was obtained from the patient after explaining the risks and benefits. A time out was taken. Mallampati patient is intubated   ASA IV  Anesthesia:        Fentanyl 100 mcg IV   Propofol 60 mcg/kg/min     The scope was passed with ease via the endotracheal tube. The endotracheal tube terminated in mid trachea out around 23 cm. Under direct visualization the endotracheal tube was withdrawn to 19 cm at the lips.  light reflex was visible on tracheostomy site. I provided direct visualization for Dr. Jennifer Sauceda to place the finder needle then push needle. Passage of wire through the catheter down into the trachea was then confirmed. Tracheostomy was then permed by Dr. Jennifer Sauceda and positioning confirmed in mid trachea with bronchoscopy. Patient had good hemostasis. Endotracheal tube was then removed    The patient tolerated the procedure well.   No immediate complication    EBL: 0 max all pertinent systems normal

## 2021-02-16 NOTE — PROGRESS NOTES
Palliative Medicine Progress Note    Admit Date: 2/2/2021  Hospital Day:  Hospital Day: 15     CC: CVA  HPI: Maureen Reyes is a 79 y.o. female with PMH of afib, HF, DM, asthma, pulmonary HTN, HTN, HLD, EDDY on CPAP, breast ca s/p lumpectomy/radiation, colon polyps who presented with left sided hemiplegia, severe expressive aphasia and dysarthia, right facial droop, right gaze deviation. She called family in the evening prior to presentation, they were concerned for slurred speech and so went to check on her. EMS was called. During admission she underwent M2 thrombectomy and thrombolysis with significant improvement of hemiplegia. Over the last week, the pt became more lethargic, requiring intubation 2/12 for airway protection. Family considering tracheostomy. Recommendations:     Saw pt at the bedside, she remains intubated and lethargic. Family is considering tracheostomy. Called to speak with pt's daughter Jose Monteiro, discussed her understanding of the pt's medical condition. She reports that they think the pt would want tracheostomy if there were some chance of improvement, even if that means the loss of her independence and needing to be in a nursing facility. She asks to speak with neurology prior to making a final decision. Called pt's other daughter Karan, no answer, left VM. D/w Dr. Casey Thurman and London Acuña, APRN. Addendum: Spoke with pt's daughter Seferino Cornelius, confirmed that she also is on board with tracheostomy. 1. Goals of Care/Advanced Care planning/Code status: Full code, continue with aggressive management. After talking further with neurology, family wants to move forward with tracheostomy. 2. Pain: Pt denied  3. SOB: Pt denied  4. Dysphagia: Pt did not make attempts to swallow with SLP during evaluation, now has PEG tube.    5. Disposition: Will likely need placement when medically ready for discharge    Subjective:     Scheduled Meds:   enoxaparin  40 mg Subcutaneous Daily  lidocaine 1 % injection  5 mL Intradermal Once    sodium chloride flush  10 mL Intravenous 2 times per day    piperacillin-tazobactam  3,375 mg Intravenous Q8H    insulin lispro  0-12 Units Subcutaneous Q4H    sodium chloride flush  10 mL Intravenous 2 times per day    nystatin  5 mL Oral 4x Daily    levetiracetam  1,000 mg Intravenous Q12H    pantoprazole  40 mg Intravenous Daily    ferrous sulfate  300 mg Per G Tube Every Other Day    miconazole   Topical BID    atorvastatin  80 mg Oral Nightly    carvedilol  6.25 mg Oral BID WC       Continuous Infusions:   dexmedetomidine (PRECEDEX) IV infusion Stopped (02/15/21 2012)    [Held by provider] propofol 30 mcg/kg/min (02/15/21 1330)    dextrose         PRN Meds:sodium chloride flush, sodium chloride flush, glucose, dextrose, glucagon (rDNA), dextrose, perflutren lipid microspheres, labetalol, albuterol sulfate HFA, hydrALAZINE, acetaminophen, promethazine **OR** ondansetron, polyethylene glycol    Review of Systems. Review of Systems - History obtained from unobtainable from patient due to mental status    Performance status 20      Objective:     Patient Vitals for the past 8 hrs:   BP Temp Temp src Pulse Resp SpO2 Height   02/16/21 1000 134/73   80 15 98 %    02/16/21 0917 (!) 139/58 98.5 °F (36.9 °C) Axillary 77 15 98 %    02/16/21 0900 (!) 139/58   69 16 98 %    02/16/21 0832    69 25 97 %    02/16/21 0800 (!) 149/95   69 12 98 %    02/16/21 0700 138/85   68 15 99 %    02/16/21 0630 (!) 149/71   59 15     02/16/21 0600 (!) 130/90   73 17     02/16/21 0530 112/70   72 17     02/16/21 0500 137/88   67 20     02/16/21 0450    73 13 98 % 5' 7\" (1.702 m)   02/16/21 0430 137/87   69 13     02/16/21 0400 126/65 98.3 °F (36.8 °C) Axillary 71 13 98 %      I/O last 3 completed shifts: In: 3959 [I.V.:962; NG/GT:50]  Out: 1400 [Urine:1400]  No intake/output data recorded.     Physical Exam  Constitutional: Appearance: She is ill-appearing. Cardiovascular:      Rate and Rhythm: Normal rate. Rhythm irregular. Heart sounds: Normal heart sounds. Pulmonary:      Comments: Mechanically ventilated  Abdominal:      General: Bowel sounds are normal.      Palpations: Abdomen is soft. Comments: PEG tube in place   Musculoskeletal:      Right lower leg: No edema. Left lower leg: No edema. Skin:     General: Skin is warm and dry. Neurological:      Comments: Lethargic, not following commands          WBC/Hgb/Hct/Plts:  5.9/8.5/27.7/189 (02/16 0341)           Assessment:     Active Problems:    Acute cerebrovascular accident (CVA) ([facility] [address]    Permanent atrial fibrillation (HCC)    Acute gastric ulcer with hemorrhage    Other pulmonary embolism without acute cor pulmonale (HCC)    Acute right MCA stroke (HCC)    Hypoxemia    Acute respiratory failure with hypoxia and hypercapnia (HCC)    Aspiration pneumonia of right lower lobe due to gastric secretions (HCC)  Resolved Problems:    * No resolved hospital problems. *      Time spent with patient and/or family: 21  Time reviewing records: 5  Time communicating with providers: 10    A total of 35 minutes spent with the patient and family on unit greater than 50% face to face time in counseling regarding palliative care and goals of care for the patient.        Jonny Jo [facility]  Inpatient Palliative Care  [phone]

## 2021-02-16 NOTE — PLAN OF CARE
Problem: HEMODYNAMIC STATUS  Goal: Patient has stable vital signs and fluid balance  Outcome: Ongoing     Problem: ACTIVITY INTOLERANCE/IMPAIRED MOBILITY  Goal: Mobility/activity is maintained at optimum level for patient  Outcome: Ongoing     Problem: COMMUNICATION IMPAIRMENT  Goal: Ability to express needs and understand communication  Outcome: Ongoing     Problem: Falls - Risk of:  Goal: Will remain free from falls  Description: Will remain free from falls  Outcome: Ongoing  Goal: Absence of physical injury  Description: Absence of physical injury  Outcome: Ongoing     Problem: Urinary Elimination:  Goal: Signs and symptoms of infection will decrease  Description: Signs and symptoms of infection will decrease  Outcome: Ongoing  Goal: Complications related to the disease process, condition or treatment will be avoided or minimized  Description: Complications related to the disease process, condition or treatment will be avoided or minimized  Outcome: Ongoing     Problem: Skin Integrity:  Goal: Will show no infection signs and symptoms  Description: Will show no infection signs and symptoms  Outcome: Ongoing  Goal: Absence of new skin breakdown  Description: Absence of new skin breakdown  Outcome: Ongoing     Problem: Nutrition  Goal: Optimal nutrition therapy  Outcome: Ongoing     Problem: Confusion - Acute:  Goal: Absence of continued neurological deterioration signs and symptoms  Description: Absence of continued neurological deterioration signs and symptoms  Outcome: Ongoing  Goal: Mental status will be restored to baseline  Description: Mental status will be restored to baseline  Outcome: Ongoing     Problem: Discharge Planning:  Goal: Ability to perform activities of daily living will improve  Description: Ability to perform activities of daily living will improve  Outcome: Ongoing  Goal: Participates in care planning  Description: Participates in care planning  Outcome: Ongoing Problem: Injury - Risk of, Physical Injury:  Goal: Will remain free from falls  Description: Will remain free from falls  Outcome: Ongoing  Goal: Absence of physical injury  Description: Absence of physical injury  Outcome: Ongoing     Problem: Mood - Altered:  Goal: Mood stable  Description: Mood stable  Outcome: Ongoing  Goal: Absence of abusive behavior  Description: Absence of abusive behavior  Outcome: Ongoing  Goal: Verbalizations of feeling emotionally comfortable while being cared for will increase  Description: Verbalizations of feeling emotionally comfortable while being cared for will increase  Outcome: Ongoing     Problem: Psychomotor Activity - Altered:  Goal: Absence of psychomotor disturbance signs and symptoms  Description: Absence of psychomotor disturbance signs and symptoms  Outcome: Ongoing     Problem: Sensory Perception - Impaired:  Goal: Demonstrations of improved sensory functioning will increase  Description: Demonstrations of improved sensory functioning will increase  Outcome: Ongoing  Goal: Decrease in sensory misperception frequency  Description: Decrease in sensory misperception frequency  Outcome: Ongoing  Goal: Able to refrain from responding to false sensory perceptions  Description: Able to refrain from responding to false sensory perceptions  Outcome: Ongoing  Goal: Demonstrates accurate environmental perceptions  Description: Demonstrates accurate environmental perceptions  Outcome: Ongoing  Goal: Able to distinguish between reality-based and nonreality-based thinking  Description: Able to distinguish between reality-based and nonreality-based thinking  Outcome: Ongoing  Goal: Able to interrupt nonreality-based thinking  Description: Able to interrupt nonreality-based thinking  Outcome: Ongoing     Problem: Sleep Pattern Disturbance:  Goal: Appears well-rested  Description: Appears well-rested  Outcome: Ongoing

## 2021-02-16 NOTE — PROGRESS NOTES
ICU Progress Note    Admit Date: 2/2/2021  ICU Day:15  Vent Day:   IV Access:Peripheral  IV Fluids:None. FEN: Currently NPO. Restart TF goal rate 35 cc/hr, free water 75 cc q4h  Sedation:None   Vasopressors:None                Antibiotics: Vancomycin and Zosyn   Diet: Diet NPO, After Midnight    CC: R MCA stroke    Interval history:   Pt remains NPO in preparation for possible Tracheostomy. Palliative care is planning a discussion w/ family regarding goals of care. Tracheostomy will be dependent on the decisions made in that discussion. Pt has been weaned from all sedation. Remains somnolent but does open eyes to touch. HPI: Ben Anders a 79 y.o. female, with a history of afib (previously on Xarelto, d/c'd due to recent GI bleed 1/31), HF, DM, asthma, pulm HTN (not oxygen dependent), HTN, HLD, EDDY on cpap, recent GI bleed breast cancer s/p lumpectomy/radiation, colon polyps, obesity who presented with left-sided hemiplegia, severe expressive aphasia and dysarthria, right facial droop, and right-gaze deviation. She underwent M2 thrombectomy and thrombolysis with significant improvement of hemiplegia, but still with other aforementioned symptoms. Was admitted to ICU on 2/2. On 2/3, CT head done for neuro status change of pt not lifting or holding up left arm as previously, revealed acute hemorrhagic conversion of new large acute parenchymal hemorrhage in the right cerebral hemisphere. On 2/3, cardene drip started for BP control. On 2/4, IVC filter placed for acute LLE DVT, given contraindications for anticoagulation. PEG tube placed on 2/9 and was started on TF 2/11. She had a rapid response called on 2/12 on the floors for hypoxic respiratory failure. She was intubated and transferred back to the ICU. CXR identified worsening basilar infiltrates. It is suspected she had aspiration pneumonitis due to inability to protect airway.      Medications:     Scheduled Meds:  lidocaine 1 % injection  5 mL Intradermal Once    sodium chloride flush  10 mL Intravenous 2 times per day    piperacillin-tazobactam  3,375 mg Intravenous Q8H    insulin lispro  0-12 Units Subcutaneous Q4H    sodium chloride flush  10 mL Intravenous 2 times per day    nystatin  5 mL Oral 4x Daily    levetiracetam  1,000 mg Intravenous Q12H    pantoprazole  40 mg Intravenous Daily    ferrous sulfate  300 mg Per G Tube Every Other Day    miconazole   Topical BID    atorvastatin  80 mg Oral Nightly    carvedilol  6.25 mg Oral BID WC     Continuous Infusions:   dexmedetomidine (PRECEDEX) IV infusion Stopped (02/15/21 2012)    [Held by provider] propofol 30 mcg/kg/min (02/15/21 1330)    dextrose       PRN Meds:sodium chloride flush, sodium chloride flush, glucose, dextrose, glucagon (rDNA), dextrose, perflutren lipid microspheres, labetalol, albuterol sulfate HFA, hydrALAZINE, acetaminophen, promethazine **OR** ondansetron, polyethylene glycol    Objective:   Vitals:   T-max:  Patient Vitals for the past 8 hrs:   BP Temp Temp src Pulse Resp SpO2 Height   02/16/21 0630 (!) 149/71   59 15     02/16/21 0600 (!) 130/90   73 17     02/16/21 0530 112/70   72 17     02/16/21 0500 137/88   67 20     02/16/21 0450    73 13 98 % 5' 7\" (1.702 m)   02/16/21 0430 137/87   69 13     02/16/21 0400 126/65 98.3 °F (36.8 °C) Axillary 71 13 98 %    02/16/21 0330 131/70   67 16     02/16/21 0050    58 16 95 % 5' 7\" (1.702 m)       Intake/Output Summary (Last 24 hours) at 2/16/2021 1805  Last data filed at 2/16/2021 0441  Gross per 24 hour   Intake 1012 ml   Output 1400 ml   Net -388 ml       Access:   -Peripheral Access Day#:3                               Nagel Day#:9  NGT Day#: 6                                            ETT Day#:1  Vent Settings: Vent Mode: AC/PRVC Rate Set: 12 bmp/Vt Ordered: 500 mL/ /FiO2 : 30 %  -pt currently does not have nagel in place     Physical Exam Constitutional:       Interventions: She is sedated and intubated. HENT:      Head: Normocephalic and atraumatic. Mouth/Throat:      Pharynx: Oropharyngeal exudate present. No posterior oropharyngeal erythema. Comments: Presence of white exudate near ET   Eyes:      General:         Right eye: No discharge. Left eye: No discharge. Conjunctiva/sclera: Conjunctivae normal.      Pupils: Pupils are equal, round, and reactive to light. Cardiovascular:      Rate and Rhythm: Normal rate. Rhythm irregular. Heart sounds: Normal heart sounds. No murmur. No friction rub. No gallop. Comments: No BLLE edema noted. Pt in Afib at time of exam     Pulmonary:      Effort: She is intubated. Breath sounds: Normal breath sounds. No wheezing or rales. Abdominal:      General: Abdomen is flat. There is no distension. Palpations: Abdomen is soft. Tenderness: There is no abdominal tenderness. There is no guarding. Comments: PEG tube C/DI  Abdomen soft, NT, non-distended   Musculoskeletal:      Right lower leg: No edema. Left lower leg: No edema. Skin:     General: Skin is warm and dry. Neurological:      Mental Status: Mental status is at baseline. Comments: Pt opens eyes to touch. Sedation has been removed, unable to respond to verbal commands. LABS:    CBC:   Recent Labs     02/14/21  0417 02/15/21  0430 02/16/21  0341   WBC 6.1 6.0 5.9   HGB 8.5* 8.7* 8.5*   HCT 28.0* 28.6* 27.7*    160 189   MCV 81.9 81.8 81.4     Renal:    Recent Labs     02/14/21  0417 02/15/21  0430 02/16/21  0341    138 139   K 3.5 4.0 3.8    99 98*   CO2 34* 34* 33*   BUN 15 12 10   CREATININE <0.5* <0.5* <0.5*   GLUCOSE 172* 131* 138*   CALCIUM 8.7 8.7 8.9   MG 1.60*  --   --    ANIONGAP 6 5 8     Hepatic: No results for input(s): AST, ALT, BILITOT, BILIDIR, PROT, LABALBU, ALKPHOS in the last 72 hours. Troponin: No results for input(s): TROPONINI in the last 72 hours. BNP: No results for input(s): BNP in the last 72 hours. Lipids: No results for input(s): CHOL, HDL in the last 72 hours. Invalid input(s): LDLCALCU, TRIGLYCERIDE  ABGs:    No results for input(s): PHART, HLM7OFO, PO2ART, VAI9YJL, BEART, THGBART, W4HLOLGD, YMM0KWY in the last 72 hours. INR: No results for input(s): INR in the last 72 hours. Lactate: No results for input(s): LACTATE in the last 72 hours. Cultures:  -----------------------------------------------------------------  RAD:   CT HEAD WO CONTRAST   Final Result      XR CHEST PORTABLE   Final Result      Persistent bilateral infiltrates         XR CHEST PORTABLE   Final Result   Impression:          1. Increased opacities in the left lung base since the prior study. There may be a small left pleural effusion. 2. Persistent patchy right lung opacities. CT HEAD WO CONTRAST   Final Result   Interval progression of vasogenic edema at the right middle cerebral artery hemorrhagic infarction and interval development of right to left midline shift of about 3 mm as described above. Apparent slight decrease in the size of hemorrhage in its AP dimension. XR CHEST 1 VIEW   Final Result   1. Interval extubation. 2. Progression of bilateral airspace disease. MRI BRAIN WO CONTRAST   Final Result      Region of right middle cerebral artery hemorrhagic infarction measures approximately 5.0 x 4.2 cm with surrounding mild vasogenic edema and diffusion restriction consistent with right middle cerebral artery distribution infarct with hemorrhagic    components      No additional areas of hemorrhage or infarction identified. Mild local mass effect is identified with very minimal midline shift of 3 to 4 mm      XR CHEST PORTABLE   Final Result      Persistent bilateral infiltrates and cardiomegaly.          IR GUIDED IVC FILTER PLACEMENT   Final Result   Impression: Successful placement of retrievable IVC filter. CT HEAD WO CONTRAST   Final Result      1. Hemorrhagic transformation of right MCA infarct, without significant change. No new hemorrhage. CT head without contrast   Final Result         1. Stable large intraparenchymal hemorrhage located within the right posterior temporal parietal lobe with extension into the adjacent insula. There is also subarachnoid extension of hemorrhage into the overlying sulci as well as the right sylvian    fissure. Findings result in some mild right to left subfalcine herniation. 2.There is a 1.3 x 1.1 cm extra-axial mass identified within the left ordered aspect of the foramen magnum. This results in some mass effect on the adjacent medulla. This is without change from the prior study. Finding may represent a meningioma. This    could be further evaluated with a contrast-enhanced MRI of the head following resolution of the intracranial hemorrhage. CT HEAD WO CONTRAST   Final Result      1. Stable large intraparenchymal hemorrhage located within the right posterior temporal parietal lobe with extension into the adjacent insula. There is also subarachnoid extension of hemorrhage into the overlying sulci as well as the right sylvian    fissure. Findings result in some mild right to left subfalcine herniation. 2.There is a 1.3 x 1.1 cm extra-axial mass identified within the left ordered aspect of the foramen magnum. This results in some mass effect on the adjacent medulla. This is without change from the prior study. Finding may represent a meningioma. This    could be further evaluated with a contrast-enhanced MRI of the head following resolution of the intracranial hemorrhage.       VL Extremity Venous Bilateral   Final Result      CT HEAD WO CONTRAST   Final Result Post therapy large acute parenchymal hemorrhage in the right cerebral hemisphere with subarachnoid component. Extensive surrounding edema is associated with 4 cm leftward shift of midline structures. Critical finding of acute intracranial hemorrhage was called to Jacinto White of the neurosurgery department at 12:25 PM on 2/3/2021, with instructions to contact patient's attending physician with these results. CT Brain Perfusion   Final Result      1. Hypoperfusion in the right MCA territory with a central ischemic core of 8 mL, total volume of hypoperfusion of 79 mL and a penumbra of 71 mL. XR CHEST PORTABLE   Final Result   1. Limited evaluation because of patient rotation and underpenetration of the film. 2. No dense airspace consolidation. 3. Probable mild cardiomegaly. CTA HEAD NECK W CONTRAST   Final Result   1. Carotid and vertebral arteries are patent and without stenosis or acute abnormality. 2. Incidental partial imaging of the a segmental pulmonary embolism in the right middle lobe. 3. Partial imaging of small right greater than left pleural effusions. 4. Previous left suboccipital craniotomy with a residual 1.6 cm left-sided extra-axial mass at the foramen magnum with mass effect on the cord at the cervical medullary junction favored to represent meningioma. Recommend correlation with clinical history    and previous imaging. 5. Incidental multinodular goiter. CTA HEAD:      FINDINGS: The internal carotid arteries are patent and normal in caliber through the skull base. The middle cerebral arteries are patent. 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 The A2 and A3 segments of the ACAs are patent bilaterally. Left MCA is patent. There is focal occlusion of an M2 branch of the right MCA (series 2 images 427 and 428). However, other M2 branches and M3 branches in the right sylvian fissure are patent. The M1 segment of the right MCA is patent. The posterior cerebral arteries are    patent bilaterally. The basilar artery is patent and normal in caliber. It is supplied by the left vertebral artery. The small nondominant right vertebral artery terminates in right sided PICA. IMPRESSION:   1. 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 2. 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 Results were discussed with Dr. Kirti Curry at 8:00 PM on 2/2/2021. CT HEAD WO CONTRAST   Final Result   1. Previous left suboccipital craniotomy with a partially calcified extra-axial mass at the left foramen magnum measuring 1.8 cm contacting the cord at the cervicomedullary junction with some degree of mass effect on the cord at the foramen magnum. This    most likely represents residual/recurrent meningioma or other extra-axial mass. Correlation with patient history and previous imaging recommended. 2. Mild global volume loss and mild chronic small vessel ischemic disease in the white matter. 3. No acute intracranial hemorrhage. IR ANGIOGRAM CAROTID CEREBRAL RIGHT    (Results Pending)         Assessment/Plan:   78 yo F with PMHx afib, UGIB, recent MCA stroke with hemorrhagic conversion post-thrombectomy, now with respiratory failure. Acute hypoxic respiratory failure 2/2 suspect aspiration pneumonitis  Chest x-ray shows RLL opacities. Patient is s/p R MCA stroke, s/p thrombectomy with thrombolytics. -Patient is currently intubated, sedated with propofol  -resp cultures positive for serratia  -Zosyn (first dose 2/12) appropriate for culture results (Serratia)  - on minimal vent settings: FiO2 30%/RR 12//PEEP 5  -discussions regarding tracheostomy are ongoing.  FU w/ recs from palliative care R MCA stroke, s/p thrombectomy and thrombolysis  - CT 2/4 showed hemorrhagic transformation of right MCA infarct, repeat stable  - MRI w/ findings as above  - S/p PEG tube placement 02/9/21  - keppra 1000 mg IV BID per neuro  - lipitor 80 mg PO qD  - holding antiplatelets due to hemorrhagic conversion  - will need neurosurgery clearance to resume a/c for afib   - s/p PEG. Plan for [facility] prior to [facility]  -resume tube feeds EN @25 ml/hr and increase by 10 ml Q4 hrs to goal rate of 35 ml/hr & 75 ml water bolus every   4 hour per dietary recs    -NPO 2/16 for possible trach placement   - palliative care following    Intraparenchymal hemorrhage   -CT 02/04   -stable  -keep SBP < 160.  Off nicardipine drip.  Coreg 6.25mg BID   -Holding anticoagulation and antiplatelets       Hypernatremia, resolved  -Likely secondary to decreased oral intake/dehydration  -Nephrology following  -Cont Water boluses with tube feeds   -Monitor sodium daily      Seizures  -Likely 2/2 above   -Routine EEG positive for epileptiform discharges  -Keppra [id number] BID     Metabolic encephalopathy:  -Likely multifactorial, secondary to above   -Management as above     Oral thrush:  -continued presence of oral film/exudate  -Nystatin first dose 2/11s     Acute blood loss anemia  -Protonix BID     Chronic systolic heart failure  -most recent echo 2/4/2020 with EF 55-60%, indeterminate diastolic function, PASP 53 mmHg, dilated IVC and collapses <50%  -spot doses of lasix as needed  -takes lasix 40 mg PO qD coreg 6.25 mg PO qD, diltiazem 180 mg PO qD and losartan 25 mg PO qD, will ask pharmacy to do med rec, as unsure if patient is taking these at home     Segmental PE  -incidental finding  -No AC due to above  -IVC filter placed 2/4     Atrial fibrillation  -Holding AC, will need neurosurg clearance prior to resuming  -monitor HR  -carvedilol 6.25 mg BID      T2DM  -mealtime and correctional insulin     HTN  -as above     Asthma -breathing treatments PRN        Code Status:Full Code  FEN: Diet NPO, After Midnight  PPX:  SCDs  DISPO: ICU    WanderDixon Chong  02/16/21  8:21 AM

## 2021-02-16 NOTE — PROGRESS NOTES
Patient remains on vent, tolerating settings well. Off all sedation at this time. Pt will open eyes and localize to physical stimuli but is not following commands at this time. A-fib per monitor, rate controlled. BP WNL at this time. PEG tube clamped. Will continue to monitor closely.

## 2021-02-16 NOTE — CARE COORDINATION
Tracheostomy today     [facility] continues to follow and will begin precert when pt is closer to being medically ready for d/c. Family agrees with d/c plan to [facility].      Electronically signed by Destiny Laureano RN on 2/16/2021 at 4:38 PM  [phone]

## 2021-02-16 NOTE — PROGRESS NOTES
Respiratory Therapy Bronchoscopy Assist      Name:  Katt Pires  Medical Record Number:  [id number]  Age: 79 y.o. Gender: female  : 1940  Today's Date:  2021  Room:  [address] [place] [address]      RT present during bronchoscopy and trach placement assist with patient on 100%. Sterile field maintained throughout procedure. Patient was pre-sedated. clear/bloody/cloudy:81562} fluid obtained. Pt tolerated procedure well. Patient SpO2 within acceptable range throughout bronchscopy procedure. Physician assisted with bronch from 1630PM/AM to [id number] PM/AM without complications.  Bronchoscope ID: endoscopy [id number]    Patient/caregiver was educated on the proper method of use:  No: pt sedated for procedure      Level of patient/caregiver understanding able to:   [] Verbalize understanding   [] Demonstrate understanding       [] Teach back        [] Needs reinforcement       []  No available caregiver               []  Other:     Response to education:  pt sedated for procedure     Electronically signed by Zoe IDALIA Winter on 2021 at 5:42 PM

## 2021-02-16 NOTE — PROGRESS NOTES
Pt having episodes of Bradycardia, dropping HR into the 30's. Team notified. Precedex stopped.    Pablo Fontaine

## 2021-02-16 NOTE — PROGRESS NOTES
Interval History:  Plan for trach today.     Current Medications:    Current Facility-Administered Medications:     enoxaparin (LOVENOX) injection 40 mg, 40 mg, Subcutaneous, Daily, Nura Lewis MD, 40 mg at 02/16/21 1206    dexmedetomidine (PRECEDEX) 400 mcg in sodium chloride 0.9 % 100 mL infusion, 0.2 mcg/kg/hr, Intravenous, Continuous, Zeenat Peterson MD, Stopped at 02/15/21 2012    lidocaine PF 1 % injection 5 mL, 5 mL, Intradermal, Once, Rosemary Stahl MD    sodium chloride flush 0.9 % injection 10 mL, 10 mL, Intravenous, 2 times per day, Rosemary Stahl MD, 10 mL at 02/16/21 0908    sodium chloride flush 0.9 % injection 10 mL, 10 mL, Intravenous, PRN, Rosemary Stahl MD    [Held by provider] propofol injection, 5-50 mcg/kg/min, Intravenous, Titrated, Nura Lewis MD, Last Rate: 17.9 mL/hr at 02/15/21 1330, 30 mcg/kg/min at 02/15/21 1330    piperacillin-tazobactam (ZOSYN) 3,375 mg in dextrose 5 % 100 mL IVPB extended infusion (mini-bag), 3,375 mg, Intravenous, Q8H, Nura Lewis MD, Last Rate: 25 mL/hr at 02/16/21 1206, 3,375 mg at 02/16/21 1206    insulin lispro (1 Unit Dial) 0-12 Units, 0-12 Units, Subcutaneous, Q4H, Nura Lewis MD, Stopped at 02/15/21 1925    sodium chloride flush 0.9 % injection 10 mL, 10 mL, Intravenous, 2 times per day, Nura Lewis MD, 10 mL at 02/16/21 1207    sodium chloride flush 0.9 % injection 10 mL, 10 mL, Intravenous, PRN, Nura Lewis MD    nystatin (MYCOSTATIN) 600675 UNIT/ML suspension 500,000 Units, 5 mL, Oral, 4x Daily, Nain Madison MD, 500,000 Units at 02/16/21 1206    levETIRAcetam (KEPPRA) 1,000 mg in sodium chloride 0.9 % 100 mL IVPB, 1,000 mg, Intravenous, Q12H, SU Palomino - CNP, Stopped at 02/16/21 0920    pantoprazole (PROTONIX) injection 40 mg, 40 mg, Intravenous, Daily, Andreina Medeiros MD, 40 mg at 02/16/21 6325   ferrous sulfate 300 (60 Fe) MG/5ML syrup 300 mg, 300 mg, Per G Tube, Every Other Day, Andreina Medeiros MD, 300 mg at 02/15/21 1016    glucose (GLUTOSE) 40 % oral gel 15 g, 15 g, Oral, PRN, Lorrie Garcia MD    dextrose 50 % IV solution, 12.5 g, Intravenous, PRN, Lorrie Garcia MD    glucagon (rDNA) injection 1 mg, 1 mg, Intramuscular, PRN, Lorrie Garcia MD    dextrose 5 % solution, 100 mL/hr, Intravenous, PRN, Lorrie Garcia MD    miconazole (MICOTIN) 2 % powder, , Topical, BID, Lorrie Garcia MD, Given at 02/16/21 0906    perflutren lipid microspheres (DEFINITY) injection 1.65 mg, 1.5 mL, Intravenous, ONCE PRN, Lorrie Garcia MD    labetalol (NORMODYNE;TRANDATE) injection 10 mg, 10 mg, Intravenous, Q4H PRN, Lorrie Garcia MD, 10 mg at 02/12/21 0726    albuterol sulfate  (90 Base) MCG/ACT inhaler 2 puff, 2 puff, Inhalation, PRN, Lorrie Garcia MD    hydrALAZINE (APRESOLINE) injection 5 mg, 5 mg, Intravenous, Q6H PRN, Lorrie Garcia MD, 5 mg at 02/09/21 0800    acetaminophen (TYLENOL) tablet 650 mg, 650 mg, Oral, Q4H PRN, Lorrie Garcia MD    promethazine (PHENERGAN) tablet 12.5 mg, 12.5 mg, Oral, Q6H PRN **OR** ondansetron (ZOFRAN) injection 4 mg, 4 mg, Intravenous, Q6H PRN, Lorrie Garcia MD    polyethylene glycol [facility]) packet 17 g, 17 g, Oral, Daily PRN, Lorrie Garcia MD    atorvastatin (LIPITOR) tablet 80 mg, 80 mg, Oral, Nightly, Lorrie Garcia MD, 80 mg at 02/15/21 2048    carvedilol (COREG) tablet 6.25 mg, 6.25 mg, Oral, BID WC, Nura Lewis MD, 6.25 mg at 02/16/21 9805      Physical Exam  Constitutional  BP (!) 150/60   Pulse 68   Temp 99.1 °F (37.3 °C) (Axillary)   Resp 12   Ht 5' 7\" (1.702 m)   Wt 249 lb 9 oz (113.2 kg)   SpO2 96%   BMI 39.09 kg/m²       Constitutional    Vital signs: BP, HR, and RR reviewed   General depressed mental status, no distress, well-nourished  Eyes: fundoscopic exam difficult given inability to cooperate Cardiovascular: pulses symmetric in all 4 extremities. No peripheral edema. Psychiatric: limited exam given encephalopathy but not agitated and no signs of hallucinations  Neurologic  Mental status:   Sedated and received RSI for intubation. CN2: Visual Fields: no blink to either side with threat  CN 3,4,6:  extraocular muscles intact with dolls maneuver  CN5: facial sensation limited exam given encephalopathy  CN7:mild left facial but ET tube in situ  CN8: hearing limited exam given encephalopathy  CN9: limited exam given encephalopathy  CN11: limited exam given encephalopathy  CN12: limited exam given encephalopathy  Strength:   Abnormal flexion in RUE to pain  No movement to pain in LUE  Triple flexion to pain in BLE    Sensory: grimace to pain in all 4 extremities. Cerebellar/coordination:limited exam given encephalopathy  Tone: normal in all 4 extremities  Gait: limited exam given encephalopathy and intubated with ventilator. CvEE2021 22:00pm to 08:00am on 2021  SEIZURES:  None  INTERICTAL:  None  PUSHBUTTONS:  None  BACKGROUND:  Abundant generalized 6-7 Hz theta slowing of the background with frequent superimposed 1-2 Hz delta frequencies. EKG:  regular        2021 18:15pm - 22:00pm  SEIZURES:  None  INTERICTAL:  None  PUSHBUTTONS:  None  BACKGROUND:  Abundant generalized 6-7 Hz theta slowing of the background with frequent superimposed 1-2 Hz delta frequencies. EKG:  regular     CLINICAL INTERPRETATION:  This was an abnormal tracing for age and state due to a mild to moderate generalized slow wave abnormality indicating diffuse cerebral dysfunction which can be of multiple causes, including structural, or vascular abnormalities, toxic/metabolic conditions, hydrocephalus, or postictal conditions. No epileptiform discharge, focal slowing, or seizures were seen during this recording. Clinical correlation is advised.          Routine EEG, 21 This is an abnormal awake and drowsy EEG due to the presence of epileptiform spikes in the right hemispheric leads. No generalized electrographic seizures during this recording. CT head, 2/10/21  Interval progression of vasogenic edema at the right middle cerebral artery hemorrhagic infarction and interval development of right to left midline shift of about 3 mm as described above. Apparent slight decrease in the size of hemorrhage in its AP dimension. ECHO 2/4/2021 - LV size normal, EF 55-60%, elevated PA pressures, No evidence of shunting      Studies:  MRI of brain w/o - 2/7/2021             Ulysses Danes is a 79 y.o. female with a history of afib, who presented with L-hemiparesis, aphasia, dysarthria & R gaze deviation, found to have R M2 segment occlusion, initial NIHSS of 14, not tpa candidate s/p thrombectomy w/ TICI 2b reperfusion. Initially improved NIHSS s/p thrombectomy w/ subsequent decline, repeat head CT showed hemorrhagic transformation, stable on repeat imaging. On empiric Keppra given hemorraghic transformation. Had some clinical improvement initially, however, has been less responsive the past few days. CT head was obtained on 2/10/21 and revealed mild worsening cerebral edema. Routine EEG completed today revealed the presence of epileptiform spikes in the right hemispheric leads. No generalized electrographic seizures . It is possible that seizures are contributing to her encephalopathy. UA also concerning for UTI which may also be contributing to her clinically worsening. Now on antibiotics. CXR also concerning for aspiration pneumononitis. Long discussion with pt's daughter, Gatito Licona, regarding expected deficits from stroke. They would like to proceed with trach.      Plan:  -CXR looks like aspiration pneumonitis, likely cannot protect airway and will need trach  -Family wants to be aggressive with care so plan on trach and placement -Keppra 1000mg IV BID  - Continue high intensity statin  - Continue to hold antiplatelets at this time d/t hemorrhagic transformation  - Patient w/ hx of Afib - will need anticoagulation resumed, likely in 2-3 weeks from time of stroke (after clearance from neurosurgery)   - SCDs for DVT prophylaxis  - PT/OT/ST, rehab as able  - Follow up with Neurology shortly after discharge       [facility]

## 2021-02-17 LAB
ANION GAP SERPL CALCULATED.3IONS-SCNC: 6 MMOL/L (ref 3–16)
BASOPHILS ABSOLUTE: 0 K/UL (ref 0–0.2)
BASOPHILS RELATIVE PERCENT: 0.7 %
BUN BLDV-MCNC: 10 MG/DL (ref 7–20)
CALCIUM SERPL-MCNC: 8.5 MG/DL (ref 8.3–10.6)
CHLORIDE BLD-SCNC: 94 MMOL/L (ref 99–110)
CO2: 33 MMOL/L (ref 21–32)
CREAT SERPL-MCNC: 0.5 MG/DL (ref 0.6–1.2)
EOSINOPHILS ABSOLUTE: 0.1 K/UL (ref 0–0.6)
EOSINOPHILS RELATIVE PERCENT: 1.8 %
GFR AFRICAN AMERICAN: >60
GFR NON-AFRICAN AMERICAN: >60
GLUCOSE BLD-MCNC: 143 MG/DL (ref 70–99)
GLUCOSE BLD-MCNC: 147 MG/DL (ref 70–99)
GLUCOSE BLD-MCNC: 153 MG/DL (ref 70–99)
GLUCOSE BLD-MCNC: 158 MG/DL (ref 70–99)
GLUCOSE BLD-MCNC: 159 MG/DL (ref 70–99)
GLUCOSE BLD-MCNC: 169 MG/DL (ref 70–99)
GLUCOSE BLD-MCNC: 173 MG/DL (ref 70–99)
HCT VFR BLD CALC: 26.6 % (ref 36–48)
HEMOGLOBIN: 8.2 G/DL (ref 12–16)
LYMPHOCYTES ABSOLUTE: 0.8 K/UL (ref 1–5.1)
LYMPHOCYTES RELATIVE PERCENT: 13.8 %
MAGNESIUM: 2.1 MG/DL (ref 1.8–2.4)
MCH RBC QN AUTO: 24.9 PG (ref 26–34)
MCHC RBC AUTO-ENTMCNC: 30.9 G/DL (ref 31–36)
MCV RBC AUTO: 80.5 FL (ref 80–100)
MONOCYTES ABSOLUTE: 0.7 K/UL (ref 0–1.3)
MONOCYTES RELATIVE PERCENT: 11.7 %
NEUTROPHILS ABSOLUTE: 4.1 K/UL (ref 1.7–7.7)
NEUTROPHILS RELATIVE PERCENT: 72 %
PDW BLD-RTO: 25.5 % (ref 12.4–15.4)
PERFORMED ON: ABNORMAL
PLATELET # BLD: 189 K/UL (ref 135–450)
PMV BLD AUTO: 10.3 FL (ref 5–10.5)
POTASSIUM REFLEX MAGNESIUM: 3.2 MMOL/L (ref 3.5–5.1)
RBC # BLD: 3.3 M/UL (ref 4–5.2)
SODIUM BLD-SCNC: 133 MMOL/L (ref 136–145)
WBC # BLD: 5.7 K/UL (ref 4–11)

## 2021-02-17 PROCEDURE — 6370000000 HC RX 637 (ALT 250 FOR IP): Performed by: COUNSELOR

## 2021-02-17 PROCEDURE — 6370000000 HC RX 637 (ALT 250 FOR IP): Performed by: INTERNAL MEDICINE

## 2021-02-17 PROCEDURE — 6370000000 HC RX 637 (ALT 250 FOR IP): Performed by: STUDENT IN AN ORGANIZED HEALTH CARE EDUCATION/TRAINING PROGRAM

## 2021-02-17 PROCEDURE — 85025 COMPLETE CBC W/AUTO DIFF WBC: CPT

## 2021-02-17 PROCEDURE — 2580000003 HC RX 258: Performed by: NURSE PRACTITIONER

## 2021-02-17 PROCEDURE — C9113 INJ PANTOPRAZOLE SODIUM, VIA: HCPCS | Performed by: STUDENT IN AN ORGANIZED HEALTH CARE EDUCATION/TRAINING PROGRAM

## 2021-02-17 PROCEDURE — 94761 N-INVAS EAR/PLS OXIMETRY MLT: CPT

## 2021-02-17 PROCEDURE — 2500000003 HC RX 250 WO HCPCS: Performed by: STUDENT IN AN ORGANIZED HEALTH CARE EDUCATION/TRAINING PROGRAM

## 2021-02-17 PROCEDURE — 2580000003 HC RX 258: Performed by: STUDENT IN AN ORGANIZED HEALTH CARE EDUCATION/TRAINING PROGRAM

## 2021-02-17 PROCEDURE — 36415 COLL VENOUS BLD VENIPUNCTURE: CPT

## 2021-02-17 PROCEDURE — 99233 SBSQ HOSP IP/OBS HIGH 50: CPT | Performed by: INTERNAL MEDICINE

## 2021-02-17 PROCEDURE — 6360000002 HC RX W HCPCS: Performed by: NURSE PRACTITIONER

## 2021-02-17 PROCEDURE — 6360000002 HC RX W HCPCS: Performed by: STUDENT IN AN ORGANIZED HEALTH CARE EDUCATION/TRAINING PROGRAM

## 2021-02-17 PROCEDURE — 83735 ASSAY OF MAGNESIUM: CPT

## 2021-02-17 PROCEDURE — 94003 VENT MGMT INPAT SUBQ DAY: CPT

## 2021-02-17 PROCEDURE — 2000000000 HC ICU R&B

## 2021-02-17 PROCEDURE — 80048 BASIC METABOLIC PNL TOTAL CA: CPT

## 2021-02-17 PROCEDURE — 2700000000 HC OXYGEN THERAPY PER DAY

## 2021-02-17 PROCEDURE — 2500000003 HC RX 250 WO HCPCS: Performed by: COUNSELOR

## 2021-02-17 PROCEDURE — 6360000002 HC RX W HCPCS: Performed by: COUNSELOR

## 2021-02-17 RX ORDER — HYDRALAZINE HYDROCHLORIDE 20 MG/ML
5 INJECTION INTRAMUSCULAR; INTRAVENOUS ONCE
Status: COMPLETED | OUTPATIENT
Start: 2021-02-17 | End: 2021-02-17

## 2021-02-17 RX ORDER — POTASSIUM CHLORIDE 7.45 MG/ML
10 INJECTION INTRAVENOUS
Status: COMPLETED | OUTPATIENT
Start: 2021-02-17 | End: 2021-02-17

## 2021-02-17 RX ORDER — LABETALOL HYDROCHLORIDE 5 MG/ML
10 INJECTION, SOLUTION INTRAVENOUS ONCE
Status: COMPLETED | OUTPATIENT
Start: 2021-02-17 | End: 2021-02-17

## 2021-02-17 RX ADMIN — PIPERACILLIN AND TAZOBACTAM 3375 MG: 3; .375 INJECTION, POWDER, LYOPHILIZED, FOR SOLUTION INTRAVENOUS at 03:10

## 2021-02-17 RX ADMIN — LEVETIRACETAM 1000 MG: 100 INJECTION, SOLUTION INTRAVENOUS at 09:19

## 2021-02-17 RX ADMIN — NYSTATIN 500000 UNITS: 100000 SUSPENSION ORAL at 14:26

## 2021-02-17 RX ADMIN — PROPOFOL 20 MCG/KG/MIN: 10 INJECTION, EMULSION INTRAVENOUS at 08:07

## 2021-02-17 RX ADMIN — CARVEDILOL 6.25 MG: 6.25 TABLET, FILM COATED ORAL at 16:41

## 2021-02-17 RX ADMIN — PIPERACILLIN AND TAZOBACTAM 3375 MG: 3; .375 INJECTION, POWDER, LYOPHILIZED, FOR SOLUTION INTRAVENOUS at 19:50

## 2021-02-17 RX ADMIN — POTASSIUM CHLORIDE 10 MEQ: 7.46 INJECTION, SOLUTION INTRAVENOUS at 08:02

## 2021-02-17 RX ADMIN — LABETALOL HYDROCHLORIDE 10 MG: 5 INJECTION, SOLUTION INTRAVENOUS at 20:56

## 2021-02-17 RX ADMIN — CARVEDILOL 6.25 MG: 6.25 TABLET, FILM COATED ORAL at 08:09

## 2021-02-17 RX ADMIN — NYSTATIN 500000 UNITS: 100000 SUSPENSION ORAL at 16:41

## 2021-02-17 RX ADMIN — MICONAZOLE NITRATE: 20 POWDER TOPICAL at 20:56

## 2021-02-17 RX ADMIN — INSULIN LISPRO 2 UNITS: 100 INJECTION, SOLUTION INTRAVENOUS; SUBCUTANEOUS at 03:15

## 2021-02-17 RX ADMIN — HYDRALAZINE HYDROCHLORIDE 5 MG: 20 INJECTION INTRAMUSCULAR; INTRAVENOUS at 03:10

## 2021-02-17 RX ADMIN — ENOXAPARIN SODIUM 40 MG: 40 INJECTION SUBCUTANEOUS at 08:09

## 2021-02-17 RX ADMIN — Medication 10 ML: at 20:56

## 2021-02-17 RX ADMIN — INSULIN LISPRO 2 UNITS: 100 INJECTION, SOLUTION INTRAVENOUS; SUBCUTANEOUS at 06:33

## 2021-02-17 RX ADMIN — Medication 10 ML: at 20:15

## 2021-02-17 RX ADMIN — INSULIN LISPRO 2 UNITS: 100 INJECTION, SOLUTION INTRAVENOUS; SUBCUTANEOUS at 23:15

## 2021-02-17 RX ADMIN — MINERAL SUPPLEMENT IRON 300 MG / 5 ML STRENGTH LIQUID 100 PER BOX UNFLAVORED 300 MG: at 08:09

## 2021-02-17 RX ADMIN — HYDRALAZINE HYDROCHLORIDE 5 MG: 20 INJECTION INTRAMUSCULAR; INTRAVENOUS at 20:00

## 2021-02-17 RX ADMIN — NYSTATIN 500000 UNITS: 100000 SUSPENSION ORAL at 20:15

## 2021-02-17 RX ADMIN — POTASSIUM CHLORIDE 10 MEQ: 7.46 INJECTION, SOLUTION INTRAVENOUS at 07:01

## 2021-02-17 RX ADMIN — POTASSIUM CHLORIDE 10 MEQ: 7.46 INJECTION, SOLUTION INTRAVENOUS at 11:32

## 2021-02-17 RX ADMIN — PIPERACILLIN AND TAZOBACTAM 3375 MG: 3; .375 INJECTION, POWDER, LYOPHILIZED, FOR SOLUTION INTRAVENOUS at 11:47

## 2021-02-17 RX ADMIN — INSULIN LISPRO 2 UNITS: 100 INJECTION, SOLUTION INTRAVENOUS; SUBCUTANEOUS at 11:48

## 2021-02-17 RX ADMIN — HYDRALAZINE HYDROCHLORIDE 5 MG: 20 INJECTION INTRAMUSCULAR; INTRAVENOUS at 11:48

## 2021-02-17 RX ADMIN — NYSTATIN 500000 UNITS: 100000 SUSPENSION ORAL at 08:09

## 2021-02-17 RX ADMIN — LEVETIRACETAM 1000 MG: 100 INJECTION, SOLUTION INTRAVENOUS at 20:56

## 2021-02-17 RX ADMIN — POTASSIUM CHLORIDE 10 MEQ: 7.46 INJECTION, SOLUTION INTRAVENOUS at 09:46

## 2021-02-17 RX ADMIN — INSULIN LISPRO 2 UNITS: 100 INJECTION, SOLUTION INTRAVENOUS; SUBCUTANEOUS at 19:49

## 2021-02-17 RX ADMIN — PANTOPRAZOLE SODIUM 40 MG: 40 INJECTION, POWDER, FOR SOLUTION INTRAVENOUS at 08:09

## 2021-02-17 RX ADMIN — PROPOFOL 25 MCG/KG/MIN: 10 INJECTION, EMULSION INTRAVENOUS at 01:30

## 2021-02-17 RX ADMIN — INSULIN LISPRO 2 UNITS: 100 INJECTION, SOLUTION INTRAVENOUS; SUBCUTANEOUS at 16:53

## 2021-02-17 RX ADMIN — HYDRALAZINE HYDROCHLORIDE 5 MG: 20 INJECTION, SOLUTION INTRAMUSCULAR; INTRAVENOUS at 06:29

## 2021-02-17 RX ADMIN — ATORVASTATIN CALCIUM 80 MG: 40 TABLET, FILM COATED ORAL at 20:14

## 2021-02-17 RX ADMIN — POTASSIUM BICARBONATE 20 MEQ: 782 TABLET, EFFERVESCENT ORAL at 14:25

## 2021-02-17 RX ADMIN — MICONAZOLE NITRATE: 20 POWDER TOPICAL at 08:10

## 2021-02-17 RX ADMIN — LABETALOL HYDROCHLORIDE 10 MG: 5 INJECTION, SOLUTION INTRAVENOUS at 23:51

## 2021-02-17 ASSESSMENT — PAIN SCALES - GENERAL
PAINLEVEL_OUTOF10: 0

## 2021-02-17 ASSESSMENT — PULMONARY FUNCTION TESTS
PIF_VALUE: 25
PIF_VALUE: 24
PIF_VALUE: 25
PIF_VALUE: 24

## 2021-02-17 NOTE — CARE COORDINATION
Tracheostomy placed 02/16    [facility] following but will need fiO2 at 50%, peep less than 10 and will need to be off propofol for 24 hours. Will need [employer] precert for placement. Family in agreement with this plan.      Electronically signed by Gurjit Arrieta RN on 2/17/2021 at 11:02 AM  [phone]

## 2021-02-17 NOTE — PROGRESS NOTES
Palliative Care Chart Review  and Check in Note:     NAME:  Oralia Quezada  Admit Date: 2/2/2021  Hospital Day:  Hospital Day: 16   Current Code status: Full Code    Palliative care is continuing to following Ms. Canales for symptom management,  and goals of care discussion as needed. Patient's chart reviewed today 2/17/21. Saw pt at the bedside, she underwent tracheostomy yesterday. The following are the currently established goals/code status, and Symptom management.      Goals of care: Continue with current management    Code status: Full Code    Discharge plan: Likely LTAC when medically ready for discharge      [address] [place] [address]  [phone]

## 2021-02-17 NOTE — PROGRESS NOTES
ICU Progress Note    Admit Date: 2/2/2021  ICU Day:15  Vent Day:   IV Access:Peripheral  IV Fluids:None. FEN: Currently NPO. Restart TF goal rate 35 cc/hr, free water 75 cc q4h  Sedation:None   Vasopressors:None                Antibiotics: Vancomycin and Zosyn   Diet: DIET TUBE FEED CONTINUOUS/CYCLIC NPO; Low Calorie High Protein (Vital HP); Gastrostomy; Continuous; 25; 35; 24    CC: R MCA stroke    Interval history:   Pt had a successful tracheostomy yesterday (2/17) w/ no acute events. Pt was sedated for procedure w/ fentanyl and propofol. Pt remains on 20 mcg/kg/min of propofol, and is non-responsive to verbal and physical stimuli. Digna Vieyra. HPI: Sangeeta Left a 79 y.o. female, with a history of afib (previously on Xarelto, d/c'd due to recent GI bleed 1/31), HF, DM, asthma, pulm HTN (not oxygen dependent), HTN, HLD, EDDY on cpap, recent GI bleed breast cancer s/p lumpectomy/radiation, colon polyps, obesity who presented with left-sided hemiplegia, severe expressive aphasia and dysarthria, right facial droop, and right-gaze deviation. She underwent M2 thrombectomy and thrombolysis with significant improvement of hemiplegia, but still with other aforementioned symptoms. Was admitted to ICU on 2/2. On 2/3, CT head done for neuro status change of pt not lifting or holding up left arm as previously, revealed acute hemorrhagic conversion of new large acute parenchymal hemorrhage in the right cerebral hemisphere. On 2/3, cardene drip started for BP control. On 2/4, IVC filter placed for acute LLE DVT, given contraindications for anticoagulation. PEG tube placed on 2/9 and was started on TF 2/11. She had a rapid response called on 2/12 on the floors for hypoxic respiratory failure. She was intubated and transferred back to the ICU. CXR identified worsening basilar infiltrates. It is suspected she had aspiration pneumonitis due to inability to protect airway.      Medications:     Scheduled Meds: Comments: Presence of white exudate, on tongue   Eyes:      General:         Right eye: No discharge. Left eye: No discharge. Conjunctiva/sclera: Conjunctivae normal.      Pupils: Pupils are equal, round, and reactive to light. Cardiovascular:      Rate and Rhythm: Normal rate. Rhythm irregular. Heart sounds: Normal heart sounds. No murmur. No friction rub. No gallop. Comments: No BLLE edema noted. Pt in Afib at time of exam     Pulmonary:      Effort: She is intubated. Breath sounds: Normal breath sounds. No wheezing or rales. Abdominal:      General: Abdomen is flat. There is no distension. Palpations: Abdomen is soft. Tenderness: There is no abdominal tenderness. There is no guarding. Comments: PEG tube C/DI  Abdomen soft, NT, non-distended   Musculoskeletal:      Right lower leg: No edema. Left lower leg: No edema. Skin:     General: Skin is warm and dry. Neurological:      Comments: Pt sedated unresponsive to stimuli          LABS:    CBC:   Recent Labs     02/15/21  0430 02/16/21  0341 02/17/21  0333   WBC 6.0 5.9 5.7   HGB 8.7* 8.5* 8.2*   HCT 28.6* 27.7* 26.6*    189 189   MCV 81.8 81.4 80.5     Renal:    Recent Labs     02/15/21  0430 02/16/21  0341 02/17/21  0333    139 133*   K 4.0 3.8 3.2*   CL 99 98* 94*   CO2 34* 33* 33*   BUN 12 10 10   CREATININE <0.5* <0.5* 0.5*   GLUCOSE 131* 138* 147*   CALCIUM 8.7 8.9 8.5   MG  --   --  2.10   ANIONGAP 5 8 6     Hepatic: No results for input(s): AST, ALT, BILITOT, BILIDIR, PROT, LABALBU, ALKPHOS in the last 72 hours. Troponin: No results for input(s): TROPONINI in the last 72 hours. BNP: No results for input(s): BNP in the last 72 hours. Lipids: No results for input(s): CHOL, HDL in the last 72 hours. Invalid input(s): LDLCALCU, TRIGLYCERIDE  ABGs:    No results for input(s): PHART, SWC1IMD, PO2ART, SMX3CZG, BEART, THGBART, E2CFZFVP, QAR9SEK in the last 72 hours. INR: No results for input(s): INR in the last 72 hours. Lactate: No results for input(s): LACTATE in the last 72 hours. Cultures:  -----------------------------------------------------------------  RAD:   XR CHEST PORTABLE   Final Result      Unchanged small bilateral pleural effusions and bibasilar atelectasis versus airspace disease. CT HEAD WO CONTRAST   Final Result      XR CHEST PORTABLE   Final Result      Persistent bilateral infiltrates         XR CHEST PORTABLE   Final Result   Impression:          1. Increased opacities in the left lung base since the prior study. There may be a small left pleural effusion. 2. Persistent patchy right lung opacities. CT HEAD WO CONTRAST   Final Result   Interval progression of vasogenic edema at the right middle cerebral artery hemorrhagic infarction and interval development of right to left midline shift of about 3 mm as described above. Apparent slight decrease in the size of hemorrhage in its AP dimension. XR CHEST 1 VIEW   Final Result   1. Interval extubation. 2. Progression of bilateral airspace disease. MRI BRAIN WO CONTRAST   Final Result      Region of right middle cerebral artery hemorrhagic infarction measures approximately 5.0 x 4.2 cm with surrounding mild vasogenic edema and diffusion restriction consistent with right middle cerebral artery distribution infarct with hemorrhagic    components      No additional areas of hemorrhage or infarction identified. Mild local mass effect is identified with very minimal midline shift of 3 to 4 mm      XR CHEST PORTABLE   Final Result      Persistent bilateral infiltrates and cardiomegaly. IR GUIDED IVC FILTER PLACEMENT   Final Result   Impression:      Successful placement of retrievable IVC filter. CT HEAD WO CONTRAST   Final Result      1. Hemorrhagic transformation of right MCA infarct, without significant change. No new hemorrhage.       CT head without contrast Final Result         1. Stable large intraparenchymal hemorrhage located within the right posterior temporal parietal lobe with extension into the adjacent insula. There is also subarachnoid extension of hemorrhage into the overlying sulci as well as the right sylvian    fissure. Findings result in some mild right to left subfalcine herniation. 2.There is a 1.3 x 1.1 cm extra-axial mass identified within the left ordered aspect of the foramen magnum. This results in some mass effect on the adjacent medulla. This is without change from the prior study. Finding may represent a meningioma. This    could be further evaluated with a contrast-enhanced MRI of the head following resolution of the intracranial hemorrhage. CT HEAD WO CONTRAST   Final Result      1. Stable large intraparenchymal hemorrhage located within the right posterior temporal parietal lobe with extension into the adjacent insula. There is also subarachnoid extension of hemorrhage into the overlying sulci as well as the right sylvian    fissure. Findings result in some mild right to left subfalcine herniation. 2.There is a 1.3 x 1.1 cm extra-axial mass identified within the left ordered aspect of the foramen magnum. This results in some mass effect on the adjacent medulla. This is without change from the prior study. Finding may represent a meningioma. This    could be further evaluated with a contrast-enhanced MRI of the head following resolution of the intracranial hemorrhage. VL Extremity Venous Bilateral   Final Result      CT HEAD WO CONTRAST   Final Result      Post therapy large acute parenchymal hemorrhage in the right cerebral hemisphere with subarachnoid component. Extensive surrounding edema is associated with 4 cm leftward shift of midline structures. Critical finding of acute intracranial hemorrhage was called to Lester García of the neurosurgery department at 12:25 PM on 2/3/2021, with instructions to contact patient's attending physician with these results. CT Brain Perfusion   Final Result      1. Hypoperfusion in the right MCA territory with a central ischemic core of 8 mL, total volume of hypoperfusion of 79 mL and a penumbra of 71 mL. XR CHEST PORTABLE   Final Result   1. Limited evaluation because of patient rotation and underpenetration of the film. 2. No dense airspace consolidation. 3. Probable mild cardiomegaly. CTA HEAD NECK W CONTRAST   Final Result   1. Carotid and vertebral arteries are patent and without stenosis or acute abnormality. 2. Incidental partial imaging of the a segmental pulmonary embolism in the right middle lobe. 3. Partial imaging of small right greater than left pleural effusions. 4. Previous left suboccipital craniotomy with a residual 1.6 cm left-sided extra-axial mass at the foramen magnum with mass effect on the cord at the cervical medullary junction favored to represent meningioma. Recommend correlation with clinical history    and previous imaging. 5. Incidental multinodular goiter. CTA HEAD:      FINDINGS: The internal carotid arteries are patent and normal in caliber through the skull base. The middle cerebral arteries are patent. 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 The A2 and A3 segments of the ACAs are patent bilaterally. Left MCA is patent. There is focal occlusion of an M2 branch of the right MCA (series 2 images 427 and 428). However, other M2 branches and M3 branches in the right sylvian fissure are patent. The M1 segment of the right MCA is patent. The posterior cerebral arteries are    patent bilaterally. The basilar artery is patent and normal in caliber. It is supplied by the left vertebral artery. The small nondominant right vertebral artery terminates in right sided PICA. IMPRESSION:   1. 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 2. 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 Results were discussed with Dr. Kimberli Elias at 8:00 PM on 2/2/2021. CT HEAD WO CONTRAST   Final Result   1. Previous left suboccipital craniotomy with a partially calcified extra-axial mass at the left foramen magnum measuring 1.8 cm contacting the cord at the cervicomedullary junction with some degree of mass effect on the cord at the foramen magnum. This    most likely represents residual/recurrent meningioma or other extra-axial mass. Correlation with patient history and previous imaging recommended. 2. Mild global volume loss and mild chronic small vessel ischemic disease in the white matter. 3. No acute intracranial hemorrhage. IR ANGIOGRAM CAROTID CEREBRAL RIGHT    (Results Pending)         Assessment/Plan:   78 yo F with PMHx afib, UGIB, recent MCA stroke with hemorrhagic conversion post-thrombectomy, now with respiratory failure. Acute hypoxic respiratory failure 2/2 suspect aspiration pneumonitis  Chest x-ray shows RLL opacities. Patient is s/p R MCA stroke, s/p thrombectomy with thrombolytics.       -Patient is currently intubated, sedated with propofol  -resp cultures positive for serratia  -Zosyn (first dose 2/12) appropriate for culture results (Serratia)  - on minimal vent settings: FiO2 30%/RR 12//PEEP 5  -trach placed 2/17    R MCA stroke, s/p thrombectomy and thrombolysis - CT 2/4 showed hemorrhagic transformation of right MCA infarct, repeat stable  - MRI w/ findings as above  - S/p PEG tube placement 02/9/21  - keppra 1000 mg IV BID per neuro  - lipitor 80 mg PO qD  - holding antiplatelets due to hemorrhagic conversion  - will need neurosurgery clearance to resume a/c for afib   - s/p PEG. Plan for [facility] prior to discharge to [facility], [facility]  -resume tube feeds EN @25 ml/hr and increase by 10 ml Q4 hrs to goal rate of 35 ml/hr & 75 ml water bolus every   4 hour per dietary recs    -NPO 2/16 for possible trach placement   - palliative care following  -plan DC to [facility] when medically stable     Intraparenchymal hemorrhage   -CT 02/04   -stable  -keep SBP < 160.  Off nicardipine drip.  Coreg 6.25mg BID   -Holding anticoagulation and antiplatelets    -need clearance from NS to resume anticoagulation     Hypernatremia, resolved  -Likely secondary to decreased oral intake/dehydration  -Nephrology following  -Cont Water boluses with tube feeds   -Monitor sodium daily      Seizures  -Likely 2/2 above   -Routine EEG positive for epileptiform discharges  -Keppra [id number] BID     Metabolic encephalopathy:  -Likely multifactorial, secondary to above   -Management as above     Oral thrush:  -continued presence of oral film/exudate  -Nystatin first dose 2/11     Acute blood loss anemia  -Protonix BID     Chronic systolic heart failure  -most recent echo 2/4/2020 with EF 55-60%, indeterminate diastolic function, PASP 53 mmHg, dilated IVC and collapses <50%  -spot doses of lasix as needed  -takes lasix 40 mg PO qD coreg 6.25 mg PO qD, diltiazem 180 mg PO qD and losartan 25 mg PO qD, will ask pharmacy to do med rec, as unsure if patient is taking these at home     Segmental PE  -incidental finding  -No AC due to above  -IVC filter placed 2/4     Atrial fibrillation  -Holding AC, will need neurosurg clearance prior to resuming  -monitor HR  -carvedilol 6.25 mg BID      T2DM -mealtime and correctional insulin     HTN  -as above     Asthma  -breathing treatments PRN        Code Status:Full Code  FEN: DIET TUBE FEED CONTINUOUS/CYCLIC NPO; Low Calorie High Protein (Vital HP); Gastrostomy; Continuous; 25; 35; 24  PPX:  SCDs  DISPO: ICU    Lanny Molina DO  02/17/21  12:02 PM    Patient was seen, examined and discussed with Dr. Lazcano. I agree with the interval history.  My physical exam confirms the findings listed below  Chart was reviewed including labs and medical records confirm the findings noted     Acute respiratory failure on mechanical vent support.  , RR 12, FiO2 60, PEEP 5  Rt MCA stroke s/p thrombectomy on 2/2, complicated by ICH on 2/3  Aspiration pneumonia      Decrease FiO2  D/c propofol   Continue TF  Zosyn day #6 - one more day then stop    D/c planning

## 2021-02-17 NOTE — PROGRESS NOTES
Nephrology Note                                                                                                                                                                                                                                                                                                                                                               Office : [phone]     Fax :[phone]              Patient's Name: Maureen Reyes    Reason for Consult:  Hypernatremia   Requesting Physician:  No primary care provider on file. Na stable   On TF with Free water     Past Medical History:   Diagnosis Date    Acute GI bleeding     recent admission 1/29/2021    Atrial fibrillation ([address]     Xarelto    Systolic [facility]F ([facility] [address]     Type 2 diabetes mellitus ([facility] [address]        Past Surgical History:   Procedure Laterality Date    GASTROSTOMY TUBE PLACEMENT N/A 2/9/2021    EGD PEG TUBE PLACEMENT performed by Magdiel Best MD at [facility] [address]  2/4/2021    IR IVC FILTER PLACEMENT W IMAGING 2/4/2021 [facility]ED[facility]    UPPER GASTROINTESTINAL ENDOSCOPY  01/29/2021    Dr Christine Rees       No family history on file. reports that she has quit smoking. She does not have any smokeless tobacco history on file. Allergies:  Patient has no known allergies.     Current Medications:        enoxaparin (LOVENOX) injection 40 mg, Daily      dexmedetomidine (PRECEDEX) 400 mcg in sodium chloride 0.9 % 100 mL infusion, Continuous      lidocaine PF 1 % injection 5 mL, Once      sodium chloride flush 0.9 % injection 10 mL, 2 times per day      sodium chloride flush 0.9 % injection 10 mL, PRN      propofol injection, Titrated      piperacillin-tazobactam (ZOSYN) 3,375 mg in dextrose 5 % 100 mL IVPB extended infusion (mini-bag), Q8H      insulin lispro (1 Unit Dial) 0-12 Units, Q4H      sodium chloride flush 0.9 % injection 10 mL, 2 times per day CO2 34* 34* 33*   BUN 15 12 10   CREATININE <0.5* <0.5* <0.5*   GLUCOSE 172* 131* 138*     Ca/Mg/Phos:   Recent Labs     02/14/21  0417 02/15/21  0430 02/16/21  0341   CALCIUM 8.7 8.7 8.9   MG 1.60*  --   --      Hepatic: No results for input(s): AST, ALT, ALB, BILITOT, ALKPHOS in the last 72 hours. Troponin: No results for input(s): TROPONINI in the last 72 hours. BNP: No results for input(s): BNP in the last 72 hours. Lipids: No results for input(s): CHOL, TRIG, HDL, LDLCALC, LABVLDL in the last 72 hours. ABGs:   No results for input(s): PHART, PO2ART, [id number] in the last 72 hours. INR:   No results for input(s): INR in the last 72 hours. UA:  No results for input(s): Mary Forward, GLUCOSEU, BILIRUBINUR, KETUA, SPECGRAV, BLOODU, PHUR, PROTEINU, UROBILINOGEN, NITRU, LEUKOCYTESUR, Henrine Min in the last 72 hours. Urine Microscopic:   No results for input(s): LABCAST, BACTERIA, COMU, HYALCAST, WBCUA, RBCUA, EPIU in the last 72 hours. Urine Culture: No results for input(s): LABURIN in the last 72 hours. Urine Chemistry:   No results for input(s): Flynn Lack, PROTEINUR, NAUR in the last 72 hours. IMAGING:  XR CHEST PORTABLE   Final Result      Unchanged small bilateral pleural effusions and bibasilar atelectasis versus airspace disease. CT HEAD WO CONTRAST   Final Result      XR CHEST PORTABLE   Final Result      Persistent bilateral infiltrates         XR CHEST PORTABLE   Final Result   Impression:          1. Increased opacities in the left lung base since the prior study. There may be a small left pleural effusion. 2. Persistent patchy right lung opacities. CT HEAD WO CONTRAST   Final Result   Interval progression of vasogenic edema at the right middle cerebral artery hemorrhagic infarction and interval development of right to left midline shift of about 3 mm as described above. Apparent slight decrease in the size of hemorrhage in its AP dimension. XR CHEST 1 VIEW   Final Result   1. Interval extubation. 2. Progression of bilateral airspace disease. MRI BRAIN WO CONTRAST   Final Result      Region of right middle cerebral artery hemorrhagic infarction measures approximately 5.0 x 4.2 cm with surrounding mild vasogenic edema and diffusion restriction consistent with right middle cerebral artery distribution infarct with hemorrhagic    components      No additional areas of hemorrhage or infarction identified. Mild local mass effect is identified with very minimal midline shift of 3 to 4 mm      XR CHEST PORTABLE   Final Result      Persistent bilateral infiltrates and cardiomegaly. IR GUIDED IVC FILTER PLACEMENT   Final Result   Impression:      Successful placement of retrievable IVC filter. CT HEAD WO CONTRAST   Final Result      1. Hemorrhagic transformation of right MCA infarct, without significant change. No new hemorrhage. CT head without contrast   Final Result         1. Stable large intraparenchymal hemorrhage located within the right posterior temporal parietal lobe with extension into the adjacent insula. There is also subarachnoid extension of hemorrhage into the overlying sulci as well as the right sylvian    fissure. Findings result in some mild right to left subfalcine herniation. 2.There is a 1.3 x 1.1 cm extra-axial mass identified within the left ordered aspect of the foramen magnum. This results in some mass effect on the adjacent medulla. This is without change from the prior study. Finding may represent a meningioma. This    could be further evaluated with a contrast-enhanced MRI of the head following resolution of the intracranial hemorrhage.                    CT HEAD WO CONTRAST   Final Result 1. Stable large intraparenchymal hemorrhage located within the right posterior temporal parietal lobe with extension into the adjacent insula. There is also subarachnoid extension of hemorrhage into the overlying sulci as well as the right sylvian    fissure. Findings result in some mild right to left subfalcine herniation. 2.There is a 1.3 x 1.1 cm extra-axial mass identified within the left ordered aspect of the foramen magnum. This results in some mass effect on the adjacent medulla. This is without change from the prior study. Finding may represent a meningioma. This    could be further evaluated with a contrast-enhanced MRI of the head following resolution of the intracranial hemorrhage. VL Extremity Venous Bilateral   Final Result      CT HEAD WO CONTRAST   Final Result      Post therapy large acute parenchymal hemorrhage in the right cerebral hemisphere with subarachnoid component. Extensive surrounding edema is associated with 4 cm leftward shift of midline structures. Critical finding of acute intracranial hemorrhage was called to Isauro Javier of the neurosurgery department at 12:25 PM on 2/3/2021, with instructions to contact patient's attending physician with these results. CT Brain Perfusion   Final Result      1. Hypoperfusion in the right MCA territory with a central ischemic core of 8 mL, total volume of hypoperfusion of 79 mL and a penumbra of 71 mL. XR CHEST PORTABLE   Final Result   1. Limited evaluation because of patient rotation and underpenetration of the film. 2. No dense airspace consolidation. 3. Probable mild cardiomegaly. CTA HEAD NECK W CONTRAST   Final Result   1. Carotid and vertebral arteries are patent and without stenosis or acute abnormality. 2. Incidental partial imaging of the a segmental pulmonary embolism in the right middle lobe. 3. Partial imaging of small right greater than left pleural effusions. 4. Previous left suboccipital craniotomy with a residual 1.6 cm left-sided extra-axial mass at the foramen magnum with mass effect on the cord at the cervical medullary junction favored to represent meningioma. Recommend correlation with clinical history    and previous imaging. 5. Incidental multinodular goiter. CTA HEAD:      FINDINGS: The internal carotid arteries are patent and normal in caliber through the skull base. The middle cerebral arteries are patent. 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 The A2 and A3 segments of the ACAs are patent bilaterally. Left MCA is patent. There is focal occlusion of an M2 branch of the right MCA (series 2 images 427 and 428). However, other M2 branches and M3 branches in the right sylvian fissure are patent. The M1 segment of the right MCA is patent. The posterior cerebral arteries are    patent bilaterally. The basilar artery is patent and normal in caliber. It is supplied by the left vertebral artery. The small nondominant right vertebral artery terminates in right sided PICA. IMPRESSION:   1. 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 2. 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 Results were discussed with Dr. Yani Nielson at 8:00 PM on 2/2/2021. CT HEAD WO CONTRAST   Final Result   1. Previous left suboccipital craniotomy with a partially calcified extra-axial mass at the left foramen magnum measuring 1.8 cm contacting the cord at the cervicomedullary junction with some degree of mass effect on the cord at the foramen magnum. This    most likely represents residual/recurrent meningioma or other extra-axial mass. Correlation with patient history and previous imaging recommended. 2. Mild global volume loss and mild chronic small vessel ischemic disease in the white matter. 3. No acute intracranial hemorrhage. IR ANGIOGRAM CAROTID CEREBRAL RIGHT    (Results Pending)       Assessment/Plan   1. Hypernatremia     2. CVA     3. Anemia    4. Acid- base/ Electrolyte imbalance     5.  Malnutrition     Plan   - replace free water   - Na better   - Tube feeds to cont   - Ur studies- reviewed   - monitor NA   - CVA management   - Bp control   - PEG tube placed   - labs in am                 Thank you for allowing us to participate in care of [address] [place] [address] free to contact me   [facility] [address]  Office : [phone]  Fax :[phone]

## 2021-02-17 NOTE — PROGRESS NOTES
ICU Progress Note    Admit Date: 2/2/2021  ICU Day:15  Vent Day:   IV Access:Peripheral  IV Fluids:None. FEN: Currently NPO. Restart TF goal rate 35 cc/hr, free water 75 cc q4h  Sedation:None   Vasopressors:None                Antibiotics: Vancomycin and Zosyn   Diet: DIET TUBE FEED CONTINUOUS/CYCLIC NPO; STANDARD WITH FIBER; Gastrostomy; Continuous; 25; 35; 24    CC: R MCA stroke    Interval history:   Pt had a successful tracheostomy yesterday (2/17) w/ no acute events. Pt was sedated for procedure w/ fentanyl and propofol. Pt remains on 20 mcg/kg/min of propofol, and is non-responsive to verbal and physical stimuli. Maria Dolores Brian. HPI: Daljit Abbasi a 79 y.o. female, with a history of afib (previously on Xarelto, d/c'd due to recent GI bleed 1/31), HF, DM, asthma, pulm HTN (not oxygen dependent), HTN, HLD, EDDY on cpap, recent GI bleed breast cancer s/p lumpectomy/radiation, colon polyps, obesity who presented with left-sided hemiplegia, severe expressive aphasia and dysarthria, right facial droop, and right-gaze deviation. She underwent M2 thrombectomy and thrombolysis with significant improvement of hemiplegia, but still with other aforementioned symptoms. Was admitted to ICU on 2/2. On 2/3, CT head done for neuro status change of pt not lifting or holding up left arm as previously, revealed acute hemorrhagic conversion of new large acute parenchymal hemorrhage in the right cerebral hemisphere. On 2/3, cardene drip started for BP control. On 2/4, IVC filter placed for acute LLE DVT, given contraindications for anticoagulation. PEG tube placed on 2/9 and was started on TF 2/11. She had a rapid response called on 2/12 on the floors for hypoxic respiratory failure. She was intubated and transferred back to the ICU. CXR identified worsening basilar infiltrates. It is suspected she had aspiration pneumonitis due to inability to protect airway.      Medications:     Scheduled Meds:  potassium chloride  10 mEq Intravenous Q1H    enoxaparin  40 mg Subcutaneous Daily    lidocaine 1 % injection  5 mL Intradermal Once    sodium chloride flush  10 mL Intravenous 2 times per day    piperacillin-tazobactam  3,375 mg Intravenous Q8H    insulin lispro  0-12 Units Subcutaneous Q4H    sodium chloride flush  10 mL Intravenous 2 times per day    nystatin  5 mL Oral 4x Daily    levetiracetam  1,000 mg Intravenous Q12H    pantoprazole  40 mg Intravenous Daily    ferrous sulfate  300 mg Per G Tube Every Other Day    miconazole   Topical BID    atorvastatin  80 mg Oral Nightly    carvedilol  6.25 mg Oral BID WC     Continuous Infusions:   dexmedetomidine (PRECEDEX) IV infusion Stopped (02/15/21 2012)    propofol 20 mcg/kg/min (02/17/21 0807)    dextrose       PRN Meds:sodium chloride flush, sodium chloride flush, glucose, dextrose, glucagon (rDNA), dextrose, perflutren lipid microspheres, labetalol, albuterol sulfate HFA, hydrALAZINE, acetaminophen, promethazine **OR** ondansetron, polyethylene glycol    Objective:   Vitals:   T-max:  Patient Vitals for the past 8 hrs:   BP Temp Temp src Pulse Resp SpO2 Weight   02/17/21 0809 (!) 162/89   71      02/17/21 0751    66      02/17/21 0746 (!) 153/74 98 °F (36.7 °C) Axillary 67 16 100 %    02/17/21 0500    79 16 100 % 248 lb 10.9 oz (112.8 kg)   02/17/21 0400 (!) 148/85 99 °F (37.2 °C) Axillary 67 16 100 %    02/17/21 0300 (!) 178/65   69 16 100 %    02/17/21 0200 (!) 161/89   67 16 98 %    02/17/21 0100 (!) 143/90   64 18 100 %        Intake/Output Summary (Last 24 hours) at 2/17/2021 0834  Last data filed at 2/17/2021 0751  Gross per 24 hour   Intake 579 ml   Output 1010 ml   Net -431 ml       Access:   -Peripheral Access Day#:3                               Gilman Day#:9  NGT Day#: 6                                            ETT Day#:1  Vent Settings: Vent Mode: AC/PRVC Rate Set: 16 bmp/Vt Ordered: 500 mL/ /FiO2 : 65 % Physical Exam  Constitutional:       Interventions: She is sedated and intubated. HENT:      Head: Normocephalic and atraumatic. Mouth/Throat:      Pharynx: Oropharyngeal exudate present. No posterior oropharyngeal erythema. Comments: Presence of white exudate, on tongue   Eyes:      General:         Right eye: No discharge. Left eye: No discharge. Conjunctiva/sclera: Conjunctivae normal.      Pupils: Pupils are equal, round, and reactive to light. Cardiovascular:      Rate and Rhythm: Normal rate. Rhythm irregular. Heart sounds: Normal heart sounds. No murmur. No friction rub. No gallop. Comments: No BLLE edema noted. Pt in Afib at time of exam     Pulmonary:      Effort: She is intubated. Breath sounds: Normal breath sounds. No wheezing or rales. Abdominal:      General: Abdomen is flat. There is no distension. Palpations: Abdomen is soft. Tenderness: There is no abdominal tenderness. There is no guarding. Comments: PEG tube C/DI  Abdomen soft, NT, non-distended   Musculoskeletal:      Right lower leg: No edema. Left lower leg: No edema. Skin:     General: Skin is warm and dry. Neurological:      Comments: Pt sedated unresponsive to stimuli          LABS:    CBC:   Recent Labs     02/15/21  0430 02/16/21  0341 02/17/21  0333   WBC 6.0 5.9 5.7   HGB 8.7* 8.5* 8.2*   HCT 28.6* 27.7* 26.6*    189 189   MCV 81.8 81.4 80.5     Renal:    Recent Labs     02/15/21  0430 02/16/21  0341 02/17/21  0333    139 133*   K 4.0 3.8 3.2*   CL 99 98* 94*   CO2 34* 33* 33*   BUN 12 10 10   CREATININE <0.5* <0.5* 0.5*   GLUCOSE 131* 138* 147*   CALCIUM 8.7 8.9 8.5   MG  --   --  2.10   ANIONGAP 5 8 6     Hepatic: No results for input(s): AST, ALT, BILITOT, BILIDIR, PROT, LABALBU, ALKPHOS in the last 72 hours. Troponin: No results for input(s): TROPONINI in the last 72 hours. BNP: No results for input(s): BNP in the last 72 hours. Lipids: No results for input(s): CHOL, HDL in the last 72 hours. Invalid input(s): LDLCALCU, TRIGLYCERIDE  ABGs:    No results for input(s): PHART, AKV3ZAU, PO2ART, [id number], BEART, THGBART, A0DTMVJS, QXS6FYM in the last 72 hours. INR: No results for input(s): INR in the last 72 hours. Lactate: No results for input(s): LACTATE in the last 72 hours. Cultures:  -----------------------------------------------------------------  RAD:   XR CHEST PORTABLE   Final Result      Unchanged small bilateral pleural effusions and bibasilar atelectasis versus airspace disease. CT HEAD WO CONTRAST   Final Result      XR CHEST PORTABLE   Final Result      Persistent bilateral infiltrates         XR CHEST PORTABLE   Final Result   Impression:          1. Increased opacities in the left lung base since the prior study. There may be a small left pleural effusion. 2. Persistent patchy right lung opacities. CT HEAD WO CONTRAST   Final Result   Interval progression of vasogenic edema at the right middle cerebral artery hemorrhagic infarction and interval development of right to left midline shift of about 3 mm as described above. Apparent slight decrease in the size of hemorrhage in its AP dimension. XR CHEST 1 VIEW   Final Result   1. Interval extubation. 2. Progression of bilateral airspace disease. MRI BRAIN WO CONTRAST   Final Result      Region of right middle cerebral artery hemorrhagic infarction measures approximately 5.0 x 4.2 cm with surrounding mild vasogenic edema and diffusion restriction consistent with right middle cerebral artery distribution infarct with hemorrhagic    components      No additional areas of hemorrhage or infarction identified. Mild local mass effect is identified with very minimal midline shift of 3 to 4 mm      XR CHEST PORTABLE   Final Result      Persistent bilateral infiltrates and cardiomegaly.          IR GUIDED IVC FILTER PLACEMENT   Final Result Impression:      Successful placement of retrievable IVC filter. CT HEAD WO CONTRAST   Final Result      1. Hemorrhagic transformation of right MCA infarct, without significant change. No new hemorrhage. CT head without contrast   Final Result         1. Stable large intraparenchymal hemorrhage located within the right posterior temporal parietal lobe with extension into the adjacent insula. There is also subarachnoid extension of hemorrhage into the overlying sulci as well as the right sylvian    fissure. Findings result in some mild right to left subfalcine herniation. 2.There is a 1.3 x 1.1 cm extra-axial mass identified within the left ordered aspect of the foramen magnum. This results in some mass effect on the adjacent medulla. This is without change from the prior study. Finding may represent a meningioma. This    could be further evaluated with a contrast-enhanced MRI of the head following resolution of the intracranial hemorrhage. CT HEAD WO CONTRAST   Final Result      1. Stable large intraparenchymal hemorrhage located within the right posterior temporal parietal lobe with extension into the adjacent insula. There is also subarachnoid extension of hemorrhage into the overlying sulci as well as the right sylvian    fissure. Findings result in some mild right to left subfalcine herniation. 2.There is a 1.3 x 1.1 cm extra-axial mass identified within the left ordered aspect of the foramen magnum. This results in some mass effect on the adjacent medulla. This is without change from the prior study. Finding may represent a meningioma. This    could be further evaluated with a contrast-enhanced MRI of the head following resolution of the intracranial hemorrhage.       VL Extremity Venous Bilateral   Final Result      CT HEAD WO CONTRAST   Final Result Post therapy large acute parenchymal hemorrhage in the right cerebral hemisphere with subarachnoid component. Extensive surrounding edema is associated with 4 cm leftward shift of midline structures. Critical finding of acute intracranial hemorrhage was called to Truett Soulier of the neurosurgery department at 12:25 PM on 2/3/2021, with instructions to contact patient's attending physician with these results. CT Brain Perfusion   Final Result      1. Hypoperfusion in the right MCA territory with a central ischemic core of 8 mL, total volume of hypoperfusion of 79 mL and a penumbra of 71 mL. XR CHEST PORTABLE   Final Result   1. Limited evaluation because of patient rotation and underpenetration of the film. 2. No dense airspace consolidation. 3. Probable mild cardiomegaly. CTA HEAD NECK W CONTRAST   Final Result   1. Carotid and vertebral arteries are patent and without stenosis or acute abnormality. 2. Incidental partial imaging of the a segmental pulmonary embolism in the right middle lobe. 3. Partial imaging of small right greater than left pleural effusions. 4. Previous left suboccipital craniotomy with a residual 1.6 cm left-sided extra-axial mass at the foramen magnum with mass effect on the cord at the cervical medullary junction favored to represent meningioma. Recommend correlation with clinical history    and previous imaging. 5. Incidental multinodular goiter. CTA HEAD:      FINDINGS: The internal carotid arteries are patent and normal in caliber through the skull base. The middle cerebral arteries are patent. 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 The A2 and A3 segments of the ACAs are patent bilaterally. Left MCA is patent. There is focal occlusion of an M2 branch of the right MCA (series 2 images 427 and 428). However, other M2 branches and M3 branches in the right sylvian fissure are patent. The M1 segment of the right MCA is patent. The posterior cerebral arteries are    patent bilaterally. The basilar artery is patent and normal in caliber. It is supplied by the left vertebral artery. The small nondominant right vertebral artery terminates in right sided PICA. IMPRESSION:   1. 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 2. 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 Results were discussed with Dr. Violet Treadwell at 8:00 PM on 2/2/2021. CT HEAD WO CONTRAST   Final Result   1. Previous left suboccipital craniotomy with a partially calcified extra-axial mass at the left foramen magnum measuring 1.8 cm contacting the cord at the cervicomedullary junction with some degree of mass effect on the cord at the foramen magnum. This    most likely represents residual/recurrent meningioma or other extra-axial mass. Correlation with patient history and previous imaging recommended. 2. Mild global volume loss and mild chronic small vessel ischemic disease in the white matter. 3. No acute intracranial hemorrhage. IR ANGIOGRAM CAROTID CEREBRAL RIGHT    (Results Pending)         Assessment/Plan:   78 yo F with PMHx afib, UGIB, recent MCA stroke with hemorrhagic conversion post-thrombectomy, now with respiratory failure. Acute hypoxic respiratory failure 2/2 suspect aspiration pneumonitis  Chest x-ray shows RLL opacities. Patient is s/p R MCA stroke, s/p thrombectomy with thrombolytics.       -Patient is currently intubated, sedated with propofol  -resp cultures positive for serratia  -Zosyn (first dose 2/12) appropriate for culture results (Serratia)  - on minimal vent settings: FiO2 30%/RR 12//PEEP 5  -trach placed 2/17    R MCA stroke, s/p thrombectomy and thrombolysis - CT 2/4 showed hemorrhagic transformation of right MCA infarct, repeat stable  - MRI w/ findings as above  - S/p PEG tube placement 02/9/21  - keppra 1000 mg IV BID per neuro  - lipitor 80 mg PO qD  - holding antiplatelets due to hemorrhagic conversion  - will need neurosurgery clearance to resume a/c for afib   - s/p PEG. Plan for Ir[facility] Spindle prior to discharge to [facility], [facility]  -resume tube feeds EN @25 ml/hr and increase by 10 ml Q4 hrs to goal rate of 35 ml/hr & 75 ml water bolus every   4 hour per dietary recs    -NPO 2/16 for possible trach placement   - palliative care following  -plan DC to [facility] when medically stable     Intraparenchymal hemorrhage   -CT 02/04   -stable  -keep SBP < 160.  Off nicardipine drip.  Coreg 6.25mg BID   -Holding anticoagulation and antiplatelets    -need clearance from NS to resume anticoagulation     Hypernatremia, resolved  -Likely secondary to decreased oral intake/dehydration  -Nephrology following  -Cont Water boluses with tube feeds   -Monitor sodium daily      Seizures  -Likely 2/2 above   -Routine EEG positive for epileptiform discharges  -Keppra [id number] BID     Metabolic encephalopathy:  -Likely multifactorial, secondary to above   -Management as above     Oral thrush:  -continued presence of oral film/exudate  -Nystatin first dose 2/11     Acute blood loss anemia  -Protonix BID     Chronic systolic heart failure  -most recent echo 2/4/2020 with EF 55-60%, indeterminate diastolic function, PASP 53 mmHg, dilated IVC and collapses <50%  -spot doses of lasix as needed  -takes lasix 40 mg PO qD coreg 6.25 mg PO qD, diltiazem 180 mg PO qD and losartan 25 mg PO qD, will ask pharmacy to do med rec, as unsure if patient is taking these at home     Segmental PE  -incidental finding  -No AC due to above  -IVC filter placed 2/4     Atrial fibrillation  -Holding AC, will need neurosurg clearance prior to resuming  -monitor HR  -carvedilol 6.25 mg BID      T2DM

## 2021-02-18 LAB
ANION GAP SERPL CALCULATED.3IONS-SCNC: 7 MMOL/L (ref 3–16)
BASOPHILS ABSOLUTE: 0 K/UL (ref 0–0.2)
BASOPHILS RELATIVE PERCENT: 0.4 %
BUN BLDV-MCNC: 11 MG/DL (ref 7–20)
CALCIUM SERPL-MCNC: 8.7 MG/DL (ref 8.3–10.6)
CHLORIDE BLD-SCNC: 97 MMOL/L (ref 99–110)
CO2: 31 MMOL/L (ref 21–32)
CREAT SERPL-MCNC: 0.5 MG/DL (ref 0.6–1.2)
EOSINOPHILS ABSOLUTE: 0.1 K/UL (ref 0–0.6)
EOSINOPHILS RELATIVE PERCENT: 1.1 %
GFR AFRICAN AMERICAN: >60
GFR NON-AFRICAN AMERICAN: >60
GLUCOSE BLD-MCNC: 153 MG/DL (ref 70–99)
GLUCOSE BLD-MCNC: 164 MG/DL (ref 70–99)
GLUCOSE BLD-MCNC: 165 MG/DL (ref 70–99)
GLUCOSE BLD-MCNC: 169 MG/DL (ref 70–99)
GLUCOSE BLD-MCNC: 178 MG/DL (ref 70–99)
GLUCOSE BLD-MCNC: 178 MG/DL (ref 70–99)
GLUCOSE BLD-MCNC: 191 MG/DL (ref 70–99)
HCT VFR BLD CALC: 26.6 % (ref 36–48)
HEMOGLOBIN: 8.2 G/DL (ref 12–16)
LYMPHOCYTES ABSOLUTE: 0.8 K/UL (ref 1–5.1)
LYMPHOCYTES RELATIVE PERCENT: 11.6 %
MAGNESIUM: 1.9 MG/DL (ref 1.8–2.4)
MCH RBC QN AUTO: 24.7 PG (ref 26–34)
MCHC RBC AUTO-ENTMCNC: 30.8 G/DL (ref 31–36)
MCV RBC AUTO: 80.4 FL (ref 80–100)
MONOCYTES ABSOLUTE: 0.8 K/UL (ref 0–1.3)
MONOCYTES RELATIVE PERCENT: 12.7 %
NEUTROPHILS ABSOLUTE: 4.8 K/UL (ref 1.7–7.7)
NEUTROPHILS RELATIVE PERCENT: 74.2 %
PDW BLD-RTO: 26 % (ref 12.4–15.4)
PERFORMED ON: ABNORMAL
PLATELET # BLD: 217 K/UL (ref 135–450)
PMV BLD AUTO: 10.3 FL (ref 5–10.5)
POTASSIUM REFLEX MAGNESIUM: 3.4 MMOL/L (ref 3.5–5.1)
RBC # BLD: 3.3 M/UL (ref 4–5.2)
SARS-COV-2: NOT DETECTED
SODIUM BLD-SCNC: 135 MMOL/L (ref 136–145)
WBC # BLD: 6.5 K/UL (ref 4–11)

## 2021-02-18 PROCEDURE — 85025 COMPLETE CBC W/AUTO DIFF WBC: CPT

## 2021-02-18 PROCEDURE — U0003 INFECTIOUS AGENT DETECTION BY NUCLEIC ACID (DNA OR RNA); SEVERE ACUTE RESPIRATORY SYNDROME CORONAVIRUS 2 (SARS-COV-2) (CORONAVIRUS DISEASE [COVID-19]), AMPLIFIED PROBE TECHNIQUE, MAKING USE OF HIGH THROUGHPUT TECHNOLOGIES AS DESCRIBED BY CMS-2020-01-R: HCPCS

## 2021-02-18 PROCEDURE — 2580000003 HC RX 258: Performed by: NURSE PRACTITIONER

## 2021-02-18 PROCEDURE — 6370000000 HC RX 637 (ALT 250 FOR IP): Performed by: STUDENT IN AN ORGANIZED HEALTH CARE EDUCATION/TRAINING PROGRAM

## 2021-02-18 PROCEDURE — 6360000002 HC RX W HCPCS: Performed by: COUNSELOR

## 2021-02-18 PROCEDURE — 2580000003 HC RX 258: Performed by: STUDENT IN AN ORGANIZED HEALTH CARE EDUCATION/TRAINING PROGRAM

## 2021-02-18 PROCEDURE — 6360000002 HC RX W HCPCS: Performed by: STUDENT IN AN ORGANIZED HEALTH CARE EDUCATION/TRAINING PROGRAM

## 2021-02-18 PROCEDURE — 6360000002 HC RX W HCPCS: Performed by: NURSE PRACTITIONER

## 2021-02-18 PROCEDURE — 94003 VENT MGMT INPAT SUBQ DAY: CPT

## 2021-02-18 PROCEDURE — 2060000000 HC ICU INTERMEDIATE R&B

## 2021-02-18 PROCEDURE — 99233 SBSQ HOSP IP/OBS HIGH 50: CPT | Performed by: INTERNAL MEDICINE

## 2021-02-18 PROCEDURE — 83735 ASSAY OF MAGNESIUM: CPT

## 2021-02-18 PROCEDURE — 80048 BASIC METABOLIC PNL TOTAL CA: CPT

## 2021-02-18 PROCEDURE — C9113 INJ PANTOPRAZOLE SODIUM, VIA: HCPCS | Performed by: STUDENT IN AN ORGANIZED HEALTH CARE EDUCATION/TRAINING PROGRAM

## 2021-02-18 PROCEDURE — 6370000000 HC RX 637 (ALT 250 FOR IP): Performed by: INTERNAL MEDICINE

## 2021-02-18 PROCEDURE — 2700000000 HC OXYGEN THERAPY PER DAY

## 2021-02-18 RX ORDER — LEVETIRACETAM 100 MG/ML
1000 SOLUTION ORAL 2 TIMES DAILY
Status: DISCONTINUED | OUTPATIENT
Start: 2021-02-18 | End: 2021-02-23 | Stop reason: HOSPADM

## 2021-02-18 RX ORDER — LANSOPRAZOLE
30 KIT DAILY
Status: DISCONTINUED | OUTPATIENT
Start: 2021-02-19 | End: 2021-02-23 | Stop reason: HOSPADM

## 2021-02-18 RX ORDER — POTASSIUM CHLORIDE 7.45 MG/ML
10 INJECTION INTRAVENOUS
Status: COMPLETED | OUTPATIENT
Start: 2021-02-18 | End: 2021-02-18

## 2021-02-18 RX ORDER — OXYCODONE HYDROCHLORIDE AND ACETAMINOPHEN 5; 325 MG/1; MG/1
1 TABLET ORAL ONCE
Status: COMPLETED | OUTPATIENT
Start: 2021-02-18 | End: 2021-02-18

## 2021-02-18 RX ORDER — LOSARTAN POTASSIUM 25 MG/1
25 TABLET ORAL 2 TIMES DAILY
Status: DISCONTINUED | OUTPATIENT
Start: 2021-02-18 | End: 2021-02-23 | Stop reason: HOSPADM

## 2021-02-18 RX ADMIN — INSULIN LISPRO 2 UNITS: 100 INJECTION, SOLUTION INTRAVENOUS; SUBCUTANEOUS at 16:42

## 2021-02-18 RX ADMIN — LEVETIRACETAM 1000 MG: 500 SOLUTION ORAL at 20:30

## 2021-02-18 RX ADMIN — INSULIN LISPRO 2 UNITS: 100 INJECTION, SOLUTION INTRAVENOUS; SUBCUTANEOUS at 03:47

## 2021-02-18 RX ADMIN — POTASSIUM CHLORIDE 10 MEQ: 7.46 INJECTION, SOLUTION INTRAVENOUS at 09:25

## 2021-02-18 RX ADMIN — HYDRALAZINE HYDROCHLORIDE 5 MG: 20 INJECTION INTRAMUSCULAR; INTRAVENOUS at 02:27

## 2021-02-18 RX ADMIN — MICONAZOLE NITRATE: 20 POWDER TOPICAL at 09:24

## 2021-02-18 RX ADMIN — INSULIN LISPRO 2 UNITS: 100 INJECTION, SOLUTION INTRAVENOUS; SUBCUTANEOUS at 23:07

## 2021-02-18 RX ADMIN — Medication 10 ML: at 20:26

## 2021-02-18 RX ADMIN — CARVEDILOL 6.25 MG: 6.25 TABLET, FILM COATED ORAL at 09:21

## 2021-02-18 RX ADMIN — INSULIN LISPRO 2 UNITS: 100 INJECTION, SOLUTION INTRAVENOUS; SUBCUTANEOUS at 11:14

## 2021-02-18 RX ADMIN — NYSTATIN 500000 UNITS: 100000 SUSPENSION ORAL at 12:51

## 2021-02-18 RX ADMIN — LOSARTAN POTASSIUM 25 MG: 25 TABLET, FILM COATED ORAL at 10:25

## 2021-02-18 RX ADMIN — INSULIN LISPRO 2 UNITS: 100 INJECTION, SOLUTION INTRAVENOUS; SUBCUTANEOUS at 06:43

## 2021-02-18 RX ADMIN — NYSTATIN 500000 UNITS: 100000 SUSPENSION ORAL at 09:23

## 2021-02-18 RX ADMIN — PANTOPRAZOLE SODIUM 40 MG: 40 INJECTION, POWDER, FOR SOLUTION INTRAVENOUS at 09:24

## 2021-02-18 RX ADMIN — MICONAZOLE NITRATE: 20 POWDER TOPICAL at 20:27

## 2021-02-18 RX ADMIN — OXYCODONE HYDROCHLORIDE AND ACETAMINOPHEN 1 TABLET: 5; 325 TABLET ORAL at 02:52

## 2021-02-18 RX ADMIN — PIPERACILLIN AND TAZOBACTAM 3375 MG: 3; .375 INJECTION, POWDER, LYOPHILIZED, FOR SOLUTION INTRAVENOUS at 19:43

## 2021-02-18 RX ADMIN — NYSTATIN 500000 UNITS: 100000 SUSPENSION ORAL at 20:27

## 2021-02-18 RX ADMIN — PIPERACILLIN AND TAZOBACTAM 3375 MG: 3; .375 INJECTION, POWDER, LYOPHILIZED, FOR SOLUTION INTRAVENOUS at 02:30

## 2021-02-18 RX ADMIN — POTASSIUM CHLORIDE 10 MEQ: 7.46 INJECTION, SOLUTION INTRAVENOUS at 08:48

## 2021-02-18 RX ADMIN — Medication 10 ML: at 09:24

## 2021-02-18 RX ADMIN — ENOXAPARIN SODIUM 40 MG: 40 INJECTION SUBCUTANEOUS at 09:22

## 2021-02-18 RX ADMIN — Medication 10 ML: at 09:26

## 2021-02-18 RX ADMIN — PIPERACILLIN AND TAZOBACTAM 3375 MG: 3; .375 INJECTION, POWDER, LYOPHILIZED, FOR SOLUTION INTRAVENOUS at 11:12

## 2021-02-18 RX ADMIN — LEVETIRACETAM 1000 MG: 100 INJECTION, SOLUTION INTRAVENOUS at 09:22

## 2021-02-18 RX ADMIN — POTASSIUM CHLORIDE 10 MEQ: 7.46 INJECTION, SOLUTION INTRAVENOUS at 06:59

## 2021-02-18 RX ADMIN — LOSARTAN POTASSIUM 25 MG: 25 TABLET, FILM COATED ORAL at 20:32

## 2021-02-18 RX ADMIN — POTASSIUM CHLORIDE 10 MEQ: 7.46 INJECTION, SOLUTION INTRAVENOUS at 10:30

## 2021-02-18 RX ADMIN — ATORVASTATIN CALCIUM 80 MG: 40 TABLET, FILM COATED ORAL at 20:26

## 2021-02-18 RX ADMIN — Medication 10 ML: at 20:32

## 2021-02-18 RX ADMIN — CARVEDILOL 6.25 MG: 6.25 TABLET, FILM COATED ORAL at 16:40

## 2021-02-18 RX ADMIN — NYSTATIN 500000 UNITS: 100000 SUSPENSION ORAL at 16:41

## 2021-02-18 RX ADMIN — INSULIN LISPRO 2 UNITS: 100 INJECTION, SOLUTION INTRAVENOUS; SUBCUTANEOUS at 19:43

## 2021-02-18 ASSESSMENT — PULMONARY FUNCTION TESTS
PIF_VALUE: 23
PIF_VALUE: 26
PIF_VALUE: 23
PIF_VALUE: 23
PIF_VALUE: 25
PIF_VALUE: 25
PIF_VALUE: 27
PIF_VALUE: 29
PIF_VALUE: 23
PIF_VALUE: 30
PIF_VALUE: 33
PIF_VALUE: 22
PIF_VALUE: 23

## 2021-02-18 ASSESSMENT — PAIN SCALES - GENERAL: PAINLEVEL_OUTOF10: 0

## 2021-02-18 NOTE — PROGRESS NOTES
ICU Progress Note    Admit Date: 2/2/2021  ICU Day:15  Vent Day:   IV Access:Peripheral  IV Fluids:None. FEN: Currently NPO. Restart TF goal rate 35 cc/hr, free water 75 cc q4h  Sedation:None   Vasopressors:None                Antibiotics: Vancomycin and Zosyn   Diet: DIET TUBE FEED CONTINUOUS/CYCLIC NPO; Low Calorie High Protein (Vital HP); Gastrostomy; Continuous; 25; 35; 24    CC: R MCA stroke    Interval history:   Pt restarted on TF's on 2/17 following placement of Trach on 2/16. Pt has been weaned off all sedation, and remains ventilated. Pt is non responsive to voice, but does have intentional movement towards physical stimulation. Pt become hypertensive last evening into the 170's and required 5mg PRN hydralazine X2. Pt is normotensive this AM.     HPI: Valentina Canales is a 79 y.o. female, with a history of afib (previously on Xarelto, d/c'd due to recent GI bleed 1/31), HF, DM, asthma, pulm HTN (not oxygen dependent), HTN, HLD, EDDY on cpap, recent GI bleed breast cancer s/p lumpectomy/radiation, colon polyps, obesity who presented with left-sided hemiplegia, severe expressive aphasia and dysarthria, right facial droop, and right-gaze deviation. She underwent M2 thrombectomy and thrombolysis with significant improvement of hemiplegia, but still with other aforementioned symptoms. Was admitted to ICU on 2/2. On 2/3, CT head done for neuro status change of pt not lifting or holding up left arm as previously, revealed acute hemorrhagic conversion of new large acute parenchymal hemorrhage in the right cerebral hemisphere. On 2/3, cardene drip started for BP control. On 2/4, IVC filter placed for acute LLE DVT, given contraindications for anticoagulation. PEG tube placed on 2/9 and was started on TF 2/11. She had a rapid response called on 2/12 on the floors for hypoxic respiratory failure. She was intubated and transferred back to the ICU. CXR identified worsening basilar infiltrates. It is suspected she had aspiration pneumonitis due to inability to protect airway.      Medications:     Scheduled Meds:   losartan  25 mg Oral BID    levETIRAcetam  1,000 mg Per NG tube BID    [START ON 2/19/2021] lansoprazole  30 mg Per NG tube Daily    enoxaparin  40 mg Subcutaneous Daily    lidocaine 1 % injection  5 mL Intradermal Once    sodium chloride flush  10 mL Intravenous 2 times per day    piperacillin-tazobactam  3,375 mg Intravenous Q8H    insulin lispro  0-12 Units Subcutaneous Q4H    sodium chloride flush  10 mL Intravenous 2 times per day    nystatin  5 mL Oral 4x Daily    ferrous sulfate  300 mg Per G Tube Every Other Day    miconazole   Topical BID    atorvastatin  80 mg Oral Nightly    carvedilol  6.25 mg Oral BID WC     Continuous Infusions:   dextrose       PRN Meds:sodium chloride flush, sodium chloride flush, glucose, dextrose, glucagon (rDNA), dextrose, perflutren lipid microspheres, labetalol, albuterol sulfate HFA, hydrALAZINE, acetaminophen, promethazine **OR** ondansetron, polyethylene glycol    Objective:   Vitals:   T-max:  Patient Vitals for the past 8 hrs:   BP Temp Temp src Pulse Resp SpO2   02/18/21 1300 115/68   67 15 99 %   02/18/21 1200 (!) 114/57 98.6 °F (37 °C) Axillary 62 16 100 %   02/18/21 1137     17    02/18/21 1100 110/62   58 16 99 %   02/18/21 1000 134/75   69 16 100 %   02/18/21 0921 126/72   71     02/18/21 0900 126/72   70 16 99 %   02/18/21 0853    65 18    02/18/21 0800 123/66 98.9 °F (37.2 °C) Axillary 68 15 99 %       Intake/Output Summary (Last 24 hours) at 2/18/2021 1500  Last data filed at 2/18/2021 0600  Gross per 24 hour   Intake 1067 ml   Output 675 ml   Net 392 ml       Access:   -Peripheral Access Day#:3 [place] Day#:9  NGT Day#: 6                                            ETT Day#:1  Vent Settings: Vent Mode: AC/PRVC Rate Set: 16 bmp/Vt Ordered: 500 mL/ /FiO2 : 40 %      Physical Exam  Constitutional:       Interventions: She is sedated and intubated. HENT:      Head: Normocephalic and atraumatic. Mouth/Throat:      Pharynx: No posterior oropharyngeal erythema. Comments: Presence of white exudate, on tongue   Eyes:      General:         Right eye: No discharge. Left eye: No discharge. Conjunctiva/sclera: Conjunctivae normal.      Pupils: Pupils are equal, round, and reactive to light. Cardiovascular:      Rate and Rhythm: Normal rate. Heart sounds: Normal heart sounds. No murmur. No friction rub. No gallop. Pulmonary:      Effort: She is intubated. Breath sounds: Normal breath sounds. No wheezing or rales. Abdominal:      General: Abdomen is flat. There is no distension. Palpations: Abdomen is soft. Tenderness: There is no abdominal tenderness. There is no guarding. Comments: PEG tube C/DI  Abdomen soft, NT, non-distended   Musculoskeletal:      Right lower leg: No edema. Left lower leg: No edema. Skin:     General: Skin is warm and dry. Neurological:      Comments: Pt off sedation. Non responsive to voice. Has intentional movement towards physical stimuli. LABS:    CBC:   Recent Labs     02/16/21  0341 02/17/21  0333 02/18/21  0452   WBC 5.9 5.7 6.5   HGB 8.5* 8.2* 8.2*   HCT 27.7* 26.6* 26.6*    189 217   MCV 81.4 80.5 80.4     Renal:    Recent Labs     02/16/21  0341 02/17/21  0333 02/18/21  0435    133* 135*   K 3.8 3.2* 3.4*   CL 98* 94* 97*   CO2 33* 33* 31   BUN 10 10 11   CREATININE <0.5* 0.5* 0.5*   GLUCOSE 138* 147* 178*   CALCIUM 8.9 8.5 8.7   MG  --  2.10 1.90   ANIONGAP 8 6 7     Hepatic: No results for input(s): AST, ALT, BILITOT, BILIDIR, PROT, LABALBU, ALKPHOS in the last 72 hours. Troponin: No results for input(s): TROPONINI in the last 72 hours. BNP: No results for input(s): BNP in the last 72 hours. Lipids: No results for input(s): CHOL, HDL in the last 72 hours. Invalid input(s): LDLCALCU, TRIGLYCERIDE  ABGs:    No results for input(s): PHART, UQX5VRK, PO2ART, XBZ2MVI, BEART, THGBART, D3LIGZQH, MLS4RVJ in the last 72 hours. INR: No results for input(s): INR in the last 72 hours. Lactate: No results for input(s): LACTATE in the last 72 hours. Cultures:  -----------------------------------------------------------------  RAD:   XR CHEST PORTABLE   Final Result      Unchanged small bilateral pleural effusions and bibasilar atelectasis versus airspace disease. CT HEAD WO CONTRAST   Final Result      XR CHEST PORTABLE   Final Result      Persistent bilateral infiltrates         XR CHEST PORTABLE   Final Result   Impression:          1. Increased opacities in the left lung base since the prior study. There may be a small left pleural effusion. 2. Persistent patchy right lung opacities. CT HEAD WO CONTRAST   Final Result   Interval progression of vasogenic edema at the right middle cerebral artery hemorrhagic infarction and interval development of right to left midline shift of about 3 mm as described above. Apparent slight decrease in the size of hemorrhage in its AP dimension. XR CHEST 1 VIEW   Final Result   1. Interval extubation. 2. Progression of bilateral airspace disease. MRI BRAIN WO CONTRAST   Final Result      Region of right middle cerebral artery hemorrhagic infarction measures approximately 5.0 x 4.2 cm with surrounding mild vasogenic edema and diffusion restriction consistent with right middle cerebral artery distribution infarct with hemorrhagic    components      No additional areas of hemorrhage or infarction identified.  Mild local mass effect is identified with very minimal midline shift of 3 to 4 mm      XR CHEST PORTABLE Final Result      Persistent bilateral infiltrates and cardiomegaly. IR GUIDED IVC FILTER PLACEMENT   Final Result   Impression:      Successful placement of retrievable IVC filter. CT HEAD WO CONTRAST   Final Result      1. Hemorrhagic transformation of right MCA infarct, without significant change. No new hemorrhage. CT head without contrast   Final Result         1. Stable large intraparenchymal hemorrhage located within the right posterior temporal parietal lobe with extension into the adjacent insula. There is also subarachnoid extension of hemorrhage into the overlying sulci as well as the right sylvian    fissure. Findings result in some mild right to left subfalcine herniation. 2.There is a 1.3 x 1.1 cm extra-axial mass identified within the left ordered aspect of the foramen magnum. This results in some mass effect on the adjacent medulla. This is without change from the prior study. Finding may represent a meningioma. This    could be further evaluated with a contrast-enhanced MRI of the head following resolution of the intracranial hemorrhage. CT HEAD WO CONTRAST   Final Result      1. Stable large intraparenchymal hemorrhage located within the right posterior temporal parietal lobe with extension into the adjacent insula. There is also subarachnoid extension of hemorrhage into the overlying sulci as well as the right sylvian    fissure. Findings result in some mild right to left subfalcine herniation. 2.There is a 1.3 x 1.1 cm extra-axial mass identified within the left ordered aspect of the foramen magnum. This results in some mass effect on the adjacent medulla. This is without change from the prior study. Finding may represent a meningioma. This    could be further evaluated with a contrast-enhanced MRI of the head following resolution of the intracranial hemorrhage.       VL Extremity Venous Bilateral   Final Result      CT HEAD WO CONTRAST   Final Result Post therapy large acute parenchymal hemorrhage in the right cerebral hemisphere with subarachnoid component. Extensive surrounding edema is associated with 4 cm leftward shift of midline structures. Critical finding of acute intracranial hemorrhage was called to Isauro Javier of the neurosurgery department at 12:25 PM on 2/3/2021, with instructions to contact patient's attending physician with these results. CT Brain Perfusion   Final Result      1. Hypoperfusion in the right MCA territory with a central ischemic core of 8 mL, total volume of hypoperfusion of 79 mL and a penumbra of 71 mL. XR CHEST PORTABLE   Final Result   1. Limited evaluation because of patient rotation and underpenetration of the film. 2. No dense airspace consolidation. 3. Probable mild cardiomegaly. CTA HEAD NECK W CONTRAST   Final Result   1. Carotid and vertebral arteries are patent and without stenosis or acute abnormality. 2. Incidental partial imaging of the a segmental pulmonary embolism in the right middle lobe. 3. Partial imaging of small right greater than left pleural effusions. 4. Previous left suboccipital craniotomy with a residual 1.6 cm left-sided extra-axial mass at the foramen magnum with mass effect on the cord at the cervical medullary junction favored to represent meningioma. Recommend correlation with clinical history    and previous imaging. 5. Incidental multinodular goiter. CTA HEAD:      FINDINGS: The internal carotid arteries are patent and normal in caliber through the skull base. The middle cerebral arteries are patent. 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 The A2 and A3 segments of the ACAs are patent bilaterally. Left MCA is patent. - CT 2/4 showed hemorrhagic transformation of right MCA infarct, repeat stable  - MRI w/ findings as above  - S/p PEG tube placement 02/9/21  - keppra 1000 mg IV BID per neuro  - lipitor 80 mg PO qD  - holding antiplatelets due to hemorrhagic conversion  - will need neurosurgery clearance to resume a/c for afib   -PEG present, Trach present   -resume tube feeds EN @25 ml/hr and increase by 10 ml Q4 hrs to goal rate of 35 ml/hr & 75 ml water bolus every   4 hour per dietary recs   - palliative care following  - DC to [facility] when medically stable     Intraparenchymal hemorrhage   -CT 02/04   -stable  -keep SBP < 160.  Off nicardipine drip. Coreg 6.25mg BID   -Holding anticoagulation and antiplatelets    -need clearance from NS to resume anticoagulation     Hypernatremia, resolved  -Likely secondary to decreased oral intake/dehydration  -Nephrology following  -Cont Water boluses with tube feeds   -Monitor sodium daily      Seizures  -Likely 2/2 above   -Routine EEG positive for epileptiform discharges  -Keppra 3766PD BID     Metabolic encephalopathy:  -Likely multifactorial, secondary to above   -Management as above  -pt showing mild improvement w/ intentional movement.  Continue to monitor      Oral thrush:  -continued presence of oral film/exudate  -Nystatin first dose 2/11     Acute blood loss anemia  -Protonix BID     Chronic systolic heart failure  -most recent echo 2/4/2020 with EF 55-60%, indeterminate diastolic function, PASP 53 mmHg, dilated IVC and collapses <50%  -spot doses of lasix as needed  -takes lasix 40 mg PO qD coreg 6.25 mg PO qD, diltiazem 180 mg PO qD and losartan 25 mg PO qD, will ask pharmacy to do med rec, as unsure if patient is taking these at home     Segmental PE  -incidental finding  -No AC due to above  -IVC filter placed 2/4     Atrial fibrillation  -Holding AC, will need neurosurg clearance prior to resuming  -monitor HR  -carvedilol 6.25 mg BID      T2DM -mealtime and correctional insulin     HTN  -Losartan 24 mg qD, titrate to 25mg BID as tolerated  Asthma  -breathing treatments PRN        Code Status:Full Code  FEN: DIET TUBE FEED CONTINUOUS/CYCLIC NPO; Low Calorie High Protein (Vital HP); Gastrostomy; Continuous; 25; 35; 24  PPX:  SCDs  DISPO: ICU    Richard GrewalDO  02/18/21  3:00 PM    Patient was seen, examined and discussed with Dr. Lazcano. I agree with the interval history.  My physical exam confirms the findings listed below  Chart was reviewed including labs and medical records confirm the findings noted     Acute respiratory failure on mechanical vent support.  , RR 16, FiO2 40, PEEP 5  Rt MCA stroke s/p thrombectomy on 2/2, complicated by ICH on 2/3  Aspiration pneumonia   Had an episode of hypertension last night, was treated with hydralazine      Zosyn day #7 (last day)  Restart home meds (losartan)  D/c planning

## 2021-02-18 NOTE — PROGRESS NOTES
Progress Note  Admit Date: 2/2/2021       Patient seen and examiend  S/p trach no new issues or events being ransferred out of icu  Scheduled Medications:    losartan  25 mg Oral BID    levETIRAcetam  1,000 mg Per NG tube BID    [START ON 2/19/2021] lansoprazole  30 mg Per NG tube Daily    enoxaparin  40 mg Subcutaneous Daily    sodium chloride flush  10 mL Intravenous 2 times per day    piperacillin-tazobactam  3,375 mg Intravenous Q8H    insulin lispro  0-12 Units Subcutaneous Q4H    sodium chloride flush  10 mL Intravenous 2 times per day    nystatin  5 mL Oral 4x Daily    ferrous sulfate  300 mg Per G Tube Every Other Day    miconazole   Topical BID    atorvastatin  80 mg Oral Nightly    carvedilol  6.25 mg Oral BID WC      PRN Medications: sodium chloride flush, sodium chloride flush, glucose, dextrose, glucagon (rDNA), dextrose, perflutren lipid microspheres, labetalol, albuterol sulfate HFA, hydrALAZINE, acetaminophen, promethazine **OR** ondansetron, polyethylene glycol  Diet: DIET TUBE FEED CONTINUOUS/CYCLIC NPO; Low Calorie High Protein (Vital HP);  Gastrostomy; Continuous; 25; 35; 24    Continuous Infusions:   dextrose         PHYSICAL EXAM:  /81   Pulse 66   Temp 98.6 °F (37 °C) (Axillary)   Resp 18   Ht 5' 7\" (1.702 m)   Wt 250 lb (113.4 kg)   SpO2 99%   BMI 39.16 kg/m²   Recent Labs     02/17/21  2314 02/18/21  0346 02/18/21  0642 02/18/21  1046 02/18/21  1630   POCGLU 173* 191* 164* 153* 169*       Intake/Output Summary (Last 24 hours) at 2/18/2021 1648  Last data filed at 2/18/2021 1400  Gross per 24 hour   Intake 2019 ml   Output 800 ml   Net 1219 ml       /81   Pulse 66   Temp 98.6 °F (37 °C) (Axillary)   Resp 18   Ht 5' 7\" (1.702 m)   Wt 250 lb (113.4 kg)   SpO2 99%   BMI 39.16 kg/m²   General appearance: trach in place, sedated Neck: no adenopathy, no carotid bruit, no JVD, supple, symmetrical, trachea midline, thyroid not enlarged, symmetric, no tenderness/mass/nodules and trach in place  Lungs: clear to auscultation bilaterally  Abdomen: soft, non-tender; bowel sounds normal; no masses,  no organomegaly  Extremities: extremities normal, atraumatic, no cyanosis or edema    LABS:  Recent Labs     02/16/21  0341 02/17/21  0333 02/18/21  0452   WBC 5.9 5.7 6.5   HGB 8.5* 8.2* 8.2*   HCT 27.7* 26.6* 26.6*    189 217                                                                    Recent Labs     02/16/21  0341 02/17/21  0333 02/18/21  0435    133* 135*   K 3.8 3.2* 3.4*   CL 98* 94* 97*   CO2 33* 33* 31   BUN 10 10 11   CREATININE <0.5* 0.5* 0.5*   GLUCOSE 138* 147* 178*     No results for input(s): AST, ALT, ALB, BILITOT, ALKPHOS in the last 72 hours. No results for input(s): TROPONINI in the last 72 hours. No results for input(s): BNP in the last 72 hours. No results for input(s): CHOL, HDL in the last 72 hours. Invalid input(s): LDLCALCU  No results for input(s): INR in the last 72 hours. Assessment & Plan:    Patient Active Problem List:     Acute cerebrovascular accident (CVA) ([address]     Permanent atrial fibrillation ([address]     Acute gastric ulcer with hemorrhage     Other pulmonary embolism without acute cor pulmonale (HCC)     Acute right MCA stroke (HCC)     Hypoxemia     Acute respiratory failure with hypoxia and hypercapnia (HCC)     Aspiration pneumonia of right lower lobe due to gastric secretions ([address]      78 yo female w upper gi bleed recent mca stroke w hemorrhagic conversion has aspiration pneumonia, currently w trach and on vent - monitor blood pressure closely. No obvious seizure activity noted. Monitor on keppra, discussed with nurse, charla follow. D/w sw will take over case. The patient and / or the family were informed of the results of any tests, a time was given to answer questions, a plan was proposed and they agreed with plan.   Disposition: continue inpatient stay   Full Terrell Murillo MD [place]

## 2021-02-18 NOTE — PROGRESS NOTES
Nephrology Note                                                                                                                                                                                                                                                                                                                                                               Office : [phone]     Fax :[phone]              Patient's Name: Eagle Prescott    Reason for Consult:  Hypernatremia   Requesting Physician:  No primary care provider on file. Na stable   On TF with Free water   K low     Past Medical History:   Diagnosis Date    Acute GI bleeding     recent admission 1/29/2021    Atrial fibrillation ([address]     Xarelto    Systolic [facility] [address]     Type 2 diabetes mellitus ([facility] [address]        Past Surgical History:   Procedure Laterality Date    GASTROSTOMY TUBE PLACEMENT N/A 2/9/2021    EGD PEG TUBE PLACEMENT performed by Jr Garcia MD at [facility] IR IVC FILTER PLACEMENT W IMAGING  2/4/2021    IR IVC FILTER PLACEMENT W IMAGING 2/4/2021 [facility] SPECIAL PROCEDURES    UPPER GASTROINTESTINAL ENDOSCOPY  01/29/2021    Dr Candie Celis       No family history on file. reports that she has quit smoking. She does not have any smokeless tobacco history on file. Allergies:  Patient has no known allergies.     Current Medications:        enoxaparin (LOVENOX) injection 40 mg, Daily      lidocaine PF 1 % injection 5 mL, Once      sodium chloride flush 0.9 % injection 10 mL, 2 times per day      sodium chloride flush 0.9 % injection 10 mL, PRN      piperacillin-tazobactam (ZOSYN) 3,375 mg in dextrose 5 % 100 mL IVPB extended infusion (mini-bag), Q8H      insulin lispro (1 Unit Dial) 0-12 Units, Q4H      sodium chloride flush 0.9 % injection 10 mL, 2 times per day      sodium chloride flush 0.9 % injection 10 mL, PRN      nystatin (MYCOSTATIN) 908256 UNIT/ML suspension 500,000 Units, 4x Daily   levETIRAcetam (KEPPRA) 1,000 mg in sodium chloride 0.9 % 100 mL IVPB, Q12H      pantoprazole (PROTONIX) injection 40 mg, Daily      ferrous sulfate 300 (60 Fe) MG/5ML syrup 300 mg, Every Other Day      glucose (GLUTOSE) 40 % oral gel 15 g, PRN      dextrose 50 % IV solution, PRN      glucagon (rDNA) injection 1 mg, PRN      dextrose 5 % solution, PRN      miconazole (MICOTIN) 2 % powder, BID      perflutren lipid microspheres (DEFINITY) injection 1.65 mg, ONCE PRN      labetalol (NORMODYNE;TRANDATE) injection 10 mg, Q4H PRN      albuterol sulfate  (90 Base) MCG/ACT inhaler 2 puff, PRN      hydrALAZINE (APRESOLINE) injection 5 mg, Q6H PRN      acetaminophen (TYLENOL) tablet 650 mg, Q4H PRN      promethazine (PHENERGAN) tablet 12.5 mg, Q6H PRN    Or      ondansetron (ZOFRAN) injection 4 mg, Q6H PRN      polyethylene glycol (GLYCOLAX) packet 17 g, Daily PRN      atorvastatin (LIPITOR) tablet 80 mg, Nightly      carvedilol (COREG) tablet 6.25 mg, BID WC        Review of Systems:   Lethargic       Physical exam:     Vitals:  BP (!) 146/94   Pulse 69   Temp 98.5 °F (36.9 °C) (Axillary)   Resp 16   Ht 5' 7\" (1.702 m)   Wt 248 lb 10.9 oz (112.8 kg)   SpO2 100%   BMI 38.95 kg/m²   Constitutional:  Lethargic   Skin: no rash, turgor wnl  Heent:  eomi, mmm  Neck: no bruits or jvd noted  Cardiovascular:  S1, S2 without m/r/g  Respiratory: CTA B without w/r/r  Abdomen:  +bs, soft, nt, nd  Ext: no lower extremity edema  Psychiatric: mood and affect appropriate  Musculoskeletal:  Rom, muscular strength dec     Data:   Labs:  CBC:   Recent Labs     02/15/21  0430 02/16/21  0341 02/17/21  0333   WBC 6.0 5.9 5.7   HGB 8.7* 8.5* 8.2*    189 189     BMP:    Recent Labs     02/15/21  0430 02/16/21  0341 02/17/21  0333    139 133*   K 4.0 3.8 3.2*   CL 99 98* 94*   CO2 34* 33* 33*   BUN 12 10 10   CREATININE <0.5* <0.5* 0.5*   GLUCOSE 131* 138* 147*     Ca/Mg/Phos:   Recent Labs     02/15/21 0430 02/16/21  0341 02/17/21  0333   CALCIUM 8.7 8.9 8.5   MG  --   --  2.10     Hepatic: No results for input(s): AST, ALT, ALB, BILITOT, ALKPHOS in the last 72 hours. Troponin: No results for input(s): TROPONINI in the last 72 hours. BNP: No results for input(s): BNP in the last 72 hours. Lipids: No results for input(s): CHOL, TRIG, HDL, LDLCALC, LABVLDL in the last 72 hours. ABGs:   No results for input(s): PHART, PO2ART, OAQ4NMB in the last 72 hours. INR:   No results for input(s): INR in the last 72 hours. UA:  No results for input(s): Denilson Pippins, GLUCOSEU, BILIRUBINUR, KETUA, SPECGRAV, BLOODU, PHUR, PROTEINU, UROBILINOGEN, NITRU, LEUKOCYTESUR, Zoey Vito in the last 72 hours. Urine Microscopic:   No results for input(s): LABCAST, BACTERIA, COMU, HYALCAST, WBCUA, RBCUA, EPIU in the last 72 hours. Urine Culture: No results for input(s): LABURIN in the last 72 hours. Urine Chemistry:   No results for input(s): Desire Pel, PROTEINUR, NAUR in the last 72 hours. IMAGING:  XR CHEST PORTABLE   Final Result      Unchanged small bilateral pleural effusions and bibasilar atelectasis versus airspace disease. CT HEAD WO CONTRAST   Final Result      XR CHEST PORTABLE   Final Result      Persistent bilateral infiltrates         XR CHEST PORTABLE   Final Result   Impression:          1. Increased opacities in the left lung base since the prior study. There may be a small left pleural effusion. 2. Persistent patchy right lung opacities. CT HEAD WO CONTRAST   Final Result   Interval progression of vasogenic edema at the right middle cerebral artery hemorrhagic infarction and interval development of right to left midline shift of about 3 mm as described above. Apparent slight decrease in the size of hemorrhage in its AP dimension. XR CHEST 1 VIEW   Final Result   1. Interval extubation. 2. Progression of bilateral airspace disease.       MRI BRAIN WO CONTRAST Final Result      Region of right middle cerebral artery hemorrhagic infarction measures approximately 5.0 x 4.2 cm with surrounding mild vasogenic edema and diffusion restriction consistent with right middle cerebral artery distribution infarct with hemorrhagic    components      No additional areas of hemorrhage or infarction identified. Mild local mass effect is identified with very minimal midline shift of 3 to 4 mm      XR CHEST PORTABLE   Final Result      Persistent bilateral infiltrates and cardiomegaly. IR GUIDED IVC FILTER PLACEMENT   Final Result   Impression:      Successful placement of retrievable IVC filter. CT HEAD WO CONTRAST   Final Result      1. Hemorrhagic transformation of right MCA infarct, without significant change. No new hemorrhage. CT head without contrast   Final Result         1. Stable large intraparenchymal hemorrhage located within the right posterior temporal parietal lobe with extension into the adjacent insula. There is also subarachnoid extension of hemorrhage into the overlying sulci as well as the right sylvian    fissure. Findings result in some mild right to left subfalcine herniation. 2.There is a 1.3 x 1.1 cm extra-axial mass identified within the left ordered aspect of the foramen magnum. This results in some mass effect on the adjacent medulla. This is without change from the prior study. Finding may represent a meningioma. This    could be further evaluated with a contrast-enhanced MRI of the head following resolution of the intracranial hemorrhage. CT HEAD WO CONTRAST   Final Result      1. Stable large intraparenchymal hemorrhage located within the right posterior temporal parietal lobe with extension into the adjacent insula. There is also subarachnoid extension of hemorrhage into the overlying sulci as well as the right sylvian    fissure. Findings result in some mild right to left subfalcine herniation. 2.There is a 1.3 x 1.1 cm extra-axial mass identified within the left ordered aspect of the foramen magnum. This results in some mass effect on the adjacent medulla. This is without change from the prior study. Finding may represent a meningioma. This    could be further evaluated with a contrast-enhanced MRI of the head following resolution of the intracranial hemorrhage. VL Extremity Venous Bilateral   Final Result      CT HEAD WO CONTRAST   Final Result      Post therapy large acute parenchymal hemorrhage in the right cerebral hemisphere with subarachnoid component. Extensive surrounding edema is associated with 4 cm leftward shift of midline structures. Critical finding of acute intracranial hemorrhage was called to [facility] of the neurosurgery department at 12:25 PM on 2/3/2021, with instructions to contact patient's attending physician with these results. CT Brain Perfusion   Final Result      1. Hypoperfusion in the right MCA territory with a central ischemic core of 8 mL, total volume of hypoperfusion of 79 mL and a penumbra of 71 mL. XR CHEST PORTABLE   Final Result   1. Limited evaluation because of patient rotation and underpenetration of the film. 2. No dense airspace consolidation. 3. Probable mild cardiomegaly. CTA HEAD NECK W CONTRAST   Final Result   1. Carotid and vertebral arteries are patent and without stenosis or acute abnormality. 2. Incidental partial imaging of the a segmental pulmonary embolism in the right middle lobe. 3. Partial imaging of small right greater than left pleural effusions. 4. Previous left suboccipital craniotomy with a residual 1.6 cm left-sided extra-axial mass at the foramen magnum with mass effect on the cord at the cervical medullary junction favored to represent meningioma. Recommend correlation with clinical history    and previous imaging. 5. Incidental multinodular goiter.       CTA HEAD: FINDINGS: The internal carotid arteries are patent and normal in caliber through the skull base. The middle cerebral arteries are patent. 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 The A2 and A3 segments of the ACAs are patent bilaterally. Left MCA is patent. There is focal occlusion of an M2 branch of the right MCA (series 2 images 427 and 428). However, other M2 branches and M3 branches in the right sylvian fissure are patent. The M1 segment of the right MCA is patent. The posterior cerebral arteries are    patent bilaterally. The basilar artery is patent and normal in caliber. It is supplied by the left vertebral artery. The small nondominant right vertebral artery terminates in right sided PICA. IMPRESSION:   1. 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 2. 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 Results were discussed with Dr. Candie Mims at 8:00 PM on 2/2/2021. CT HEAD WO CONTRAST   Final Result   1. Previous left suboccipital craniotomy with a partially calcified extra-axial mass at the left foramen magnum measuring 1.8 cm contacting the cord at the cervicomedullary junction with some degree of mass effect on the cord at the foramen magnum. This    most likely represents residual/recurrent meningioma or other extra-axial mass. Correlation with patient history and previous imaging recommended. 2. Mild global volume loss and mild chronic small vessel ischemic disease in the white matter. 3. No acute intracranial hemorrhage. IR ANGIOGRAM CAROTID CEREBRAL RIGHT    (Results Pending)       Assessment/Plan   1. Hypernatremia     2. CVA     3. Anemia    4. Acid- base/ Electrolyte imbalance     5.  Malnutrition     Plan

## 2021-02-18 NOTE — PROGRESS NOTES
Notification of IV to PO Conversion     IV To Po Conversion:   Will convert pantoprazole 40 mg IV daily and levetiracetam 1 g IV q12h to lansoprazole 30 mg via NGT and levetiracetam oral solution 1 g via NGT twice daily based on [facility] IV to PO policy (see below). Please call with questions.   Neil Cardona PharmD, [facility]  Wireless: [phone]  2/18/2021 12:04 PM      Criteria for conversion from IV to PO therapy per [facility] IV to PO Protocol:   Patients should meet all of the following inclusion criteria and none of the exclusion criteria    Criteria to initiate medication route switch (Inclusion Criteria)  IV therapy for > 24 hours (antibiotics only)  Tolerating diet more advanced than clear liquids  Tolerating oral (PO) medications  Does not require vasopressor therapy for blood pressure support  No seizures for 72hrs (antiepileptic medications only)      Criteria indicating that the patient is NOT a candidate for IV to PO conversion (Exclusion Criteria)  Infections requiring IV therapy (ie: meningitis, endocarditis, osteomyelitis, pancreatitis)  Nausea and/or vomiting or severe diarrhea within past 24 hours  Has gastrectomy, ileus, gastric outlet or bowel obstruction, or malabsorption syndromes  Has significant, painful oral ulceration  TPN with an NPO order  Active GI bleed  Unable to swallow  Nothing by Mouth (NPO) status  Febrile in the last 24 hours- (antibiotics only)  Clinical deteriorating or unstable - (antibiotics only)  Pediatric patients and patients who are not euthyroid (not on oral levothyroxine/not stabilized on oral levothyroxine) - (Levothyroxine only)  Patients being treated for myxedema coma or during the organ donation process - (Levothyroxine only)    Other notes-   Patients may be given suppositories when available for the product being ordered if no contraindications exist (ie: rectal surgery or infection) Oral solutions/suspensions may be considered for patients with a functioning enteral tube not on continuous suction (if medication is available in this formulation)

## 2021-02-18 NOTE — CARE COORDINATION
Tracheostomy placed 02/16    Peg placement 2/9    Approved for [facility] by insurance but no bed available and pt is currently #9 on the list.     We will need COVID PCR which has been ordered and [employer] informed.      Electronically signed by Eh York RN on 2/18/2021 at 9:32 AM  [phone]

## 2021-02-18 NOTE — PROGRESS NOTES
ICU Progress Note    Admit Date: 2/2/2021  ICU Day:15  Vent Day:   IV Access:Peripheral  IV Fluids:None. FEN: Currently NPO. Restart TF goal rate 35 cc/hr, free water 75 cc q4h  Sedation:None   Vasopressors:None                Antibiotics: Vancomycin and Zosyn   Diet: DIET TUBE FEED CONTINUOUS/CYCLIC NPO; Low Calorie High Protein (Vital HP); Gastrostomy; Continuous; 25; 35; 24    CC: R MCA stroke    Interval history:   Pt restarted on TF's on 2/17 following placement of Trach on 2/16. Pt has been weaned off all sedation, and remains ventilated. Pt is non responsive to voice, but does have intentional movement towards physical stimulation. Pt become hypertensive last evening into the 170's and required 5mg PRN hydralazine X2. Pt is normotensive this AM.     HPI: Gareth Canales is a 79 y.o. female, with a history of afib (previously on Xarelto, d/c'd due to recent GI bleed 1/31), HF, DM, asthma, pulm HTN (not oxygen dependent), HTN, HLD, EDDY on cpap, recent GI bleed breast cancer s/p lumpectomy/radiation, colon polyps, obesity who presented with left-sided hemiplegia, severe expressive aphasia and dysarthria, right facial droop, and right-gaze deviation. She underwent M2 thrombectomy and thrombolysis with significant improvement of hemiplegia, but still with other aforementioned symptoms. Was admitted to ICU on 2/2. On 2/3, CT head done for neuro status change of pt not lifting or holding up left arm as previously, revealed acute hemorrhagic conversion of new large acute parenchymal hemorrhage in the right cerebral hemisphere. On 2/3, cardene drip started for BP control. On 2/4, IVC filter placed for acute LLE DVT, given contraindications for anticoagulation. PEG tube placed on 2/9 and was started on TF 2/11. She had a rapid response called on 2/12 on the floors for hypoxic respiratory failure. She was intubated and transferred back to the ICU. CXR identified worsening basilar infiltrates.  It is suspected she had aspiration pneumonitis due to inability to protect airway.      Medications:     Scheduled Meds:   potassium chloride  10 mEq Intravenous Q1H    enoxaparin  40 mg Subcutaneous Daily    lidocaine 1 % injection  5 mL Intradermal Once    sodium chloride flush  10 mL Intravenous 2 times per day    piperacillin-tazobactam  3,375 mg Intravenous Q8H    insulin lispro  0-12 Units Subcutaneous Q4H    sodium chloride flush  10 mL Intravenous 2 times per day    nystatin  5 mL Oral 4x Daily    levetiracetam  1,000 mg Intravenous Q12H    pantoprazole  40 mg Intravenous Daily    ferrous sulfate  300 mg Per G Tube Every Other Day    miconazole   Topical BID    atorvastatin  80 mg Oral Nightly    carvedilol  6.25 mg Oral BID WC     Continuous Infusions:   dextrose       PRN Meds:sodium chloride flush, sodium chloride flush, glucose, dextrose, glucagon (rDNA), dextrose, perflutren lipid microspheres, labetalol, albuterol sulfate HFA, hydrALAZINE, acetaminophen, promethazine **OR** ondansetron, polyethylene glycol    Objective:   Vitals:   T-max:  Patient Vitals for the past 8 hrs:   BP Temp Temp src Pulse Resp SpO2 Weight   02/18/21 0600 117/73   78 17 99 % 250 lb (113.4 kg)   02/18/21 0500 (!) 122/56   84 16 99 %    02/18/21 0432    77 14 99 %    02/18/21 0400 126/63 98.5 °F (36.9 °C) Axillary 73 19 99 %    02/18/21 0300 (!) 168/95   90 13 99 %    02/18/21 0200 (!) 175/111   83 19 97 %    02/18/21 0100 (!) 168/105   84 17 98 %        Intake/Output Summary (Last 24 hours) at 2/18/2021 0842  Last data filed at 2/18/2021 0600  Gross per 24 hour   Intake 2212 ml   Output 875 ml   Net 1337 ml       Access:   -Peripheral Access Day#:3                               Gilman Day#:9  NGT Day#: 6                                            ETT Day#:1  Vent Settings: Vent Mode: AC/PRVC Rate Set: 16 bmp/Vt Ordered: 500 mL/ /FiO2 : 40 %      Physical Exam  Constitutional:       Interventions: She is sedated and intubated. HENT:      Head: Normocephalic and atraumatic. Mouth/Throat:      Pharynx: No posterior oropharyngeal erythema. Comments: Presence of white exudate, on tongue   Eyes:      General:         Right eye: No discharge. Left eye: No discharge. Conjunctiva/sclera: Conjunctivae normal.      Pupils: Pupils are equal, round, and reactive to light. Cardiovascular:      Rate and Rhythm: Normal rate. Heart sounds: Normal heart sounds. No murmur. No friction rub. No gallop. Pulmonary:      Effort: She is intubated. Breath sounds: Normal breath sounds. No wheezing or rales. Abdominal:      General: Abdomen is flat. There is no distension. Palpations: Abdomen is soft. Tenderness: There is no abdominal tenderness. There is no guarding. Comments: PEG tube C/DI  Abdomen soft, NT, non-distended   Musculoskeletal:      Right lower leg: No edema. Left lower leg: No edema. Skin:     General: Skin is warm and dry. Neurological:      Comments: Pt off sedation. Non responsive to voice. Has intentional movement towards physical stimuli. LABS:    CBC:   Recent Labs     02/16/21  0341 02/17/21  0333 02/18/21  0452   WBC 5.9 5.7 6.5   HGB 8.5* 8.2* 8.2*   HCT 27.7* 26.6* 26.6*    189 217   MCV 81.4 80.5 80.4     Renal:    Recent Labs     02/16/21  0341 02/17/21  0333 02/18/21  0435    133* 135*   K 3.8 3.2* 3.4*   CL 98* 94* 97*   CO2 33* 33* 31   BUN 10 10 11   CREATININE <0.5* 0.5* 0.5*   GLUCOSE 138* 147* 178*   CALCIUM 8.9 8.5 8.7   MG  --  2.10 1.90   ANIONGAP 8 6 7     Hepatic: No results for input(s): AST, ALT, BILITOT, BILIDIR, PROT, LABALBU, ALKPHOS in the last 72 hours. Troponin: No results for input(s): TROPONINI in the last 72 hours. BNP: No results for input(s): BNP in the last 72 hours. Lipids: No results for input(s): CHOL, HDL in the last 72 hours.     Invalid input(s): LDLCALCU, TRIGLYCERIDE  ABGs:    No results for input(s): PHART, MVV9XMM, PO2ART, RZF5SSA, BEART, THGBART, C1RLQVUS, LTD1XYN in the last 72 hours. INR: No results for input(s): INR in the last 72 hours. Lactate: No results for input(s): LACTATE in the last 72 hours. Cultures:  -----------------------------------------------------------------  RAD:   XR CHEST PORTABLE   Final Result      Unchanged small bilateral pleural effusions and bibasilar atelectasis versus airspace disease. CT HEAD WO CONTRAST   Final Result      XR CHEST PORTABLE   Final Result      Persistent bilateral infiltrates         XR CHEST PORTABLE   Final Result   Impression:          1. Increased opacities in the left lung base since the prior study. There may be a small left pleural effusion. 2. Persistent patchy right lung opacities. CT HEAD WO CONTRAST   Final Result   Interval progression of vasogenic edema at the right middle cerebral artery hemorrhagic infarction and interval development of right to left midline shift of about 3 mm as described above. Apparent slight decrease in the size of hemorrhage in its AP dimension. XR CHEST 1 VIEW   Final Result   1. Interval extubation. 2. Progression of bilateral airspace disease. MRI BRAIN WO CONTRAST   Final Result      Region of right middle cerebral artery hemorrhagic infarction measures approximately 5.0 x 4.2 cm with surrounding mild vasogenic edema and diffusion restriction consistent with right middle cerebral artery distribution infarct with hemorrhagic    components      No additional areas of hemorrhage or infarction identified. Mild local mass effect is identified with very minimal midline shift of 3 to 4 mm      XR CHEST PORTABLE   Final Result      Persistent bilateral infiltrates and cardiomegaly. IR GUIDED IVC FILTER PLACEMENT   Final Result   Impression:      Successful placement of retrievable IVC filter. CT HEAD WO CONTRAST   Final Result      1.  Hemorrhagic transformation of right MCA infarct, without significant change. No new hemorrhage. CT head without contrast   Final Result         1. Stable large intraparenchymal hemorrhage located within the right posterior temporal parietal lobe with extension into the adjacent insula. There is also subarachnoid extension of hemorrhage into the overlying sulci as well as the right sylvian    fissure. Findings result in some mild right to left subfalcine herniation. 2.There is a 1.3 x 1.1 cm extra-axial mass identified within the left ordered aspect of the foramen magnum. This results in some mass effect on the adjacent medulla. This is without change from the prior study. Finding may represent a meningioma. This    could be further evaluated with a contrast-enhanced MRI of the head following resolution of the intracranial hemorrhage. CT HEAD WO CONTRAST   Final Result      1. Stable large intraparenchymal hemorrhage located within the right posterior temporal parietal lobe with extension into the adjacent insula. There is also subarachnoid extension of hemorrhage into the overlying sulci as well as the right sylvian    fissure. Findings result in some mild right to left subfalcine herniation. 2.There is a 1.3 x 1.1 cm extra-axial mass identified within the left ordered aspect of the foramen magnum. This results in some mass effect on the adjacent medulla. This is without change from the prior study. Finding may represent a meningioma. This    could be further evaluated with a contrast-enhanced MRI of the head following resolution of the intracranial hemorrhage. VL Extremity Venous Bilateral   Final Result      CT HEAD WO CONTRAST   Final Result      Post therapy large acute parenchymal hemorrhage in the right cerebral hemisphere with subarachnoid component. Extensive surrounding edema is associated with 4 cm leftward shift of midline structures.       Critical finding of acute intracranial hemorrhage was called to [facility] Chanel Sy of the neurosurgery department at 12:25 PM on 2/3/2021, with instructions to contact patient's attending physician with these results. CT Brain Perfusion   Final Result      1. Hypoperfusion in the right MCA territory with a central ischemic core of 8 mL, total volume of hypoperfusion of 79 mL and a penumbra of 71 mL. XR CHEST PORTABLE   Final Result   1. Limited evaluation because of patient rotation and underpenetration of the film. 2. No dense airspace consolidation. 3. Probable mild cardiomegaly. CTA HEAD NECK W CONTRAST   Final Result   1. Carotid and vertebral arteries are patent and without stenosis or acute abnormality. 2. Incidental partial imaging of the a segmental pulmonary embolism in the right middle lobe. 3. Partial imaging of small right greater than left pleural effusions. 4. Previous left suboccipital craniotomy with a residual 1.6 cm left-sided extra-axial mass at the foramen magnum with mass effect on the cord at the cervical medullary junction favored to represent meningioma. Recommend correlation with clinical history    and previous imaging. 5. Incidental multinodular goiter. CTA HEAD:      FINDINGS: The internal carotid arteries are patent and normal in caliber through the skull base. The middle cerebral arteries are patent. 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 The A2 and A3 segments of the ACAs are patent bilaterally. Left MCA is patent. There is focal occlusion of an M2 branch of the right MCA (series 2 images 427 and 428). However, other M2 branches and M3 branches in the right sylvian fissure are patent. The M1 segment of the right MCA is patent. The posterior cerebral arteries are    patent bilaterally.  The basilar artery is patent and normal in caliber. It is supplied by the left vertebral artery. The small nondominant right vertebral artery terminates in right sided PICA. IMPRESSION:   1. 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 2. 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 Results were discussed with Dr. Yani Nielson at 8:00 PM on 2/2/2021. CT HEAD WO CONTRAST   Final Result   1. Previous left suboccipital craniotomy with a partially calcified extra-axial mass at the left foramen magnum measuring 1.8 cm contacting the cord at the cervicomedullary junction with some degree of mass effect on the cord at the foramen magnum. This    most likely represents residual/recurrent meningioma or other extra-axial mass. Correlation with patient history and previous imaging recommended. 2. Mild global volume loss and mild chronic small vessel ischemic disease in the white matter. 3. No acute intracranial hemorrhage. IR ANGIOGRAM CAROTID CEREBRAL RIGHT    (Results Pending)         Assessment/Plan:   80 yo F with PMHx afib, UGIB, recent MCA stroke with hemorrhagic conversion post-thrombectomy, now with respiratory failure. Acute hypoxic respiratory failure 2/2 suspect aspiration pneumonitis  Chest x-ray shows RLL opacities. Patient is s/p R MCA stroke, s/p thrombectomy with thrombolytics.       -Patient is currently intubated, sedated with propofol  -resp cultures positive for serratia  -Zosyn (first dose 2/12) appropriate for culture results (Serratia)  - on minimal vent settings: FiO2 40%/RR 12//PEEP 5*** assumed recommended changes   -trach placed 2/17  -DC zosyn (2/18)    R MCA stroke, s/p thrombectomy and thrombolysis  - CT 2/4 showed hemorrhagic transformation of right MCA infarct, repeat stable  - MRI w/ findings as above  - S/p PEG tube placement 02/9/21  - keppra 1000 mg IV BID per neuro  - lipitor 80 mg PO qD  - holding antiplatelets due to hemorrhagic conversion  - will need neurosurgery clearance to resume a/c for afib   -PEG present, Trach present   -resume tube feeds EN @25 ml/hr and increase by 10 ml Q4 hrs to goal rate of 35 ml/hr & 75 ml water bolus every   4 hour per dietary recs   - palliative care following  - DC to [facility] when medically stable     Intraparenchymal hemorrhage   -CT 02/04   -stable  -keep SBP < 160.  Off nicardipine drip. Coreg 6.25mg BID   -Holding anticoagulation and antiplatelets    -need clearance from NS to resume anticoagulation     Hypernatremia, resolved  -Likely secondary to decreased oral intake/dehydration  -Nephrology following  -Cont Water boluses with tube feeds   -Monitor sodium daily      Seizures  -Likely 2/2 above   -Routine EEG positive for epileptiform discharges  -Keppra [id number] BID     Metabolic encephalopathy:  -Likely multifactorial, secondary to above   -Management as above  -pt showing mild improvement w/ intentional movement. Continue to monitor      Oral thrush:  -continued presence of oral film/exudate  -Nystatin first dose 2/11     Acute blood loss anemia  -Protonix BID     Chronic systolic heart failure  -most recent echo 2/4/2020 with EF 55-60%, indeterminate diastolic function, PASP 53 mmHg, dilated IVC and collapses <50%  -spot doses of lasix as needed  -takes lasix 40 mg PO qD coreg 6.25 mg PO qD, diltiazem 180 mg PO qD and losartan 25 mg PO qD, will ask pharmacy to do med rec, as unsure if patient is taking these at home     Segmental PE  -incidental finding  -No AC due to above  -IVC filter placed 2/4     Atrial fibrillation  -Holding AC, will need neurosurg clearance prior to resuming  -monitor HR  -carvedilol 6.25 mg BID      T2DM  -mealtime and correctional insulin     HTN  -Losartan 24 mg qD, titrate to 25mg BID as tolerated ***    Asthma  -breathing treatments PRN        Code Status:Full Code  FEN: DIET TUBE FEED CONTINUOUS/CYCLIC NPO; Low Calorie High Protein (Vital HP);  Gastrostomy; Continuous; 25; 35; 24 PPX:  SCDs  DISPO: ICU    Blase Able  02/18/21  8:42 AM

## 2021-02-18 NOTE — PROGRESS NOTES
Pt SBP in 170s. Hydralazine and labetalol given previously. Residents notified, additional labetalol ordered. Will continue to monitor.

## 2021-02-19 LAB
ANION GAP SERPL CALCULATED.3IONS-SCNC: 8 MMOL/L (ref 3–16)
BASOPHILS ABSOLUTE: 0 K/UL (ref 0–0.2)
BASOPHILS RELATIVE PERCENT: 0.6 %
BUN BLDV-MCNC: 10 MG/DL (ref 7–20)
CALCIUM SERPL-MCNC: 8.8 MG/DL (ref 8.3–10.6)
CHLORIDE BLD-SCNC: 94 MMOL/L (ref 99–110)
CO2: 30 MMOL/L (ref 21–32)
CREAT SERPL-MCNC: <0.5 MG/DL (ref 0.6–1.2)
EOSINOPHILS ABSOLUTE: 0.1 K/UL (ref 0–0.6)
EOSINOPHILS RELATIVE PERCENT: 1.3 %
GFR AFRICAN AMERICAN: >60
GFR NON-AFRICAN AMERICAN: >60
GLUCOSE BLD-MCNC: 152 MG/DL (ref 70–99)
GLUCOSE BLD-MCNC: 153 MG/DL (ref 70–99)
GLUCOSE BLD-MCNC: 162 MG/DL (ref 70–99)
GLUCOSE BLD-MCNC: 165 MG/DL (ref 70–99)
GLUCOSE BLD-MCNC: 167 MG/DL (ref 70–99)
GLUCOSE BLD-MCNC: 178 MG/DL (ref 70–99)
HCT VFR BLD CALC: 24.9 % (ref 36–48)
HEMOGLOBIN: 7.8 G/DL (ref 12–16)
LYMPHOCYTES ABSOLUTE: 0.9 K/UL (ref 1–5.1)
LYMPHOCYTES RELATIVE PERCENT: 15.3 %
MAGNESIUM: 2 MG/DL (ref 1.8–2.4)
MCH RBC QN AUTO: 25 PG (ref 26–34)
MCHC RBC AUTO-ENTMCNC: 31.1 G/DL (ref 31–36)
MCV RBC AUTO: 80.3 FL (ref 80–100)
MONOCYTES ABSOLUTE: 0.7 K/UL (ref 0–1.3)
MONOCYTES RELATIVE PERCENT: 11.8 %
NEUTROPHILS ABSOLUTE: 4.2 K/UL (ref 1.7–7.7)
NEUTROPHILS RELATIVE PERCENT: 71 %
PDW BLD-RTO: 25.1 % (ref 12.4–15.4)
PERFORMED ON: ABNORMAL
PLATELET # BLD: 219 K/UL (ref 135–450)
PMV BLD AUTO: 10 FL (ref 5–10.5)
POTASSIUM REFLEX MAGNESIUM: 3.3 MMOL/L (ref 3.5–5.1)
RBC # BLD: 3.1 M/UL (ref 4–5.2)
SODIUM BLD-SCNC: 132 MMOL/L (ref 136–145)
WBC # BLD: 5.9 K/UL (ref 4–11)

## 2021-02-19 PROCEDURE — 6370000000 HC RX 637 (ALT 250 FOR IP): Performed by: STUDENT IN AN ORGANIZED HEALTH CARE EDUCATION/TRAINING PROGRAM

## 2021-02-19 PROCEDURE — 6360000002 HC RX W HCPCS: Performed by: STUDENT IN AN ORGANIZED HEALTH CARE EDUCATION/TRAINING PROGRAM

## 2021-02-19 PROCEDURE — 83735 ASSAY OF MAGNESIUM: CPT

## 2021-02-19 PROCEDURE — 99232 SBSQ HOSP IP/OBS MODERATE 35: CPT | Performed by: INTERNAL MEDICINE

## 2021-02-19 PROCEDURE — 2060000000 HC ICU INTERMEDIATE R&B

## 2021-02-19 PROCEDURE — 2580000003 HC RX 258: Performed by: STUDENT IN AN ORGANIZED HEALTH CARE EDUCATION/TRAINING PROGRAM

## 2021-02-19 PROCEDURE — 36415 COLL VENOUS BLD VENIPUNCTURE: CPT

## 2021-02-19 PROCEDURE — 80048 BASIC METABOLIC PNL TOTAL CA: CPT

## 2021-02-19 PROCEDURE — 94003 VENT MGMT INPAT SUBQ DAY: CPT

## 2021-02-19 PROCEDURE — 85025 COMPLETE CBC W/AUTO DIFF WBC: CPT

## 2021-02-19 PROCEDURE — 2700000000 HC OXYGEN THERAPY PER DAY

## 2021-02-19 PROCEDURE — 94761 N-INVAS EAR/PLS OXIMETRY MLT: CPT

## 2021-02-19 RX ORDER — FUROSEMIDE 10 MG/ML
40 INJECTION INTRAMUSCULAR; INTRAVENOUS ONCE
Status: COMPLETED | OUTPATIENT
Start: 2021-02-19 | End: 2021-02-19

## 2021-02-19 RX ADMIN — LANSOPRAZOLE 30 MG: KIT at 10:39

## 2021-02-19 RX ADMIN — ENOXAPARIN SODIUM 40 MG: 40 INJECTION SUBCUTANEOUS at 08:19

## 2021-02-19 RX ADMIN — MICONAZOLE NITRATE: 20 POWDER TOPICAL at 08:20

## 2021-02-19 RX ADMIN — Medication 10 ML: at 08:20

## 2021-02-19 RX ADMIN — NYSTATIN 500000 UNITS: 100000 SUSPENSION ORAL at 13:37

## 2021-02-19 RX ADMIN — NYSTATIN 500000 UNITS: 100000 SUSPENSION ORAL at 17:12

## 2021-02-19 RX ADMIN — INSULIN LISPRO 2 UNITS: 100 INJECTION, SOLUTION INTRAVENOUS; SUBCUTANEOUS at 06:48

## 2021-02-19 RX ADMIN — NYSTATIN 500000 UNITS: 100000 SUSPENSION ORAL at 08:19

## 2021-02-19 RX ADMIN — CARVEDILOL 6.25 MG: 6.25 TABLET, FILM COATED ORAL at 08:19

## 2021-02-19 RX ADMIN — Medication 10 ML: at 20:37

## 2021-02-19 RX ADMIN — POTASSIUM BICARBONATE 40 MEQ: 782 TABLET, EFFERVESCENT ORAL at 14:30

## 2021-02-19 RX ADMIN — LEVETIRACETAM 1000 MG: 500 SOLUTION ORAL at 20:36

## 2021-02-19 RX ADMIN — INSULIN LISPRO 2 UNITS: 100 INJECTION, SOLUTION INTRAVENOUS; SUBCUTANEOUS at 11:39

## 2021-02-19 RX ADMIN — INSULIN LISPRO 2 UNITS: 100 INJECTION, SOLUTION INTRAVENOUS; SUBCUTANEOUS at 20:38

## 2021-02-19 RX ADMIN — LOSARTAN POTASSIUM 25 MG: 25 TABLET, FILM COATED ORAL at 08:19

## 2021-02-19 RX ADMIN — MICONAZOLE NITRATE: 20 POWDER TOPICAL at 20:37

## 2021-02-19 RX ADMIN — LOSARTAN POTASSIUM 25 MG: 25 TABLET, FILM COATED ORAL at 20:36

## 2021-02-19 RX ADMIN — INSULIN LISPRO 2 UNITS: 100 INJECTION, SOLUTION INTRAVENOUS; SUBCUTANEOUS at 15:54

## 2021-02-19 RX ADMIN — ATORVASTATIN CALCIUM 80 MG: 40 TABLET, FILM COATED ORAL at 20:36

## 2021-02-19 RX ADMIN — INSULIN LISPRO 2 UNITS: 100 INJECTION, SOLUTION INTRAVENOUS; SUBCUTANEOUS at 04:12

## 2021-02-19 RX ADMIN — MINERAL SUPPLEMENT IRON 300 MG / 5 ML STRENGTH LIQUID 100 PER BOX UNFLAVORED 300 MG: at 08:19

## 2021-02-19 RX ADMIN — LEVETIRACETAM 1000 MG: 500 SOLUTION ORAL at 08:20

## 2021-02-19 RX ADMIN — NYSTATIN 500000 UNITS: 100000 SUSPENSION ORAL at 20:36

## 2021-02-19 RX ADMIN — FUROSEMIDE 40 MG: 10 INJECTION, SOLUTION INTRAMUSCULAR; INTRAVENOUS at 14:30

## 2021-02-19 ASSESSMENT — PULMONARY FUNCTION TESTS
PIF_VALUE: 30
PIF_VALUE: 23
PIF_VALUE: 22
PIF_VALUE: 22
PIF_VALUE: 23
PIF_VALUE: 25
PIF_VALUE: 22
PIF_VALUE: 21
PIF_VALUE: 27
PIF_VALUE: 23

## 2021-02-19 ASSESSMENT — PAIN SCALES - GENERAL: PAINLEVEL_OUTOF10: 0

## 2021-02-19 NOTE — DISCHARGE INSTR - DIET

## 2021-02-19 NOTE — PROGRESS NOTES
Progress Note  Admit Date: 2/2/2021         Patient seen and examined  Doing about the same as yesterday non responsive   S/p trach on 2/16 and peg on 2/9  Awaiting placement for stepan  Scheduled Medications:    losartan  25 mg Oral BID    levETIRAcetam  1,000 mg Per NG tube BID    lansoprazole  30 mg Per NG tube Daily    enoxaparin  40 mg Subcutaneous Daily    sodium chloride flush  10 mL Intravenous 2 times per day    insulin lispro  0-12 Units Subcutaneous Q4H    sodium chloride flush  10 mL Intravenous 2 times per day    nystatin  5 mL Oral 4x Daily    ferrous sulfate  300 mg Per G Tube Every Other Day    miconazole   Topical BID    atorvastatin  80 mg Oral Nightly    carvedilol  6.25 mg Oral BID WC      PRN Medications: sodium chloride flush, sodium chloride flush, glucose, dextrose, glucagon (rDNA), dextrose, perflutren lipid microspheres, labetalol, albuterol sulfate HFA, hydrALAZINE, acetaminophen, promethazine **OR** ondansetron, polyethylene glycol  Diet: DIET TUBE FEED CONTINUOUS/CYCLIC NPO; Low Calorie High Protein (Vital HP);  Gastrostomy; Continuous; 25; 35; 24    Continuous Infusions:   dextrose         PHYSICAL EXAM:  /66   Pulse 71   Temp 99.7 °F (37.6 °C) (Axillary)   Resp 16   Ht 5' 7\" (1.702 m)   Wt 235 lb 3.7 oz (106.7 kg)   SpO2 99%   BMI 36.84 kg/m²   Recent Labs     02/18/21  1630 02/18/21  1942 02/18/21  2306 02/19/21  0404 02/19/21  0647   POCGLU 169* 178* 165* 178* 153*       Intake/Output Summary (Last 24 hours) at 2/19/2021 0834  Last data filed at 2/19/2021 0600  Gross per 24 hour   Intake 1222.54 ml   Output 1155 ml   Net 67.54 ml       /66   Pulse 71   Temp 99.7 °F (37.6 °C) (Axillary)   Resp 16   Ht 5' 7\" (1.702 m)   Wt 235 lb 3.7 oz (106.7 kg)   SpO2 99%   BMI 36.84 kg/m²   General appearance: not responsive and not cooperative, no distress trach in place  Head: Normocephalic, without obvious abnormality, atraumatic Neck: no adenopathy, no carotid bruit, no JVD, supple, symmetrical, trachea midline and trach in place  Lungs: wheezes bilaterally end inspiratory   Heart: regular rate and rhythm, S1, S2 normal, no murmur, click, rub or gallop  Abdomen: soft, non-tender; bowel sounds normal; no masses,  no organomegaly and feeding tube in place  Extremities: extremities normal, atraumatic, no cyanosis or edema  Pulses: edema 1 plus pitting bilaterally    LABS:  Recent Labs     02/17/21  0333 02/18/21  0452 02/19/21  0400   WBC 5.7 6.5 5.9   HGB 8.2* 8.2* 7.8*   HCT 26.6* 26.6* 24.9*    217 219                                                                    Recent Labs     02/17/21  0333 02/18/21  0435 02/19/21  0400   * 135* 132*   K 3.2* 3.4* 3.3*   CL 94* 97* 94*   CO2 33* 31 30   BUN 10 11 10   CREATININE 0.5* 0.5* <0.5*   GLUCOSE 147* 178* 162*     No results for input(s): AST, ALT, ALB, BILITOT, ALKPHOS in the last 72 hours. No results for input(s): TROPONINI in the last 72 hours. No results for input(s): BNP in the last 72 hours. No results for input(s): CHOL, HDL in the last 72 hours. Invalid input(s): LDLCALCU  No results for input(s): INR in the last 72 hours.     Assessment & Plan:    Patient Active Problem List:     Acute cerebrovascular accident (CVA) ([address]     Permanent atrial fibrillation ([address]     Acute gastric ulcer with hemorrhage     Other pulmonary embolism without acute cor pulmonale (HCC)     Acute right MCA stroke ([address]     Hypoxemia     Acute respiratory failure with hypoxia and hypercapnia (HCC)     Aspiration pneumonia of right lower lobe due to gastric secretions ([address] 78 yo female w acut erespiratory failure, aspiration pneumonitis was on zosyn from 2/12 through 2/18, she is s/p trach on 2/17 and peg as wel. She had a right mca stroke underwent thrombectomy w hemorrhagic transformation. She is currently on keppra. Reviewed suggestions by neurosurgery will continue with monitoring and she will need to be seen by neurosurgery within the next two weeks before resumption of anticoagulation given her hx of afib. Discussed with team, continue to monitor. Discussed with social work, patient [facility]. The patient and / or the family were informed of the results of any tests, a time was given to answer questions, a plan was proposed and they agreed with plan.   Disposition: continue monitoring awaitning bed at [place], patient was number 9 yesterday   Full Aicha Murphy MD [address]

## 2021-02-19 NOTE — PROGRESS NOTES
ICU Progress Note    Admit Date: 2/2/2021  ICU Day:15  Vent Day:   IV Access:Peripheral  IV Fluids:None. FEN: Currently NPO. Restart TF goal rate 35 cc/hr, free water 75 cc q4h  Sedation:None   Vasopressors:None                Antibiotics: Vancomycin and Zosyn   Diet: DIET TUBE FEED CONTINUOUS/CYCLIC NPO; Low Calorie High Protein (Vital HP); Gastrostomy; Continuous; 25; 35; 24    CC: R MCA stroke    Interval history:   No over night event. COVID test is negative anticipated DC to LTAC  Echo was done shoed EF 55%, with dilated and non collapsable IVC   Pt has been weaned off all sedation, and remains ventilated.  Pt is non responsive to voice,   Bp more controlled today  I/O: U Out 1,1 L,           Medications:     Scheduled Meds:   losartan  25 mg Oral BID    levETIRAcetam  1,000 mg Per NG tube BID    lansoprazole  30 mg Per NG tube Daily    enoxaparin  40 mg Subcutaneous Daily    sodium chloride flush  10 mL Intravenous 2 times per day    insulin lispro  0-12 Units Subcutaneous Q4H    sodium chloride flush  10 mL Intravenous 2 times per day    nystatin  5 mL Oral 4x Daily    ferrous sulfate  300 mg Per G Tube Every Other Day    miconazole   Topical BID    atorvastatin  80 mg Oral Nightly    carvedilol  6.25 mg Oral BID WC     Continuous Infusions:   dextrose       PRN Meds:sodium chloride flush, sodium chloride flush, glucose, dextrose, glucagon (rDNA), dextrose, perflutren lipid microspheres, labetalol, albuterol sulfate HFA, hydrALAZINE, acetaminophen, promethazine **OR** ondansetron, polyethylene glycol    Objective:   Vitals:   T-max:  Patient Vitals for the past 8 hrs:   BP Temp Temp src Pulse Resp SpO2 Weight   02/19/21 1200 125/76   59 19 100 %    02/19/21 1127      100 %    02/19/21 1115 (!) 130/57 98.7 °F (37.1 °C) Axillary 61 16 100 %    02/19/21 1000 125/68   65 16     02/19/21 0922    70 17     02/19/21 0900    56 14 100 %    02/19/21 0830    67 17 100 %  02/19/21 0800 119/66 99.7 °F (37.6 °C) Axillary 71 16 99 %    02/19/21 0600       235 lb 3.7 oz (106.7 kg)       Intake/Output Summary (Last 24 hours) at 2/19/2021 1346  Last data filed at 2/19/2021 1120  Gross per 24 hour   Intake 1466.54 ml   Output 1155 ml   Net 311.54 ml       Access:   -Peripheral Access Day#:3                               Gilman Day#:9  NGT Day#: 6                                            ETT Day#:1  Vent Settings: Vent Mode: AC/PRVC Rate Set: 16 bmp/Vt Ordered: 500 mL/ /FiO2 : 40 %    Physical Exam  Constitutional:       Interventions: She is sedated and intubated. HENT:      Head: Normocephalic and atraumatic. Mouth/Throat:      Pharynx: No posterior oropharyngeal erythema. Comments: Presence of white exudate, on tongue   Eyes:      General:         Right eye: No discharge. Left eye: No discharge. Conjunctiva/sclera: Conjunctivae normal.      Pupils: Pupils are equal, round, and reactive to light. Cardiovascular:      Rate and Rhythm: Normal rate. Heart sounds: Normal heart sounds. No murmur. No friction rub. No gallop. Pulmonary:      Effort: She is intubated. Breath sounds: Normal breath sounds. No wheezing or rales. Abdominal:      General: Abdomen is flat. There is no distension. Palpations: Abdomen is soft. Tenderness: There is no abdominal tenderness. There is no guarding. Comments: PEG tube C/DI  Abdomen soft, NT, non-distended   Musculoskeletal:      Right lower leg: No edema. Left lower leg: No edema. Skin:     General: Skin is warm and dry. Neurological:      Comments: Pt off sedation. Non responsive to voice. Has intentional movement towards physical stimuli.           LABS:    CBC:   Recent Labs     02/17/21  0333 02/18/21  0452 02/19/21  0400   WBC 5.7 6.5 5.9   HGB 8.2* 8.2* 7.8*   HCT 26.6* 26.6* 24.9*    217 219   MCV 80.5 80.4 80.3     Renal:    Recent Labs     02/17/21  0333 02/18/21 0435 02/19/21  0400   * 135* 132*   K 3.2* 3.4* 3.3*   CL 94* 97* 94*   CO2 33* 31 30   BUN 10 11 10   CREATININE 0.5* 0.5* <0.5*   GLUCOSE 147* 178* 162*   CALCIUM 8.5 8.7 8.8   MG 2.10 1.90 2.00   ANIONGAP 6 7 8     Hepatic: No results for input(s): AST, ALT, BILITOT, BILIDIR, PROT, LABALBU, ALKPHOS in the last 72 hours. Troponin: No results for input(s): TROPONINI in the last 72 hours. BNP: No results for input(s): BNP in the last 72 hours. Lipids: No results for input(s): CHOL, HDL in the last 72 hours. Invalid input(s): LDLCALCU, TRIGLYCERIDE  ABGs:    No results for input(s): PHART, [id number], PO2ART, IQI3EBX, BEART, THGBART, [id number], TDF1KAR in the last 72 hours. INR: No results for input(s): INR in the last 72 hours. Lactate: No results for input(s): LACTATE in the last 72 hours. Cultures:  -----------------------------------------------------------------  RAD:   XR CHEST PORTABLE   Final Result      Unchanged small bilateral pleural effusions and bibasilar atelectasis versus airspace disease. CT HEAD WO CONTRAST   Final Result      XR CHEST PORTABLE   Final Result      Persistent bilateral infiltrates         XR CHEST PORTABLE   Final Result   Impression:          1. Increased opacities in the left lung base since the prior study. There may be a small left pleural effusion. 2. Persistent patchy right lung opacities. CT HEAD WO CONTRAST   Final Result   Interval progression of vasogenic edema at the right middle cerebral artery hemorrhagic infarction and interval development of right to left midline shift of about 3 mm as described above. Apparent slight decrease in the size of hemorrhage in its AP dimension. XR CHEST 1 VIEW   Final Result   1. Interval extubation. 2. Progression of bilateral airspace disease.       MRI BRAIN WO CONTRAST   Final Result Region of right middle cerebral artery hemorrhagic infarction measures approximately 5.0 x 4.2 cm with surrounding mild vasogenic edema and diffusion restriction consistent with right middle cerebral artery distribution infarct with hemorrhagic    components      No additional areas of hemorrhage or infarction identified. Mild local mass effect is identified with very minimal midline shift of 3 to 4 mm      XR CHEST PORTABLE   Final Result      Persistent bilateral infiltrates and cardiomegaly. IR GUIDED IVC FILTER PLACEMENT   Final Result   Impression:      Successful placement of retrievable IVC filter. CT HEAD WO CONTRAST   Final Result      1. Hemorrhagic transformation of right MCA infarct, without significant change. No new hemorrhage. CT head without contrast   Final Result         1. Stable large intraparenchymal hemorrhage located within the right posterior temporal parietal lobe with extension into the adjacent insula. There is also subarachnoid extension of hemorrhage into the overlying sulci as well as the right sylvian    fissure. Findings result in some mild right to left subfalcine herniation. 2.There is a 1.3 x 1.1 cm extra-axial mass identified within the left ordered aspect of the foramen magnum. This results in some mass effect on the adjacent medulla. This is without change from the prior study. Finding may represent a meningioma. This    could be further evaluated with a contrast-enhanced MRI of the head following resolution of the intracranial hemorrhage. CT HEAD WO CONTRAST   Final Result      1. Stable large intraparenchymal hemorrhage located within the right posterior temporal parietal lobe with extension into the adjacent insula. There is also subarachnoid extension of hemorrhage into the overlying sulci as well as the right sylvian    fissure. Findings result in some mild right to left subfalcine herniation. 2.There is a 1.3 x 1.1 cm extra-axial mass identified within the left ordered aspect of the foramen magnum. This results in some mass effect on the adjacent medulla. This is without change from the prior study. Finding may represent a meningioma. This    could be further evaluated with a contrast-enhanced MRI of the head following resolution of the intracranial hemorrhage. VL Extremity Venous Bilateral   Final Result      CT HEAD WO CONTRAST   Final Result      Post therapy large acute parenchymal hemorrhage in the right cerebral hemisphere with subarachnoid component. Extensive surrounding edema is associated with 4 cm leftward shift of midline structures. Critical finding of acute intracranial hemorrhage was called to Davina Younger of the neurosurgery department at 12:25 PM on 2/3/2021, with instructions to contact patient's attending physician with these results. CT Brain Perfusion   Final Result      1. Hypoperfusion in the right MCA territory with a central ischemic core of 8 mL, total volume of hypoperfusion of 79 mL and a penumbra of 71 mL. XR CHEST PORTABLE   Final Result   1. Limited evaluation because of patient rotation and underpenetration of the film. 2. No dense airspace consolidation. 3. Probable mild cardiomegaly. CTA HEAD NECK W CONTRAST   Final Result   1. Carotid and vertebral arteries are patent and without stenosis or acute abnormality. 2. Incidental partial imaging of the a segmental pulmonary embolism in the right middle lobe. 3. Partial imaging of small right greater than left pleural effusions. 4. Previous left suboccipital craniotomy with a residual 1.6 cm left-sided extra-axial mass at the foramen magnum with mass effect on the cord at the cervical medullary junction favored to represent meningioma. Recommend correlation with clinical history    and previous imaging. 5. Incidental multinodular goiter.       CTA HEAD: FINDINGS: The internal carotid arteries are patent and normal in caliber through the skull base. The middle cerebral arteries are patent. 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 The A2 and A3 segments of the ACAs are patent bilaterally. Left MCA is patent. There is focal occlusion of an M2 branch of the right MCA (series 2 images 427 and 428). However, other M2 branches and M3 branches in the right sylvian fissure are patent. The M1 segment of the right MCA is patent. The posterior cerebral arteries are    patent bilaterally. The basilar artery is patent and normal in caliber. It is supplied by the left vertebral artery. The small nondominant right vertebral artery terminates in right sided PICA. IMPRESSION:   1. 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 2. 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 Results were discussed with Dr. Riki Wolfe at 8:00 PM on 2/2/2021. CT HEAD WO CONTRAST   Final Result   1. Previous left suboccipital craniotomy with a partially calcified extra-axial mass at the left foramen magnum measuring 1.8 cm contacting the cord at the cervicomedullary junction with some degree of mass effect on the cord at the foramen magnum. This    most likely represents residual/recurrent meningioma or other extra-axial mass. Correlation with patient history and previous imaging recommended. 2. Mild global volume loss and mild chronic small vessel ischemic disease in the white matter. 3. No acute intracranial hemorrhage. IR ANGIOGRAM CAROTID CEREBRAL RIGHT    (Results Pending)         Assessment/Plan:   78 yo F with PMHx afib, UGIB, recent MCA stroke with hemorrhagic conversion post-thrombectomy, now with respiratory failure. Acute hypoxic respiratory failure 2/2 suspect aspiration pneumonitis  Chest x-ray shows RLL opacities. Patient is s/p R MCA stroke, s/p thrombectomy with thrombolytics. -Patient is currently intubated, sedated with propofol  -resp cultures positive for serratia  -S/P Zosyn (first dose 2/12) appropriate for culture results (Serratia)  - on minimal vent settings: FiO2 40%/RR 12//PEEP 5  -trach placed 2/17      R MCA stroke, s/p thrombectomy and thrombolysis  - CT 2/4 showed hemorrhagic transformation of right MCA infarct, repeat stable  - MRI w/ findings as above  - S/p PEG tube placement 02/9/21  - keppra 1000 mg IV BID per neuro  - lipitor 80 mg PO qD  - holding antiplatelets due to hemorrhagic conversion  - will need neurosurgery clearance to resume a/c for afib   -PEG present, Trach present   -resume tube feeds    - palliative care following  - DC to [facility] pending pre cert     Intraparenchymal hemorrhage   -CT 02/04   -stable  -keep SBP < 160.  Off nicardipine drip. Coreg 6.25mg BID   -Holding anticoagulation and antiplatelets   -need clearance from NS to resume anticoagulation     Hypernatremia, resolved  -Likely secondary to decreased oral intake/dehydration  -Nephrology following  -Cont Water boluses with tube feeds   -Monitor sodium daily      Seizures  -Likely 2/2 above   -Routine EEG positive for epileptiform discharges  -Keppra [id number] BID     Metabolic encephalopathy:  -Likely multifactorial, secondary to above   -Management as above  -pt showing mild improvement w/ intentional movement. Continue to monitor      Oral thrush:  -continued presence of oral film/exudate  -Nystatin first dose 2/11     Acute blood loss anemia  -Protonix BID     Chronic systolic heart failure  -most recent echo 2/4/2020 with EF 55-60%, indeterminate diastolic function, PASP 53 mmHg, dilated IVC and collapses <50%  -spot doses of lasix as needed  -Losartan, Coreg.   - 1 dose of lasix IV and will re assess.    Segmental PE  -incidental finding  -No AC due to above  -IVC filter placed 2/4     Atrial fibrillation  -Holding AC, will need neurosurg clearance prior to resuming  -monitor HR  -carvedilol 6.25 mg BID      T2DM  -mealtime and correctional insulin     HTN  -Losartan 24 mg qD, titrate to 25mg BID as tolerated  Asthma  -breathing treatments PRN        Code Status:Full Code  FEN: DIET TUBE FEED CONTINUOUS/CYCLIC NPO; Low Calorie High Protein (Vital HP); Gastrostomy; Continuous; 25; 35; 24  PPX:  SCDs  DISPO: ICU    Zeenat Bee MD  02/19/21  1:46 PM    Patient was seen, examined and discussed with Dr. Lazcano. I agree with the interval history. My physical exam confirms the findings listed below  Chart was reviewed including labs and medical records confirm the findings noted     Acute respiratory failure on mechanical vent support.  , RR 16, FiO2 40, PEEP 5  Rt MCA stroke s/p thrombectomy on 2/2, complicated by ICH on 2/3  Aspiration pneumonia - finished 7 days course of ABX  BP better controlled      Discussed with primary team  D/c planning   Agree on the A/P noted above.

## 2021-02-19 NOTE — PLAN OF CARE
Note: Pt at risk for skin breakdown. See Genaro score. Pt remains on bedrest. Unable to reposition self in bed. Heels elevated off bed. Sacral heart mepilex intact to protect,  site inspected and intact underneath. Will continue to turn and reposition patient every two hours and as needed. Will continue to keep patient clean and dry, applying skin care cream as needed. Pillows used for repositioning q2hs. Will continue to monitor and assess for skin breakdown. Problem: Injury - Risk of, Physical Injury:  Goal: Will remain free from falls  Description: Will remain free from falls  Note: Pt free from injury or falls at this time, fall precautions in place, bed in low position, side rail up x2, Pierre Fall Risk: High (45 and higher), bed alarm on, reoriented to room and call light, reminded not to get up without assistance, call light in reach, will continue to monitor. Pt unable to verbalize understanding of fall risk procedures.

## 2021-02-19 NOTE — PROGRESS NOTES
Nephrology Note                                                                                                                                                                                                                                                                                                                                                               Office : [phone]     Fax :[phone]              Patient's Name: Tong Salcido    Reason for Consult:  Hypernatremia   Requesting Physician:  No primary care provider on file. Na stable   On TF with Free water   K low     Past Medical History:   Diagnosis Date    Acute GI bleeding     recent admission 1/29/2021    Atrial fibrillation ([facility] [address]     Xarelto    Systolic CHF ([facility] [address] [facility]     Type 2 diabetes mellitus ([facility] [address]        Past Surgical History:   Procedure Laterality Date    GASTROSTOMY TUBE PLACEMENT N/A 2/9/2021    EGD PEG TUBE PLACEMENT performed by Maco Felix MD at [facility] IR IVC FILTER PLACEMENT W IMAGING  2/4/2021    IR IVC FILTER PLACEMENT W IMAGING 2/4/2021 [facility]    UPPER GASTROINTESTINAL ENDOSCOPY  01/29/2021    Dr Lord Pelaez       No family history on file. reports that she has quit smoking. She does not have any smokeless tobacco history on file. Allergies:  Patient has no known allergies.     Current Medications:        losartan (COZAAR) tablet 25 mg, BID      levETIRAcetam (KEPPRA) 100 MG/ML solution 1,000 mg, BID      lansoprazole suspension SUSP 30 mg, Daily      enoxaparin (LOVENOX) injection 40 mg, Daily      sodium chloride flush 0.9 % injection 10 mL, 2 times per day      sodium chloride flush 0.9 % injection 10 mL, PRN      insulin lispro (1 Unit Dial) 0-12 Units, Q4H      sodium chloride flush 0.9 % injection 10 mL, 2 times per day      sodium chloride flush 0.9 % injection 10 mL, PRN      nystatin (MYCOSTATIN) 547161 UNIT/ML suspension 500,000 Units, 4x Daily   ferrous sulfate 300 (60 Fe) MG/5ML syrup 300 mg, Every Other Day      glucose (GLUTOSE) 40 % oral gel 15 g, PRN      dextrose 50 % IV solution, PRN      glucagon (rDNA) injection 1 mg, PRN      dextrose 5 % solution, PRN      miconazole (MICOTIN) 2 % powder, BID      perflutren lipid microspheres (DEFINITY) injection 1.65 mg, ONCE PRN      labetalol (NORMODYNE;TRANDATE) injection 10 mg, Q4H PRN      albuterol sulfate  (90 Base) MCG/ACT inhaler 2 puff, PRN      hydrALAZINE (APRESOLINE) injection 5 mg, Q6H PRN      acetaminophen (TYLENOL) tablet 650 mg, Q4H PRN      promethazine (PHENERGAN) tablet 12.5 mg, Q6H PRN    Or      ondansetron (ZOFRAN) injection 4 mg, Q6H PRN      polyethylene glycol (GLYCOLAX) packet 17 g, Daily PRN      atorvastatin (LIPITOR) tablet 80 mg, Nightly      carvedilol (COREG) tablet 6.25 mg, BID WC        Review of Systems:   Lethargic       Physical exam:     Vitals:  BP (!) 124/56   Pulse 77   Temp 98.5 °F (36.9 °C) (Axillary)   Resp 17   Ht 5' 7\" (1.702 m)   Wt 235 lb 3.7 oz (106.7 kg)   SpO2 99%   BMI 36.84 kg/m²   Constitutional:  Lethargic   Skin: no rash, turgor wnl  Heent:  eomi, mmm  Neck: no bruits or jvd noted  Cardiovascular:  S1, S2 without m/r/g  Respiratory: CTA B without w/r/r  Abdomen:  +bs, soft, nt, nd  Ext: no lower extremity edema  Psychiatric: mood and affect appropriate  Musculoskeletal:  Rom, muscular strength dec     Data:   Labs:  CBC:   Recent Labs     02/17/21  0333 02/18/21  0452 02/19/21  0400   WBC 5.7 6.5 5.9   HGB 8.2* 8.2* 7.8*    217 219     BMP:    Recent Labs     02/17/21  0333 02/18/21  0435 02/19/21  0400   * 135* 132*   K 3.2* 3.4* 3.3*   CL 94* 97* 94*   CO2 33* 31 30   BUN 10 11 10   CREATININE 0.5* 0.5* <0.5*   GLUCOSE 147* 178* 162*     Ca/Mg/Phos:   Recent Labs     02/17/21  0333 02/18/21  0435 02/19/21  0400   CALCIUM 8.5 8.7 8.8   MG 2.10 1.90 2.00 Hepatic: No results for input(s): AST, ALT, ALB, BILITOT, ALKPHOS in the last 72 hours. Troponin: No results for input(s): TROPONINI in the last 72 hours. BNP: No results for input(s): BNP in the last 72 hours. Lipids: No results for input(s): CHOL, TRIG, HDL, LDLCALC, LABVLDL in the last 72 hours. ABGs:   No results for input(s): PHART, PO2ART, LDM9LDK in the last 72 hours. INR:   No results for input(s): INR in the last 72 hours. UA:  No results for input(s): Dot Blacksmith, GLUCOSEU, BILIRUBINUR, KETUA, SPECGRAV, BLOODU, PHUR, PROTEINU, UROBILINOGEN, NITRU, LEUKOCYTESUR, Lenoard Ek in the last 72 hours. Urine Microscopic:   No results for input(s): LABCAST, BACTERIA, COMU, HYALCAST, WBCUA, RBCUA, EPIU in the last 72 hours. Urine Culture: No results for input(s): LABURIN in the last 72 hours. Urine Chemistry:   No results for input(s): Aydin Banter, PROTEINUR, NAUR in the last 72 hours. IMAGING:  XR CHEST PORTABLE   Final Result      Unchanged small bilateral pleural effusions and bibasilar atelectasis versus airspace disease. CT HEAD WO CONTRAST   Final Result      XR CHEST PORTABLE   Final Result      Persistent bilateral infiltrates         XR CHEST PORTABLE   Final Result   Impression:          1. Increased opacities in the left lung base since the prior study. There may be a small left pleural effusion. 2. Persistent patchy right lung opacities. CT HEAD WO CONTRAST   Final Result   Interval progression of vasogenic edema at the right middle cerebral artery hemorrhagic infarction and interval development of right to left midline shift of about 3 mm as described above. Apparent slight decrease in the size of hemorrhage in its AP dimension. XR CHEST 1 VIEW   Final Result   1. Interval extubation. 2. Progression of bilateral airspace disease.       MRI BRAIN WO CONTRAST   Final Result 2.There is a 1.3 x 1.1 cm extra-axial mass identified within the left ordered aspect of the foramen magnum. This results in some mass effect on the adjacent medulla. This is without change from the prior study. Finding may represent a meningioma. This    could be further evaluated with a contrast-enhanced MRI of the head following resolution of the intracranial hemorrhage. VL Extremity Venous Bilateral   Final Result      CT HEAD WO CONTRAST   Final Result      Post therapy large acute parenchymal hemorrhage in the right cerebral hemisphere with subarachnoid component. Extensive surrounding edema is associated with 4 cm leftward shift of midline structures. Critical finding of acute intracranial hemorrhage was called to Miguel De Dios of the neurosurgery department at 12:25 PM on 2/3/2021, with instructions to contact patient's attending physician with these results. CT Brain Perfusion   Final Result      1. Hypoperfusion in the right MCA territory with a central ischemic core of 8 mL, total volume of hypoperfusion of 79 mL and a penumbra of 71 mL. XR CHEST PORTABLE   Final Result   1. Limited evaluation because of patient rotation and underpenetration of the film. 2. No dense airspace consolidation. 3. Probable mild cardiomegaly. CTA HEAD NECK W CONTRAST   Final Result   1. Carotid and vertebral arteries are patent and without stenosis or acute abnormality. 2. Incidental partial imaging of the a segmental pulmonary embolism in the right middle lobe. 3. Partial imaging of small right greater than left pleural effusions. 4. Previous left suboccipital craniotomy with a residual 1.6 cm left-sided extra-axial mass at the foramen magnum with mass effect on the cord at the cervical medullary junction favored to represent meningioma. Recommend correlation with clinical history    and previous imaging. 5. Incidental multinodular goiter.       CTA HEAD: FINDINGS: The internal carotid arteries are patent and normal in caliber through the skull base. The middle cerebral arteries are patent. 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 The A2 and A3 segments of the ACAs are patent bilaterally. Left MCA is patent. There is focal occlusion of an M2 branch of the right MCA (series 2 images 427 and 428). However, other M2 branches and M3 branches in the right sylvian fissure are patent. The M1 segment of the right MCA is patent. The posterior cerebral arteries are    patent bilaterally. The basilar artery is patent and normal in caliber. It is supplied by the left vertebral artery. The small nondominant right vertebral artery terminates in right sided PICA. IMPRESSION:   1. 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 2. 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 Results were discussed with Dr. Debo Castro at 8:00 PM on 2/2/2021. CT HEAD WO CONTRAST   Final Result   1. Previous left suboccipital craniotomy with a partially calcified extra-axial mass at the left foramen magnum measuring 1.8 cm contacting the cord at the cervicomedullary junction with some degree of mass effect on the cord at the foramen magnum. This    most likely represents residual/recurrent meningioma or other extra-axial mass. Correlation with patient history and previous imaging recommended. 2. Mild global volume loss and mild chronic small vessel ischemic disease in the white matter. 3. No acute intracranial hemorrhage. IR ANGIOGRAM CAROTID CEREBRAL RIGHT    (Results Pending)       Assessment/Plan   1. Hypernatremia     2. CVA     3. Anemia    4. Acid- base/ Electrolyte imbalance     5.  Malnutrition     Plan - replace free water   - Na better   - replace K   - Tube feeds to cont   - Ur studies- reviewed   - monitor NA   - CVA management   - Bp control   - PEG tube placed   - labs in am                 Thank you for allowing us to participate in care of [address] [place] [address] free to contact me   [facility] [address]  Office : [phone]  Fax :[phone]

## 2021-02-19 NOTE — PROGRESS NOTES
Nephrology Note                                                                                                                                                                                                                                                                                                                                                               Office : [phone]     Fax :[phone]              Patient's Name: Jose Angel Castaneda    Reason for Consult:  Hypernatremia   Requesting Physician:  No primary care provider on file. Na stable   On TF with Free water   K low     Past Medical History:   Diagnosis Date    Acute GI bleeding     recent admission 1/29/2021    Atrial fibrillation ([facility] [address]     Xarelto    Systolic CHF ([facility] [address]     Type 2 diabetes mellitus ([facility]        Past Surgical History:   Procedure Laterality Date    GASTROSTOMY TUBE PLACEMENT N/A 2/9/2021    EGD PEG TUBE PLACEMENT performed by aPras Jaimes [facility] at [facility] IR IVC FILTER PLACEMENT W IMAGING  2/4/2021    IR IVC FILTER PLACEMENT W IMAGING 2/4/2021 [facility] PROCEDURES    UPPER GASTROINTESTINAL ENDOSCOPY  01/29/2021    Dr Herrmann Gave       No family history on file. reports that she has quit smoking. She does not have any smokeless tobacco history on file. Allergies:  Patient has no known allergies.     Current Medications:        losartan (COZAAR) tablet 25 mg, BID      levETIRAcetam (KEPPRA) 100 MG/ML solution 1,000 mg, BID      [START ON 2/19/2021] lansoprazole suspension SUSP 30 mg, Daily      enoxaparin (LOVENOX) injection 40 mg, Daily      sodium chloride flush 0.9 % injection 10 mL, 2 times per day      sodium chloride flush 0.9 % injection 10 mL, PRN      piperacillin-tazobactam (ZOSYN) 3,375 mg in dextrose 5 % 100 mL IVPB extended infusion (mini-bag), Q8H      insulin lispro (1 Unit Dial) 0-12 Units, Q4H      sodium chloride flush 0.9 % injection 10 mL, 2 times per day 2108 02/17/21  0333 02/18/21  0435   CALCIUM 8.9 8.5 8.7   MG  --  2.10 1.90     Hepatic: No results for input(s): AST, ALT, ALB, BILITOT, ALKPHOS in the last 72 hours. Troponin: No results for input(s): TROPONINI in the last 72 hours. BNP: No results for input(s): BNP in the last 72 hours. Lipids: No results for input(s): CHOL, TRIG, HDL, LDLCALC, LABVLDL in the last 72 hours. ABGs:   No results for input(s): PHART, PO2ART, DKQ7QBA in the last 72 hours. INR:   No results for input(s): INR in the last 72 hours. UA:  No results for input(s): Rozella Celina, GLUCOSEU, BILIRUBINUR, KETUA, SPECGRAV, BLOODU, PHUR, PROTEINU, UROBILINOGEN, NITRU, LEUKOCYTESUR, Glee Kappa in the last 72 hours. Urine Microscopic:   No results for input(s): LABCAST, BACTERIA, COMU, HYALCAST, WBCUA, RBCUA, EPIU in the last 72 hours. Urine Culture: No results for input(s): LABURIN in the last 72 hours. Urine Chemistry:   No results for input(s): Ike Arnold, PROTEINUR, NAUR in the last 72 hours. IMAGING:  XR CHEST PORTABLE   Final Result      Unchanged small bilateral pleural effusions and bibasilar atelectasis versus airspace disease. CT HEAD WO CONTRAST   Final Result      XR CHEST PORTABLE   Final Result      Persistent bilateral infiltrates         XR CHEST PORTABLE   Final Result   Impression:          1. Increased opacities in the left lung base since the prior study. There may be a small left pleural effusion. 2. Persistent patchy right lung opacities. CT HEAD WO CONTRAST   Final Result   Interval progression of vasogenic edema at the right middle cerebral artery hemorrhagic infarction and interval development of right to left midline shift of about 3 mm as described above. Apparent slight decrease in the size of hemorrhage in its AP dimension. XR CHEST 1 VIEW   Final Result   1. Interval extubation. 2. Progression of bilateral airspace disease.       MRI BRAIN WO CONTRAST Final Result      Region of right middle cerebral artery hemorrhagic infarction measures approximately 5.0 x 4.2 cm with surrounding mild vasogenic edema and diffusion restriction consistent with right middle cerebral artery distribution infarct with hemorrhagic    components      No additional areas of hemorrhage or infarction identified. Mild local mass effect is identified with very minimal midline shift of 3 to 4 mm      XR CHEST PORTABLE   Final Result      Persistent bilateral infiltrates and cardiomegaly. IR GUIDED IVC FILTER PLACEMENT   Final Result   Impression:      Successful placement of retrievable IVC filter. CT HEAD WO CONTRAST   Final Result      1. Hemorrhagic transformation of right MCA infarct, without significant change. No new hemorrhage. CT head without contrast   Final Result         1. Stable large intraparenchymal hemorrhage located within the right posterior temporal parietal lobe with extension into the adjacent insula. There is also subarachnoid extension of hemorrhage into the overlying sulci as well as the right sylvian    fissure. Findings result in some mild right to left subfalcine herniation. 2.There is a 1.3 x 1.1 cm extra-axial mass identified within the left ordered aspect of the foramen magnum. This results in some mass effect on the adjacent medulla. This is without change from the prior study. Finding may represent a meningioma. This    could be further evaluated with a contrast-enhanced MRI of the head following resolution of the intracranial hemorrhage. CT HEAD WO CONTRAST   Final Result      1. Stable large intraparenchymal hemorrhage located within the right posterior temporal parietal lobe with extension into the adjacent insula. There is also subarachnoid extension of hemorrhage into the overlying sulci as well as the right sylvian    fissure. Findings result in some mild right to left subfalcine herniation. 2.There is a 1.3 x 1.1 cm extra-axial mass identified within the left ordered aspect of the foramen magnum. This results in some mass effect on the adjacent medulla. This is without change from the prior study. Finding may represent a meningioma. This    could be further evaluated with a contrast-enhanced MRI of the head following resolution of the intracranial hemorrhage. VL Extremity Venous Bilateral   Final Result      CT HEAD WO CONTRAST   Final Result      Post therapy large acute parenchymal hemorrhage in the right cerebral hemisphere with subarachnoid component. Extensive surrounding edema is associated with 4 cm leftward shift of midline structures. Critical finding of acute intracranial hemorrhage was called to David West of the neurosurgery department at 12:25 PM on 2/3/2021, with instructions to contact patient's attending physician with these results. CT Brain Perfusion   Final Result      1. Hypoperfusion in the right MCA territory with a central ischemic core of 8 mL, total volume of hypoperfusion of 79 mL and a penumbra of 71 mL. XR CHEST PORTABLE   Final Result   1. Limited evaluation because of patient rotation and underpenetration of the film. 2. No dense airspace consolidation. 3. Probable mild cardiomegaly. CTA HEAD NECK W CONTRAST   Final Result   1. Carotid and vertebral arteries are patent and without stenosis or acute abnormality. 2. Incidental partial imaging of the a segmental pulmonary embolism in the right middle lobe. 3. Partial imaging of small right greater than left pleural effusions. 4. Previous left suboccipital craniotomy with a residual 1.6 cm left-sided extra-axial mass at the foramen magnum with mass effect on the cord at the cervical medullary junction favored to represent meningioma. Recommend correlation with clinical history    and previous imaging. 5. Incidental multinodular goiter.       CTA HEAD: - replace free water   - Na better   - replace K   - Tube feeds to cont   - Ur studies- reviewed   - monitor NA   - CVA management   - Bp control   - PEG tube placed   - labs in am                 Thank you for allowing us to participate in care of [address] [place] [address] free to contact me   [facility] [address]  Office : [phone]  Fax :[phone]

## 2021-02-19 NOTE — CONSULTS
NUTRITION ASSESSMENT  Admission Date: 2/2/2021     Type and Reason for Visit: Reassess    NUTRITION RECOMMENDATIONS:    1. Recommend continue EN formula Low Calorie High Protein  Vital High Protein  and advance to goal rate of 50 ml/hr to provide 1200 ml total volume, 1200 kcal, 105 g protein and 1003 ml free water. 2. Recommend 75 ml water bolus every 4 hour    NUTRITION ASSESSMENT:  Pt continues to be intubated, sedation off. EN running at goal rate however RD to increase rate as propofol is d/c. Will continue to monitor tolerance and nutritional status. MALNUTRITION ASSESSMENT  Context of Malnutrition: Acute Illness   Malnutrition Status: At risk for malnutrition (Comment)  Findings of the 6 clinical characteristics of malnutrition (Minimum of 2 out of 6 clinical characteristics is required to make the diagnosis of moderate or severe Protein Calorie Malnutrition based on AND/ASPEN Guidelines):  Energy Intake: Less than/equal to 50% of estimated energy requirements    Energy Intake Time: Greater than or equal to 7 days    Weight Loss %: Unable to assess    Weight loss Time: Unable to assess   Due to current CDC guidelines recommending 6-ft distancing for social isolation for COVID19 prevention, physical aspects of the malnutrition assessment were withheld at this time. NUTRITION DIAGNOSIS   Problem: Problem #1: Inadequate oral intake  Etiology: Cognitive or neurological impairment  Signs & Symptoms: NPO status due to medical condition and Nutrition Support-EN    NUTRITION INTERVENTION  Food and/or Nutrient Delivery:Continue NPO or Modify Current Tube Feeding   Nutrition education/counseling/coordination of care: Continue Inpatient Monitoring     NUTRITION MONITORING & EVALUATION:  Evaluation:Progressing towards goal   Goals:Goals: pt will tolerate EN at goal rate to meet 100% of nutrition needs.    Monitoring: TF Intake  or TF Tolerance      OBJECTIVE DATA:  · Nutrition-Focused Physical Findings: +BM 2/17 · Wounds None      Past Medical History:   Diagnosis Date    Acute GI bleeding     recent admission 1/29/2021    Atrial fibrillation ([facility] [address]     Xarelto    Systolic CHF ([facility])     Type 2 diabetes mellitus ([facility])         ANTHROPOMETRICS  Current Height: 5' 7\" (170.2 cm)  Current Weight: 235 lb 3.7 oz (106.7 kg)    Admission weight: 225 lb (102.1 kg)  Ideal Bodyweight 135 lb    Usual Bodyweight  No wt hx; pt unable to provide   Weight Changes mirlande d/t limited wt hx       BMI BMI (Calculated): 36.9    Wt Readings from Last 50 Encounters:   02/19/21 235 lb 3.7 oz (106.7 kg)       COMPARATIVE STANDARDS  Estimated Total Kcals/Day : 11-14  Current Bodyweight (99.2 kg) ~3202-2719 kcal    Estimated Total Protein (g/day) : 1.3-1.5 Ideal Bodyweight  (61.4 kg) 79-91 g/day  Estimated Daily Total Fluid (ml/day): 1500 ml    Food / Nutrition-Related History  Pre-Admission / Home Diet:    none listed  Home Supplements / Herbals:    none noted  Food Restrictions / Cultural Requests:    none noted    Current Nutrition Therapies   DIET TUBE FEED CONTINUOUS/CYCLIC NPO; Low Calorie High Protein (Vital HP);  Gastrostomy; Continuous; 25; 35; 24     PO Intake: NPO  PO Supplement: NPO   PO Supplement Intake: NPO  IVF: N/A    NUTRITION RISK LEVEL: Risk Level: High     Nataly Roblero RD, LD  [place]:  [phone]  Office:  [phone]

## 2021-02-20 LAB
ANION GAP SERPL CALCULATED.3IONS-SCNC: 8 MMOL/L (ref 3–16)
BASOPHILS ABSOLUTE: 0 K/UL (ref 0–0.2)
BASOPHILS RELATIVE PERCENT: 0.5 %
BUN BLDV-MCNC: 12 MG/DL (ref 7–20)
CALCIUM SERPL-MCNC: 9 MG/DL (ref 8.3–10.6)
CHLORIDE BLD-SCNC: 97 MMOL/L (ref 99–110)
CO2: 30 MMOL/L (ref 21–32)
CREAT SERPL-MCNC: <0.5 MG/DL (ref 0.6–1.2)
EOSINOPHILS ABSOLUTE: 0.1 K/UL (ref 0–0.6)
EOSINOPHILS RELATIVE PERCENT: 1 %
GFR AFRICAN AMERICAN: >60
GFR NON-AFRICAN AMERICAN: >60
GLUCOSE BLD-MCNC: 147 MG/DL (ref 70–99)
GLUCOSE BLD-MCNC: 151 MG/DL (ref 70–99)
GLUCOSE BLD-MCNC: 153 MG/DL (ref 70–99)
GLUCOSE BLD-MCNC: 157 MG/DL (ref 70–99)
GLUCOSE BLD-MCNC: 159 MG/DL (ref 70–99)
GLUCOSE BLD-MCNC: 165 MG/DL (ref 70–99)
GLUCOSE BLD-MCNC: 169 MG/DL (ref 70–99)
GLUCOSE BLD-MCNC: 175 MG/DL (ref 70–99)
HCT VFR BLD CALC: 25.1 % (ref 36–48)
HEMOGLOBIN: 7.8 G/DL (ref 12–16)
LYMPHOCYTES ABSOLUTE: 0.9 K/UL (ref 1–5.1)
LYMPHOCYTES RELATIVE PERCENT: 15 %
MAGNESIUM: 1.8 MG/DL (ref 1.8–2.4)
MCH RBC QN AUTO: 25 PG (ref 26–34)
MCHC RBC AUTO-ENTMCNC: 31.2 G/DL (ref 31–36)
MCV RBC AUTO: 80.1 FL (ref 80–100)
MONOCYTES ABSOLUTE: 0.5 K/UL (ref 0–1.3)
MONOCYTES RELATIVE PERCENT: 8.8 %
NEUTROPHILS ABSOLUTE: 4.5 K/UL (ref 1.7–7.7)
NEUTROPHILS RELATIVE PERCENT: 74.7 %
PDW BLD-RTO: 25.1 % (ref 12.4–15.4)
PERFORMED ON: ABNORMAL
PLATELET # BLD: 214 K/UL (ref 135–450)
PMV BLD AUTO: 10.1 FL (ref 5–10.5)
POTASSIUM REFLEX MAGNESIUM: 3.3 MMOL/L (ref 3.5–5.1)
RBC # BLD: 3.14 M/UL (ref 4–5.2)
SODIUM BLD-SCNC: 135 MMOL/L (ref 136–145)
WBC # BLD: 6.1 K/UL (ref 4–11)

## 2021-02-20 PROCEDURE — 6360000002 HC RX W HCPCS: Performed by: STUDENT IN AN ORGANIZED HEALTH CARE EDUCATION/TRAINING PROGRAM

## 2021-02-20 PROCEDURE — 2580000003 HC RX 258: Performed by: STUDENT IN AN ORGANIZED HEALTH CARE EDUCATION/TRAINING PROGRAM

## 2021-02-20 PROCEDURE — 6370000000 HC RX 637 (ALT 250 FOR IP): Performed by: STUDENT IN AN ORGANIZED HEALTH CARE EDUCATION/TRAINING PROGRAM

## 2021-02-20 PROCEDURE — 85025 COMPLETE CBC W/AUTO DIFF WBC: CPT

## 2021-02-20 PROCEDURE — 94003 VENT MGMT INPAT SUBQ DAY: CPT

## 2021-02-20 PROCEDURE — 2060000000 HC ICU INTERMEDIATE R&B

## 2021-02-20 PROCEDURE — 83735 ASSAY OF MAGNESIUM: CPT

## 2021-02-20 PROCEDURE — 94761 N-INVAS EAR/PLS OXIMETRY MLT: CPT

## 2021-02-20 PROCEDURE — 36415 COLL VENOUS BLD VENIPUNCTURE: CPT

## 2021-02-20 PROCEDURE — 2700000000 HC OXYGEN THERAPY PER DAY

## 2021-02-20 PROCEDURE — 1200000000 HC SEMI PRIVATE

## 2021-02-20 PROCEDURE — 6360000002 HC RX W HCPCS: Performed by: INTERNAL MEDICINE

## 2021-02-20 PROCEDURE — 80048 BASIC METABOLIC PNL TOTAL CA: CPT

## 2021-02-20 RX ORDER — POTASSIUM CHLORIDE 7.45 MG/ML
10 INJECTION INTRAVENOUS
Status: COMPLETED | OUTPATIENT
Start: 2021-02-20 | End: 2021-02-20

## 2021-02-20 RX ADMIN — MICONAZOLE NITRATE: 20 POWDER TOPICAL at 09:09

## 2021-02-20 RX ADMIN — ENOXAPARIN SODIUM 40 MG: 40 INJECTION SUBCUTANEOUS at 11:58

## 2021-02-20 RX ADMIN — INSULIN LISPRO 2 UNITS: 100 INJECTION, SOLUTION INTRAVENOUS; SUBCUTANEOUS at 03:06

## 2021-02-20 RX ADMIN — LEVETIRACETAM 1000 MG: 500 SOLUTION ORAL at 21:30

## 2021-02-20 RX ADMIN — Medication 10 ML: at 09:08

## 2021-02-20 RX ADMIN — NYSTATIN 500000 UNITS: 100000 SUSPENSION ORAL at 16:25

## 2021-02-20 RX ADMIN — NYSTATIN 500000 UNITS: 100000 SUSPENSION ORAL at 12:08

## 2021-02-20 RX ADMIN — INSULIN LISPRO 2 UNITS: 100 INJECTION, SOLUTION INTRAVENOUS; SUBCUTANEOUS at 23:48

## 2021-02-20 RX ADMIN — POTASSIUM CHLORIDE 10 MEQ: 7.46 INJECTION, SOLUTION INTRAVENOUS at 15:20

## 2021-02-20 RX ADMIN — LOSARTAN POTASSIUM 25 MG: 25 TABLET, FILM COATED ORAL at 20:41

## 2021-02-20 RX ADMIN — NYSTATIN 500000 UNITS: 100000 SUSPENSION ORAL at 20:41

## 2021-02-20 RX ADMIN — LEVETIRACETAM 1000 MG: 500 SOLUTION ORAL at 09:08

## 2021-02-20 RX ADMIN — INSULIN LISPRO 2 UNITS: 100 INJECTION, SOLUTION INTRAVENOUS; SUBCUTANEOUS at 12:03

## 2021-02-20 RX ADMIN — CARVEDILOL 6.25 MG: 6.25 TABLET, FILM COATED ORAL at 09:06

## 2021-02-20 RX ADMIN — ATORVASTATIN CALCIUM 80 MG: 40 TABLET, FILM COATED ORAL at 20:41

## 2021-02-20 RX ADMIN — MICONAZOLE NITRATE: 20 POWDER TOPICAL at 20:41

## 2021-02-20 RX ADMIN — Medication 10 ML: at 20:42

## 2021-02-20 RX ADMIN — POTASSIUM BICARBONATE 40 MEQ: 782 TABLET, EFFERVESCENT ORAL at 09:06

## 2021-02-20 RX ADMIN — INSULIN LISPRO 2 UNITS: 100 INJECTION, SOLUTION INTRAVENOUS; SUBCUTANEOUS at 18:51

## 2021-02-20 RX ADMIN — POTASSIUM CHLORIDE 10 MEQ: 7.46 INJECTION, SOLUTION INTRAVENOUS at 14:14

## 2021-02-20 RX ADMIN — LOSARTAN POTASSIUM 25 MG: 25 TABLET, FILM COATED ORAL at 09:06

## 2021-02-20 RX ADMIN — POTASSIUM CHLORIDE 10 MEQ: 7.46 INJECTION, SOLUTION INTRAVENOUS at 16:21

## 2021-02-20 RX ADMIN — INSULIN LISPRO 2 UNITS: 100 INJECTION, SOLUTION INTRAVENOUS; SUBCUTANEOUS at 01:04

## 2021-02-20 RX ADMIN — INSULIN LISPRO 2 UNITS: 100 INJECTION, SOLUTION INTRAVENOUS; SUBCUTANEOUS at 07:05

## 2021-02-20 RX ADMIN — LANSOPRAZOLE 30 MG: KIT at 09:07

## 2021-02-20 RX ADMIN — INSULIN LISPRO 2 UNITS: 100 INJECTION, SOLUTION INTRAVENOUS; SUBCUTANEOUS at 15:25

## 2021-02-20 RX ADMIN — POTASSIUM CHLORIDE 10 MEQ: 7.46 INJECTION, SOLUTION INTRAVENOUS at 13:18

## 2021-02-20 RX ADMIN — NYSTATIN 500000 UNITS: 100000 SUSPENSION ORAL at 09:07

## 2021-02-20 ASSESSMENT — PULMONARY FUNCTION TESTS
PIF_VALUE: 24
PIF_VALUE: 25
PIF_VALUE: 24
PIF_VALUE: 25
PIF_VALUE: 22
PIF_VALUE: 23
PIF_VALUE: 27
PIF_VALUE: 24
PIF_VALUE: 23
PIF_VALUE: 25
PIF_VALUE: 24
PIF_VALUE: 23
PIF_VALUE: 20
PIF_VALUE: 22
PIF_VALUE: 25
PIF_VALUE: 22
PIF_VALUE: 25
PIF_VALUE: 25
PIF_VALUE: 22
PIF_VALUE: 22
PIF_VALUE: 30

## 2021-02-20 ASSESSMENT — PAIN SCALES - WONG BAKER
WONGBAKER_NUMERICALRESPONSE: 2

## 2021-02-20 ASSESSMENT — PAIN SCALES - GENERAL
PAINLEVEL_OUTOF10: 0
PAINLEVEL_OUTOF10: 0

## 2021-02-20 NOTE — PLAN OF CARE
Problem: Falls - Risk of:  Goal: Will remain free from falls  Description: Will remain free from falls  2/20/2021 0115 by Nicole Alvarado RN  Outcome: Ongoing    Problem: Skin Integrity:  Goal: Will show no infection signs and symptoms  Description: Will show no infection signs and symptoms  2/20/2021 0115 by Nicole Alvarado RN  Outcome: Ongoing    Problem: Nutrition  Goal: Optimal nutrition therapy  Outcome: Ongoing

## 2021-02-20 NOTE — PROGRESS NOTES
Nephrology Note                                                                                                                                                                                                                                                                                                                                                               Office : [phone]     Fax :[phone]              Patient's Name: Marlin Nagel    Reason for Consult:  Hypernatremia   Requesting Physician:  No primary care provider on file. Na trending down  On TF with Free water   No significant overnight event    Past Medical History:   Diagnosis Date    Acute GI bleeding     recent admission 1/29/2021    Atrial fibrillation ([facility] [address]     Xarelto    Systolic CHF ([facility] [address]     Type 2 diabetes mellitus ([facility] [address]        Past Surgical History:   Procedure Laterality Date    GASTROSTOMY TUBE PLACEMENT N/A 2/9/2021    EGD PEG TUBE PLACEMENT performed by Jeffrey Bautista MD at [facility] IR IVC FILTER PLACEMENT W IMAGING  2/4/2021    IR IVC FILTER PLACEMENT W IMAGING 2/4/2021 [facility] SPECIAL PROCEDURES    UPPER GASTROINTESTINAL ENDOSCOPY  01/29/2021    Dr Jerman Pandya       No family history on file. reports that she has quit smoking. She does not have any smokeless tobacco history on file. Allergies:  Patient has no known allergies.     Current Medications:        potassium bicarb-citric acid (EFFER-K) effervescent tablet 40 mEq, Daily      losartan (COZAAR) tablet 25 mg, BID      levETIRAcetam (KEPPRA) 100 MG/ML solution 1,000 mg, BID      lansoprazole suspension SUSP 30 mg, Daily      enoxaparin (LOVENOX) injection 40 mg, Daily      sodium chloride flush 0.9 % injection 10 mL, 2 times per day      sodium chloride flush 0.9 % injection 10 mL, PRN      insulin lispro (1 Unit Dial) 0-12 Units, Q4H      sodium chloride flush 0.9 % injection 10 mL, 2 times per day   sodium chloride flush 0.9 % injection 10 mL, PRN      nystatin (MYCOSTATIN) 611421 UNIT/ML suspension 500,000 Units, 4x Daily      ferrous sulfate 300 (60 Fe) MG/5ML syrup 300 mg, Every Other Day      glucose (GLUTOSE) 40 % oral gel 15 g, PRN      dextrose 50 % IV solution, PRN      glucagon (rDNA) injection 1 mg, PRN      dextrose 5 % solution, PRN      miconazole (MICOTIN) 2 % powder, BID      perflutren lipid microspheres (DEFINITY) injection 1.65 mg, ONCE PRN      labetalol (NORMODYNE;TRANDATE) injection 10 mg, Q4H PRN      albuterol sulfate  (90 Base) MCG/ACT inhaler 2 puff, PRN      hydrALAZINE (APRESOLINE) injection 5 mg, Q6H PRN      acetaminophen (TYLENOL) tablet 650 mg, Q4H PRN      promethazine (PHENERGAN) tablet 12.5 mg, Q6H PRN    Or      ondansetron (ZOFRAN) injection 4 mg, Q6H PRN      polyethylene glycol (GLYCOLAX) packet 17 g, Daily PRN      atorvastatin (LIPITOR) tablet 80 mg, Nightly      carvedilol (COREG) tablet 6.25 mg, BID WC        Review of Systems:   Lethargic       Physical exam:     Vitals:  /66   Pulse 69   Temp 98.6 °F (37 °C) (Axillary)   Resp 15   Ht 5' 7\" (1.702 m)   Wt 233 lb 14.5 oz (106.1 kg)   SpO2 100%   BMI 36.64 kg/m²   Constitutional:  Lethargic   Skin: no rash, turgor wnl  Heent:  eomi, mmm  Neck: no bruits or jvd noted  Cardiovascular:  S1, S2 without m/r/g  Respiratory: Diminished at bases   Abdomen:  +bs, soft, nt, nd  Ext: no lower extremity edema      Data:   Labs:  CBC:   Recent Labs     02/18/21  0452 02/19/21  0400 02/20/21  0504   WBC 6.5 5.9 6.1   HGB 8.2* 7.8* 7.8*    219 214     BMP:    Recent Labs     02/18/21  0435 02/19/21  0400 02/20/21  0504   * 132* 135*   K 3.4* 3.3* 3.3*   CL 97* 94* 97*   CO2 31 30 30   BUN 11 10 12   CREATININE 0.5* <0.5* <0.5*   GLUCOSE 178* 162* 147*     Ca/Mg/Phos:   Recent Labs     02/18/21  0435 02/19/21  0400 02/20/21  0504   CALCIUM 8.7 8.8 9.0   MG 1.90 2.00 1.80 Hepatic: No results for input(s): AST, ALT, ALB, BILITOT, ALKPHOS in the last 72 hours. Troponin: No results for input(s): TROPONINI in the last 72 hours. BNP: No results for input(s): BNP in the last 72 hours. Lipids: No results for input(s): CHOL, TRIG, HDL, LDLCALC, LABVLDL in the last 72 hours. ABGs:   No results for input(s): PHART, PO2ART, ULC0DUV in the last 72 hours. INR:   No results for input(s): INR in the last 72 hours. UA:  No results for input(s): Leopoldo Saupe, GLUCOSEU, BILIRUBINUR, KETUA, SPECGRAV, BLOODU, PHUR, PROTEINU, UROBILINOGEN, NITRU, LEUKOCYTESUR, Mennie Mall in the last 72 hours. Urine Microscopic:   No results for input(s): LABCAST, BACTERIA, COMU, HYALCAST, WBCUA, RBCUA, EPIU in the last 72 hours. Urine Culture: No results for input(s): LABURIN in the last 72 hours. Urine Chemistry:   No results for input(s): Treva Decent, PROTEINUR, NAUR in the last 72 hours. IMAGING:  XR CHEST PORTABLE   Final Result      Unchanged small bilateral pleural effusions and bibasilar atelectasis versus airspace disease. CT HEAD WO CONTRAST   Final Result      XR CHEST PORTABLE   Final Result      Persistent bilateral infiltrates         XR CHEST PORTABLE   Final Result   Impression:          1. Increased opacities in the left lung base since the prior study. There may be a small left pleural effusion. 2. Persistent patchy right lung opacities. CT HEAD WO CONTRAST   Final Result   Interval progression of vasogenic edema at the right middle cerebral artery hemorrhagic infarction and interval development of right to left midline shift of about 3 mm as described above. Apparent slight decrease in the size of hemorrhage in its AP dimension. XR CHEST 1 VIEW   Final Result   1. Interval extubation. 2. Progression of bilateral airspace disease.       MRI BRAIN WO CONTRAST   Final Result 2.There is a 1.3 x 1.1 cm extra-axial mass identified within the left ordered aspect of the foramen magnum. This results in some mass effect on the adjacent medulla. This is without change from the prior study. Finding may represent a meningioma. This    could be further evaluated with a contrast-enhanced MRI of the head following resolution of the intracranial hemorrhage. VL Extremity Venous Bilateral   Final Result      CT HEAD WO CONTRAST   Final Result      Post therapy large acute parenchymal hemorrhage in the right cerebral hemisphere with subarachnoid component. Extensive surrounding edema is associated with 4 cm leftward shift of midline structures. Critical finding of acute intracranial hemorrhage was called to Marce Berry of the neurosurgery department at 12:25 PM on 2/3/2021, with instructions to contact patient's attending physician with these results. CT Brain Perfusion   Final Result      1. Hypoperfusion in the right MCA territory with a central ischemic core of 8 mL, total volume of hypoperfusion of 79 mL and a penumbra of 71 mL. XR CHEST PORTABLE   Final Result   1. Limited evaluation because of patient rotation and underpenetration of the film. 2. No dense airspace consolidation. 3. Probable mild cardiomegaly. CTA HEAD NECK W CONTRAST   Final Result   1. Carotid and vertebral arteries are patent and without stenosis or acute abnormality. 2. Incidental partial imaging of the a segmental pulmonary embolism in the right middle lobe. 3. Partial imaging of small right greater than left pleural effusions. 4. Previous left suboccipital craniotomy with a residual 1.6 cm left-sided extra-axial mass at the foramen magnum with mass effect on the cord at the cervical medullary junction favored to represent meningioma. Recommend correlation with clinical history    and previous imaging. 5. Incidental multinodular goiter.       CTA HEAD: FINDINGS: The internal carotid arteries are patent and normal in caliber through the skull base. The middle cerebral arteries are patent. 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 The A2 and A3 segments of the ACAs are patent bilaterally. Left MCA is patent. There is focal occlusion of an M2 branch of the right MCA (series 2 images 427 and 428). However, other M2 branches and M3 branches in the right sylvian fissure are patent. The M1 segment of the right MCA is patent. The posterior cerebral arteries are    patent bilaterally. The basilar artery is patent and normal in caliber. It is supplied by the left vertebral artery. The small nondominant right vertebral artery terminates in right sided PICA. IMPRESSION:   1. 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 2. 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 Results were discussed with Dr. Maribell Sorenson at 8:00 PM on 2/2/2021. CT HEAD WO CONTRAST   Final Result   1. Previous left suboccipital craniotomy with a partially calcified extra-axial mass at the left foramen magnum measuring 1.8 cm contacting the cord at the cervicomedullary junction with some degree of mass effect on the cord at the foramen magnum. This    most likely represents residual/recurrent meningioma or other extra-axial mass. Correlation with patient history and previous imaging recommended. 2. Mild global volume loss and mild chronic small vessel ischemic disease in the white matter. 3. No acute intracranial hemorrhage. IR ANGIOGRAM CAROTID CEREBRAL RIGHT    (Results Pending)       Assessment/Plan   1. Hypernatremia     2. CVA     3. Anemia    4. Acid- base/ Electrolyte imbalance     5.  Malnutrition     Plan - dec broderick water 250 ml q4h  - replace K as needed  - Tube feeds to cont   - Ur studies- reviewed   - monitor NA   - CVA management   - Bp control   - PEG tube placed   - labs in am                 Thank you for allowing us to participate in care of [facility] free to contact me   [facility] [address]  Office : [phone]  Fax :[phone]

## 2021-02-20 NOTE — PLAN OF CARE
Description: Able to distinguish between reality-based and nonreality-based thinking  2/20/2021 1036 by Wanda Benito RN  Outcome: Ongoing  2/20/2021 0115 by Steven Winston RN  Outcome: Ongoing  Goal: Able to interrupt nonreality-based thinking  Description: Able to interrupt nonreality-based thinking  2/20/2021 1036 by Wanda Benito RN  Outcome: Ongoing  2/20/2021 0115 by Steven Winston RN  Outcome: Ongoing     Problem: Sleep Pattern Disturbance:  Goal: Appears well-rested  Description: Appears well-rested  2/20/2021 1036 by Wanda Benito RN  Outcome: Ongoing  2/20/2021 0115 by Steven Winston RN  Outcome: Ongoing

## 2021-02-21 LAB
ANION GAP SERPL CALCULATED.3IONS-SCNC: 8 MMOL/L (ref 3–16)
BASOPHILS ABSOLUTE: 0 K/UL (ref 0–0.2)
BASOPHILS RELATIVE PERCENT: 0.5 %
BUN BLDV-MCNC: 14 MG/DL (ref 7–20)
CALCIUM SERPL-MCNC: 8.8 MG/DL (ref 8.3–10.6)
CHLORIDE BLD-SCNC: 95 MMOL/L (ref 99–110)
CO2: 30 MMOL/L (ref 21–32)
CREAT SERPL-MCNC: <0.5 MG/DL (ref 0.6–1.2)
EOSINOPHILS ABSOLUTE: 0.1 K/UL (ref 0–0.6)
EOSINOPHILS RELATIVE PERCENT: 1.2 %
GFR AFRICAN AMERICAN: >60
GFR NON-AFRICAN AMERICAN: >60
GLUCOSE BLD-MCNC: 124 MG/DL (ref 70–99)
GLUCOSE BLD-MCNC: 164 MG/DL (ref 70–99)
GLUCOSE BLD-MCNC: 169 MG/DL (ref 70–99)
GLUCOSE BLD-MCNC: 187 MG/DL (ref 70–99)
GLUCOSE BLD-MCNC: 204 MG/DL (ref 70–99)
GLUCOSE BLD-MCNC: 210 MG/DL (ref 70–99)
HCT VFR BLD CALC: 24.1 % (ref 36–48)
HEMOGLOBIN: 7.6 G/DL (ref 12–16)
LYMPHOCYTES ABSOLUTE: 0.9 K/UL (ref 1–5.1)
LYMPHOCYTES RELATIVE PERCENT: 14.5 %
MAGNESIUM: 1.7 MG/DL (ref 1.8–2.4)
MCH RBC QN AUTO: 24.9 PG (ref 26–34)
MCHC RBC AUTO-ENTMCNC: 31.5 G/DL (ref 31–36)
MCV RBC AUTO: 79.3 FL (ref 80–100)
MONOCYTES ABSOLUTE: 0.5 K/UL (ref 0–1.3)
MONOCYTES RELATIVE PERCENT: 7.7 %
NEUTROPHILS ABSOLUTE: 4.7 K/UL (ref 1.7–7.7)
NEUTROPHILS RELATIVE PERCENT: 76.1 %
PDW BLD-RTO: 24.9 % (ref 12.4–15.4)
PERFORMED ON: ABNORMAL
PLATELET # BLD: 194 K/UL (ref 135–450)
PMV BLD AUTO: 9.5 FL (ref 5–10.5)
POTASSIUM REFLEX MAGNESIUM: 3.5 MMOL/L (ref 3.5–5.1)
RBC # BLD: 3.04 M/UL (ref 4–5.2)
SODIUM BLD-SCNC: 133 MMOL/L (ref 136–145)
WBC # BLD: 6.1 K/UL (ref 4–11)

## 2021-02-21 PROCEDURE — 2580000003 HC RX 258: Performed by: STUDENT IN AN ORGANIZED HEALTH CARE EDUCATION/TRAINING PROGRAM

## 2021-02-21 PROCEDURE — 94761 N-INVAS EAR/PLS OXIMETRY MLT: CPT

## 2021-02-21 PROCEDURE — 6370000000 HC RX 637 (ALT 250 FOR IP): Performed by: STUDENT IN AN ORGANIZED HEALTH CARE EDUCATION/TRAINING PROGRAM

## 2021-02-21 PROCEDURE — 94003 VENT MGMT INPAT SUBQ DAY: CPT

## 2021-02-21 PROCEDURE — 6370000000 HC RX 637 (ALT 250 FOR IP): Performed by: INTERNAL MEDICINE

## 2021-02-21 PROCEDURE — 2700000000 HC OXYGEN THERAPY PER DAY

## 2021-02-21 PROCEDURE — 85025 COMPLETE CBC W/AUTO DIFF WBC: CPT

## 2021-02-21 PROCEDURE — 80048 BASIC METABOLIC PNL TOTAL CA: CPT

## 2021-02-21 PROCEDURE — 36415 COLL VENOUS BLD VENIPUNCTURE: CPT

## 2021-02-21 PROCEDURE — 6360000002 HC RX W HCPCS: Performed by: STUDENT IN AN ORGANIZED HEALTH CARE EDUCATION/TRAINING PROGRAM

## 2021-02-21 PROCEDURE — 99231 SBSQ HOSP IP/OBS SF/LOW 25: CPT | Performed by: INTERNAL MEDICINE

## 2021-02-21 PROCEDURE — 83735 ASSAY OF MAGNESIUM: CPT

## 2021-02-21 PROCEDURE — 1200000000 HC SEMI PRIVATE

## 2021-02-21 RX ORDER — CASTOR OIL AND BALSAM, PERU 788; 87 MG/G; MG/G
OINTMENT TOPICAL 2 TIMES DAILY
Status: DISCONTINUED | OUTPATIENT
Start: 2021-02-21 | End: 2021-02-23 | Stop reason: HOSPADM

## 2021-02-21 RX ADMIN — ENOXAPARIN SODIUM 40 MG: 40 INJECTION SUBCUTANEOUS at 07:57

## 2021-02-21 RX ADMIN — MINERAL SUPPLEMENT IRON 300 MG / 5 ML STRENGTH LIQUID 100 PER BOX UNFLAVORED 300 MG: at 07:58

## 2021-02-21 RX ADMIN — Medication: at 20:09

## 2021-02-21 RX ADMIN — Medication: at 08:04

## 2021-02-21 RX ADMIN — INSULIN LISPRO 4 UNITS: 100 INJECTION, SOLUTION INTRAVENOUS; SUBCUTANEOUS at 11:44

## 2021-02-21 RX ADMIN — NYSTATIN 500000 UNITS: 100000 SUSPENSION ORAL at 20:08

## 2021-02-21 RX ADMIN — LEVETIRACETAM 1000 MG: 500 SOLUTION ORAL at 08:03

## 2021-02-21 RX ADMIN — Medication 10 ML: at 21:00

## 2021-02-21 RX ADMIN — POTASSIUM BICARBONATE 40 MEQ: 782 TABLET, EFFERVESCENT ORAL at 07:59

## 2021-02-21 RX ADMIN — LOSARTAN POTASSIUM 25 MG: 25 TABLET, FILM COATED ORAL at 07:59

## 2021-02-21 RX ADMIN — Medication 10 ML: at 08:05

## 2021-02-21 RX ADMIN — NYSTATIN 500000 UNITS: 100000 SUSPENSION ORAL at 07:58

## 2021-02-21 RX ADMIN — Medication 10 ML: at 20:09

## 2021-02-21 RX ADMIN — LEVETIRACETAM 1000 MG: 500 SOLUTION ORAL at 20:08

## 2021-02-21 RX ADMIN — LANSOPRAZOLE 30 MG: KIT at 08:03

## 2021-02-21 RX ADMIN — INSULIN LISPRO 4 UNITS: 100 INJECTION, SOLUTION INTRAVENOUS; SUBCUTANEOUS at 16:21

## 2021-02-21 RX ADMIN — Medication: at 04:25

## 2021-02-21 RX ADMIN — INSULIN LISPRO 2 UNITS: 100 INJECTION, SOLUTION INTRAVENOUS; SUBCUTANEOUS at 19:22

## 2021-02-21 RX ADMIN — CARVEDILOL 6.25 MG: 6.25 TABLET, FILM COATED ORAL at 07:59

## 2021-02-21 RX ADMIN — LOSARTAN POTASSIUM 25 MG: 25 TABLET, FILM COATED ORAL at 20:09

## 2021-02-21 RX ADMIN — ATORVASTATIN CALCIUM 80 MG: 40 TABLET, FILM COATED ORAL at 20:09

## 2021-02-21 RX ADMIN — MICONAZOLE NITRATE: 20 POWDER TOPICAL at 20:10

## 2021-02-21 RX ADMIN — INSULIN LISPRO 2 UNITS: 100 INJECTION, SOLUTION INTRAVENOUS; SUBCUTANEOUS at 07:39

## 2021-02-21 RX ADMIN — NYSTATIN 500000 UNITS: 100000 SUSPENSION ORAL at 13:31

## 2021-02-21 RX ADMIN — MICONAZOLE NITRATE: 20 POWDER TOPICAL at 08:04

## 2021-02-21 RX ADMIN — NYSTATIN 500000 UNITS: 100000 SUSPENSION ORAL at 16:24

## 2021-02-21 ASSESSMENT — PULMONARY FUNCTION TESTS
PIF_VALUE: 27
PIF_VALUE: 22
PIF_VALUE: 22
PIF_VALUE: 26
PIF_VALUE: 23
PIF_VALUE: 22
PIF_VALUE: 23
PIF_VALUE: 22
PIF_VALUE: 24
PIF_VALUE: 23
PIF_VALUE: 23
PIF_VALUE: 27

## 2021-02-21 ASSESSMENT — PAIN SCALES - GENERAL: PAINLEVEL_OUTOF10: 0

## 2021-02-21 NOTE — PLAN OF CARE
Description: Absence of new skin breakdown  2/21/2021 1100 by Jhoana Portillo RN  Outcome: Ongoing  2/21/2021 0108 by Alessio Gray RN  Outcome: Ongoing     Problem: Nutrition  Goal: Optimal nutrition therapy  2/21/2021 1100 by Jhoana Portillo RN  Outcome: Ongoing  2/21/2021 0108 by Alessio Gray RN  Outcome: Ongoing     Problem: Confusion - Acute:  Goal: Absence of continued neurological deterioration signs and symptoms  Description: Absence of continued neurological deterioration signs and symptoms  2/21/2021 1100 by Jhoana Portillo RN  Outcome: Ongoing  2/21/2021 0108 by Alessio Gray RN  Outcome: Ongoing  Goal: Mental status will be restored to baseline  Description: Mental status will be restored to baseline  2/21/2021 1100 by Jhoana Portillo RN  Outcome: Ongoing  2/21/2021 0108 by Alessio Gray RN  Outcome: Ongoing     Problem: Discharge Planning:  Goal: Ability to perform activities of daily living will improve  Description: Ability to perform activities of daily living will improve  2/21/2021 1100 by Jhoana Portillo RN  Outcome: Ongoing  2/21/2021 0108 by Alessio Gray RN  Outcome: Ongoing  Goal: Participates in care planning  Description: Participates in care planning  Outcome: Ongoing     Problem: Injury - Risk of, Physical Injury:  Goal: Will remain free from falls  Description: Will remain free from falls  2/21/2021 1100 by Jhoana Portillo RN  Outcome: Ongoing  2/21/2021 0108 by Alessio Gray RN  Outcome: Ongoing  Goal: Absence of physical injury  Description: Absence of physical injury  2/21/2021 1100 by Jhoana Portillo RN  Outcome: Ongoing  2/21/2021 0108 by Alessio Gray RN  Outcome: Ongoing     Problem: Mood - Altered:  Goal: Mood stable  Description: Mood stable  2/21/2021 1100 by Jhoana Portillo RN  Outcome: Ongoing  2/21/2021 0108 by Alessio Gray RN  Outcome: Met This Shift  Goal: Absence of abusive behavior  Description: Absence of abusive behavior  2/21/2021 1100 by Jhoana Portillo RN Outcome: Ongoing  2/21/2021 0108 by Kameron Victor RN  Outcome: Met This Shift  Goal: Verbalizations of feeling emotionally comfortable while being cared for will increase  Description: Verbalizations of feeling emotionally comfortable while being cared for will increase  2/21/2021 1100 by Lisa Choi RN  Outcome: Ongoing  2/21/2021 0108 by Kameron Victor RN  Outcome: Met This Shift     Problem: Psychomotor Activity - Altered:  Goal: Absence of psychomotor disturbance signs and symptoms  Description: Absence of psychomotor disturbance signs and symptoms  Outcome: Ongoing     Problem: Sensory Perception - Impaired:  Goal: Demonstrations of improved sensory functioning will increase  Description: Demonstrations of improved sensory functioning will increase  2/21/2021 1100 by Lisa Choi RN  Outcome: Ongoing  2/21/2021 0108 by Kameron Victor RN  Outcome: Ongoing  Goal: Decrease in sensory misperception frequency  Description: Decrease in sensory misperception frequency  Outcome: Ongoing  Goal: Able to refrain from responding to false sensory perceptions  Description: Able to refrain from responding to false sensory perceptions  Outcome: Ongoing  Goal: Demonstrates accurate environmental perceptions  Description: Demonstrates accurate environmental perceptions  Outcome: Ongoing  Goal: Able to distinguish between reality-based and nonreality-based thinking  Description: Able to distinguish between reality-based and nonreality-based thinking  Outcome: Ongoing  Goal: Able to interrupt nonreality-based thinking  Description: Able to interrupt nonreality-based thinking  Outcome: Ongoing     Problem: Sleep Pattern Disturbance:  Goal: Appears well-rested  Description: Appears well-rested  Outcome: Ongoing

## 2021-02-21 NOTE — PLAN OF CARE
Problem: Falls - Risk of:  Goal: Will remain free from falls  Description: Will remain free from falls  Outcome: Ongoing   [facility]. Non skid footwear with ambulation. Bed in lowest position. Call light in reach. Bed alarm activated. Reminded to call for assistance before exiting bed. Continue safety precautions. Problem: ACTIVITY INTOLERANCE/IMPAIRED MOBILITY  Goal: Mobility/activity is maintained at optimum level for patient  Outcome: Ongoing   Patient is bed bound. Continue passive ROM exercises and repositioning every two hours and as needed for comfort. Problem: Urinary Elimination:  Goal: Signs and symptoms of infection will decrease  Description: Signs and symptoms of infection will decrease  Outcome: Ongoing   Patient has a Gilman catheter. Output is adequate. Catheter care preformed. Problem: Skin Integrity:  Goal: Will show no infection signs and symptoms  Description: Will show no infection signs and symptoms  Outcome: Ongoing   Patient has abrasion to left posterior gluteal fold. Venelex cream ordered. Open to air. Sacral heart dressing to coccyx. Pillow support in place. Problem: Nutrition  Goal: Optimal nutrition therapy  Outcome: Ongoing   Continuous TF infusing at goal rate.      Problem: Sensory Perception - Impaired:  Goal: Demonstrations of improved sensory functioning will increase  Description: Demonstrations of improved sensory functioning will increase  Outcome: Ongoing     Problem: Confusion - Acute:  Goal: Absence of continued neurological deterioration signs and symptoms  Description: Absence of continued neurological deterioration signs and symptoms  Outcome: Ongoing

## 2021-02-21 NOTE — PROGRESS NOTES
This RN was talking to the patient. While comforting her patient grabbed and sqeezed RN hand with her right hand. She had a moderate .

## 2021-02-21 NOTE — PROGRESS NOTES
Nephrology Note                                                                                                                                                                                                                                                                                                                                                               Office : [phone]     Fax :[phone]              Patient's Name: Shelly Rankin    Reason for Consult:  Hypernatremia   Requesting Physician:  No primary care provider on file. Na trending down  On TF with Free water (same as yesterday)  No significant overnight event    Past Medical History:   Diagnosis Date    Acute GI bleeding     recent admission 1/29/2021    Atrial fibrillation ([facility] [address]     Xarelto    Systolic CHF ([facility] [address]     Type 2 diabetes mellitus ([facility] [address]        Past Surgical History:   Procedure Laterality Date    GASTROSTOMY TUBE PLACEMENT N/A 2/9/2021    EGD PEG TUBE PLACEMENT performed by Maria L Franklin MD at [facility] IR IVC FILTER PLACEMENT W IMAGING  2/4/2021    IR IVC FILTER PLACEMENT W IMAGING 2/4/2021 [facility] SPECIAL PROCEDURES    UPPER GASTROINTESTINAL ENDOSCOPY  01/29/2021    Dr Herman Huge       No family history on file. reports that she has quit smoking. She does not have any smokeless tobacco history on file. Allergies:  Patient has no known allergies.     Current Medications:        Venelex ointment, BID      potassium bicarb-citric acid (EFFER-K) effervescent tablet 40 mEq, Daily      losartan (COZAAR) tablet 25 mg, BID      levETIRAcetam (KEPPRA) 100 MG/ML solution 1,000 mg, BID      lansoprazole suspension SUSP 30 mg, Daily      enoxaparin (LOVENOX) injection 40 mg, Daily      sodium chloride flush 0.9 % injection 10 mL, 2 times per day      sodium chloride flush 0.9 % injection 10 mL, PRN      insulin lispro (1 Unit Dial) 0-12 Units, Q4H 3334 02/21/21  0420   CALCIUM 8.8 9.0 8.8   MG 2.00 1.80 1.70*     Hepatic: No results for input(s): AST, ALT, ALB, BILITOT, ALKPHOS in the last 72 hours. Troponin: No results for input(s): TROPONINI in the last 72 hours. BNP: No results for input(s): BNP in the last 72 hours. Lipids: No results for input(s): CHOL, TRIG, HDL, LDLCALC, LABVLDL in the last 72 hours. ABGs:   No results for input(s): PHART, PO2ART, UUV6MOT in the last 72 hours. INR:   No results for input(s): INR in the last 72 hours. UA:  No results for input(s): Fabiana Kristopher, GLUCOSEU, BILIRUBINUR, KETUA, SPECGRAV, BLOODU, PHUR, PROTEINU, UROBILINOGEN, NITRU, LEUKOCYTESUR, Farrukh Scale in the last 72 hours. Urine Microscopic:   No results for input(s): LABCAST, BACTERIA, COMU, HYALCAST, WBCUA, RBCUA, EPIU in the last 72 hours. Urine Culture: No results for input(s): LABURIN in the last 72 hours. Urine Chemistry:   No results for input(s): Timbo Fu, PROTEINUR, NAUR in the last 72 hours. IMAGING:  XR CHEST PORTABLE   Final Result      Unchanged small bilateral pleural effusions and bibasilar atelectasis versus airspace disease. CT HEAD WO CONTRAST   Final Result      XR CHEST PORTABLE   Final Result      Persistent bilateral infiltrates         XR CHEST PORTABLE   Final Result   Impression:          1. Increased opacities in the left lung base since the prior study. There may be a small left pleural effusion. 2. Persistent patchy right lung opacities. CT HEAD WO CONTRAST   Final Result   Interval progression of vasogenic edema at the right middle cerebral artery hemorrhagic infarction and interval development of right to left midline shift of about 3 mm as described above. Apparent slight decrease in the size of hemorrhage in its AP dimension. XR CHEST 1 VIEW   Final Result   1. Interval extubation. 2. Progression of bilateral airspace disease.       MRI BRAIN WO CONTRAST   Final Result 2.There is a 1.3 x 1.1 cm extra-axial mass identified within the left ordered aspect of the foramen magnum. This results in some mass effect on the adjacent medulla. This is without change from the prior study. Finding may represent a meningioma. This    could be further evaluated with a contrast-enhanced MRI of the head following resolution of the intracranial hemorrhage. VL Extremity Venous Bilateral   Final Result      CT HEAD WO CONTRAST   Final Result      Post therapy large acute parenchymal hemorrhage in the right cerebral hemisphere with subarachnoid component. Extensive surrounding edema is associated with 4 cm leftward shift of midline structures. Critical finding of acute intracranial hemorrhage was called to Quirino Meek of the neurosurgery department at 12:25 PM on 2/3/2021, with instructions to contact patient's attending physician with these results. CT Brain Perfusion   Final Result      1. Hypoperfusion in the right MCA territory with a central ischemic core of 8 mL, total volume of hypoperfusion of 79 mL and a penumbra of 71 mL. XR CHEST PORTABLE   Final Result   1. Limited evaluation because of patient rotation and underpenetration of the film. 2. No dense airspace consolidation. 3. Probable mild cardiomegaly. CTA HEAD NECK W CONTRAST   Final Result   1. Carotid and vertebral arteries are patent and without stenosis or acute abnormality. 2. Incidental partial imaging of the a segmental pulmonary embolism in the right middle lobe. 3. Partial imaging of small right greater than left pleural effusions. 4. Previous left suboccipital craniotomy with a residual 1.6 cm left-sided extra-axial mass at the foramen magnum with mass effect on the cord at the cervical medullary junction favored to represent meningioma. Recommend correlation with clinical history    and previous imaging. 5. Incidental multinodular goiter.       CTA HEAD: FINDINGS: The internal carotid arteries are patent and normal in caliber through the skull base. The middle cerebral arteries are patent. 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 The A2 and A3 segments of the ACAs are patent bilaterally. Left MCA is patent. There is focal occlusion of an M2 branch of the right MCA (series 2 images 427 and 428). However, other M2 branches and M3 branches in the right sylvian fissure are patent. The M1 segment of the right MCA is patent. The posterior cerebral arteries are    patent bilaterally. The basilar artery is patent and normal in caliber. It is supplied by the left vertebral artery. The small nondominant right vertebral artery terminates in right sided PICA. IMPRESSION:   1. 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 2. 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 Results were discussed with Dr. Debo Castro at 8:00 PM on 2/2/2021. CT HEAD WO CONTRAST   Final Result   1. Previous left suboccipital craniotomy with a partially calcified extra-axial mass at the left foramen magnum measuring 1.8 cm contacting the cord at the cervicomedullary junction with some degree of mass effect on the cord at the foramen magnum. This    most likely represents residual/recurrent meningioma or other extra-axial mass. Correlation with patient history and previous imaging recommended. 2. Mild global volume loss and mild chronic small vessel ischemic disease in the white matter. 3. No acute intracranial hemorrhage. IR ANGIOGRAM CAROTID CEREBRAL RIGHT    (Results Pending)       Assessment/Plan   1. Hypernatremia     2. CVA     3. Anemia    4. Acid- base/ Electrolyte imbalance     5.  Malnutrition     Plan - dec broderick water 250 ml q6h (spoke to RN)  - replace K as needed  - Tube feeds to cont   - Ur studies- reviewed   - monitor NA   - CVA management   - Bp controlled  - PEG tube placed   - labs in am                 Thank you for allowing us to participate in care of [address] free to contact me   [facility] [address]  Office : [phone]  Fax :[phone]

## 2021-02-21 NOTE — PROGRESS NOTES
Hospitalist Progress Note      PCP: No primary care provider on file. Date of Admission: 2/2/2021    Chief Complaint:     Chief Complaint   Patient presents with    Cerebrovascular Accident       Subjective:  Patient seen and examined at the bedside.   No acute events overnight  Continues on tube feeds  Await bed at [facility]: reviewed as documented 2/2/2021, no changes    Medications:  Reviewed    Infusion Medications    dextrose       Scheduled Medications    Venelex   Topical BID    potassium bicarb-citric acid  40 mEq Oral Daily    losartan  25 mg Oral BID    levETIRAcetam  1,000 mg Per NG tube BID    lansoprazole  30 mg Per NG tube Daily    enoxaparin  40 mg Subcutaneous Daily    sodium chloride flush  10 mL Intravenous 2 times per day    insulin lispro  0-12 Units Subcutaneous Q4H    sodium chloride flush  10 mL Intravenous 2 times per day    nystatin  5 mL Oral 4x Daily    ferrous sulfate  300 mg Per G Tube Every Other Day    miconazole   Topical BID    atorvastatin  80 mg Oral Nightly    carvedilol  6.25 mg Oral BID WC     PRN Meds: sodium chloride flush, sodium chloride flush, glucose, dextrose, glucagon (rDNA), dextrose, perflutren lipid microspheres, labetalol, albuterol sulfate HFA, hydrALAZINE, acetaminophen, promethazine **OR** ondansetron, polyethylene glycol      Intake/Output Summary (Last 24 hours) at 2/21/2021 1020  Last data filed at 2/21/2021 0745  Gross per 24 hour   Intake 2279 ml   Output 1375 ml   Net 904 ml       Physical Exam    /83   Pulse 83   Temp 99.9 °F (37.7 °C) (Axillary)   Resp 17   Ht 5' 7\" (1.702 m)   Wt 233 lb 14.5 oz (106.1 kg)   SpO2 100%   BMI 36.64 kg/m²     General appearance:  No acute distress, appears stated age  Eyes: Pupils equal, round, reactive to light, conjunctiva/corneas clear  Ears/Nose/Mouth/Throat: No external lesions or scars, hearing intact to voice  Neck: Trachea midline, no masses noted, no thyromegaly Respiratory:  Non-labored breathing, clear to auscultation bilaterally  Cardiovascular: Regular rate and rhythm, no murmurs, gallops, or rubs  Abdomen: Feeding tube in place, no tenderness  Musculoskeletal: Warm, 1+ pitting edema in BL lower extremities  Skin: normal color, no wounds noted  Psychiatric: intermittently responsive, on trach    Labs:   Recent Labs     02/19/21  0400 02/20/21  0504 02/21/21  0420   WBC 5.9 6.1 6.1   HGB 7.8* 7.8* 7.6*   HCT 24.9* 25.1* 24.1*    214 194     Recent Labs     02/19/21  0400 02/20/21  0504 02/21/21  0420   * 135* 133*   K 3.3* 3.3* 3.5   CL 94* 97* 95*   CO2 30 30 30   BUN 10 12 14   CREATININE <0.5* <0.5* <0.5*   CALCIUM 8.8 9.0 8.8     No results for input(s): AST, ALT, BILIDIR, BILITOT, ALKPHOS in the last 72 hours. No results for input(s): INR in the last 72 hours. No results for input(s): Neldon Docker in the last 72 hours. Urinalysis:      Lab Results   Component Value Date    NITRU Negative 02/11/2021    WBCUA None seen 02/11/2021    BACTERIA Rare 02/11/2021    RBCUA None seen 02/11/2021    BLOODU Negative 02/11/2021    SPECGRAV 1.025 02/11/2021    GLUCOSEU Negative 02/11/2021       Radiology:  XR CHEST PORTABLE   Final Result      Unchanged small bilateral pleural effusions and bibasilar atelectasis versus airspace disease. CT HEAD WO CONTRAST   Final Result      XR CHEST PORTABLE   Final Result      Persistent bilateral infiltrates         XR CHEST PORTABLE   Final Result   Impression:          1. Increased opacities in the left lung base since the prior study. There may be a small left pleural effusion. 2. Persistent patchy right lung opacities. CT HEAD WO CONTRAST   Final Result   Interval progression of vasogenic edema at the right middle cerebral artery hemorrhagic infarction and interval development of right to left midline shift of about 3 mm as described above. Apparent slight decrease in the size of hemorrhage in its AP dimension. XR CHEST 1 VIEW   Final Result   1. Interval extubation. 2. Progression of bilateral airspace disease. MRI BRAIN WO CONTRAST   Final Result      Region of right middle cerebral artery hemorrhagic infarction measures approximately 5.0 x 4.2 cm with surrounding mild vasogenic edema and diffusion restriction consistent with right middle cerebral artery distribution infarct with hemorrhagic    components      No additional areas of hemorrhage or infarction identified. Mild local mass effect is identified with very minimal midline shift of 3 to 4 mm      XR CHEST PORTABLE   Final Result      Persistent bilateral infiltrates and cardiomegaly. IR GUIDED IVC FILTER PLACEMENT   Final Result   Impression:      Successful placement of retrievable IVC filter. CT HEAD WO CONTRAST   Final Result      1. Hemorrhagic transformation of right MCA infarct, without significant change. No new hemorrhage. CT head without contrast   Final Result         1. Stable large intraparenchymal hemorrhage located within the right posterior temporal parietal lobe with extension into the adjacent insula. There is also subarachnoid extension of hemorrhage into the overlying sulci as well as the right sylvian    fissure. Findings result in some mild right to left subfalcine herniation. 2.There is a 1.3 x 1.1 cm extra-axial mass identified within the left ordered aspect of the foramen magnum. This results in some mass effect on the adjacent medulla. This is without change from the prior study. Finding may represent a meningioma. This    could be further evaluated with a contrast-enhanced MRI of the head following resolution of the intracranial hemorrhage.                    CT HEAD WO CONTRAST   Final Result 1. Stable large intraparenchymal hemorrhage located within the right posterior temporal parietal lobe with extension into the adjacent insula. There is also subarachnoid extension of hemorrhage into the overlying sulci as well as the right sylvian    fissure. Findings result in some mild right to left subfalcine herniation. 2.There is a 1.3 x 1.1 cm extra-axial mass identified within the left ordered aspect of the foramen magnum. This results in some mass effect on the adjacent medulla. This is without change from the prior study. Finding may represent a meningioma. This    could be further evaluated with a contrast-enhanced MRI of the head following resolution of the intracranial hemorrhage. VL Extremity Venous Bilateral   Final Result      CT HEAD WO CONTRAST   Final Result      Post therapy large acute parenchymal hemorrhage in the right cerebral hemisphere with subarachnoid component. Extensive surrounding edema is associated with 4 cm leftward shift of midline structures. Critical finding of acute intracranial hemorrhage was called to Mando Lindsey of the neurosurgery department at 12:25 PM on 2/3/2021, with instructions to contact patient's attending physician with these results. CT Brain Perfusion   Final Result      1. Hypoperfusion in the right MCA territory with a central ischemic core of 8 mL, total volume of hypoperfusion of 79 mL and a penumbra of 71 mL. XR CHEST PORTABLE   Final Result   1. Limited evaluation because of patient rotation and underpenetration of the film. 2. No dense airspace consolidation. 3. Probable mild cardiomegaly. CTA HEAD NECK W CONTRAST   Final Result   1. Carotid and vertebral arteries are patent and without stenosis or acute abnormality. 2. Incidental partial imaging of the a segmental pulmonary embolism in the right middle lobe. 3. Partial imaging of small right greater than left pleural effusions. 4. Previous left suboccipital craniotomy with a residual 1.6 cm left-sided extra-axial mass at the foramen magnum with mass effect on the cord at the cervical medullary junction favored to represent meningioma. Recommend correlation with clinical history    and previous imaging. 5. Incidental multinodular goiter. CTA HEAD:      FINDINGS: The internal carotid arteries are patent and normal in caliber through the skull base. The middle cerebral arteries are patent. 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 The A2 and A3 segments of the ACAs are patent bilaterally. Left MCA is patent. There is focal occlusion of an M2 branch of the right MCA (series 2 images 427 and 428). However, other M2 branches and M3 branches in the right sylvian fissure are patent. The M1 segment of the right MCA is patent. The posterior cerebral arteries are    patent bilaterally. The basilar artery is patent and normal in caliber. It is supplied by the left vertebral artery. The small nondominant right vertebral artery terminates in right sided PICA. IMPRESSION:   1. 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 2. 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 Results were discussed with Dr. Kyle Goodman at 8:00 PM on 2/2/2021. CT HEAD WO CONTRAST   Final Result   1. Previous left suboccipital craniotomy with a partially calcified extra-axial mass at the left foramen magnum measuring 1.8 cm contacting the cord at the cervicomedullary junction with some degree of mass effect on the cord at the foramen magnum. This    most likely represents residual/recurrent meningioma or other extra-axial mass. Correlation with patient history and previous imaging recommended. 2. Mild global volume loss and mild chronic small vessel ischemic disease in the white matter. 3. No acute intracranial hemorrhage. IR ANGIOGRAM CAROTID CEREBRAL RIGHT    (Results Pending)       Assessment/Plan:    Active Hospital Problems    Diagnosis Date Noted    Acute respiratory failure with hypoxia and hypercapnia (HCC) [J96.01, J96.02]     Aspiration pneumonia of right lower lobe due to gastric secretions ([address] [J69.0]     Hypoxemia [R09.02] 02/06/2021    Permanent atrial fibrillation (HCC) [I48.21] 02/03/2021    Acute gastric ulcer with hemorrhage [K25.0] 02/03/2021    Other pulmonary embolism without acute cor pulmonale (HCC) [I26.99] 02/03/2021    Acute right MCA stroke ([facility] [address] [I63.511]     Acute cerebrovascular accident (CVA) ([facility] [address] [I63.9] 02/02/2021       Plan:    -Zosyn 2/12-2/18  -Trach placed 2/16, PEG 2/9  -R MCA stroke w/ thrombectomy, now on keppra  -Plan for [facility] when bed available    DVT Prophylaxis: Lovenox  Diet: DIET TUBE FEED CONTINUOUS/CYCLIC NPO; Low Calorie High Protein (Vital HP);  Gastrostomy; Continuous; 25; 50; 24  Code Status: Full Code    PT/OT Eval Status: Complete    Dispo: medically ready for discharge when post-acute care arranged    Kaila Ackerman MD

## 2021-02-22 LAB
ANION GAP SERPL CALCULATED.3IONS-SCNC: 10 MMOL/L (ref 3–16)
BASOPHILS ABSOLUTE: 0 K/UL (ref 0–0.2)
BASOPHILS RELATIVE PERCENT: 0.2 %
BUN BLDV-MCNC: 14 MG/DL (ref 7–20)
CALCIUM SERPL-MCNC: 8.8 MG/DL (ref 8.3–10.6)
CHLORIDE BLD-SCNC: 94 MMOL/L (ref 99–110)
CO2: 27 MMOL/L (ref 21–32)
CREAT SERPL-MCNC: <0.5 MG/DL (ref 0.6–1.2)
EOSINOPHILS ABSOLUTE: 0 K/UL (ref 0–0.6)
EOSINOPHILS RELATIVE PERCENT: 0.3 %
GFR AFRICAN AMERICAN: >60
GFR NON-AFRICAN AMERICAN: >60
GLUCOSE BLD-MCNC: 152 MG/DL (ref 70–99)
GLUCOSE BLD-MCNC: 163 MG/DL (ref 70–99)
GLUCOSE BLD-MCNC: 163 MG/DL (ref 70–99)
GLUCOSE BLD-MCNC: 170 MG/DL (ref 70–99)
GLUCOSE BLD-MCNC: 171 MG/DL (ref 70–99)
GLUCOSE BLD-MCNC: 179 MG/DL (ref 70–99)
GLUCOSE BLD-MCNC: 183 MG/DL (ref 70–99)
GLUCOSE BLD-MCNC: 188 MG/DL (ref 70–99)
HCT VFR BLD CALC: 26 % (ref 36–48)
HEMOGLOBIN: 8 G/DL (ref 12–16)
LYMPHOCYTES ABSOLUTE: 0.6 K/UL (ref 1–5.1)
LYMPHOCYTES RELATIVE PERCENT: 4.4 %
MAGNESIUM: 1.4 MG/DL (ref 1.8–2.4)
MCH RBC QN AUTO: 24.8 PG (ref 26–34)
MCHC RBC AUTO-ENTMCNC: 30.9 G/DL (ref 31–36)
MCV RBC AUTO: 80.3 FL (ref 80–100)
MONOCYTES ABSOLUTE: 0.5 K/UL (ref 0–1.3)
MONOCYTES RELATIVE PERCENT: 3.1 %
NEUTROPHILS ABSOLUTE: 13.6 K/UL (ref 1.7–7.7)
NEUTROPHILS RELATIVE PERCENT: 92 %
PDW BLD-RTO: 24.7 % (ref 12.4–15.4)
PERFORMED ON: ABNORMAL
PERFORMED ON: NORMAL
PLATELET # BLD: 214 K/UL (ref 135–450)
PMV BLD AUTO: 9.7 FL (ref 5–10.5)
POTASSIUM REFLEX MAGNESIUM: 3.2 MMOL/L (ref 3.5–5.1)
RBC # BLD: 3.24 M/UL (ref 4–5.2)
SODIUM BLD-SCNC: 131 MMOL/L (ref 136–145)
WBC # BLD: 14.8 K/UL (ref 4–11)

## 2021-02-22 PROCEDURE — 80048 BASIC METABOLIC PNL TOTAL CA: CPT

## 2021-02-22 PROCEDURE — 6370000000 HC RX 637 (ALT 250 FOR IP): Performed by: STUDENT IN AN ORGANIZED HEALTH CARE EDUCATION/TRAINING PROGRAM

## 2021-02-22 PROCEDURE — 36415 COLL VENOUS BLD VENIPUNCTURE: CPT

## 2021-02-22 PROCEDURE — 2060000000 HC ICU INTERMEDIATE R&B

## 2021-02-22 PROCEDURE — 2700000000 HC OXYGEN THERAPY PER DAY

## 2021-02-22 PROCEDURE — 2580000003 HC RX 258: Performed by: STUDENT IN AN ORGANIZED HEALTH CARE EDUCATION/TRAINING PROGRAM

## 2021-02-22 PROCEDURE — 83735 ASSAY OF MAGNESIUM: CPT

## 2021-02-22 PROCEDURE — 1200000000 HC SEMI PRIVATE

## 2021-02-22 PROCEDURE — 85025 COMPLETE CBC W/AUTO DIFF WBC: CPT

## 2021-02-22 PROCEDURE — 94003 VENT MGMT INPAT SUBQ DAY: CPT

## 2021-02-22 PROCEDURE — 94761 N-INVAS EAR/PLS OXIMETRY MLT: CPT

## 2021-02-22 PROCEDURE — 6360000002 HC RX W HCPCS: Performed by: STUDENT IN AN ORGANIZED HEALTH CARE EDUCATION/TRAINING PROGRAM

## 2021-02-22 PROCEDURE — 94150 VITAL CAPACITY TEST: CPT

## 2021-02-22 PROCEDURE — 99233 SBSQ HOSP IP/OBS HIGH 50: CPT | Performed by: INTERNAL MEDICINE

## 2021-02-22 RX ORDER — LANSOPRAZOLE
30 KIT DAILY
Qty: 300 ML | Refills: 0
Start: 2021-02-23 | End: 2021-04-28 | Stop reason: ALTCHOICE

## 2021-02-22 RX ORDER — LEVETIRACETAM 100 MG/ML
1000 SOLUTION ORAL 2 TIMES DAILY
Qty: 600 ML | Refills: 0
Start: 2021-02-22 | End: 2021-04-28

## 2021-02-22 RX ORDER — CASTOR OIL AND BALSAM, PERU 788; 87 MG/G; MG/G
OINTMENT TOPICAL 2 TIMES DAILY
Qty: 1 TUBE | Refills: 0
Start: 2021-02-22 | End: 2021-03-24

## 2021-02-22 RX ORDER — ALBUTEROL SULFATE 90 UG/1
2 AEROSOL, METERED RESPIRATORY (INHALATION) PRN
Qty: 1 INHALER | Refills: 3
Start: 2021-02-22 | End: 2021-04-28 | Stop reason: ALTCHOICE

## 2021-02-22 RX ADMIN — ENOXAPARIN SODIUM 40 MG: 40 INJECTION SUBCUTANEOUS at 08:16

## 2021-02-22 RX ADMIN — POTASSIUM BICARBONATE 40 MEQ: 782 TABLET, EFFERVESCENT ORAL at 08:16

## 2021-02-22 RX ADMIN — NYSTATIN 500000 UNITS: 100000 SUSPENSION ORAL at 22:42

## 2021-02-22 RX ADMIN — INSULIN LISPRO 2 UNITS: 100 INJECTION, SOLUTION INTRAVENOUS; SUBCUTANEOUS at 18:27

## 2021-02-22 RX ADMIN — NYSTATIN 500000 UNITS: 100000 SUSPENSION ORAL at 17:39

## 2021-02-22 RX ADMIN — NYSTATIN 500000 UNITS: 100000 SUSPENSION ORAL at 12:49

## 2021-02-22 RX ADMIN — Medication 10 ML: at 08:17

## 2021-02-22 RX ADMIN — Medication: at 08:16

## 2021-02-22 RX ADMIN — NYSTATIN 500000 UNITS: 100000 SUSPENSION ORAL at 08:16

## 2021-02-22 RX ADMIN — Medication 10 ML: at 23:21

## 2021-02-22 RX ADMIN — INSULIN LISPRO 2 UNITS: 100 INJECTION, SOLUTION INTRAVENOUS; SUBCUTANEOUS at 12:49

## 2021-02-22 RX ADMIN — LOSARTAN POTASSIUM 25 MG: 25 TABLET, FILM COATED ORAL at 08:16

## 2021-02-22 RX ADMIN — LEVETIRACETAM 1000 MG: 500 SOLUTION ORAL at 08:16

## 2021-02-22 RX ADMIN — CARVEDILOL 6.25 MG: 6.25 TABLET, FILM COATED ORAL at 17:41

## 2021-02-22 RX ADMIN — INSULIN LISPRO 2 UNITS: 100 INJECTION, SOLUTION INTRAVENOUS; SUBCUTANEOUS at 03:20

## 2021-02-22 RX ADMIN — LANSOPRAZOLE 30 MG: KIT at 08:16

## 2021-02-22 RX ADMIN — LOSARTAN POTASSIUM 25 MG: 25 TABLET, FILM COATED ORAL at 22:42

## 2021-02-22 RX ADMIN — Medication: at 21:00

## 2021-02-22 RX ADMIN — MICONAZOLE NITRATE: 20 POWDER TOPICAL at 08:16

## 2021-02-22 RX ADMIN — ATORVASTATIN CALCIUM 80 MG: 40 TABLET, FILM COATED ORAL at 22:42

## 2021-02-22 RX ADMIN — LEVETIRACETAM 1000 MG: 500 SOLUTION ORAL at 23:24

## 2021-02-22 RX ADMIN — MICONAZOLE NITRATE: 20 POWDER TOPICAL at 21:00

## 2021-02-22 RX ADMIN — INSULIN LISPRO 2 UNITS: 100 INJECTION, SOLUTION INTRAVENOUS; SUBCUTANEOUS at 07:07

## 2021-02-22 RX ADMIN — CARVEDILOL 6.25 MG: 6.25 TABLET, FILM COATED ORAL at 08:16

## 2021-02-22 RX ADMIN — INSULIN LISPRO 2 UNITS: 100 INJECTION, SOLUTION INTRAVENOUS; SUBCUTANEOUS at 17:39

## 2021-02-22 ASSESSMENT — PULMONARY FUNCTION TESTS
PIF_VALUE: 21
PIF_VALUE: 21
PIF_VALUE: 20
PIF_VALUE: 24
PIF_VALUE: 20
PIF_VALUE: 25
PIF_VALUE: 22
PIF_VALUE: 23
PIF_VALUE: 21
PIF_VALUE: 27
PIF_VALUE: 21
PIF_VALUE: 22

## 2021-02-22 ASSESSMENT — PAIN SCALES - GENERAL
PAINLEVEL_OUTOF10: 0
PAINLEVEL_OUTOF10: 0

## 2021-02-22 NOTE — PROGRESS NOTES
Pulmonary & Critical Care Medicine    Admit Date: 2021  PCP: No primary care provider on file. CC:  Chronic vent  Events of Last 24 hours:   No major events over the past 24 hours. Vitals:  Tmax:  VITALS:  /79   Pulse 75   Temp 98.7 °F (37.1 °C) (Axillary)   Resp 17   Ht 5' 7\" (1.702 m)   Wt 233 lb 14.5 oz (106.1 kg)   SpO2 99%   BMI 36.64 kg/m²   24HR INTAKE/OUTPUT:      Intake/Output Summary (Last 24 hours) at 2021 232  Last data filed at 2021 1737  Gross per 24 hour   Intake 2135 ml   Output 1050 ml   Net 1085 ml     CURRENT PULSE OXIMETRY:  SpO2: 99 %  24HR PULSE OXIMETRY RANGE:  SpO2  Av.3 %  Min: 91 %  Max: 100 %    EXAM:  General: No distress. Alert. Eyes: PERRL. No sclera icterus. No conjunctival injection. ENT: No discharge. Moist oral mucosa   Neck: trach tube in place. Neck is supple   Resp: No accessory muscle use. No crackles. No wheezing. No rhonchi. CV: Regular rate. Regular rhythm. No mumur or rub. No edema. GI: Non-tender. Non-distended. Normal bowel sounds. Skin: Warm and dry. No nodule on exposed extremities. No rash on exposed extremities. Neuro: Awake. Left sided weakness    Medications:    IV:   dextrose           Scheduled Meds:   Venelex   Topical BID    potassium bicarb-citric acid  40 mEq Oral Daily    losartan  25 mg Oral BID    levETIRAcetam  1,000 mg Per NG tube BID    lansoprazole  30 mg Per NG tube Daily    enoxaparin  40 mg Subcutaneous Daily    sodium chloride flush  10 mL Intravenous 2 times per day    insulin lispro  0-12 Units Subcutaneous Q4H    sodium chloride flush  10 mL Intravenous 2 times per day    nystatin  5 mL Oral 4x Daily    ferrous sulfate  300 mg Per G Tube Every Other Day    miconazole   Topical BID    atorvastatin  80 mg Oral Nightly    carvedilol  6.25 mg Oral BID WC         Diet: DIET TUBE FEED CONTINUOUS/CYCLIC NPO; Low Calorie High Protein (Vital HP);  Gastrostomy; Continuous; 25; 50; 24 Results:  CBC:   Recent Labs     02/19/21  0400 02/20/21  0504 02/21/21  0420   WBC 5.9 6.1 6.1   HGB 7.8* 7.8* 7.6*   HCT 24.9* 25.1* 24.1*   MCV 80.3 80.1 79.3*    214 194     BMP:   Recent Labs     02/19/21  0400 02/20/21  0504 02/21/21  0420   * 135* 133*   K 3.3* 3.3* 3.5   CL 94* 97* 95*   CO2 30 30 30   BUN 10 12 14   CREATININE <0.5* <0.5* <0.5*     LIVER PROFILE: No results for input(s): AST, ALT, LIPASE, BILIDIR, BILITOT, ALKPHOS in the last 72 hours. Invalid input(s): AMYLASE,  ALB  PT/INR: No results for input(s): PROTIME, INR in the last 72 hours. APTT: No results for input(s): APTT in the last 72 hours. UA:No results for input(s): NITRITE, COLORU, PHUR, LABCAST, WBCUA, RBCUA, MUCUS, TRICHOMONAS, YEAST, BACTERIA, CLARITYU, SPECGRAV, LEUKOCYTESUR, UROBILINOGEN, BILIRUBINUR, BLOODU, GLUCOSEU, AMORPHOUS in the last 72 hours. Invalid input(s): Indira Dos Santos      Assessment/Plan:  79 y.o. female with     Acute respiratory failure on mechanical vent support.  , RR 16, FiO2 35, PEEP 5  Rt MCA stroke s/p thrombectomy on 2/2, complicated by ICH on 2/3  Aspiration pneumonia - finished 7 days course of ABX     Awaiting LTAC placement.     Will try pressure control tomorrow      Treva Ariza MD

## 2021-02-22 NOTE — PROGRESS NOTES
Pt had a total of 8 watery bowel movements overnight. Dr. Galvez Perez aware of diarrhea. No further orders.

## 2021-02-22 NOTE — PROGRESS NOTES
Pt currently resting in bed. Vitals stable with no change in status. Pt appears comfortable at this time. Will continue to monitor.

## 2021-02-22 NOTE — DISCHARGE SUMMARY
Hospital Discharge Summary    Patient's PCP: No primary care provider on file. Admit Date: 2/2/2021   Discharge Date: 2/22/2021    Admitting Physician: Dr. Nevaeh Santos MD  Discharge Physician: Dr. Ml Sandhu: pulmonary/intensive care, GI, nephrology, neurology and neurosurgery and IR    HPI:   \"70 y.o. adult, with a history of afib, HF, DM, asthma, pulm HTN (not oxygen dependent), HTN, HLD, EDDY on cpap, breast cancer s/p lumpectomy/radiation, colon polyps, obesity who presented with left-sided hemiplegia, severe expressive aphasia and dysarthria, right facial droop, and right-gaze deviation. Reportedly, last known normal was around 12:30 when she had a phone call with family. Vanna Yousif then had another phone call around [phone] this evening that was concerning for slurred speech and confusion.  As such family checked on her and found her slumped in her chair at home with left-sided weakness and slurred speech and EMS was called. Vanna Yousif was noted by EMS to have a temperature of 101.2, however had a normal temp in ED. In ED, CTA head/neck showed a right proximal M2 occlusion as well as incidental finding of a right segmental PE. She underwent thrombectomy and thrombolysis of R MCA with reperfusion grade TICI 2b. I saw her before and after procedure. Post-thrombectomy, she was able to move left-sided extremities albeit with significant weakness. She was still having dysarthria to the point where I could not make out what she was saying beyond \"yes\" and \"no\". She is in rate-controlled atrial fibrillation              Of note, she was discharged from [facility] 2 days ago where she was treated for acute GI bleed and was taken off of her Xarelto, which she had been taking for her afib. An EGD at that time revealed an unusual prominence distal to ampulla which was biopsied along with ulcer in gastric antrum and erosion closer to pylorus. \"     Brief hospital course:    Acute Hypoxemic respiratory failure:  2/2 cva and aspiraton pna- now s/p trach and peg. Co,pleted abx rx. Wean vent as tolerates. Pulm/ cc help appreciated. Acute CVA:  CT HEAD WO CONTRAST [[id number]] Resulted: 02/13/21 0005      Order Status: Completed Updated: 02/13/21 0006     Narrative:         Patient: Andrea Majano  Time Out: 00:05   Exam(s): CT HEAD Without Contrast       EXAM:     CT Head Without Intravenous Contrast       CLINICAL HISTORY:     ams.       TECHNIQUE:     Axial computed tomography images of the head/brain without intravenous   contrast.  CTDI is 67.82 mGy and DLP is 1326.08 mGy-cm.  All CT scans at   this facility use dose modulation, iterative reconstruction, and/or   weight based dosing when appropriate to reduce radiation dose to as low   as reasonably achievable.       COMPARISON:     02/10/2021.       FINDINGS/IMPRESSION:       Evolving right intracranial hemorrhage.  No new hemorrhage.     Stable mild leftward midline shift.     No other significant interval change, please refer to prior report for   full details. No antiplatelets or ac given hemorrhagic stroke. Cont statin.     F/E/N:  Now s/p peg- cont tf.    Discharge Diagnoses:   Patient Active Problem List   Diagnosis Code    Acute cerebrovascular accident (CVA) ([address] I63.9    Permanent atrial fibrillation ([address] I48.21    Acute gastric ulcer with hemorrhage K25.0    Other pulmonary embolism without acute cor pulmonale (HCC) I26.99    Acute right MCA stroke ([address] I63.511    Hypoxemia R09.02    Acute respiratory failure with hypoxia and hypercapnia (HCC) J96.01, J96.02    Aspiration pneumonia of right lower lobe due to gastric secretions (HCC) J69.0       Physical Exam: /79   Pulse 85   Temp 99.5 °F (37.5 °C) (Rectal)   Resp 8   Ht 5' 7\" (1.702 m)   Wt 233 lb 14.5 oz (106.1 kg)   SpO2 100%   BMI 36.64 kg/m²     Recent Labs     02/21/21  1620 02/21/21  1915 02/22/21  0319 02/22/21  0706 02/22/21  0805   POCGLU 210* 187* 163* 163* 170*       /79   Pulse 85   Temp 99.5 °F (37.5 °C) (Rectal)   Resp 8   Ht 5' 7\" (1.702 m)   Wt 233 lb 14.5 oz (106.1 kg)   SpO2 100%   BMI 36.64 kg/m²       General appearance:  sedated  Neck: trached  Respiratory:  no distress  Cardiovascular: Regular rate and rhythm  Abdomen: Feeding tube in place, no tenderness  Musculoskeletal: 1+ pitting edema in BL lower extremities  Skin: normal color, no wounds noted  Neuro: Obtunded    LABS:  Recent Labs     02/20/21  0504 02/21/21  0420 02/22/21  0609   WBC 6.1 6.1 14.8*   HGB 7.8* 7.6* 8.0*   HCT 25.1* 24.1* 26.0*    194 214                                                                  Recent Labs     02/20/21  0504 02/21/21  0420 02/22/21  0608   * 133* 131*   K 3.3* 3.5 3.2*   CL 97* 95* 94*   CO2 30 30 27   BUN 12 14 14   CREATININE <0.5* <0.5* <0.5*   GLUCOSE 147* 164* 152*       Discharge Medications:   Zita Canales   Home Medication Instructions AVS:[id number]    Printed on:02/22/21 1201   Medication Information                      acetaminophen (TYLENOL) 325 MG tablet  Take 650 mg by mouth every 6 hours as needed for Pain             albuterol sulfate HFA (VENTOLIN HFA) 108 (90 Base) MCG/ACT inhaler  Inhale 2 puffs into the lungs every 6 hours as needed for Wheezing             albuterol sulfate  (90 Base) MCG/ACT inhaler  Inhale 2 puffs into the lungs as needed for Wheezing or Shortness of Breath (Q2H PRN)             Balsam [place]-Castor Oil (VENELEX) OINT ointment  Apply topically 2 times daily             calcium-vitamin D (OSCAL-500) 500-200 MG-UNIT per tablet  Take 1 tablet by mouth daily             carvedilol (COREG) 6.25 MG tablet  Take 6.25 mg by mouth 2 times daily (with meals)             ferrous sulfate (IRON 325) 325 (65 Fe) MG tablet  Take 325 mg by mouth daily (with breakfast)             furosemide (LASIX) 20 MG tablet  Take 20 mg by mouth daily             insulin lispro protamine & lispro (HUMALOG MIX) (75-25) 100 UNIT per ML SUSP injection vial  Inject into the skin 2 times daily (with meals) INJECT 14 UNITS IN THE MORNING AND 10 UNITS IN THE EVENING             lansoprazole 3 MG/ML SUSP  10 mLs by Per NG tube route daily             letrozole (FEMARA) 2.5 MG tablet  Take 2.5 mg by mouth daily             levETIRAcetam (KEPPRA) 100 MG/ML solution  10 mLs by Per NG tube route 2 times daily             miconazole (MICOTIN) 2 % powder  Apply topically 2 times daily. nystatin (MYCOSTATIN) 504606 UNIT/ML suspension  Take 5 mLs by mouth 4 times daily             pantoprazole (PROTONIX) 40 MG tablet  Take 40 mg by mouth 2 times daily (before meals) For bleeding ulcers (prescribed 1/31/21)             potassium bicarb-citric acid (EFFER-K) 20 MEQ TBEF effervescent tablet  Take 1 tablet by mouth daily             pravastatin (PRAVACHOL) 10 MG tablet  Take 10 mg by mouth daily                Activity: activity as tolerated  Diet: TF  Wound Care: as directed    Disposition: long term care facility  Discharged Condition: Stable  Follow Up: Primary Care Physician in one week    Total time spent on discharge, finalizing medications, referrals and arranging follow up was 25 minutes.

## 2021-02-22 NOTE — PLAN OF CARE
Problem: Falls - Risk of:  Goal: Will remain free from falls  Description: Will remain free from falls  Outcome: Ongoing       Problem: Nutrition  Goal: Optimal nutrition therapy  Outcome: Ongoing     Problem: Skin Integrity:  Goal: Will show no infection signs and symptoms  Description: Will show no infection signs and symptoms  Outcome: Ongoing     Problem: Skin Integrity:  Goal: Absence of new skin breakdown  Description: Absence of new skin breakdown  Outcome: Ongoing

## 2021-02-22 NOTE — PROGRESS NOTES
Pt has had several watery bowel movements that required extensive cleaning. Pt is now developing open areas, abrasions, and bleeding, see LDAs. Wound care consulted for evaluation.

## 2021-02-22 NOTE — PROGRESS NOTES
Pt currently resting in bed. Vitals stable with no change in status. Pt appears comfortable at this time.  Will continue to monitor

## 2021-02-22 NOTE — PROGRESS NOTES
Assumed pt care at 0700. Pt currently resting in bed, tolerating trach and vent without issue. Pt is unable to speak or follow commands. Pt warm dry pink. Pt appears comfortable at this time. Will continue to monitor.

## 2021-02-22 NOTE — PROGRESS NOTES
Pulmonary & Critical Care Medicine    Admit Date: 2021  PCP: No primary care provider on file. CC:  Chronic vent  Events of Last 24 hours:   No major events over the past 24 hours. Vitals:  Tmax:  VITALS:  /79   Pulse 76   Temp 99.1 °F (37.3 °C) (Axillary)   Resp 17   Ht 5' 7\" (1.702 m)   Wt 233 lb 14.5 oz (106.1 kg)   SpO2 100%   BMI 36.64 kg/m²   24HR INTAKE/OUTPUT:      Intake/Output Summary (Last 24 hours) at 2021 1302  Last data filed at 2021 1000  Gross per 24 hour   Intake 2786 ml   Output 100 ml   Net 2686 ml     CURRENT PULSE OXIMETRY:  SpO2: 100 %  24HR PULSE OXIMETRY RANGE:  SpO2  Av.5 %  Min: 91 %  Max: 100 %    EXAM:  General: No distress. Alert. Eyes: PERRL. No sclera icterus. No conjunctival injection. ENT: No discharge. Moist oral mucosa   Neck: trach tube in place. Neck is supple   Resp: No accessory muscle use. No crackles. No wheezing. No rhonchi. CV: Regular rate. Regular rhythm. No mumur or rub. No edema. GI: Non-tender. Non-distended. Normal bowel sounds. Skin: Warm and dry. No nodule on exposed extremities. No rash on exposed extremities. Neuro: somnolent.  Left sided weakness    Medications:    IV:   dextrose           Scheduled Meds:   Venelex   Topical BID    potassium bicarb-citric acid  40 mEq Oral Daily    losartan  25 mg Oral BID    levETIRAcetam  1,000 mg Per NG tube BID    lansoprazole  30 mg Per NG tube Daily    enoxaparin  40 mg Subcutaneous Daily    sodium chloride flush  10 mL Intravenous 2 times per day    insulin lispro  0-12 Units Subcutaneous Q4H    sodium chloride flush  10 mL Intravenous 2 times per day    nystatin  5 mL Oral 4x Daily    ferrous sulfate  300 mg Per G Tube Every Other Day    miconazole   Topical BID    atorvastatin  80 mg Oral Nightly    carvedilol  6.25 mg Oral BID WC Diet: DIET TUBE FEED CONTINUOUS/CYCLIC NPO; Low Calorie High Protein (Vital HP); Gastrostomy; Continuous; 25; 50; 24     Results:  CBC:   Recent Labs     02/20/21  0504 02/21/21  0420 02/22/21  0609   WBC 6.1 6.1 14.8*   HGB 7.8* 7.6* 8.0*   HCT 25.1* 24.1* 26.0*   MCV 80.1 79.3* 80.3    194 214     BMP:   Recent Labs     02/20/21  0504 02/21/21  0420 02/22/21  0608   * 133* 131*   K 3.3* 3.5 3.2*   CL 97* 95* 94*   CO2 30 30 27   BUN 12 14 14   CREATININE <0.5* <0.5* <0.5*     LIVER PROFILE: No results for input(s): AST, ALT, LIPASE, BILIDIR, BILITOT, ALKPHOS in the last 72 hours. Invalid input(s): AMYLASE,  ALB  PT/INR: No results for input(s): PROTIME, INR in the last 72 hours. APTT: No results for input(s): APTT in the last 72 hours. UA:No results for input(s): NITRITE, COLORU, PHUR, LABCAST, WBCUA, RBCUA, MUCUS, TRICHOMONAS, YEAST, BACTERIA, CLARITYU, SPECGRAV, LEUKOCYTESUR, UROBILINOGEN, BILIRUBINUR, BLOODU, GLUCOSEU, AMORPHOUS in the last 72 hours. Invalid input(s): Brittaney Silvestre      Assessment/Plan:  79 y.o. female with     Acute respiratory failure on mechanical vent support.  , RR 16, FiO2 35, PEEP 5  Rt MCA stroke s/p thrombectomy on 2/2, complicated by ICH on 2/3  Aspiration pneumonia - finished 7 days course of ABX     Awaiting LTAC placement. Switched her to PSV this afternoon and she needed PS of 18 to pull adequate volumes.     Vanessa Cody MD

## 2021-02-23 VITALS
DIASTOLIC BLOOD PRESSURE: 75 MMHG | OXYGEN SATURATION: 100 % | BODY MASS INDEX: 38.55 KG/M2 | RESPIRATION RATE: 16 BRPM | TEMPERATURE: 99.2 F | SYSTOLIC BLOOD PRESSURE: 120 MMHG | HEIGHT: 67 IN | HEART RATE: 81 BPM | WEIGHT: 245.59 LBS

## 2021-02-23 LAB
ANION GAP SERPL CALCULATED.3IONS-SCNC: 8 MMOL/L (ref 3–16)
BASOPHILS ABSOLUTE: 0 K/UL (ref 0–0.2)
BASOPHILS RELATIVE PERCENT: 0.2 %
BUN BLDV-MCNC: 17 MG/DL (ref 7–20)
CALCIUM SERPL-MCNC: 8.7 MG/DL (ref 8.3–10.6)
CHLORIDE BLD-SCNC: 91 MMOL/L (ref 99–110)
CO2: 29 MMOL/L (ref 21–32)
CREAT SERPL-MCNC: <0.5 MG/DL (ref 0.6–1.2)
EOSINOPHILS ABSOLUTE: 0.1 K/UL (ref 0–0.6)
EOSINOPHILS RELATIVE PERCENT: 0.6 %
GFR AFRICAN AMERICAN: >60
GFR NON-AFRICAN AMERICAN: >60
GLUCOSE BLD-MCNC: 177 MG/DL (ref 70–99)
GLUCOSE BLD-MCNC: 190 MG/DL (ref 70–99)
GLUCOSE BLD-MCNC: 193 MG/DL (ref 70–99)
GLUCOSE BLD-MCNC: 207 MG/DL (ref 70–99)
HCT VFR BLD CALC: 23.6 % (ref 36–48)
HEMOGLOBIN: 7.2 G/DL (ref 12–16)
LYMPHOCYTES ABSOLUTE: 1.1 K/UL (ref 1–5.1)
LYMPHOCYTES RELATIVE PERCENT: 7 %
MAGNESIUM: 1.5 MG/DL (ref 1.8–2.4)
MCH RBC QN AUTO: 24.8 PG (ref 26–34)
MCHC RBC AUTO-ENTMCNC: 30.7 G/DL (ref 31–36)
MCV RBC AUTO: 80.9 FL (ref 80–100)
MONOCYTES ABSOLUTE: 0.6 K/UL (ref 0–1.3)
MONOCYTES RELATIVE PERCENT: 3.7 %
NEUTROPHILS ABSOLUTE: 13.5 K/UL (ref 1.7–7.7)
NEUTROPHILS RELATIVE PERCENT: 88.5 %
PDW BLD-RTO: 24.4 % (ref 12.4–15.4)
PERFORMED ON: ABNORMAL
PLATELET # BLD: 203 K/UL (ref 135–450)
PMV BLD AUTO: 10.2 FL (ref 5–10.5)
POTASSIUM REFLEX MAGNESIUM: 3.1 MMOL/L (ref 3.5–5.1)
RBC # BLD: 2.91 M/UL (ref 4–5.2)
SODIUM BLD-SCNC: 128 MMOL/L (ref 136–145)
WBC # BLD: 15.3 K/UL (ref 4–11)

## 2021-02-23 PROCEDURE — 94761 N-INVAS EAR/PLS OXIMETRY MLT: CPT

## 2021-02-23 PROCEDURE — 2700000000 HC OXYGEN THERAPY PER DAY

## 2021-02-23 PROCEDURE — 6370000000 HC RX 637 (ALT 250 FOR IP): Performed by: STUDENT IN AN ORGANIZED HEALTH CARE EDUCATION/TRAINING PROGRAM

## 2021-02-23 PROCEDURE — 85025 COMPLETE CBC W/AUTO DIFF WBC: CPT

## 2021-02-23 PROCEDURE — 80048 BASIC METABOLIC PNL TOTAL CA: CPT

## 2021-02-23 PROCEDURE — 2580000003 HC RX 258: Performed by: STUDENT IN AN ORGANIZED HEALTH CARE EDUCATION/TRAINING PROGRAM

## 2021-02-23 PROCEDURE — 94003 VENT MGMT INPAT SUBQ DAY: CPT

## 2021-02-23 PROCEDURE — 6360000002 HC RX W HCPCS: Performed by: STUDENT IN AN ORGANIZED HEALTH CARE EDUCATION/TRAINING PROGRAM

## 2021-02-23 PROCEDURE — 83735 ASSAY OF MAGNESIUM: CPT

## 2021-02-23 PROCEDURE — 99231 SBSQ HOSP IP/OBS SF/LOW 25: CPT | Performed by: INTERNAL MEDICINE

## 2021-02-23 RX ADMIN — LOSARTAN POTASSIUM 25 MG: 25 TABLET, FILM COATED ORAL at 09:42

## 2021-02-23 RX ADMIN — Medication 10 ML: at 09:43

## 2021-02-23 RX ADMIN — ENOXAPARIN SODIUM 40 MG: 40 INJECTION SUBCUTANEOUS at 09:40

## 2021-02-23 RX ADMIN — Medication: at 09:43

## 2021-02-23 RX ADMIN — POTASSIUM BICARBONATE 40 MEQ: 782 TABLET, EFFERVESCENT ORAL at 09:41

## 2021-02-23 RX ADMIN — NYSTATIN 500000 UNITS: 100000 SUSPENSION ORAL at 12:30

## 2021-02-23 RX ADMIN — INSULIN LISPRO 2 UNITS: 100 INJECTION, SOLUTION INTRAVENOUS; SUBCUTANEOUS at 10:05

## 2021-02-23 RX ADMIN — NYSTATIN 500000 UNITS: 100000 SUSPENSION ORAL at 09:41

## 2021-02-23 RX ADMIN — MINERAL SUPPLEMENT IRON 300 MG / 5 ML STRENGTH LIQUID 100 PER BOX UNFLAVORED 300 MG: at 10:05

## 2021-02-23 RX ADMIN — ONDANSETRON 4 MG: 2 INJECTION INTRAMUSCULAR; INTRAVENOUS at 12:43

## 2021-02-23 RX ADMIN — LEVETIRACETAM 1000 MG: 500 SOLUTION ORAL at 09:42

## 2021-02-23 RX ADMIN — INSULIN LISPRO 2 UNITS: 100 INJECTION, SOLUTION INTRAVENOUS; SUBCUTANEOUS at 04:59

## 2021-02-23 RX ADMIN — CARVEDILOL 6.25 MG: 6.25 TABLET, FILM COATED ORAL at 10:03

## 2021-02-23 RX ADMIN — LANSOPRAZOLE 30 MG: KIT at 09:42

## 2021-02-23 RX ADMIN — MICONAZOLE NITRATE: 20 POWDER TOPICAL at 09:42

## 2021-02-23 ASSESSMENT — PULMONARY FUNCTION TESTS
PIF_VALUE: 25
PIF_VALUE: 23
PIF_VALUE: 25

## 2021-02-23 ASSESSMENT — PAIN SCALES - WONG BAKER
WONGBAKER_NUMERICALRESPONSE: 0

## 2021-02-23 ASSESSMENT — PAIN SCALES - GENERAL
PAINLEVEL_OUTOF10: 0
PAINLEVEL_OUTOF10: 0

## 2021-02-23 NOTE — CARE COORDINATION
Case Management Assessment            Discharge Note                    Date / Time of Note: 2/23/2021 10:33 AM                  Discharge Note Completed by: Juan Jose Rudolph    Patient Name: Shelly Rankin   YOB: 1940  Diagnosis: Acute cerebrovascular accident (CVA) [facility]) [I63.9]   Date / Time: 2/2/2021  7:22 PM    Current PCP: No primary care provider on file. Clinic patient: No    Hospitalization in the last 30 days: No    Advance Directives:  Code Status: Full Code  [place] DNR form completed and on chart: No    Financial:  Payor: Jose Mittal / Plan: [employer] [facility] / Product Type: *No Product type* /      Pharmacy:  No Pharmacies [email or web address] [facility] medications?:    Assistance provided by Case Management: None at this time    Does patient want to participate in local refill/ meds to beds program?:      Meds To "[employer] Rules:  1. Can ONLY be done Monday- Friday between 8:30am-5pm  2. Prescription(s) must be in pharmacy by 3pm to be filled same day  3. Copy of patient's insurance/ prescription drug card and patient face sheet must be sent along with the prescription(s)  4. Cost of Rx cannot be added to hospital bill. If financial assistance is needed, please contact unit  or ;  or  CANNOT provide pharmacy voucher for patients co-pays  5. Patients can then  the prescription on their way out of the hospital at discharge, or pharmacy can deliver to the bedside if staff is available. (payment due at time of pick-up or delivery - cash, check, or card accepted)     Able to afford home medications/ co-pay costs: Yes    ADLS:  Current PT AM-PAC Score: 6 /24  Current OT AM-PAC Score: 6 /24      DISCHARGE Disposition:    [facility] [address] [place] [address]      REPORT Phone: [phone]      Lupis Han Fax: [phone]            LOC at discharge: [address] [place] [address]  BART Completed:  Yes Notification completed in HENS/PAS?:  Not Applicable    IMM Completed:   Not Indicated    Transportation:  Transportation PLAN for discharge: EMS transportation   Mode of Transport: Ambulance stretcher - ACLS  Reason for medical transport: Bed confined: Meets the following criteria 1) unable to get out of bed without assistance or ambulate, 2) unable to safely sit up in a wheelchair, 3) unable to maintain erect seating position in a chair for time needed for transport  Name of Transport Company: [employer]  Phone: [phone]  Time of Transport: 1300    Transport form completed: Yes    Home Care:  [address] ordered at discharge: No  [address]: Not Applicable  Orders faxed: No    Durable Medical Equipment:  DME Provider: defer  Equipment obtained during hospitalization: defer    Home Oxygen and Respiratory Equipment:  Oxygen needed at discharge?: Yes  [address]: Not Applicable  Portable tank available for discharge?: Yes    Dialysis:  Dialysis patient: No    Dialysis Center:  Not Applicable    Hospice Services:  Location: Not Applicable  Agency: Not Applicable    Consents signed: Not Indicated    Referrals made at [facility] for outpatient continued care:  Not Applicable    Additional CM Notes: CM confirmed  D/c to [facility], [facility]  At  [facility]:  [address] location , CM spoke with Dgtr : Jessica Betancourt  [phone], who is aware and agreeable. Mylene La faxed  , LINN Juares to call report :      The Plan for Transition of Care is related to the following treatment goals of Acute cerebrovascular accident (CVA) [facility]) [I63.9]    The Patient and/or patient representative Maxx White and her family were provided with a choice of provider and agrees with the discharge plan Yes    Freedom of choice list was provided with basic dialogue that supports the patient's individualized plan of care/goals and shares the quality data associated with the providers.  Yes    Care Transitions patient: No    Juan Jose Rudolph RN [facility]  Case Management Department  Ph: [phone]

## 2021-02-23 NOTE — PROGRESS NOTES
Pulmonary & Critical Care Medicine    Admit Date: 2021  PCP: No primary care provider on file. CC:  Chronic vent  Events of Last 24 hours:   Same general condition. May follow simple commands on the right. Overall very weak. Vitals:  Tmax:  VITALS:  /71   Pulse 101   Temp 99.1 °F (37.3 °C) (Oral)   Resp 19   Ht 5' 7\" (1.702 m)   Wt 245 lb 9.5 oz (111.4 kg)   SpO2 98%   BMI 38.47 kg/m²   24HR INTAKE/OUTPUT:      Intake/Output Summary (Last 24 hours) at 2021 1028  Last data filed at 2021 0984  Gross per 24 hour   Intake 1278 ml   Output 200 ml   Net 1078 ml     CURRENT PULSE OXIMETRY:  SpO2: 98 %  24HR PULSE OXIMETRY RANGE:  SpO2  Av.8 %  Min: 88 %  Max: 100 %    EXAM:  General: No distress. Alert. Eyes: PERRL. No sclera icterus. No conjunctival injection. ENT: No discharge. Moist oral mucosa   Neck: trach tube in place. Neck is supple   Resp: No accessory muscle use. No crackles. No wheezing. No rhonchi. CV: Regular rate. Regular rhythm. No mumur or rub. No edema. GI: Non-tender. Non-distended. Normal bowel sounds. Skin: Warm and dry. No nodule on exposed extremities. No rash on exposed extremities. Neuro: somnolent.  Left sided weakness    Medications:    IV:   dextrose           Scheduled Meds:   Venelex   Topical BID    potassium bicarb-citric acid  40 mEq Oral Daily    losartan  25 mg Oral BID    levETIRAcetam  1,000 mg Per NG tube BID    lansoprazole  30 mg Per NG tube Daily    enoxaparin  40 mg Subcutaneous Daily    sodium chloride flush  10 mL Intravenous 2 times per day    insulin lispro  0-12 Units Subcutaneous Q4H    sodium chloride flush  10 mL Intravenous 2 times per day    nystatin  5 mL Oral 4x Daily    ferrous sulfate  300 mg Per G Tube Every Other Day    miconazole   Topical BID    atorvastatin  80 mg Oral Nightly    carvedilol  6.25 mg Oral BID WC Diet: DIET TUBE FEED CONTINUOUS/CYCLIC NPO; Low Calorie High Protein (Vital HP); Gastrostomy; Continuous; 25; 50; 24     Results:  CBC:   Recent Labs     02/21/21  0420 02/22/21  0609 02/23/21  0610   WBC 6.1 14.8* 15.3*   HGB 7.6* 8.0* 7.2*   HCT 24.1* 26.0* 23.6*   MCV 79.3* 80.3 80.9    214 203     BMP:   Recent Labs     02/21/21  0420 02/22/21  0608 02/23/21  0750   * 131* 128*   K 3.5 3.2* 3.1*   CL 95* 94* 91*   CO2 30 27 29   BUN 14 14 17   CREATININE <0.5* <0.5* <0.5*     LIVER PROFILE: No results for input(s): AST, ALT, LIPASE, BILIDIR, BILITOT, ALKPHOS in the last 72 hours. Invalid input(s): AMYLASE,  ALB  PT/INR: No results for input(s): PROTIME, INR in the last 72 hours. APTT: No results for input(s): APTT in the last 72 hours. UA:No results for input(s): NITRITE, COLORU, PHUR, LABCAST, WBCUA, RBCUA, MUCUS, TRICHOMONAS, YEAST, BACTERIA, CLARITYU, SPECGRAV, LEUKOCYTESUR, UROBILINOGEN, BILIRUBINUR, BLOODU, GLUCOSEU, AMORPHOUS in the last 72 hours. Invalid input(s): Shefali El      Assessment/Plan:  79 y.o. female with     Acute respiratory failure on mechanical vent support.  , RR 16, FiO2 35, PEEP 5  Rt MCA stroke s/p thrombectomy on 2/2, complicated by ICH on 2/3  Aspiration pneumonia - finished 7 days course of ABX  Leukocytosis      Awaiting LTAC placement. Required PIP of 18 to tolerate PSV.       Merline Levan, MD

## 2021-02-23 NOTE — PROGRESS NOTES
Report given to Tammy Baptiste at [facility], patient bathed, trach care completed, belongings packed and awaiting transport.

## 2021-02-23 NOTE — PLAN OF CARE
Goal: Will remain free from falls  Description: Will remain free from falls  Outcome: Ongoing     Problem: Injury - Risk of, Physical Injury:  Goal: Absence of physical injury  Description: Absence of physical injury  Outcome: Ongoing     Problem: Mood - Altered:  Goal: Mood stable  Description: Mood stable  Outcome: Ongoing     Problem: Mood - Altered:  Goal: Verbalizations of feeling emotionally comfortable while being cared for will increase  Description: Verbalizations of feeling emotionally comfortable while being cared for will increase  Outcome: Ongoing     Problem: Psychomotor Activity - Altered:  Goal: Absence of psychomotor disturbance signs and symptoms  Description: Absence of psychomotor disturbance signs and symptoms  Outcome: Ongoing     Problem: Sensory Perception - Impaired:  Goal: Demonstrations of improved sensory functioning will increase  Description: Demonstrations of improved sensory functioning will increase  Outcome: Ongoing     Problem: Sensory Perception - Impaired:  Goal: Decrease in sensory misperception frequency  Description: Decrease in sensory misperception frequency  Outcome: Ongoing     Problem: Sensory Perception - Impaired:  Goal: Able to refrain from responding to false sensory perceptions  Description: Able to refrain from responding to false sensory perceptions  Outcome: Ongoing     Problem: Sensory Perception - Impaired:  Goal: Demonstrates accurate environmental perceptions  Description: Demonstrates accurate environmental perceptions  Outcome: Ongoing     Problem: Sensory Perception - Impaired:  Goal: Able to interrupt nonreality-based thinking  Description: Able to interrupt nonreality-based thinking  Outcome: Ongoing     Problem: Sleep Pattern Disturbance:  Goal: Appears well-rested  Description: Appears well-rested  Outcome: Ongoing

## 2021-02-23 NOTE — PROGRESS NOTES
Patient tube feeding stopped, zofran administered in anticipation of transport to [facility].

## 2021-02-23 NOTE — PROGRESS NOTES
Pt requested a bed pan. Pt was placed on bedpan. She then very calmly slid herself off the bedpan and threw it in the floor. While the bedpan was being cleaned up she pulled out her peripheral IV.

## 2021-02-23 NOTE — PROGRESS NOTES
Nephrology Note                                                                                                                                                                                                                                                                                                                                                               Office : [phone]     Fax :[phone]              Patient's Name: Denise Francois    Reason for Consult:  Hypernatremia   Requesting Physician:  No primary care provider on file. Na dec   On TF with Free water   No significant overnight event    Past Medical History:   Diagnosis Date    Acute GI bleeding     recent admission 1/29/2021    Atrial fibrillation ([facility]     Xarelto    Systolic CHF ([facility]     Type 2 diabetes mellitus ([facility]        Past Surgical History:   Procedure Laterality Date    GASTROSTOMY TUBE PLACEMENT N/A 2/9/2021    EGD PEG TUBE PLACEMENT performed by Pablo Morales MD at [facility] [address]  2/4/2021    IR IVC FILTER PLACEMENT W IMAGING 2/4/2021 [facility] SPECIAL PROCEDURES    UPPER GASTROINTESTINAL ENDOSCOPY  01/29/2021    Dr Danielito Mendsoa       No family history on file. reports that she has quit smoking. She does not have any smokeless tobacco history on file. Allergies:  Patient has no known allergies.     Current Medications:        Venelex ointment, BID      potassium bicarb-citric acid (EFFER-K) effervescent tablet 40 mEq, Daily      losartan (COZAAR) tablet 25 mg, BID      levETIRAcetam (KEPPRA) 100 MG/ML solution 1,000 mg, BID      lansoprazole suspension SUSP 30 mg, Daily      enoxaparin (LOVENOX) injection 40 mg, Daily      sodium chloride flush 0.9 % injection 10 mL, 2 times per day      sodium chloride flush 0.9 % injection 10 mL, PRN      insulin lispro (1 Unit Dial) 0-12 Units, Q4H      sodium chloride flush 0.9 % injection 10 mL, 2 times per day   sodium chloride flush 0.9 % injection 10 mL, PRN      nystatin (MYCOSTATIN) 427800 UNIT/ML suspension 500,000 Units, 4x Daily      ferrous sulfate 300 (60 Fe) MG/5ML syrup 300 mg, Every Other Day      glucose (GLUTOSE) 40 % oral gel 15 g, PRN      dextrose 50 % IV solution, PRN      glucagon (rDNA) injection 1 mg, PRN      dextrose 5 % solution, PRN      miconazole (MICOTIN) 2 % powder, BID      perflutren lipid microspheres (DEFINITY) injection 1.65 mg, ONCE PRN      labetalol (NORMODYNE;TRANDATE) injection 10 mg, Q4H PRN      albuterol sulfate  (90 Base) MCG/ACT inhaler 2 puff, PRN      hydrALAZINE (APRESOLINE) injection 5 mg, Q6H PRN      acetaminophen (TYLENOL) tablet 650 mg, Q4H PRN      promethazine (PHENERGAN) tablet 12.5 mg, Q6H PRN    Or      ondansetron (ZOFRAN) injection 4 mg, Q6H PRN      polyethylene glycol (GLYCOLAX) packet 17 g, Daily PRN      atorvastatin (LIPITOR) tablet 80 mg, Nightly      carvedilol (COREG) tablet 6.25 mg, BID WC        Review of Systems:   Lethargic       Physical exam:     Vitals:  /65   Pulse 83   Temp 99 °F (37.2 °C) (Axillary)   Resp 17   Ht 5' 7\" (1.702 m)   Wt 233 lb 14.5 oz (106.1 kg)   SpO2 92%   BMI 36.64 kg/m²   Constitutional:  Lethargic   Skin: no rash, turgor wnl  Heent:  eomi, mmm  Neck: no bruits or jvd noted  Cardiovascular:  S1, S2 without m/r/g  Respiratory: Diminished at bases   Abdomen:  +bs, soft, nt, nd  Ext: no lower extremity edema      Data:   Labs:  CBC:   Recent Labs     02/20/21  0504 02/21/21  0420 02/22/21  0609   WBC 6.1 6.1 14.8*   HGB 7.8* 7.6* 8.0*    194 214     BMP:    Recent Labs     02/20/21  0504 02/21/21  0420 02/22/21  0608   * 133* 131*   K 3.3* 3.5 3.2*   CL 97* 95* 94*   CO2 30 30 27   BUN 12 14 14   CREATININE <0.5* <0.5* <0.5*   GLUCOSE 147* 164* 152*     Ca/Mg/Phos:   Recent Labs     02/20/21  0504 02/21/21  0420 02/22/21  0608   CALCIUM 9.0 8.8 8.8   MG 1.80 1.70* 1.40* Hepatic: No results for input(s): AST, ALT, ALB, BILITOT, ALKPHOS in the last 72 hours. Troponin: No results for input(s): TROPONINI in the last 72 hours. BNP: No results for input(s): BNP in the last 72 hours. Lipids: No results for input(s): CHOL, TRIG, HDL, LDLCALC, LABVLDL in the last 72 hours. ABGs:   No results for input(s): PHART, PO2ART, YQX9REN in the last 72 hours. INR:   No results for input(s): INR in the last 72 hours. UA:  No results for input(s): Flakita Niles, GLUCOSEU, BILIRUBINUR, KETUA, SPECGRAV, BLOODU, PHUR, PROTEINU, UROBILINOGEN, NITRU, LEUKOCYTESUR, Kolleen Marina in the last 72 hours. Urine Microscopic:   No results for input(s): LABCAST, BACTERIA, COMU, HYALCAST, WBCUA, RBCUA, EPIU in the last 72 hours. Urine Culture: No results for input(s): LABURIN in the last 72 hours. Urine Chemistry:   No results for input(s): [place], PROTEINUR, NAUR in the last 72 hours. IMAGING:  XR CHEST PORTABLE   Final Result      Unchanged small bilateral pleural effusions and bibasilar atelectasis versus airspace disease. CT HEAD WO CONTRAST   Final Result      XR CHEST PORTABLE   Final Result      Persistent bilateral infiltrates         XR CHEST PORTABLE   Final Result   Impression:          1. Increased opacities in the left lung base since the prior study. There may be a small left pleural effusion. 2. Persistent patchy right lung opacities. CT HEAD WO CONTRAST   Final Result   Interval progression of vasogenic edema at the right middle cerebral artery hemorrhagic infarction and interval development of right to left midline shift of about 3 mm as described above. Apparent slight decrease in the size of hemorrhage in its AP dimension. XR CHEST 1 VIEW   Final Result   1. Interval extubation. 2. Progression of bilateral airspace disease.       MRI BRAIN WO CONTRAST   Final Result Region of right middle cerebral artery hemorrhagic infarction measures approximately 5.0 x 4.2 cm with surrounding mild vasogenic edema and diffusion restriction consistent with right middle cerebral artery distribution infarct with hemorrhagic    components      No additional areas of hemorrhage or infarction identified. Mild local mass effect is identified with very minimal midline shift of 3 to 4 mm      XR CHEST PORTABLE   Final Result      Persistent bilateral infiltrates and cardiomegaly. IR GUIDED IVC FILTER PLACEMENT   Final Result   Impression:      Successful placement of retrievable IVC filter. CT HEAD WO CONTRAST   Final Result      1. Hemorrhagic transformation of right MCA infarct, without significant change. No new hemorrhage. CT head without contrast   Final Result         1. Stable large intraparenchymal hemorrhage located within the right posterior temporal parietal lobe with extension into the adjacent insula. There is also subarachnoid extension of hemorrhage into the overlying sulci as well as the right sylvian    fissure. Findings result in some mild right to left subfalcine herniation. 2.There is a 1.3 x 1.1 cm extra-axial mass identified within the left ordered aspect of the foramen magnum. This results in some mass effect on the adjacent medulla. This is without change from the prior study. Finding may represent a meningioma. This    could be further evaluated with a contrast-enhanced MRI of the head following resolution of the intracranial hemorrhage. CT HEAD WO CONTRAST   Final Result      1. Stable large intraparenchymal hemorrhage located within the right posterior temporal parietal lobe with extension into the adjacent insula. There is also subarachnoid extension of hemorrhage into the overlying sulci as well as the right sylvian    fissure. Findings result in some mild right to left subfalcine herniation. 2.There is a 1.3 x 1.1 cm extra-axial mass identified within the left ordered aspect of the foramen magnum. This results in some mass effect on the adjacent medulla. This is without change from the prior study. Finding may represent a meningioma. This    could be further evaluated with a contrast-enhanced MRI of the head following resolution of the intracranial hemorrhage. VL Extremity Venous Bilateral   Final Result      CT HEAD WO CONTRAST   Final Result      Post therapy large acute parenchymal hemorrhage in the right cerebral hemisphere with subarachnoid component. Extensive surrounding edema is associated with 4 cm leftward shift of midline structures. Critical finding of acute intracranial hemorrhage was called to David Washburn of the neurosurgery department at 12:25 PM on 2/3/2021, with instructions to contact patient's attending physician with these results. CT Brain Perfusion   Final Result      1. Hypoperfusion in the right MCA territory with a central ischemic core of 8 mL, total volume of hypoperfusion of 79 mL and a penumbra of 71 mL. XR CHEST PORTABLE   Final Result   1. Limited evaluation because of patient rotation and underpenetration of the film. 2. No dense airspace consolidation. 3. Probable mild cardiomegaly. CTA HEAD NECK W CONTRAST   Final Result   1. Carotid and vertebral arteries are patent and without stenosis or acute abnormality. 2. Incidental partial imaging of the a segmental pulmonary embolism in the right middle lobe. 3. Partial imaging of small right greater than left pleural effusions. 4. Previous left suboccipital craniotomy with a residual 1.6 cm left-sided extra-axial mass at the foramen magnum with mass effect on the cord at the cervical medullary junction favored to represent meningioma. Recommend correlation with clinical history    and previous imaging. 5. Incidental multinodular goiter.       CTA HEAD: - dec free water 100 ml q6  hours  - replace K as needed  - Tube feeds to cont   - Ur studies- reviewed   - monitor NA   - CVA management   - Bp controlled  - PEG tube placed   - labs in am                 Thank you for allowing us to participate in care of [address] [place] [address] free to contact me   [facility] [address]  Office : [phone]  Fax :[phone]

## 2021-03-08 PROBLEM — G93.49 ENCEPHALOPATHY DUE TO STRUCTURAL DISORDER OF BRAIN: Status: ACTIVE | Noted: 2021-03-08

## 2021-04-28 ENCOUNTER — HOSPITAL ENCOUNTER (INPATIENT)
Age: 81
LOS: 5 days | Discharge: ANOTHER HOSPITAL | DRG: 207 | End: 2021-05-03
Attending: STUDENT IN AN ORGANIZED HEALTH CARE EDUCATION/TRAINING PROGRAM | Admitting: INTERNAL MEDICINE
Payer: COMMERCIAL

## 2021-04-28 ENCOUNTER — APPOINTMENT (OUTPATIENT)
Dept: GENERAL RADIOLOGY | Age: 81
DRG: 207 | End: 2021-04-28
Payer: COMMERCIAL

## 2021-04-28 DIAGNOSIS — J93.0 TENSION PNEUMOTHORAX: Primary | ICD-10-CM

## 2021-04-28 LAB
ANION GAP SERPL CALCULATED.3IONS-SCNC: 10 MMOL/L (ref 3–16)
BASE EXCESS ARTERIAL: 8.4 MMOL/L (ref -3–3)
BASOPHILS ABSOLUTE: 0 K/UL (ref 0–0.2)
BASOPHILS RELATIVE PERCENT: 0.2 %
BUN BLDV-MCNC: 22 MG/DL (ref 7–20)
CALCIUM SERPL-MCNC: 9.4 MG/DL (ref 8.3–10.6)
CARBOXYHEMOGLOBIN ARTERIAL: 1.4 % (ref 0–1.5)
CHLORIDE BLD-SCNC: 98 MMOL/L (ref 99–110)
CO2: 27 MMOL/L (ref 21–32)
CREAT SERPL-MCNC: <0.5 MG/DL (ref 0.6–1.2)
EOSINOPHILS ABSOLUTE: 0 K/UL (ref 0–0.6)
EOSINOPHILS RELATIVE PERCENT: 0 %
GFR AFRICAN AMERICAN: >60
GFR NON-AFRICAN AMERICAN: >60
GLUCOSE BLD-MCNC: 107 MG/DL (ref 70–99)
GLUCOSE BLD-MCNC: 137 MG/DL (ref 70–99)
GLUCOSE BLD-MCNC: 225 MG/DL (ref 70–99)
HCO3 ARTERIAL: 31.6 MMOL/L (ref 21–29)
HCT VFR BLD CALC: 37.5 % (ref 36–48)
HEMOGLOBIN, ART, EXTENDED: 11.2 G/DL (ref 12–16)
HEMOGLOBIN: 11.4 G/DL (ref 12–16)
INR BLD: 1.49 (ref 0.86–1.14)
LYMPHOCYTES ABSOLUTE: 0.9 K/UL (ref 1–5.1)
LYMPHOCYTES RELATIVE PERCENT: 6.4 %
MCH RBC QN AUTO: 25.4 PG (ref 26–34)
MCHC RBC AUTO-ENTMCNC: 30.3 G/DL (ref 31–36)
MCV RBC AUTO: 83.9 FL (ref 80–100)
METHEMOGLOBIN ARTERIAL: 0 %
MONOCYTES ABSOLUTE: 0.8 K/UL (ref 0–1.3)
MONOCYTES RELATIVE PERCENT: 6.1 %
NEUTROPHILS ABSOLUTE: 11.8 K/UL (ref 1.7–7.7)
NEUTROPHILS RELATIVE PERCENT: 87.3 %
O2 CONTENT ARTERIAL: 16 ML/DL
O2 SAT, ARTERIAL: 100 %
O2 THERAPY: ABNORMAL
PCO2 ARTERIAL: 37.5 MMHG (ref 35–45)
PDW BLD-RTO: 16.8 % (ref 12.4–15.4)
PERFORMED ON: ABNORMAL
PERFORMED ON: ABNORMAL
PH ARTERIAL: 7.53 (ref 7.35–7.45)
PLATELET # BLD: 201 K/UL (ref 135–450)
PMV BLD AUTO: 10.1 FL (ref 5–10.5)
PO2 ARTERIAL: 157 MMHG (ref 75–108)
POTASSIUM REFLEX MAGNESIUM: 5.1 MMOL/L (ref 3.5–5.1)
PRO-BNP: 1678 PG/ML (ref 0–449)
PROTHROMBIN TIME: 17.4 SEC (ref 10–13.2)
RBC # BLD: 4.48 M/UL (ref 4–5.2)
SODIUM BLD-SCNC: 135 MMOL/L (ref 136–145)
TCO2 ARTERIAL: 73.3 MMOL/L
TROPONIN: 0.04 NG/ML
TROPONIN: 0.04 NG/ML
TROPONIN: 0.05 NG/ML
WBC # BLD: 13.5 K/UL (ref 4–11)

## 2021-04-28 PROCEDURE — 36415 COLL VENOUS BLD VENIPUNCTURE: CPT

## 2021-04-28 PROCEDURE — 1200000000 HC SEMI PRIVATE

## 2021-04-28 PROCEDURE — 82803 BLOOD GASES ANY COMBINATION: CPT

## 2021-04-28 PROCEDURE — 2000000000 HC ICU R&B

## 2021-04-28 PROCEDURE — U0003 INFECTIOUS AGENT DETECTION BY NUCLEIC ACID (DNA OR RNA); SEVERE ACUTE RESPIRATORY SYNDROME CORONAVIRUS 2 (SARS-COV-2) (CORONAVIRUS DISEASE [COVID-19]), AMPLIFIED PROBE TECHNIQUE, MAKING USE OF HIGH THROUGHPUT TECHNOLOGIES AS DESCRIBED BY CMS-2020-01-R: HCPCS

## 2021-04-28 PROCEDURE — 2700000000 HC OXYGEN THERAPY PER DAY

## 2021-04-28 PROCEDURE — 99291 CRITICAL CARE FIRST HOUR: CPT

## 2021-04-28 PROCEDURE — 99255 IP/OBS CONSLTJ NEW/EST HI 80: CPT | Performed by: INTERNAL MEDICINE

## 2021-04-28 PROCEDURE — 32551 INSERTION OF CHEST TUBE: CPT

## 2021-04-28 PROCEDURE — 83880 ASSAY OF NATRIURETIC PEPTIDE: CPT

## 2021-04-28 PROCEDURE — 84484 ASSAY OF TROPONIN QUANT: CPT

## 2021-04-28 PROCEDURE — 83036 HEMOGLOBIN GLYCOSYLATED A1C: CPT

## 2021-04-28 PROCEDURE — 94002 VENT MGMT INPAT INIT DAY: CPT

## 2021-04-28 PROCEDURE — 71045 X-RAY EXAM CHEST 1 VIEW: CPT

## 2021-04-28 PROCEDURE — 6370000000 HC RX 637 (ALT 250 FOR IP): Performed by: INTERNAL MEDICINE

## 2021-04-28 PROCEDURE — 80048 BASIC METABOLIC PNL TOTAL CA: CPT

## 2021-04-28 PROCEDURE — 0W9B30Z DRAINAGE OF LEFT PLEURAL CAVITY WITH DRAINAGE DEVICE, PERCUTANEOUS APPROACH: ICD-10-PCS | Performed by: STUDENT IN AN ORGANIZED HEALTH CARE EDUCATION/TRAINING PROGRAM

## 2021-04-28 PROCEDURE — 85025 COMPLETE CBC W/AUTO DIFF WBC: CPT

## 2021-04-28 PROCEDURE — 85610 PROTHROMBIN TIME: CPT

## 2021-04-28 PROCEDURE — U0005 INFEC AGEN DETEC AMPLI PROBE: HCPCS

## 2021-04-28 PROCEDURE — 2580000003 HC RX 258: Performed by: INTERNAL MEDICINE

## 2021-04-28 PROCEDURE — 94761 N-INVAS EAR/PLS OXIMETRY MLT: CPT

## 2021-04-28 PROCEDURE — 93005 ELECTROCARDIOGRAM TRACING: CPT | Performed by: STUDENT IN AN ORGANIZED HEALTH CARE EDUCATION/TRAINING PROGRAM

## 2021-04-28 RX ORDER — ATORVASTATIN CALCIUM 80 MG/1
80 TABLET, FILM COATED ORAL NIGHTLY
Status: DISCONTINUED | OUTPATIENT
Start: 2021-04-28 | End: 2021-05-03 | Stop reason: HOSPADM

## 2021-04-28 RX ORDER — DEXTROSE MONOHYDRATE 50 MG/ML
100 INJECTION, SOLUTION INTRAVENOUS PRN
Status: DISCONTINUED | OUTPATIENT
Start: 2021-04-28 | End: 2021-05-03 | Stop reason: HOSPADM

## 2021-04-28 RX ORDER — FUROSEMIDE 40 MG/1
40 TABLET ORAL DAILY
Status: DISCONTINUED | OUTPATIENT
Start: 2021-04-29 | End: 2021-05-03 | Stop reason: HOSPADM

## 2021-04-28 RX ORDER — TRAZODONE HYDROCHLORIDE 50 MG/1
50 TABLET ORAL NIGHTLY
COMMUNITY

## 2021-04-28 RX ORDER — BUDESONIDE 0.5 MG/2ML
500 INHALANT ORAL 2 TIMES DAILY
Status: DISCONTINUED | OUTPATIENT
Start: 2021-04-28 | End: 2021-04-28

## 2021-04-28 RX ORDER — LOPERAMIDE HYDROCHLORIDE 2 MG/1
2 CAPSULE ORAL PRN
Status: DISCONTINUED | OUTPATIENT
Start: 2021-04-28 | End: 2021-05-03 | Stop reason: HOSPADM

## 2021-04-28 RX ORDER — LANOLIN ALCOHOL/MO/W.PET/CERES
3 CREAM (GRAM) TOPICAL NIGHTLY
Status: DISCONTINUED | OUTPATIENT
Start: 2021-04-28 | End: 2021-05-03 | Stop reason: HOSPADM

## 2021-04-28 RX ORDER — NICOTINE POLACRILEX 4 MG
15 LOZENGE BUCCAL PRN
Status: DISCONTINUED | OUTPATIENT
Start: 2021-04-28 | End: 2021-05-03 | Stop reason: HOSPADM

## 2021-04-28 RX ORDER — TRAZODONE HYDROCHLORIDE 50 MG/1
50 TABLET ORAL NIGHTLY
Status: DISCONTINUED | OUTPATIENT
Start: 2021-04-28 | End: 2021-05-03 | Stop reason: HOSPADM

## 2021-04-28 RX ORDER — IPRATROPIUM BROMIDE AND ALBUTEROL SULFATE 2.5; .5 MG/3ML; MG/3ML
1 SOLUTION RESPIRATORY (INHALATION) 4 TIMES DAILY
Status: DISCONTINUED | OUTPATIENT
Start: 2021-04-28 | End: 2021-05-03 | Stop reason: HOSPADM

## 2021-04-28 RX ORDER — ARFORMOTEROL TARTRATE 15 UG/2ML
15 SOLUTION RESPIRATORY (INHALATION) 2 TIMES DAILY
Status: DISCONTINUED | OUTPATIENT
Start: 2021-04-28 | End: 2021-04-28

## 2021-04-28 RX ORDER — PROMETHAZINE HYDROCHLORIDE 25 MG/1
12.5 TABLET ORAL EVERY 6 HOURS PRN
Status: DISCONTINUED | OUTPATIENT
Start: 2021-04-28 | End: 2021-05-03 | Stop reason: HOSPADM

## 2021-04-28 RX ORDER — FAMOTIDINE 20 MG/1
20 TABLET, FILM COATED ORAL DAILY
COMMUNITY

## 2021-04-28 RX ORDER — LOPERAMIDE HYDROCHLORIDE 2 MG/1
2 CAPSULE ORAL PRN
COMMUNITY

## 2021-04-28 RX ORDER — SODIUM CHLORIDE 0.9 % (FLUSH) 0.9 %
5-40 SYRINGE (ML) INJECTION EVERY 12 HOURS SCHEDULED
Status: DISCONTINUED | OUTPATIENT
Start: 2021-04-28 | End: 2021-05-03 | Stop reason: HOSPADM

## 2021-04-28 RX ORDER — PREDNISONE 10 MG/1
10 TABLET ORAL DAILY
COMMUNITY

## 2021-04-28 RX ORDER — FAMOTIDINE 20 MG/1
20 TABLET, FILM COATED ORAL DAILY
Status: DISCONTINUED | OUTPATIENT
Start: 2021-04-29 | End: 2021-05-03 | Stop reason: HOSPADM

## 2021-04-28 RX ORDER — INSULIN LISPRO 100 [IU]/ML
0-6 INJECTION, SOLUTION INTRAVENOUS; SUBCUTANEOUS ONCE
Status: DISCONTINUED | OUTPATIENT
Start: 2021-04-28 | End: 2021-05-03 | Stop reason: HOSPADM

## 2021-04-28 RX ORDER — PRAVASTATIN SODIUM 10 MG
10 TABLET ORAL DAILY
Status: CANCELLED | OUTPATIENT
Start: 2021-04-29

## 2021-04-28 RX ORDER — WARFARIN SODIUM 4 MG/1
4 TABLET ORAL NIGHTLY
COMMUNITY

## 2021-04-28 RX ORDER — WARFARIN SODIUM 2 MG/1
4 TABLET ORAL EVERY EVENING
Status: DISCONTINUED | OUTPATIENT
Start: 2021-04-29 | End: 2021-04-29

## 2021-04-28 RX ORDER — LETROZOLE 2.5 MG/1
2.5 TABLET, FILM COATED ORAL DAILY
Status: CANCELLED | OUTPATIENT
Start: 2021-04-29

## 2021-04-28 RX ORDER — SODIUM CHLORIDE 9 MG/ML
25 INJECTION, SOLUTION INTRAVENOUS PRN
Status: DISCONTINUED | OUTPATIENT
Start: 2021-04-28 | End: 2021-05-03 | Stop reason: HOSPADM

## 2021-04-28 RX ORDER — PREDNISONE 10 MG/1
10 TABLET ORAL DAILY
Status: DISCONTINUED | OUTPATIENT
Start: 2021-04-29 | End: 2021-05-03 | Stop reason: HOSPADM

## 2021-04-28 RX ORDER — GABAPENTIN 100 MG/1
100 CAPSULE ORAL 3 TIMES DAILY
COMMUNITY

## 2021-04-28 RX ORDER — ONDANSETRON 2 MG/ML
4 INJECTION INTRAMUSCULAR; INTRAVENOUS EVERY 6 HOURS PRN
Status: DISCONTINUED | OUTPATIENT
Start: 2021-04-28 | End: 2021-05-03 | Stop reason: HOSPADM

## 2021-04-28 RX ORDER — BUDESONIDE 0.5 MG/2ML
1 INHALANT ORAL 2 TIMES DAILY
COMMUNITY

## 2021-04-28 RX ORDER — BUDESONIDE 0.5 MG/2ML
500 INHALANT ORAL 2 TIMES DAILY
Status: DISCONTINUED | OUTPATIENT
Start: 2021-04-29 | End: 2021-05-03 | Stop reason: HOSPADM

## 2021-04-28 RX ORDER — INSULIN LISPRO 100 [IU]/ML
0-6 INJECTION, SOLUTION INTRAVENOUS; SUBCUTANEOUS EVERY 4 HOURS
Status: DISCONTINUED | OUTPATIENT
Start: 2021-04-28 | End: 2021-05-03 | Stop reason: HOSPADM

## 2021-04-28 RX ORDER — ARFORMOTEROL TARTRATE 15 UG/2ML
15 SOLUTION RESPIRATORY (INHALATION) 2 TIMES DAILY
Status: DISCONTINUED | OUTPATIENT
Start: 2021-04-29 | End: 2021-05-03 | Stop reason: HOSPADM

## 2021-04-28 RX ORDER — GABAPENTIN 100 MG/1
100 CAPSULE ORAL 3 TIMES DAILY
Status: DISCONTINUED | OUTPATIENT
Start: 2021-04-28 | End: 2021-05-03 | Stop reason: HOSPADM

## 2021-04-28 RX ORDER — SODIUM CHLORIDE 0.9 % (FLUSH) 0.9 %
5-40 SYRINGE (ML) INJECTION PRN
Status: DISCONTINUED | OUTPATIENT
Start: 2021-04-28 | End: 2021-05-03 | Stop reason: HOSPADM

## 2021-04-28 RX ORDER — ASPIRIN 81 MG/1
324 TABLET, CHEWABLE ORAL DAILY
Status: CANCELLED | OUTPATIENT
Start: 2021-04-28

## 2021-04-28 RX ORDER — ATORVASTATIN CALCIUM 80 MG/1
80 TABLET, FILM COATED ORAL NIGHTLY
COMMUNITY

## 2021-04-28 RX ORDER — ACETAMINOPHEN 650 MG/1
650 SUPPOSITORY RECTAL EVERY 6 HOURS PRN
Status: DISCONTINUED | OUTPATIENT
Start: 2021-04-28 | End: 2021-05-03 | Stop reason: HOSPADM

## 2021-04-28 RX ORDER — LANOLIN ALCOHOL/MO/W.PET/CERES
3 CREAM (GRAM) TOPICAL NIGHTLY
COMMUNITY

## 2021-04-28 RX ORDER — FERROUS SULFATE 300 MG/5ML
300 LIQUID (ML) ORAL EVERY OTHER DAY
Status: DISCONTINUED | OUTPATIENT
Start: 2021-04-30 | End: 2021-05-03 | Stop reason: HOSPADM

## 2021-04-28 RX ORDER — ACETAMINOPHEN 325 MG/1
650 TABLET ORAL EVERY 6 HOURS PRN
Status: DISCONTINUED | OUTPATIENT
Start: 2021-04-28 | End: 2021-05-03 | Stop reason: HOSPADM

## 2021-04-28 RX ORDER — ARFORMOTEROL TARTRATE 15 UG/2ML
1 SOLUTION RESPIRATORY (INHALATION) 2 TIMES DAILY
COMMUNITY

## 2021-04-28 RX ORDER — POTASSIUM BICARBONATE 25 MEQ/1
50 TABLET, EFFERVESCENT ORAL DAILY
COMMUNITY

## 2021-04-28 RX ORDER — DEXTROSE MONOHYDRATE 25 G/50ML
12.5 INJECTION, SOLUTION INTRAVENOUS PRN
Status: DISCONTINUED | OUTPATIENT
Start: 2021-04-28 | End: 2021-05-03 | Stop reason: HOSPADM

## 2021-04-28 RX ORDER — LANSOPRAZOLE
30 KIT DAILY
Status: CANCELLED | OUTPATIENT
Start: 2021-04-29

## 2021-04-28 RX ORDER — FERROUS SULFATE 300 MG/5ML
300 LIQUID (ML) ORAL EVERY OTHER DAY
COMMUNITY

## 2021-04-28 RX ORDER — ALBUTEROL SULFATE 90 UG/1
2 AEROSOL, METERED RESPIRATORY (INHALATION) EVERY 6 HOURS PRN
Status: DISCONTINUED | OUTPATIENT
Start: 2021-04-28 | End: 2021-05-03 | Stop reason: HOSPADM

## 2021-04-28 RX ORDER — POLYETHYLENE GLYCOL 3350 17 G/17G
17 POWDER, FOR SOLUTION ORAL DAILY PRN
Status: DISCONTINUED | OUTPATIENT
Start: 2021-04-28 | End: 2021-05-03 | Stop reason: HOSPADM

## 2021-04-28 RX ORDER — IPRATROPIUM BROMIDE AND ALBUTEROL SULFATE 2.5; .5 MG/3ML; MG/3ML
1 SOLUTION RESPIRATORY (INHALATION) 4 TIMES DAILY
COMMUNITY

## 2021-04-28 RX ADMIN — WARFARIN SODIUM 6 MG: 5 TABLET ORAL at 21:29

## 2021-04-28 RX ADMIN — MELATONIN TAB 3 MG 3 MG: 3 TAB at 21:29

## 2021-04-28 RX ADMIN — GABAPENTIN 100 MG: 100 CAPSULE ORAL at 21:29

## 2021-04-28 RX ADMIN — Medication 10 ML: at 21:30

## 2021-04-28 RX ADMIN — ATORVASTATIN CALCIUM 80 MG: 80 TABLET, FILM COATED ORAL at 21:29

## 2021-04-28 RX ADMIN — TRAZODONE HYDROCHLORIDE 50 MG: 50 TABLET ORAL at 21:29

## 2021-04-28 ASSESSMENT — PULMONARY FUNCTION TESTS
PIF_VALUE: 26
PIF_VALUE: 27
PIF_VALUE: 26

## 2021-04-28 NOTE — ED NOTES
Pharmacy Medication History Note      List of current medications patient is taking is complete. Source of information: [facility]    Changes made to medication list:  Medications flagged for removal (include reason, ex. noncompliance):  none    Medications removed (include reason, ex. therapy complete or physician discontinued): ALL medications- ERROR entries    Medications added/doses adjusted: All medications- see list below    Other notes (ex. Recent course of antibiotics, Coumadin dosing):  Denies use of other OTC or herbal medications. Current Coumadin dosin mg at bedtime, managed per [facility]. Last dose times updated. Brian Viera, PharmD  ED Pharmacist [id number]  2021    No current facility-administered medications on file prior to encounter. Current Outpatient Medications on File Prior to Encounter   Medication Sig Dispense Refill    Arformoterol Tartrate (BROVANA) 15 MCG/2ML NEBU Take 1 ampule by nebulization 2 times daily      atorvastatin (LIPITOR) 80 MG tablet 80 mg by Per G Tube route nightly       famotidine (PEPCID) 20 MG tablet 20 mg by Per G Tube route daily      ferrous sulfate 300 (60 Fe) MG/5ML syrup 300 mg by Per G Tube route every other day      gabapentin (NEURONTIN) 100 MG capsule 100 mg by Per G Tube route 3 times daily. insulin regular (HUMULIN R;NOVOLIN R) 100 UNIT/ML injection Inject 0-12 Units into the skin 3 times daily (before meals) Per patient sliding scale.       ipratropium-albuterol (DUONEB) 0.5-2.5 (3) MG/3ML SOLN nebulizer solution Inhale 1 vial into the lungs 4 times daily      loperamide (IMODIUM) 2 MG capsule 2 mg by Per G Tube route as needed for Diarrhea       melatonin 3 MG TABS tablet 3 mg by Per G Tube route nightly      potassium bicarbonate (K-LYTE) 25 MEQ disintegrating tablet 50 mEq by Per G Tube route daily      predniSONE (DELTASONE) 10 MG tablet 10 mg by Per G Tube route daily       budesonide (PULMICORT) 0.5 MG/2ML nebulizer suspension Take 1 ampule by nebulization 2 times daily      traZODone (DESYREL) 50 MG tablet 50 mg by Per G Tube route nightly      Cholecalciferol 10 MCG/ML LIQD 125 mcg by Gastric Tube route daily      warfarin (COUMADIN) 4 MG tablet Take 4 mg by mouth nightly      levETIRAcetam (KEPPRA) 100 MG/ML solution 10 mLs by Per NG tube route 2 times daily 600 mL 0    furosemide (LASIX) 40 MG tablet 40 mg by Per G Tube route daily       acetaminophen (TYLENOL) 325 MG tablet 650 mg by Per G Tube route every 6 hours as needed for Pain       calcium-vitamin D (OSCAL-500) 500-200 MG-UNIT per tablet 1 tablet by Per G Tube route daily       carvedilol (COREG) 12.5 MG tablet 12.5 mg by Per G Tube route 2 times daily (with meals)       losartan (COZAAR) 100 MG tablet 100 mg by Per G Tube route daily       [DISCONTINUED] albuterol sulfate  (90 Base) MCG/ACT inhaler Inhale 2 puffs into the lungs as needed for Wheezing or Shortness of Breath (Q2H PRN) 1 Inhaler 3    [DISCONTINUED] miconazole (MICOTIN) 2 % powder Apply topically 2 times daily.  45 g 1    [DISCONTINUED] potassium bicarb-citric acid (EFFER-K) 20 MEQ TBEF effervescent tablet Take 1 tablet by mouth daily 120 tablet 0    [DISCONTINUED] lansoprazole 3 MG/ML SUSP 10 mLs by Per NG tube route daily 300 mL 0    [DISCONTINUED] ferrous sulfate (IRON 325) 325 (65 Fe) MG tablet Take 325 mg by mouth daily (with breakfast)      [DISCONTINUED] pantoprazole (PROTONIX) 40 MG tablet Take 40 mg by mouth 2 times daily (before meals) For bleeding ulcers (prescribed 1/31/21)      [DISCONTINUED] acetaminophen (TYLENOL) 325 MG tablet Take 650 mg by mouth every 6 hours as needed for Pain      [DISCONTINUED] calcium-vitamin D (OSCAL-500) 500-200 MG-UNIT per tablet Take 1 tablet by mouth daily      [DISCONTINUED] albuterol sulfate HFA (VENTOLIN HFA) 108 (90 Base) MCG/ACT inhaler Inhale 2 puffs into the lungs every 6 hours as needed for Wheezing      [DISCONTINUED] carvedilol (COREG) 6.25 MG tablet Take 6.25 mg by mouth 2 times daily (with meals)      [DISCONTINUED] insulin lispro protamine & lispro (HUMALOG MIX) (75-25) 100 UNIT per ML SUSP injection vial Inject into the skin 2 times daily (with meals) INJECT 14 UNITS IN THE MORNING AND 10 UNITS IN THE EVENING      [DISCONTINUED] letrozole (FEMARA) 2.5 MG tablet Take 2.5 mg by mouth daily      [DISCONTINUED] pravastatin (PRAVACHOL) 10 MG tablet Take 10 mg by mouth daily      [DISCONTINUED] ferrous sulfate (FE TABS 325) 325 (65 Fe) MG EC tablet Take 1 tablet by mouth daily (with breakfast) 30 tablet 3    [DISCONTINUED] pantoprazole (PROTONIX) 40 MG tablet Take 1 tablet by mouth 2 times daily (before meals) 30 tablet 0    [DISCONTINUED] furosemide (LASIX) 20 MG tablet Take 1 tablet by mouth daily 60 tablet 3    [DISCONTINUED] albuterol sulfate HFA (VENTOLIN HFA) 108 (90 Base) MCG/ACT inhaler Inhale 2 puffs into the lungs every 6 hours as needed for Wheezing      [DISCONTINUED] dilTIAZem (DILACOR XR) 180 MG extended release capsule Take 180 mg by mouth daily      [DISCONTINUED] insulin lispro protamine & lispro (HUMALOG MIX) (75-25) 100 UNIT per ML SUSP injection vial Inject into the skin INJECT 14 UNITS IN THE MORNING AND 10 UNITS IN THE EVENING      [DISCONTINUED] potassium chloride (KLOR-CON M) 20 MEQ extended release tablet Take 20 mEq by mouth 2 times daily      [DISCONTINUED] pravastatin (PRAVACHOL) 10 MG tablet Take 10 mg by mouth daily      [DISCONTINUED] letrozole (FEMARA) 2.5 MG tablet Take 2.5 mg by mouth daily

## 2021-04-28 NOTE — H&P
Hospital Medicine History and Physical    4/28/2021    Date of Admission: 4/28/2021    Date of Service: Pt seen/examined on 4/28/2021 and admitted to inpatient. Assessment/plan:  1. Tension pneumothorax. Status post chest tube placement in the emergency room. Critical care has been consulted from the emergency room, managing. 2. Acute on chronic respiratory failure with hypoxia. Secondary to respiratory distress from tension pneumothorax. Oxygenation has since improved since the compression of tension pneumothorax. Continue vent management. Critical care has been consulted. 3. Abnormal chest x-ray concerning for possible developing ARDS. Could be secondary to underlying tension pneumothorax which has not been treated in the emergency room. Await pulmonology evaluation and further recommendations. 4. Nonzero troponin. Likely secondary to #1. Obtain serial troponin. Start aspirin. Continue statin. Check echo. 5. Other comorbidities: history of diabetes type 2, asthma, CHF, atrial fibrillation, pulmonary hypertension, hyperlipidemia, obstructive sleep apnea, breast cancer status post lumpectomy and radiation treatment, obesity with BMI of 39.9 kg/m². Activities: Bedrest  Prophylaxis: Coumadin  Code status: Full code    ==========================================================  Chief complaint:  Chief Complaint   Patient presents with    Respiratory Distress     [facility] for report of resp distress sat's 88% they had to remove the pt from the vent and bag her, reported that her pulse dropped to 22. History of Presenting Illness:   This is a pleasant 79 y.o. female with history of diabetes type 2, asthma, CHF, atrial fibrillation, pulmonary hypertension, hyperlipidemia, obstructive sleep apnea, breast cancer status post lumpectomy and radiation treatment, obesity with BMI of 39.9 kg/m², who was [facility] on February 22, 2022 after about 3 weeks of hospital stay following admission with acute CVA, aspiration pneumothorax, currently status post tracheostomy and PEG placement, who presents from SNF in acute respiratory distress, hypoxia. She was also bradycardic on hypotensive. In the emergency room, chest x-ray is concerning for tension pneumothorax and possible developing ARDS. Troponin is 0.04. Decompression was done in the emergency room and patient currently has chest tube in place. Critical care has been consulted from the emergency room. Past Medical History:      Diagnosis Date    Acute GI bleeding     recent admission 1/29/2021    Atrial fibrillation (HCC)     Xarelto    [facility]ere[facility] occlusion with [facility] infarction ([facility]     Chronic respiratory failure ([facility]     COPD (chronic obstructive pulmonary disease) ([facility]     Hyperlipidemia     Hypertension     Systolic CHF ([facility]     Tracheostomy in place ([facility]     Type 2 diabetes mellitus ([facility]        Past Surgical History:      Procedure Laterality Date    GASTROSTOMY TUBE PLACEMENT N/A 2/9/2021    EGD PEG TUBE PLACEMENT performed by Gerald Hensley MD at [facility] IR IVC [address]  2/4/2021    IR IVC FILTER PLACEMENT W IMAGING 2/4/2021 [address] PROCEDURES    UPPER GASTROINTESTINAL ENDOSCOPY N/A 1/29/2021    EGD BIOPSY performed by Leon Byrne MD at [address] [place] [address]  01/29/2021    Dr William Wooten       Medications (prior to admission):  Prior to Admission medications    Medication Sig Start Date End Date Taking?  Authorizing Provider   Arformoterol Tartrate (BROVANA) 15 MCG/2ML NEBU Take 1 ampule by nebulization 2 times daily   Yes Historical Provider, MD   atorvastatin (LIPITOR) 80 MG tablet 80 mg by Per G Tube route nightly    Yes Historical Provider, MD   famotidine (PEPCID) 20 MG tablet 20 mg by Per G Tube route daily   Yes Historical Provider, MD   ferrous sulfate 300 (60 meals)    Yes Historical Provider, MD   losartan (COZAAR) 100 MG tablet 100 mg by Per G Tube route daily    Yes Historical Provider, MD       Allergy(ies):  Patient has no known allergies. Social History:  TOBACCO:  reports that she has quit smoking. She has never used smokeless tobacco.  ETOH:  reports previous alcohol use. Family History:  History reviewed. No pertinent family history. Review of Systems:  Unable to obtain as patient is nonverbal.    Vitals and physical examination:  BP (!) 99/51   Pulse 60   Temp 97 °F (36.1 °C) (Infrared)   Resp 16   Ht 5' 5\" (1.651 m)   Wt 240 lb (108.9 kg)   SpO2 100%   BMI 39.94 kg/m²   Gen/overall appearance: Nonverbal.  Head: Normocephalic, atraumatic  Eyes: Anisocoria, left pupil greater than right. ENT:- ETT in place  Neck: Trach in place. CVS: Nml S1S2, no MRG, RRR  Pulm: Clear bilaterally. No crackles/wheezes in upper lungs. Right chest tube in place. Gastrointestinal: Soft, NT/ND, +BS  Musculoskeletal: No edema. Warm  Neuro: Nonverbal.  Psychiatry: Unable to assess  Skin: No obvious rashes noted        Labs/imaging/EKG:  CBC:   Recent Labs     04/28/21  1415   WBC 13.5*   HGB 11.4*        BMP:    Recent Labs     04/28/21  1415   *   K 5.1   CL 98*   CO2 27   BUN 22*   CREATININE <0.5*   GLUCOSE 225*     Hepatic: No results for input(s): AST, ALT, ALB, BILITOT, ALKPHOS in the last 72 hours. Xr Chest Portable    Result Date: 4/28/2021  EXAMINATION: ONE XRAY VIEW OF THE CHEST 4/28/2021 1:21 pm COMPARISON: Chest x-ray earlier today at 13:03 HISTORY: ORDERING SYSTEM PROVIDED HISTORY: post needle decompression/chest tube insertion TECHNOLOGIST PROVIDED HISTORY: Reason for exam:->post needle decompression/chest tube insertion Reason for Exam: post needle decompression/chest tube insertion Acuity: Unknown Type of Exam: Subsequent/Follow-up FINDINGS: Left-sided chest tube has been placed, distal pigtail in the apex.   Minimal if any residual pneumothorax with re-expansion of the left lung evident. There is air in the this chest wall soft tissues extending to the left shoulder. There is new ill-defined opacity of the right lung. Similar opacity of the left lung. Minimal if any residual pneumothorax, with re-expansion of the left lung after chest tube placement. Findings suggest developing pulmonary edema. Continued close follow-up with short-term follow-up chest x-ray recommended for the above. Xr Chest Portable    Result Date: 4/28/2021  EXAMINATION: ONE XRAY VIEW OF THE CHEST 4/28/2021 12:55 pm COMPARISON: None. HISTORY: ORDERING SYSTEM PROVIDED HISTORY: hypoxia TECHNOLOGIST PROVIDED HISTORY: Reason for exam:->hypoxia Reason for Exam: Respiratory Distress (Pt brought in [facility] for report of resp distress sat's 88% they had to remove the pt from the vent and bag her, reported that her pulse dropped to 22 Acuity: Acute Type of Exam: Initial FINDINGS: Tracheostomy tube is present. No change in position. Interval development of a large left pneumothorax with complete collapse of the left lung and slight mediastinal shift towards the right. The heart is enlarged. Asymmetric vascular congestion in the right lung centrally and more diffuse ground-glass opacification throughout the right lung suspected to represent underlying edema or ARDS. No skeletal finding. Large left pneumothorax with mediastinal shift towards the right indicating underlying tension. By history, this may be due to a ruptured bleb in this intubated patient. There is also diffuse mild airspace opacification in the right lung that may represent asymmetric edema or developing ARDS. RECOMMENDATION: Critical results were called by Dr. Nita Polanco. Efren Hogan MD to [facility] on 4/28/2021 at 13:08. EKG: Atrial fibrillation, rate 52 BPM. No acute ST/T changes. I reviewed EKG. Discussed with ER provider.       Thank you France Helms MD for the opportunity to be involved in this patient's care.    -----------------------------  Campbell Salazar MD  [facility]

## 2021-04-28 NOTE — ED PROVIDER NOTES
[facility]      Pt Name: Maryana Palumbo  MRN: [id number]  [place] 1940  Date of evaluation: 4/28/2021  Provider: Emma Diego MD    CHIEF COMPLAINT       Chief Complaint   Patient presents with    Respiratory Distress     [facility] for report of resp distress sat's 88% they had to remove the pt from the vent and bag her, reported that her pulse dropped to 22. HISTORY OF PRESENT ILLNESS   (Location/Symptom, Timing/Onset, Context/Setting, Quality, Duration, Modifying Factors, Severity)  Note limiting factors. Maryana Palumbo is a 79 y.o. female who presents to the emergency department with endorsed respiratory distress. Brought in by EMS from [facility]. They reported sats in the 80s on her vent settings. Patient also was noted to be bradycardic down to the 20s. Patient provides no history secondary to CVA status post trach and PEG. Appears to be on Coumadin per nursing home notes and is full code. Admitted 2/2021 for :  \"Acute Hypoxemic respiratory failure:  2/2 cva and aspiraton pna- now s/p trach and peg. Received thrombectomy and tpa for stroke. Hx GIB as well\". REVIEW OF SYSTEMS    (2-9 systems for level 4, 10 or more for level 5)     Review of Systems   Unable to perform ROS: Patient nonverbal       Except as noted above the remainder of the review of systems was reviewed and negative.        PAST MEDICAL HISTORY     Past Medical History:   Diagnosis Date    Acute GI bleeding     recent admission 1/29/2021    Atrial fibrillation ([address]     Xarelto    Cerebral artery occlusion with cerebral infarction ([address]     Chronic respiratory failure ([address]     COPD (chronic obstructive pulmonary disease) (HCC)     Hyperlipidemia     Hypertension     Systolic CHF ([address]     Tracheostomy in place ([address]     Type 2 diabetes mellitus ([address]          SURGICAL HISTORY       Past Surgical History:   Procedure Laterality Date    GASTROSTOMY TUBE PLACEMENT N/A 2/9/2021    EGD PEG TUBE PLACEMENT performed by Gerald Chun MD at [facility] [address] [place]  2/4/2021    IR IVC FILTER PLACEMENT W IMAGING 2/4/2021 [address] PROCEDURES    UPPER GASTROINTESTINAL ENDOSCOPY N/A 1/29/2021    EGD BIOPSY performed by Emanuel Brown MD at [facility]  01/29/2021    Dr Zoey Luther       Previous Medications    ACETAMINOPHEN (TYLENOL) 325 MG TABLET    650 mg by Per G Tube route every 6 hours as needed for Pain     ARFORMOTEROL TARTRATE (BROVANA) 15 MCG/2ML NEBU    Take 1 ampule by nebulization 2 times daily    ATORVASTATIN (LIPITOR) 80 MG TABLET    80 mg by Per G Tube route nightly     BUDESONIDE (PULMICORT) 0.5 MG/2ML NEBULIZER SUSPENSION    Take 1 ampule by nebulization 2 times daily    CALCIUM-VITAMIN D (OSCAL-500) 500-200 MG-UNIT PER TABLET    1 tablet by Per G Tube route daily     CARVEDILOL (COREG) 12.5 MG TABLET    12.5 mg by Per G Tube route 2 times daily (with meals)     CHOLECALCIFEROL 10 MCG/ML LIQD    125 mcg by Gastric Tube route daily    FAMOTIDINE (PEPCID) 20 MG TABLET    20 mg by Per G Tube route daily    FERROUS SULFATE 300 (60 FE) MG/5ML SYRUP    300 mg by Per G Tube route every other day    FUROSEMIDE (LASIX) 40 MG TABLET    40 mg by Per G Tube route daily     GABAPENTIN (NEURONTIN) 100 MG CAPSULE    100 mg by Per G Tube route 3 times daily. INSULIN REGULAR (HUMULIN R;NOVOLIN R) 100 UNIT/ML INJECTION    Inject 0-12 Units into the skin 3 times daily (before meals) Per patient sliding scale.     IPRATROPIUM-ALBUTEROL (DUONEB) 0.5-2.5 (3) MG/3ML SOLN NEBULIZER SOLUTION    Inhale 1 vial into the lungs 4 times daily    LEVETIRACETAM (KEPPRA) 100 MG/ML SOLUTION    10 mLs by Per NG tube route 2 times daily    LOPERAMIDE (IMODIUM) 2 MG CAPSULE    2 mg by Per G Tube route as needed for Diarrhea LOSARTAN (COZAAR) 100 MG TABLET    100 mg by Per G Tube route daily     MELATONIN 3 MG TABS TABLET    3 mg by Per G Tube route nightly    POTASSIUM BICARBONATE (K-LYTE) 25 MEQ DISINTEGRATING TABLET    50 mEq by Per G Tube route daily    PREDNISONE (DELTASONE) 10 MG TABLET    10 mg by Per G Tube route daily     TRAZODONE (DESYREL) 50 MG TABLET    50 mg by Per G Tube route nightly    WARFARIN (COUMADIN) 4 MG TABLET    Take 4 mg by mouth nightly       ALLERGIES     Patient has no known allergies. FAMILY HISTORY     History reviewed. No pertinent family history.        SOCIAL HISTORY       Social History     Socioeconomic History    Marital status: Unknown     Spouse name: None    Number of children: None    Years of education: None    Highest education level: None   Occupational History    None   Social Needs    Financial resource strain: None    Food insecurity     Worry: None     Inability: None    Transportation needs     Medical: None     Non-medical: None   Tobacco Use    Smoking status: Former Smoker    Smokeless tobacco: Never Used   Substance and Sexual Activity    Alcohol use: Not Currently    Drug use: Never    Sexual activity: None   Lifestyle    Physical activity     Days per week: None     Minutes per session: None    Stress: None   Relationships    Social connections     Talks on phone: None     Gets together: None     Attends Evangelical service: None     Active member of club or organization: None     Attends meetings of clubs or organizations: None     Relationship status: None    Intimate partner violence     Fear of current or ex partner: None     Emotionally abused: None     Physically abused: None     Forced sexual activity: None   Other Topics Concern    None   Social History Narrative    ** Merged History Encounter **            SCREENINGS                        PHYSICAL EXAM    (up to 7 for level 4, 8 or more for level 5)     ED Triage Vitals   BP Temp Temp src Pulse Resp with mediastinal shift towards the right indicating   underlying tension. By history, this may be due to a ruptured bleb in this   intubated patient. There is also diffuse mild airspace opacification in the   right lung that may represent asymmetric edema or developing ARDS. RECOMMENDATION:   Critical results were called by Dr. Aliyah Toro. Jefe Cheung MD to [facility]   on 4/28/2021 at 13:08.                LABS:  Labs Reviewed   CBC WITH AUTO DIFFERENTIAL - Abnormal; Notable for the following components:       Result Value    WBC 13.5 (*)     Hemoglobin 11.4 (*)     MCH 25.4 (*)     MCHC 30.3 (*)     RDW 16.8 (*)     Neutrophils Absolute 11.8 (*)     Lymphocytes Absolute 0.9 (*)     All other components within normal limits    Narrative:     Performed at:  [facility] [address] [address] [employer]   Phone [phone]   BASIC METABOLIC PANEL W/ REFLEX TO MG FOR LOW K - Abnormal; Notable for the following components:    Sodium 135 (*)     Chloride 98 (*)     Glucose 225 (*)     BUN 22 (*)     CREATININE <0.5 (*)     All other components within normal limits    Narrative:     Performed at:  [facility] [address] [facility] [address] [place], [place]   Phone [phone]   PROTIME-INR - Abnormal; Notable for the following components:    Protime 17.4 (*)     INR 1.49 (*)     All other components within normal limits    Narrative:     Performed at:  [facility] [address] [facility] [place], [place]   Phone [phone]   TROPONIN - Abnormal; Notable for the following components:    Troponin 0.04 (*)     All other components within normal limits    Narrative:     Performed at:  [facility] [address]   Phone [phone]   BRAIN NATRIURETIC PEPTIDE - Abnormal; Notable for the following components:    Pro-BNP 1,678 (*)     All other components within normal limits    Narrative:     Performed at:  [facility]  [address], [address]   Phone [phone]   TROPONIN   TROPONIN       All other labs were within normal range or not returned as of this dictation. EMERGENCY DEPARTMENT COURSE and DIFFERENTIAL DIAGNOSIS/MDM:   Vitals:    Vitals:    04/28/21 1415 04/28/21 1430 04/28/21 1445 04/28/21 1500   BP: (!) 105/57 (!) 99/54 (!) 99/51 102/62   Pulse: 64 65 60 62   Resp: 17 25 16 16   Temp:       TempSrc:       SpO2: 100% 100% 100% 100%   Weight:       Height:             CRITICAL CARE TIME   Total Critical Care time was 35 minutes, excluding separately reportable procedures. There was a high probability of clinically significant/life threatening deterioration in the patient's condition which required my urgent intervention. PROCEDURES:  Unless otherwise noted below, none     Chest Tube    Date/Time: 4/28/2021 1:40 PM  Performed by: Marianela Casas MD  Authorized by: Marianela Casas MD     Consent:     Consent obtained:  Emergent situation  Pre-procedure details:     Skin preparation:  ChloraPrep    Preparation: Patient was prepped and draped in the usual sterile fashion    Anesthesia (see MAR for exact dosages): Anesthesia method:  Local infiltration    Local anesthetic:  Lidocaine 1% w/o epi  Procedure details:     Placement location:  L anterior    Scalpel size:  11    Tube size (Armenian): 14.    Dissection instrument: seldinger technique with pneumocath. Ultrasound guidance: no      Tension pneumothorax: yes      Tube connected to:  Suction    Drainage characteristics:  Bloody    Suture material:  0 silk    Dressing:  4x4 sterile gauze  Post-procedure details:     Post-insertion x-ray findings: tube in good position      Patient tolerance of procedure:   Tolerated well, no immediate complications        Procedure Note: Trach replacement:    Date/Time: 4/28/2021 2:30 PM  Performed by: Marianela Casas MD  Authorized by: Autumn Ovalles MD   consent was not obtained due to emergent situation. Universal protocol was followed throughout. The patient was placed in the supine position with the neck extended. The original tracheostomy tube was examined, and the stoma appeared to be healing well with no signs of infection, bleeding, irritation, or wound breakdown. A suction catheter was used to clear the tracheostomy tube and trachea or secretions. The trach ties were undone. The balloon on the existing trach was deflated. The existing trach was gently removed, and a NG tube was used as a retractor to maintain patency of the stomal opening. The tract was examined, and appeared to be healing appropriately. A new 6-0 Shiley   tracheostomy tube was placed through the stoma. The tube appeared to be in good position, without abutting the walls of the trachea. The balloon on the trach was inflated. The tube was secured with Velcro ties. The patient tolerated the procedure well with no desaturations or complications. Course and MDM:    Patient is a 71-year-old female status post trach and PEG tube for CVA presenting to the emergency room with hypoxia at nursing facility. On arrival to the emergency room she is noted to have some supraclavicular retractions with diminished breath sounds throughout. Her blood pressure initially was normal and she was tolerating near normal FiO2 settings however her blood pressure dropped shortly after arrival to the emergency room. Emergent chest x-ray was obtained showing left-sided tension pneumothorax. She was emergently decompressed in the left second intercostal space with a burst of air. Then a left anterior pneumo dart was placed as above with good airflow and some bloody output. Chest tube was set to suction. Her blood pressure did improve significantly. Post procedure chest x-ray is showing reinflation of the lung.  There was also concern for air leak from trach cuff and this was replaced by me. As she is not tolerating baseline vent settings currently she will require ICU admission. Pulmonology intensivist was also consulted with their recommendations pending. FINAL IMPRESSION      1. Tension pneumothorax          DISPOSITION/PLAN   DISPOSITION        PATIENT REFERRED TO:  No follow-up provider specified. DISCHARGE MEDICATIONS:  New Prescriptions    No medications on file     Controlled Substances Monitoring:     No flowsheet data found.     (Please note that portions of this note were completed with a voice recognition program.  Efforts were made to edit the dictations but occasionally words are mis-transcribed.)    Oral Will MD (electronically signed)  Attending Emergency Physician            Kaylan Noel MD  04/28/21 [address], [place]  04/28/21 5297

## 2021-04-28 NOTE — ED NOTES
Dr Roark Leventhal at bedside with RT  Pt has a pneumo per Dr Roark Leventhal, attempting needle decompression  Will set up for chest tube     Veryl Safe, RN  04/28/21 8021

## 2021-04-28 NOTE — CONSULTS
[facility] Pulmonary and Critical Care   Consult Note      Reason for Consult: Tension pneumothorax requiring chest tube  Requesting Physician: Madan Limon    Subjective:   CHIEF COMPLAINT: Shortness of breath and respiratory distress     HPI: All information was obtained from the chart and upon speaking with ER RN. Patient is tracheostomy dependent and resident at [address] [place] [address]. Had sudden onset of shortness of breath and respiratory distress requiring bagging-transferred to ER for further evaluation. At one point she was also noted to be profoundly bradycardic to the 20s. Patient was noted to have tension pneumothorax on the left side, urgent needle decompression followed by chest tube was placed by ER physician. Respiratory distress has resolved-patient is now on full ventilator support. Pulmonary consultation has been requested. History of CVA-ischemic which converted to hemorrhagic post thrombectomy/thrombolysis in February 2021. This was further complicated by aspiration pneumonia, is now status post tracheostomy and PEG and a resident of [address] [place] [address]. Prior history of atrial fibrillation-was on anticoagulation with Xarelto, later discontinued due to GI bleeding. History of PE status post IVC filter in February 2021.        The patient is a 79 y.o. female with significant past medical history of:      Diagnosis Date    Acute GI bleeding     recent admission 1/29/2021    Atrial fibrillation ([address]     Xarelto    Cerebral artery occlusion with cerebral infarction ([address]     Chronic respiratory failure ([address]     COPD (chronic obstructive pulmonary disease) ([address]     Hyperlipidemia     Hypertension     Systolic CHF ([address]     Tracheostomy in place ([address]     Type 2 diabetes mellitus ([address]         Past Surgical History:        Procedure Laterality Date    GASTROSTOMY TUBE PLACEMENT N/A 2/9/2021    EGD PEG TUBE PLACEMENT performed by Yeyo Gonsalez MD at [facility] IR IVC FILTER PLACEMENT W IMAGING  2/4/2021    IR IVC FILTER PLACEMENT W IMAGING 2/4/2021 [facility] PROCEDURES    UPPER GASTROINTESTINAL ENDOSCOPY N/A 1/29/2021    EGD BIOPSY performed by Summer Munguia MD at [address] [place]  01/29/2021    Dr Terrence Romeo     Current Medications:    Current Facility-Administered Medications: [START ON 4/29/2021] furosemide (LASIX) tablet 40 mg, 40 mg, Per G Tube, Daily    No Known Allergies    Social History:    TOBACCO:   reports that she has quit smoking. She has never used smokeless tobacco.  ETOH:   reports previous alcohol use. Patient currently lives independently    Family History:   History reviewed. No pertinent family history.     REVIEW OF SYSTEMS:    Unable to obtain since the patient is currently trach dependent      Objective:     Patient Vitals for the past 8 hrs:   BP Temp Temp src Pulse Resp SpO2 Height Weight   04/28/21 1830 (!) 105/56 -- -- 59 17 -- -- --   04/28/21 1800 117/71 -- -- 65 17 98 % -- --   04/28/21 1745 113/69 -- -- 62 16 100 % -- --   04/28/21 1730 110/68 -- -- 61 16 100 % -- --   04/28/21 1715 (!) 111/58 -- -- 63 18 100 % -- --   04/28/21 1714 -- -- -- -- -- 100 % -- --   04/28/21 1700 108/65 -- -- 61 16 100 % -- --   04/28/21 1645 112/60 -- -- 60 16 100 % -- --   04/28/21 1630 107/60 -- -- 58 16 100 % -- --   04/28/21 1615 105/69 -- -- 59 16 100 % -- --   04/28/21 1545 106/61 -- -- 63 16 100 % -- --   04/28/21 1535 -- -- -- 56 17 100 % -- --   04/28/21 1530 105/63 -- -- 64 17 -- -- --   04/28/21 1500 102/62 -- -- 62 16 100 % -- --   04/28/21 1445 (!) 99/51 -- -- 60 16 100 % -- --   04/28/21 1430 (!) 99/54 -- -- 65 25 100 % -- --   04/28/21 1415 (!) 105/57 -- -- 64 17 100 % -- --   04/28/21 1409 -- -- -- 68 22 100 % -- --   04/28/21 1400 (!) 114/57 -- -- 72 19 100 % -- --   04/28/21 1345 121/63 -- -- 69 22 -- -- --   04/28/21 1330 (!) 102/59 -- -- 70 21 100 % -- --   04/28/21 1312 -- -- -- 69 27 100 % -- -- 04/28/21 1310 -- -- -- 81 23 100 % -- --   04/28/21 1309 -- -- -- 69 15 100 % -- --   04/28/21 1300 (!) 97/31 -- -- 72 (!) 38 96 % -- --   04/28/21 1246 (!) 93/59 97 °F (36.1 °C) Infrared 66 25 97 % 5' 5\" (1.651 m) 240 lb (108.9 kg)     No intake/output data recorded. No intake/output data recorded. Physical Exam:  General Appearance: alert but poorly responsive, aphasic, in no acute distress  Skin: warm and dry, no rash or erythema  Head: normocephalic and atraumatic  Eyes: pupils equal, round, and reactive to light, extraocular eye movements intact, conjunctivae normal  ENT: external ear and ear canal normal bilaterally, nose without deformity, nasal mucosa and turbinates normal  Neck: supple and non-tender without mass, no cervical lymphadenopathy  Pulmonary/Chest: clear to auscultation bilaterally- no wheezes, rales or rhonchi, normal air movement, no respiratory distress  Cardiovascular: normal rate, regular rhythm,  no murmurs, rubs, distal pulses intact, no carotid bruits  Abdomen: soft, non-tender, non-distended, normal bowel sounds, no masses or organomegaly  Lymph Nodes: Cervical, supraclavicular normal  Extremities: no cyanosis, clubbing or edema  Musculoskeletal: normal range of motion, no joint swelling, deformity or tenderness  Neurologic: alert, no focal neurologic deficits    Data Review:  CBC:   Lab Results   Component Value Date    WBC 13.5 04/28/2021    RBC 4.48 04/28/2021     BMP:   Lab Results   Component Value Date    GLUCOSE 225 04/28/2021    CO2 27 04/28/2021    BUN 22 04/28/2021    CREATININE <0.5 04/28/2021    CALCIUM 9.4 04/28/2021     ABG:   Lab Results   Component Value Date    VDV7CVW 36.8 02/12/2021    BEART 13 02/12/2021    C2HTFXES 98 02/12/2021    PHART 7.431 02/12/2021    HHH5NBH 55.2 02/12/2021    PO2ART 109.5 02/12/2021    PKJ0UQB 39 02/12/2021       Radiology: All pertinent images / reports were reviewed as a part of this visit.     Narrative   EXAMINATION:   ONE XRAY VIEW OF THE CHEST       4/28/2021 1:21 pm       COMPARISON:   Chest x-ray earlier today at 13:03       HISTORY:   ORDERING SYSTEM PROVIDED HISTORY: post needle decompression/chest tube   insertion   TECHNOLOGIST PROVIDED HISTORY:   Reason for exam:->post needle decompression/chest tube insertion   Reason for Exam: post needle decompression/chest tube insertion   Acuity: Unknown   Type of Exam: Subsequent/Follow-up       FINDINGS:   Left-sided chest tube has been placed, distal pigtail in the apex.  Minimal   if any residual pneumothorax with re-expansion of the left lung evident. There is air in the this chest wall soft tissues extending to the left   shoulder.       There is new ill-defined opacity of the right lung.  Similar opacity of the   left lung.           Impression   Minimal if any residual pneumothorax, with re-expansion of the left lung   after chest tube placement.       Findings suggest developing pulmonary edema.       Continued close follow-up with short-term follow-up chest x-ray recommended   for the above. Problem List:   Spontaneous tension pneumothorax  Status post tracheostomy/PEG    Assessment/Plan:     Patient appears to have had spontaneous tension pneumothorax requiring emergent chest tube placement in the ER. Subsequent chest x-ray suggestive of complete reexpansion of the lung. Patient currently appears to be comfortable-on pressure control mode of ventilation, can switch to volume control. Continue on -20 cm H20 suction. Repeat chest x-ray in a.m. Check ABG. ? Normally requires full ventilator support at nursing facility-we will check. Patient is currently hemodynamically stable, awake and alert. Aphasic. Status post tracheostomy/PEG following hemorrhagic CVA. Can resume PEG tube feeds when able to. Pulmonary will follow.     Rosa Isela Atkinson MD

## 2021-04-28 NOTE — ED NOTES
Dr Carina Moon changed out existing trach with a #6 Shiley  Cuff inflated  Pt tolerated well.      Zeyad Garcia RN  04/28/21 5491

## 2021-04-28 NOTE — ED NOTES
Pt did have a episode of bradycardia rate down to 22    Rate returned to normal  RT at bedside evaluating pt's cuff.       Jake Simms RN  04/28/21 6507

## 2021-04-28 NOTE — ED NOTES
Bedside handoff to [facility] via ipad    Pt incontinent of urine and small amount of stool, linens removed, sabine care completed, pt has wart like mass to coccyx with open areas, mepilex applied to area  New brief placed prior to transport to floor.       Thanh Weinstein RN  04/28/21 9606

## 2021-04-28 NOTE — PROGRESS NOTES
[facility] CXR showed tension pneumothorax and chest tube inserted by MD. Vent settings changed to pressure control at this time. Trach change also performed due to blown cuff. Patient has a size 6 shiley and current vent settings are ACPC/16/ Inspiratory pressure of 20 cwp/I time . 80 Seconds/ 80% and PEEP of 5 cwp with SpO2 currently 100%.

## 2021-04-28 NOTE — ED NOTES
Bed: 02  Expected date:   Expected time:   Means of arrival:   Comments:  Critical Only- needs cleaned     Brian Helms RN  04/28/21 2735

## 2021-04-28 NOTE — ED TRIAGE NOTES
Pt arrived from [address] [place] [address] with trach  Current vent settings peep of 5  50%  AC 16  tv 500  Suctioned bloody secretions  Per RT cuff may be broken  RT Francois at bedside with RN.

## 2021-04-28 NOTE — ED NOTES
Dr Jeanine Arrieta at bedside with RT preparing to change out trach     Arielle Ballesteros, RN  04/28/21 3049

## 2021-04-28 NOTE — PROGRESS NOTES
04/28/21 1409   Vent Patient Data   Plateau Pressure 19 [id number]   Static Compliance 24.8 mL/cmH2O   Dynamic Compliance 15.8 mL/cmH2O

## 2021-04-28 NOTE — ED NOTES
Dressing placed on chest tube site  Petroleum gauze, drain gauze followed by a tegaderm         Inna Haddad RN  04/28/21 2841

## 2021-04-29 ENCOUNTER — APPOINTMENT (OUTPATIENT)
Dept: GENERAL RADIOLOGY | Age: 81
DRG: 207 | End: 2021-04-29
Payer: COMMERCIAL

## 2021-04-29 LAB
A/G RATIO: 0.9 (ref 1.1–2.2)
ALBUMIN SERPL-MCNC: 3.2 G/DL (ref 3.4–5)
ALP BLD-CCNC: 180 U/L (ref 40–129)
ALT SERPL-CCNC: 51 U/L (ref 10–40)
ANION GAP SERPL CALCULATED.3IONS-SCNC: 10 MMOL/L (ref 3–16)
AST SERPL-CCNC: 26 U/L (ref 15–37)
BASOPHILS ABSOLUTE: 0 K/UL (ref 0–0.2)
BASOPHILS RELATIVE PERCENT: 0.3 %
BILIRUB SERPL-MCNC: 0.6 MG/DL (ref 0–1)
BUN BLDV-MCNC: 28 MG/DL (ref 7–20)
CALCIUM SERPL-MCNC: 9.7 MG/DL (ref 8.3–10.6)
CHLORIDE BLD-SCNC: 98 MMOL/L (ref 99–110)
CO2: 29 MMOL/L (ref 21–32)
CREAT SERPL-MCNC: <0.5 MG/DL (ref 0.6–1.2)
EKG ATRIAL RATE: 202 BPM
EKG DIAGNOSIS: NORMAL
EKG Q-T INTERVAL: 432 MS
EKG QRS DURATION: 78 MS
EKG QTC CALCULATION (BAZETT): 413 MS
EKG R AXIS: 113 DEGREES
EKG T AXIS: 66 DEGREES
EKG VENTRICULAR RATE: 55 BPM
EOSINOPHILS ABSOLUTE: 0 K/UL (ref 0–0.6)
EOSINOPHILS RELATIVE PERCENT: 0.2 %
ESTIMATED AVERAGE GLUCOSE: 137 MG/DL
GFR AFRICAN AMERICAN: >60
GFR NON-AFRICAN AMERICAN: >60
GLOBULIN: 3.4 G/DL
GLUCOSE BLD-MCNC: 119 MG/DL (ref 70–99)
GLUCOSE BLD-MCNC: 121 MG/DL (ref 70–99)
GLUCOSE BLD-MCNC: 123 MG/DL (ref 70–99)
GLUCOSE BLD-MCNC: 133 MG/DL (ref 70–99)
GLUCOSE BLD-MCNC: 136 MG/DL (ref 70–99)
GLUCOSE BLD-MCNC: 143 MG/DL (ref 70–99)
GLUCOSE BLD-MCNC: 148 MG/DL (ref 70–99)
HBA1C MFR BLD: 6.4 %
HCT VFR BLD CALC: 33.5 % (ref 36–48)
HEMOGLOBIN: 10.4 G/DL (ref 12–16)
INR BLD: 1.76 (ref 0.86–1.14)
LV EF: 58 %
LVEF MODALITY: NORMAL
LYMPHOCYTES ABSOLUTE: 1.3 K/UL (ref 1–5.1)
LYMPHOCYTES RELATIVE PERCENT: 11.1 %
MAGNESIUM: 2.1 MG/DL (ref 1.8–2.4)
MCH RBC QN AUTO: 25.6 PG (ref 26–34)
MCHC RBC AUTO-ENTMCNC: 31.1 G/DL (ref 31–36)
MCV RBC AUTO: 82.4 FL (ref 80–100)
MONOCYTES ABSOLUTE: 0.8 K/UL (ref 0–1.3)
MONOCYTES RELATIVE PERCENT: 7.2 %
NEUTROPHILS ABSOLUTE: 9.2 K/UL (ref 1.7–7.7)
NEUTROPHILS RELATIVE PERCENT: 81.2 %
PDW BLD-RTO: 16.8 % (ref 12.4–15.4)
PERFORMED ON: ABNORMAL
PHOSPHORUS: 3.6 MG/DL (ref 2.5–4.9)
PLATELET # BLD: 185 K/UL (ref 135–450)
PMV BLD AUTO: 10 FL (ref 5–10.5)
POTASSIUM SERPL-SCNC: 3.7 MMOL/L (ref 3.5–5.1)
PROTHROMBIN TIME: 20.5 SEC (ref 10–13.2)
RBC # BLD: 4.07 M/UL (ref 4–5.2)
SARS-COV-2: NOT DETECTED
SODIUM BLD-SCNC: 137 MMOL/L (ref 136–145)
TOTAL PROTEIN: 6.6 G/DL (ref 6.4–8.2)
WBC # BLD: 11.4 K/UL (ref 4–11)

## 2021-04-29 PROCEDURE — 2500000003 HC RX 250 WO HCPCS

## 2021-04-29 PROCEDURE — 6370000000 HC RX 637 (ALT 250 FOR IP): Performed by: INTERNAL MEDICINE

## 2021-04-29 PROCEDURE — 99233 SBSQ HOSP IP/OBS HIGH 50: CPT | Performed by: INTERNAL MEDICINE

## 2021-04-29 PROCEDURE — 6360000002 HC RX W HCPCS: Performed by: INTERNAL MEDICINE

## 2021-04-29 PROCEDURE — 2700000000 HC OXYGEN THERAPY PER DAY

## 2021-04-29 PROCEDURE — 87070 CULTURE OTHR SPECIMN AEROBIC: CPT

## 2021-04-29 PROCEDURE — 87205 SMEAR GRAM STAIN: CPT

## 2021-04-29 PROCEDURE — 87206 SMEAR FLUORESCENT/ACID STAI: CPT

## 2021-04-29 PROCEDURE — 84100 ASSAY OF PHOSPHORUS: CPT

## 2021-04-29 PROCEDURE — 2000000000 HC ICU R&B

## 2021-04-29 PROCEDURE — 31645 BRNCHSC W/THER ASPIR 1ST: CPT | Performed by: INTERNAL MEDICINE

## 2021-04-29 PROCEDURE — 93306 TTE W/DOPPLER COMPLETE: CPT

## 2021-04-29 PROCEDURE — 5A1955Z RESPIRATORY VENTILATION, GREATER THAN 96 CONSECUTIVE HOURS: ICD-10-PCS | Performed by: INTERNAL MEDICINE

## 2021-04-29 PROCEDURE — 94761 N-INVAS EAR/PLS OXIMETRY MLT: CPT

## 2021-04-29 PROCEDURE — 85610 PROTHROMBIN TIME: CPT

## 2021-04-29 PROCEDURE — 36415 COLL VENOUS BLD VENIPUNCTURE: CPT

## 2021-04-29 PROCEDURE — 71045 X-RAY EXAM CHEST 1 VIEW: CPT

## 2021-04-29 PROCEDURE — 93010 ELECTROCARDIOGRAM REPORT: CPT | Performed by: INTERNAL MEDICINE

## 2021-04-29 PROCEDURE — 87116 MYCOBACTERIA CULTURE: CPT

## 2021-04-29 PROCEDURE — 87186 SC STD MICRODIL/AGAR DIL: CPT

## 2021-04-29 PROCEDURE — 94640 AIRWAY INHALATION TREATMENT: CPT

## 2021-04-29 PROCEDURE — 80053 COMPREHEN METABOLIC PANEL: CPT

## 2021-04-29 PROCEDURE — 85025 COMPLETE CBC W/AUTO DIFF WBC: CPT

## 2021-04-29 PROCEDURE — 2580000003 HC RX 258: Performed by: INTERNAL MEDICINE

## 2021-04-29 PROCEDURE — 0BC88ZZ EXTIRPATION OF MATTER FROM LEFT UPPER LOBE BRONCHUS, VIA NATURAL OR ARTIFICIAL OPENING ENDOSCOPIC: ICD-10-PCS | Performed by: INTERNAL MEDICINE

## 2021-04-29 PROCEDURE — 94003 VENT MGMT INPAT SUBQ DAY: CPT

## 2021-04-29 PROCEDURE — 83735 ASSAY OF MAGNESIUM: CPT

## 2021-04-29 PROCEDURE — 87015 SPECIMEN INFECT AGNT CONCNTJ: CPT

## 2021-04-29 PROCEDURE — 0BCB8ZZ EXTIRPATION OF MATTER FROM LEFT LOWER LOBE BRONCHUS, VIA NATURAL OR ARTIFICIAL OPENING ENDOSCOPIC: ICD-10-PCS | Performed by: INTERNAL MEDICINE

## 2021-04-29 PROCEDURE — 87077 CULTURE AEROBIC IDENTIFY: CPT

## 2021-04-29 PROCEDURE — 0BC68ZZ EXTIRPATION OF MATTER FROM RIGHT LOWER LOBE BRONCHUS, VIA NATURAL OR ARTIFICIAL OPENING ENDOSCOPIC: ICD-10-PCS | Performed by: INTERNAL MEDICINE

## 2021-04-29 RX ORDER — LIDOCAINE HYDROCHLORIDE 10 MG/ML
INJECTION, SOLUTION EPIDURAL; INFILTRATION; INTRACAUDAL; PERINEURAL
Status: COMPLETED
Start: 2021-04-29 | End: 2021-04-29

## 2021-04-29 RX ORDER — PROPOFOL 10 MG/ML
5-50 INJECTION, EMULSION INTRAVENOUS
Status: DISCONTINUED | OUTPATIENT
Start: 2021-04-29 | End: 2021-05-03 | Stop reason: HOSPADM

## 2021-04-29 RX ADMIN — ATORVASTATIN CALCIUM 80 MG: 80 TABLET, FILM COATED ORAL at 20:52

## 2021-04-29 RX ADMIN — IPRATROPIUM BROMIDE AND ALBUTEROL SULFATE 1 AMPULE: .5; 3 SOLUTION RESPIRATORY (INHALATION) at 16:28

## 2021-04-29 RX ADMIN — Medication 10 ML: at 09:00

## 2021-04-29 RX ADMIN — BUDESONIDE 500 MCG: 0.5 SUSPENSION RESPIRATORY (INHALATION) at 08:21

## 2021-04-29 RX ADMIN — ENOXAPARIN SODIUM 90 MG: 100 INJECTION SUBCUTANEOUS at 09:58

## 2021-04-29 RX ADMIN — GABAPENTIN 100 MG: 100 CAPSULE ORAL at 18:31

## 2021-04-29 RX ADMIN — IPRATROPIUM BROMIDE AND ALBUTEROL SULFATE 1 AMPULE: .5; 3 SOLUTION RESPIRATORY (INHALATION) at 08:21

## 2021-04-29 RX ADMIN — IPRATROPIUM BROMIDE AND ALBUTEROL SULFATE 1 AMPULE: .5; 3 SOLUTION RESPIRATORY (INHALATION) at 19:34

## 2021-04-29 RX ADMIN — ARFORMOTEROL TARTRATE 15 MCG: 15 SOLUTION RESPIRATORY (INHALATION) at 08:21

## 2021-04-29 RX ADMIN — LIDOCAINE HYDROCHLORIDE: 10 INJECTION, SOLUTION EPIDURAL; INFILTRATION; INTRACAUDAL; PERINEURAL at 12:38

## 2021-04-29 RX ADMIN — MELATONIN TAB 3 MG 3 MG: 3 TAB at 20:52

## 2021-04-29 RX ADMIN — GABAPENTIN 100 MG: 100 CAPSULE ORAL at 20:52

## 2021-04-29 RX ADMIN — PROPOFOL 30 MCG/KG/MIN: 10 INJECTION, EMULSION INTRAVENOUS at 11:55

## 2021-04-29 RX ADMIN — INSULIN LISPRO 1 UNITS: 100 INJECTION, SOLUTION INTRAVENOUS; SUBCUTANEOUS at 12:35

## 2021-04-29 RX ADMIN — IPRATROPIUM BROMIDE AND ALBUTEROL SULFATE 1 AMPULE: .5; 3 SOLUTION RESPIRATORY (INHALATION) at 11:43

## 2021-04-29 RX ADMIN — TRAZODONE HYDROCHLORIDE 50 MG: 50 TABLET ORAL at 20:52

## 2021-04-29 RX ADMIN — GABAPENTIN 100 MG: 100 CAPSULE ORAL at 09:58

## 2021-04-29 RX ADMIN — FUROSEMIDE 40 MG: 40 TABLET ORAL at 10:02

## 2021-04-29 RX ADMIN — Medication 10 ML: at 20:53

## 2021-04-29 RX ADMIN — INSULIN LISPRO 1 UNITS: 100 INJECTION, SOLUTION INTRAVENOUS; SUBCUTANEOUS at 18:31

## 2021-04-29 RX ADMIN — ARFORMOTEROL TARTRATE 15 MCG: 15 SOLUTION RESPIRATORY (INHALATION) at 19:34

## 2021-04-29 RX ADMIN — BUDESONIDE 500 MCG: 0.5 SUSPENSION RESPIRATORY (INHALATION) at 19:34

## 2021-04-29 RX ADMIN — FAMOTIDINE 20 MG: 20 TABLET ORAL at 10:03

## 2021-04-29 RX ADMIN — PREDNISONE 10 MG: 10 TABLET ORAL at 10:02

## 2021-04-29 ASSESSMENT — PULMONARY FUNCTION TESTS
PIF_VALUE: 26
PIF_VALUE: 27

## 2021-04-29 NOTE — FLOWSHEET NOTE
4 Eyes Skin Assessment     NAME:  Irvin Mistry  YOB: 1940  MEDICAL RECORD NUMBER:  [id number]    The patient is being assess for  Admission    I agree that 2 RN's have performed a thorough Head to Toe Skin Assessment on the patient. ALL assessment sites listed below have been assessed. Areas assessed by both nurses:    Head, Face, Ears, Shoulders, Back, Chest, Arms, Elbows, Hands, Sacrum. Buttock, Coccyx, Ischium and Legs. Feet and Heels        Does the Patient have a Wound?  No noted wound(s)       Genaro Prevention initiated:  Yes   Wound Care Orders initiated:  NA    Pressure Injury (Stage 3,4, Unstageable, DTI, NWPT, and Complex wounds) if present place consult order under [de-identified] No    New and Established Ostomies if present place consult order under : No      Nurse 1 eSignature: Electronically signed by Kavon Jones RN on 4/28/21 at 11:48 PM EDT    **SHARE this note so that the co-signing nurse is able to place an eSignature**    Nurse 2 eSignature: Electronically signed by Tiarra Mock RN on 4/28/21 at 11:50 PM EDT

## 2021-04-29 NOTE — PROGRESS NOTES
Pharmacy to Dose Warfarin       Per Dr. Phillip Giang, will hold off on warfarin dosing for now with chest tube and begin therapeutic lovenox to cover until warfarin restarted. Warfarin order discontinued, placeholder entered on MAR.      Gregorio Hdez, PharmD, [place].   [id number]

## 2021-04-29 NOTE — PROGRESS NOTES
Pharmacy to Dose Warfarin    Pharmacy consulted to dose warfarin for afib. INR Goal: 2-3    INR today: 1.76    Assessment/Plan:  - Will continue with [place] dose of 4 mg daily tonight.   - Nice INR increase with 6 mg dose last night. Pharmacy will continue to follow.     Renan Sheth, PharmD, [address]   [id number]

## 2021-04-29 NOTE — PROGRESS NOTES
04/29/21 0823   Vent Patient Data   High Peep/I Pressure 20   Peak Inspiratory Pressure 27 cmH2O   Mean Airway Pressure 9.6 cmH20   Rate Measured 16 br/min   Vt Exhaled 375 mL   Minute Volume 6.33 Liters   I:E Ratio 1:2.4   Plateau Pressure 20 GDI90   Static Compliance 25 mL/cmH2O   Dynamic Compliance 25 mL/cmH2O   Total PEEP 5 cmH20   Auto PEEP 0 cmH20

## 2021-04-29 NOTE — OP NOTE
Bronchoscopy Procedure Note    Date of Operation: 4/29/21  Pre-op Diagnosis: Mucous plugging of airway/left lung collapse  Post-op Diagnosis: same  Surgeon: Walter Avila  Anesthesia: General endotracheal anesthesia  Estimate Blood Loss: Minimal   Complications: None    Indications and History:  The patient is 79 y.o. female with left lung collapse with possible mucous plugging. The risks, benefits, complications, treatment options and expected outcomes were discussed with the patient. The possibilities of reaction to medication, pulmonary aspiration, perforation of a viscus, bleeding, failure to diagnose a condition and creating a complication requiring transfusion or operation were discussed with the patient's daughter over the phone who freely agreed for the procedure to be undertaken. Description of Procedure: The patient was in [address] [place] [address], identified as [facility] and the procedure verified as flexible fiberoptic bronchoscopy. A time out was held and the above information confirmed. After the induction of topical nasopharyngeal anesthesia and 5 cc of 2% lidocaine instilled through tracheostomy, the patient was placed in appropriate position and the bronchoscope was passed through the tracheostomy Portex #7 into the trachea. Lidocaine 2% 3 ml was used topically on the diamond. Careful inspection of the tracheal lumen was accomplished. The scope was sequentially passed into the left main and then left upper and lower bronchi and segmental bronchi. The scope was then withdrawn and advanced into the right main bronchus and then into the RUL, RML, and RLL bronchi and segmental bronchi. Significant bilateral mucous plugging noted, particularly over left upper lobe, left lower lobe and right lower lobe. It appeared blood-tinged and purulent. Plugging from DB, LLL and RLL was removed using portable bronchoscope, with good results.      Endobronchial findings:   Trachea: Normal mucosa   Diamond: Normal mucosa   Right main bronchus: Normal mucosa   Right upper lobe bronchus: Normal mucosa   Right middle lobe bronchus: Normal mucosa   Right lower lobe bronchus: Mucous plugging  Left main bronchus: Normal mucosa   Left upper lobe bronchus: Mucous plugging  Left lower lobe bronchus: Mucous plugging    Recommendation:   1. F/U on culture results     Attestation: I performed the procedure.     Alicia Abreu

## 2021-04-29 NOTE — PROGRESS NOTES
[address]  Patient has size 6 Shiley. Trach Kit of same size on standby  Yes, Obturator at head of the bed  No. Inner cannula cleaned/replaced Yes, drain sponge changed  Yes, Trach tie replaced is soiled No, Flange cleaned  Yes. [address] performed without complications.

## 2021-04-29 NOTE — PROGRESS NOTES
Spoke with daughters Dora Andrew and AMBER, both updated on todays plan of events. Aware covid test not back yet, aware she will be having another bronch tomorrow.

## 2021-04-29 NOTE — PROGRESS NOTES
[facility] Pulmonary/CCM Progress note      Admit Date: 4/28/2021    Chief Complaint: Shortness of breath and respiratory distress    Subjective: Interval History: Patient noted to have complete left lung collapse on x-ray this a.m. No visible air leak or tideling noted in chest tube. No discernible pneumothorax either. Bronchoscopy was performed today with removal of mucous plug from both lower lobes. Tracheostomy was changed to a Portex #7 to facilitate bronchoscopy-had 1 big chunk of mucous plug coughed up by patient during exchange. Patient has generally not required sedation-comfortable on ventilator. Scheduled Meds:   warfarin (COUMADIN) daily dosing (placeholder)   Other RX Placeholder    furosemide  40 mg Per G Tube Daily    sodium chloride flush  5-40 mL Intravenous 2 times per day    ipratropium-albuterol  1 ampule Inhalation 4x daily    insulin lispro  0-6 Units Subcutaneous Q4H    traZODone  50 mg Per G Tube Nightly    predniSONE  10 mg Per G Tube Daily    melatonin  3 mg Per G Tube Nightly    gabapentin  100 mg Per G Tube TID    [START ON 4/30/2021] ferrous sulfate  300 mg Per G Tube Every Other Day    famotidine  20 mg Per G Tube Daily    atorvastatin  80 mg Per G Tube Nightly    insulin lispro  0-6 Units Subcutaneous Once    Arformoterol Tartrate  15 mcg Nebulization BID    budesonide  500 mcg Nebulization BID     Continuous Infusions:   propofol Stopped (04/29/21 1233)    sodium chloride      dextrose       PRN Meds:albuterol sulfate HFA, sodium chloride flush, sodium chloride, promethazine **OR** ondansetron, polyethylene glycol, acetaminophen **OR** acetaminophen, glucose, dextrose, glucagon (rDNA), dextrose, loperamide    Review of Systems  Unable to obtain since the patient is nonverbal and tracheostomy dependent.     Objective:     Patient Vitals for the past 8 hrs:   BP Temp Temp src Pulse Resp SpO2 Height   04/29/21 1230 -- -- -- -- -- -- 5' 5\" (1.651 m)   04/29/21 1144 -- -- -- 70 24 99 % --   04/29/21 0835 129/64 97.5 °F (36.4 °C) Temporal -- 16 -- --   04/29/21 0823 -- -- -- 72 16 97 % --     I/O last 3 completed shifts:  In: -   Out: 60 [Chest Tube:60]  No intake/output data recorded. General Appearance: Nonverbal, in no acute distress  Skin: warm and dry, no rash or erythema  Head: normocephalic and atraumatic  Eyes: pupils equal, round, and reactive to light, extraocular eye movements intact, conjunctivae normal  ENT: external ear and ear canal normal bilaterally, nose without deformity, nasal mucosa and turbinates normal  Neck: supple and non-tender without mass, no cervical lymphadenopathy  Pulmonary/Chest: decreased breath sounds noted-left side  Cardiovascular: normal rate, regular rhythm,  no murmurs, rubs, distal pulses intact, no carotid bruits  Abdomen: soft, non-tender, non-distended, normal bowel sounds, no masses or organomegaly  Lymph Nodes: Cervical, supraclavicular normal  Extremities: no cyanosis, clubbing or edema  Musculoskeletal: normal range of motion, no joint swelling, deformity or tenderness  Neurologic: Alert, nonverbal, dense left hemiparesis    Data Review:  CBC:   Lab Results   Component Value Date    WBC 11.4 04/29/2021    RBC 4.07 04/29/2021     BMP:   Lab Results   Component Value Date    GLUCOSE 121 04/29/2021    CO2 29 04/29/2021    BUN 28 04/29/2021    CREATININE <0.5 04/29/2021    CALCIUM 9.7 04/29/2021     ABG:   Lab Results   Component Value Date    FHJ5XBW 31.6 04/28/2021    BEART 8.4 04/28/2021    G9QLKJYJ 100.0 04/28/2021    PHART 7.533 04/28/2021    ESC8VJV 37.5 04/28/2021    PO2ART 157.0 04/28/2021    NPE6LVD 73.3 04/28/2021       Radiology: All pertinent images / reports were reviewed as a part of this visit.     Narrative   EXAMINATION:   ONE XRAY VIEW OF THE CHEST       4/29/2021 1:08 pm       COMPARISON:   04/29/2021 at 10:55 a.m.       HISTORY:   ORDERING SYSTEM PROVIDED HISTORY: Post bronch   TECHNOLOGIST PROVIDED HISTORY: Reason for exam:->Post bronch   Reason for Exam: Post bronch   Acuity: Unknown   Type of Exam: Unknown       FINDINGS:   Aerated lung now visualized in the left upper hemithorax.  Tracheostomy tube   remains in place.  The right lung and heart are unchanged.  Soft tissue gas   again seen in the left chest wall.           Impression   Some improvement demonstrating some aerated lung in the left upper hemithorax       Problem List:     Spontaneous tension pneumothorax status post chest tube placement  Mucous plugging of airway    Assessment/Plan:     Spontaneous tension pneumothorax status post chest tube placement yesterday, lung has reexpanded-now having issues with mucus plugging of airway. No visible air leak and no visible pneumothorax on chest x-ray-has some subcutaneous emphysema. Will take patient off suction and place on waterseal-repeat chest x-ray in a.m. Mucous plugging with complete collapse of left lung noted on chest x-ray this a.m. Bronchoscopy using disposable bronchoscope was performed after tracheostomy exchange to Portex #7, to facilitate the procedure. Thick purulent appearing mucous plugs of left upper and lower lobe and some also in the right lower lobe. Patient would require repeat evaluation tomorrow-will use therapeutic bronchoscope from endoscopy. Hold anticoagulation tonight. Can resume tube feeds-hold tube feeds past midnight. I personally called the patient's daughter and discussed about the need for bronchoscopy.     Zo Urbano MD Fall with Harm Risk

## 2021-04-29 NOTE — PROGRESS NOTES
Trach changed from [address] to Portex 8 for Bronchoscopy, pt naya well airway confirmed via ETCo2, return of volumes on ventilator and visual during bronch.

## 2021-04-29 NOTE — PROGRESS NOTES
CAll to [place] to get update on patient, received vent settings   Ac 500 peep 5  Rate 16 fi02 40%   reported to Dr Doni Grewal,   received Tube feeds, reported to dietician. Diabetisors @ 65ml/hr and 250 h20 flsuh every 6 hours. Arrived at [facility] 3/30 from [place], they are unaware if she had any covid vaccines, she did not receive them at [facility].

## 2021-04-29 NOTE — CARE COORDINATION
Discharge Planning Assessment    SW discharge planner met with patient/family member to discuss reason for admission, current living situation, and potential needs at the time of discharge. Demographics/Insurance verified: Yes  Current type of dwelling: [facility] (since 3/30/21)  Patient from ECF/SW confirmed with: Yes, w/ admissions liaison Danilo Crockett arrangements: ECF  Steps to enter home / inside home: 0  Hx of fall/s: 0  Level of function/support: Total. Patient is vent dependent. PCP: facility   Pharmacy: facility  Medication compliance issues: via PEG  DME: n/a    Active with any community resources/agencies/skilled home care/oxygen/dialysis: none  Financial issues that could impact healthcare: none    Transportation at time of d/c (Discussed w/pt/family that on the day of discharge home, tentative time of discharge will be between 10am and noon): medical    SW discussed and provided facilities of choice if transition to a skilled nursing facility is required a the time of discharge. Tentative discharge plan: return to [facility] (pending covid r/o, facility cannot take covid +). Patient must have a covid test within 72 hours of return. SW will continue to follow for any psychosocial, and discharge needs.     Neda Bravo MSW, [address]

## 2021-04-29 NOTE — PLAN OF CARE
Nutrition Problem #1: Inadequate energy intake  Intervention: Food and/or Nutrient Delivery: Start Tube Feeding  Nutritional Goals: TF tolerance @ goal rate

## 2021-04-29 NOTE — CARE COORDINATION
[facility] Wound Ostomy Continence Nurse  Consult Note       NAME:  Brody Mendez  MEDICAL RECORD NUMBER:  [id number]  AGE: 79 y.o. GENDER: female  : 1940  TODAY'S DATE:  2021    Subjective   Reason for WOCN Evaluation and Assessment: buttocks wounds       Brody Mendez is a 79 y.o. female referred by:   [x] Physician  [x] Nursing  [] Other:     Wound Identification:  Wound Type: MASD  Contributing Factors: chronic pressure, decreased mobility, incontinence of stool and incontinence of urine    Wound History: present on admission   Current Wound Care Treatment:  Foam dressing    Patient Goal of Care:  [x] Wound Healing  [] Odor Control  [] Palliative Care  [] Pain Control   [] Other:         PAST MEDICAL HISTORY        Diagnosis Date    Acute GI bleeding     recent admission 2021    Atrial fibrillation ([address]     Xarelto    Cerebral artery occlusion with cerebral infarction ([address]     Chronic respiratory failure ([address]     COPD (chronic obstructive pulmonary disease) ([address]     Hyperlipidemia     Hypertension     Systolic CHF ([address]     Tracheostomy in place ([address]     Type 2 diabetes mellitus ([address]        PAST SURGICAL HISTORY    Past Surgical History:   Procedure Laterality Date    GASTROSTOMY TUBE PLACEMENT N/A 2021    EGD PEG TUBE PLACEMENT performed by Navin Mancuso MD at [facility] IR IVC [address]  2021    IR IVC FILTER PLACEMENT W IMAGING 2021 [address] PROCEDURES    UPPER GASTROINTESTINAL ENDOSCOPY N/A 2021    EGD BIOPSY performed by Ambika Delgado MD at [address] [place] [address]  2021    Dr Ambika Pérez    History reviewed. No pertinent family history.     SOCIAL HISTORY    Social History     Tobacco Use    Smoking status: Former Smoker    Smokeless tobacco: Never Used   Substance Use Topics    Alcohol use: Not Currently    Drug use: Never       ALLERGIES    No Known Allergies    MEDICATIONS    No current facility-administered medications on file prior to encounter. Current Outpatient Medications on File Prior to Encounter   Medication Sig Dispense Refill    Arformoterol Tartrate (BROVANA) 15 MCG/2ML NEBU Take 1 ampule by nebulization 2 times daily      atorvastatin (LIPITOR) 80 MG tablet 80 mg by Per G Tube route nightly       famotidine (PEPCID) 20 MG tablet 20 mg by Per G Tube route daily      ferrous sulfate 300 (60 Fe) MG/5ML syrup 300 mg by Per G Tube route every other day      gabapentin (NEURONTIN) 100 MG capsule 100 mg by Per G Tube route 3 times daily.  insulin regular (HUMULIN R;NOVOLIN R) 100 UNIT/ML injection Inject 0-12 Units into the skin 3 times daily (before meals) Per patient sliding scale.       ipratropium-albuterol (DUONEB) 0.5-2.5 (3) MG/3ML SOLN nebulizer solution Inhale 1 vial into the lungs 4 times daily      loperamide (IMODIUM) 2 MG capsule 2 mg by Per G Tube route as needed for Diarrhea       melatonin 3 MG TABS tablet 3 mg by Per G Tube route nightly      potassium bicarbonate (K-LYTE) 25 MEQ disintegrating tablet 50 mEq by Per G Tube route daily      predniSONE (DELTASONE) 10 MG tablet 10 mg by Per G Tube route daily       budesonide (PULMICORT) 0.5 MG/2ML nebulizer suspension Take 1 ampule by nebulization 2 times daily      traZODone (DESYREL) 50 MG tablet 50 mg by Per G Tube route nightly      Cholecalciferol 10 MCG/ML LIQD 125 mcg by Gastric Tube route daily      warfarin (COUMADIN) 4 MG tablet Take 4 mg by mouth nightly      levETIRAcetam (KEPPRA) 100 MG/ML solution 10 mLs by Per NG tube route 2 times daily 600 mL 0    furosemide (LASIX) 40 MG tablet 40 mg by Per G Tube route daily       acetaminophen (TYLENOL) 325 MG tablet 650 mg by Per G Tube route every 6 hours as needed for Pain       calcium-vitamin D (OSCAL-500) 500-200 MG-UNIT per tablet 1 tablet by Per G Tube route daily       carvedilol (COREG) 12.5 MG tablet 12.5 mg by Per G Tube route 2 times daily (with meals)       losartan (COZAAR) 100 MG tablet 100 mg by Per G Tube route daily          Objective    /64   Pulse 70   Temp 97.5 °F (36.4 °C) (Temporal)   Resp 18   Ht 5' 5\" (1.651 m)   Wt 188 lb 11.4 oz (85.6 kg)   SpO2 95%   BMI 31.40 kg/m²     LABS:  WBC:    Lab Results   Component Value Date    WBC 11.4 04/29/2021     H/H:    Lab Results   Component Value Date    HGB 10.4 04/29/2021    HCT 33.5 04/29/2021     PTT:    Lab Results   Component Value Date    APTT 18.8 02/02/2021   [APTT}  PT/INR:    Lab Results   Component Value Date    PROTIME 20.5 04/29/2021    INR 1.76 04/29/2021     HgBA1c:    Lab Results   Component Value Date    LABA1C 6.4 04/28/2021       Assessment   Genaro Risk Score: Genaro Scale Score: 12    Patient Active Problem List   Diagnosis Code    Acute blood loss anemia D62    MANDY (acute kidney injury) ([facility] N17.9    GI bleed K92.2    Acute diastolic heart failure ([facility]) I50.31    Atrial fibrillation ([facility]) I48.91    Acute cerebrovascular accident (CVA) ([facility]) I63.9    Permanent atrial fibrillation ([facility]) I48.21    Acute gastric ulcer with hemorrhage K25.0    Other pulmonary embolism without acute cor pulmonale ([facility]) I26.99    Acute right MCA stroke ([facility]) I63.511    Hypoxemia R09.02    Acute respiratory failure with hypoxia and hypercapnia ([facility]) J96.01, J96.02    Aspiration pneumonia of right lower lobe due to gastric secretions ([facility]) J69.0    Encephalopathy due to structural disorder of brain G93.49    Tension pneumothorax J93.0    Tracheostomy status ([facility]) Z93.0    Mucus plugging of bronchi J98.09       Measurements:  Wound 02/22/21 Buttocks Left (Active)   Number of days: 66       Wound 02/22/21 Rectum Inner (Active)   Number of days: 66        Patient is trach with chest tube on vent. Patient from nursing home, with buttock wounds and is bleeding from trach. Discussed case with nurse Car.  Patient is on high dose of lovenox and is bleeding from trach and chest tube site. RT managing trach. Patient is incontinent of stool and has purewick in place. Sabine care given and area assessed. Patient has large cauliflower like mass near rectum that is bleeding at connection to rest of skin. Also another large mole to left side of pubis mound. Patient also has open area in perineum near anus. Area is red and bleeding. No drainage noted, skin is eroded. Will use zinc paste to protect skin and continue using purewick to managed moisture associated skin damage. Response to treatment:  Well tolerated by patient. Pain Assessment:  Severity:  0 / 10  Quality of pain: N/A  Wound Pain Timing/Severity: none  Premedicated: No    Plan   Plan of Care:   provide sabine care with each episode of incontinence  Apply zinc barrier paste to buttocks and perineum  Use purewick to manage urinary incontinence, change if soiled and twice daily.   Specialty Bed Required : No Patient on [place] bed with air pump  [x] Low Air Loss   [x] Pressure Redistribution  [] Fluid Immersion  [] Bariatric  [] Total Pressure Relief  [] Other:     Current Diet: DIET TUBE FEED CONTINUOUS/CYCLIC NPO; Diabetic; Gastrostomy; Continuous  Diet NPO, After Midnight  Dietician consult:  No    Discharge Plan:  Placement for patient upon discharge: skilled nursing    Patient appropriate for Outpatient [address]: No    Referrals:  []   [] [facility]  [] Supplies  [] Other    Patient/Caregiver Teaching:  Level of patient/caregiver understanding able to:   [] Indicates understanding       [] Needs reinforcement  [] Unsuccessful      [] Verbal Understanding  [] Demonstrated understanding       [x] No evidence of learning  [] Refused teaching         [] N/A       Electronically signed by  CAROLYN Husain, RN  Wound/Ostomy Care on 4/29/2021 at 4:32 PM

## 2021-04-29 NOTE — PROGRESS NOTES
Comprehensive Nutrition Assessment    Type and Reason for Visit:  Initial(TF @ NH)    Nutrition Recommendations/Plan:   1. Recommend order \"Diet: Tube feed continuous/ NPO\". Initiate Glucerna (Diabetic formula) at 25 mL/hr and as tolerated, increase by 25 mL/hr q 4 hours until goal of 50 mL/hr is met via PEG. 2. Recommend 80 mL H20 q 4 hours. Increase flush if Na increases greater than 145 mEq/L.  3. Ensure head of bed is 30 - 45 degrees during continuous or bolus gastric feeding and for one hour after bolus. Turn off the feeding if head of bed is lowered less than 30 degrees. 4. Monitor for tolerance (bowel habits, N/V, cramping, abdominal exam findings: distended, firm, tense, guarded, discomfort). Nutrition Assessment:  Pt is trach/vent dependent. Has PEG tube for nutrition support & receives Diabetic formula @ NH. Per MD, pt is ok to begin TF today. Recommend Glucerna 1.2 @ 50 ml/hr to provide 1260 ml; 1440kcal, 72 g pro & 966 ml free water. 80 ml water flush q 4 hr recommended for fluid needs. Will monitor tolerance. Malnutrition Assessment:  Malnutrition Status:  No malnutrition    Context:  Acute Illness     Findings of the 6 clinical characteristics of malnutrition:  Energy Intake:  Mild decrease in energy intake (Comment)  Weight Loss:  Unable to assess     Body Fat Loss:  Unable to assess     Muscle Mass Loss:  Unable to assess    Fluid Accumulation:  No significant fluid accumulation     Strength:  Not Performed    Estimated Daily Nutrient Needs:  Energy (kcal):  1290- 1548 kcal (15-18 kcal/86kg); Weight Used for Energy Requirements:  Current(noncritical care guidelines)     Protein (g):  68- 114g (1.2-2.0g/57kg);  Weight Used for Protein Requirements:  Ideal        Fluid (ml/day):   ; Method Used for Fluid Requirements:  1 ml/kcal      Nutrition Related Findings:  no edema noted      Wounds:  None       Current Nutrition Therapies:    DIET TUBE FEED CONTINUOUS/CYCLIC NPO; Diabetic; Gastrostomy; Continuous  Current Tube Feeding (TF) Orders:  · Feeding Route: PEG  · Formula: Diabetic  · Goal TF & Flush Orders Provides: Glucerna 1.2 @ goal rate of 50 ml/hr provides 1260 ml; 1440 kcal, 72 g pro & 966 ml free water. Additional 80 ml water flush q 4 hr for fluid needs      Anthropometric Measures:  · Height: 5' 5\" (165.1 cm)  · Current Body Weight: 188 lb (85.3 kg)   · Admission Body Weight:      · Usual Body Weight:       · Ideal Body Weight: 125 lbs; % Ideal Body Weight 150.4 %   · BMI: 31.3  · Adjusted Body Weight:  ; No Adjustment   · BMI Categories: Obese Class 1 (BMI 30.0-34. 9)       Nutrition Diagnosis:   · Inadequate energy intake related to impaired respiratory function(admitted with pneumothorax; chest tube inserted) as evidenced by nutrition support - enteral nutrition(not yet initiated)      Nutrition Interventions:   Food and/or Nutrient Delivery:  Start Tube Feeding  Nutrition Education/Counseling:  Education not indicated   Coordination of Nutrition Care:  Continue to monitor while inpatient    Goals:  TF tolerance @ goal rate       Nutrition Monitoring and Evaluation:   Behavioral-Environmental Outcomes:  None Identified   Food/Nutrient Intake Outcomes:  Enteral Nutrition Intake/Tolerance  Physical Signs/Symptoms Outcomes:  Biochemical Data, Weight, GI Status     Discharge Planning:    Enteral Nutrition     Electronically signed by Addi Iyer RD, SHEEBA on 4/29/21 at 12:40 PM EDT    Contact: [phone]

## 2021-04-29 NOTE — PROGRESS NOTES
Hospitalist Progress Note      PCP: Suze Godoy MD    Date of Admission: 4/28/2021    Chief Complaint:   Hypoxia      Hospital Course:       79 y.o. female with history of diabetes type 2, asthma, CHF, atrial fibrillation, pulmonary hypertension, hyperlipidemia, obstructive sleep apnea, breast cancer status post lumpectomy and radiation treatment, obesity with BMI of 39.9 kg/m², who was recently [facility] on February 22, 2022 after about 3 weeks of hospital stay following admission with acute CVA, aspiration pneumothorax, currently status post tracheostomy and PEG placement, who presents from SNF in acute respiratory distress, hypoxia. She was also bradycardic on hypotensive. In the emergency room, chest x-ray is concerning for tension pneumothorax and possible developing ARDS.       Subjective:     S/p Left-side chest tube  Trach and vent dependent  Chest tube to suction with no leak  Repeated chest x-ray some improvement in left upper hemothorax  She remain on FiO2 30%    Medications:  Reviewed    Infusion Medications    propofol Stopped (04/29/21 1233)    sodium chloride      dextrose       Scheduled Medications    warfarin (COUMADIN) daily dosing (placeholder)   Other RX Placeholder    furosemide  40 mg Per G Tube Daily    sodium chloride flush  5-40 mL Intravenous 2 times per day    ipratropium-albuterol  1 ampule Inhalation 4x daily    insulin lispro  0-6 Units Subcutaneous Q4H    traZODone  50 mg Per G Tube Nightly    predniSONE  10 mg Per G Tube Daily    melatonin  3 mg Per G Tube Nightly    gabapentin  100 mg Per G Tube TID    [START ON 4/30/2021] ferrous sulfate  300 mg Per G Tube Every Other Day    famotidine  20 mg Per G Tube Daily    atorvastatin  80 mg Per G Tube Nightly    insulin lispro  0-6 Units Subcutaneous Once    Arformoterol Tartrate  15 mcg Nebulization BID    budesonide  500 mcg Nebulization BID     PRN Meds: albuterol sulfate HFA, sodium chloride flush, sodium chloride, promethazine **OR** ondansetron, polyethylene glycol, acetaminophen **OR** acetaminophen, glucose, dextrose, glucagon (rDNA), dextrose, loperamide      Intake/Output Summary (Last 24 hours) at 4/29/2021 1509  Last data filed at 4/29/2021 0600  Gross per 24 hour   Intake --   Output 60 ml   Net -60 ml       Physical Exam Performed:    /64   Pulse 70   Temp 97.5 °F (36.4 °C) (Temporal)   Resp 24   Ht 5' 5\" (1.651 m)   Wt 188 lb 11.4 oz (85.6 kg)   SpO2 99%   BMI 31.40 kg/m²     General appearance: No apparent distress, appears stated age and cooperative. HEENT: Pupils equal, round, and reactive to light. Conjunctivae/corneas clear. Neck: Supple, with full range of motion. No jugular venous distention. Trachea midline. Respiratory:  Normal respiratory effort. Clear to auscultation, bilaterally without Rales/Wheezes/Rhonchi. Decreased respiratory sounds on left hemithorax. Status post left chest tube placement. Cardiovascular: Regular rate and rhythm with normal S1/S2 without murmurs, rubs or gallops. Abdomen: Soft, non-tender, non-distended with normal bowel sounds. Musculoskeletal: No clubbing, cyanosis or edema bilaterally. Full range of motion without deformity. Skin: Skin color, texture, turgor normal.  No rashes or lesions.   Neurologic: Nonverbal  Psychiatric: Alert   Capillary Refill: Brisk,3 seconds, normal   Peripheral Pulses: +2 palpable, equal bilaterally       Labs:   Recent Labs     04/28/21  1415 04/29/21 0439   WBC 13.5* 11.4*   HGB 11.4* 10.4*   HCT 37.5 33.5*    185     Recent Labs     04/28/21 1415 04/29/21 0439   * 137   K 5.1 3.7   CL 98* 98*   CO2 27 29   BUN 22* 28*   CREATININE <0.5* <0.5*   CALCIUM 9.4 9.7   PHOS  --  3.6     Recent Labs     04/29/21 0439   AST 26   ALT 51*   BILITOT 0.6   ALKPHOS 180*     Recent Labs     04/28/21  1415 04/29/21 0439   INR 1.49* 1.76*     Recent Labs     04/28/21  1415 04/28/21  1950 04/28/21  2322   TROPONINI 0.04* 0.05* 0.04*       Urinalysis:      Lab Results   Component Value Date    NITRU Negative 02/11/2021    WBCUA None seen 02/11/2021    BACTERIA Rare 02/11/2021    RBCUA None seen 02/11/2021    BLOODU Negative 02/11/2021    SPECGRAV 1.025 02/11/2021    GLUCOSEU Negative 02/11/2021       Radiology:  XR CHEST PORTABLE   Final Result   Some improvement demonstrating some aerated lung in the left upper hemithorax         XR CHEST PORTABLE   Final Result   Unchanged chest demonstrating total opacification of the left hemithorax         XR CHEST PORTABLE   Final Result   No recurrent pneumothorax, but complete opacification of the left hemithorax   has occurred. XR CHEST PORTABLE   Final Result   Minimal if any residual pneumothorax, with re-expansion of the left lung   after chest tube placement. Findings suggest developing pulmonary edema. Continued close follow-up with short-term follow-up chest x-ray recommended   for the above. XR CHEST PORTABLE   Final Result   Large left pneumothorax with mediastinal shift towards the right indicating   underlying tension. By history, this may be due to a ruptured bleb in this   intubated patient. There is also diffuse mild airspace opacification in the   right lung that may represent asymmetric edema or developing ARDS. RECOMMENDATION:   Critical results were called by Dr. Lisa Amezquita. Jacoby Weldon MD to [facility]   on 4/28/2021 at 13:08.          XR CHEST PORTABLE    (Results Pending)           Assessment/Plan:    Active Hospital Problems    Diagnosis    Tension pneumothorax [J93.0]     Left spontaneous pneumothorax status post chest tube placement  Acute on chronic hypoxic respiratory failure  Trach and PEG dependent  History of A. fib on Coumadin now switched to Lovenox  Status post bronchoscopy with mucous plugging    Continue ICU care  Pulmonary board  Status post bronchoscopy, shows thick purulent mucus block to left upper and lower lobes    Status post chest tube for left spontaneous pneumothorax  Initially was to wall suction now to waterseal  Pulmonary managing chest tube    Repeat chest x-ray in a.m. Non-ACS trend troponinemia, estimated EF is 03%, with diastolic dysfunction. Elevated pulmonary artery systolic pressure. Other comorbidities:  History of diabetes type 2, asthma, CHF, atrial fibrillation on warfarin, pulmonary hypertension, hyperlipidemia, obstructive sleep apnea, breast cancer status post lumpectomy and radiation, obesity. DVT Prophylaxis: Lovenox full dose  Diet: DIET TUBE FEED CONTINUOUS/CYCLIC NPO; Diabetic; Gastrostomy; Continuous  Diet NPO, After Midnight  Code Status: Full Code    PT/OT Eval Status: When appropriate    Dispo -continue ICU care    Due to the immediate potential for life-threatening deterioration due to acute on chronic hypoxic respiratory failure, left  pneumothorax, I spent 34 minutes providing critical care. This time is excluding time spent performing procedures.       Iman Moncada MD

## 2021-04-29 NOTE — PROGRESS NOTES
04/29/21 0823   Vent Information   $Ventilation $Subsequent Day   Skin Assessment Clean, dry, & intact   Equipment ID [id number]   Vent Type 980   Vent Mode AC/PC   Pressure Ordered 20   Rate Set 16 bmp   FiO2  30 %   SpO2 97 %   SpO2/FiO2 ratio 323.33   Sensitivity 3   PEEP/CPAP 5   I Time/ I Time % 0.8 s   Humidification Source HME   Vent Patient Data   High Peep/I Pressure 20   Peak Inspiratory Pressure 27 cmH2O   Mean Airway Pressure 9.6 cmH20   Rate Measured 16 br/min   Vt Exhaled 375 mL   Minute Volume 6.33 Liters   I:E Ratio 1:2.4   Plateau Pressure 20 YTA97   Static Compliance 25 mL/cmH2O   Dynamic Compliance 25 mL/cmH2O   Total PEEP 5 cmH20   Auto PEEP 0 cmH20   Cough/Sputum   Sputum How Obtained Tracheal;Suctioned   Sputum Amount Small   Sputum Color Red;Creamy   Tenacity Thick   Spontaneous Breathing Trial (SBT) RT Doc   Pulse 72   Breath Sounds   Right Upper Lobe Diminished   Right Middle Lobe Diminished   Right Lower Lobe Diminished   Left Upper Lobe Diminished   Left Lower Lobe Diminished   Additional Respiratory  Assessments   Resp 16   End Tidal CO2 27   Position Semi-Molina's   Oral Care Completed? No   Oral Care Mouth swabbed   Subglottic Suction Done?  No   Alarm Settings   High Pressure Alarm 45 cmH2O   Low Minute Volume Alarm 2 L/min   Apnea (secs) 20 secs   High Respiratory Rate 30 br/min   Low Exhaled Vt  200 mL   Patient Observation   Observations Day 2 size 6 franley

## 2021-04-29 NOTE — PROGRESS NOTES
Clinical Pharmacy Note: Pharmacy to Dose Warfarin    Pharmacy consulted by Dr. Vanna Varghese to dose warfarin. Shashank Blanc is a 79 y.o. female  is receiving warfarin for indication: Afib    INR Goal Range: 2.0 - 3.0   Prior to admission warfarin dosing regimen: 4 mg daily  INR today:   Lab Results   Component Value Date    INR 1.49 04/28/2021       Assessment/Plan:  INR is subtherapeutic on prior to admission dosing regimen. Based on today's assessment, dose warfarin 6 mg since patient is subtherapeutic. Daily INR is ordered. Pharmacy will continue to monitor and make adjustments to regimen as necessary.      Thank you for the consult,     Dana Lilly, PharmD  PGY-1 Pharmacy Resident  [id number]

## 2021-04-29 NOTE — PROGRESS NOTES
Clinical Pharmacy Note     Lovenox placed on hold by Dr. Rishabh Washington. Order discontinued per protocol. Please assess and reorder when appropriate. Thank you for allowing us to participate in the care of this patient.      Kenia Gay, PharmD  Clinical Pharmacist [id number]  4/29/2021

## 2021-04-30 ENCOUNTER — APPOINTMENT (OUTPATIENT)
Dept: GENERAL RADIOLOGY | Age: 81
DRG: 207 | End: 2021-04-30
Payer: COMMERCIAL

## 2021-04-30 LAB
A/G RATIO: 0.8 (ref 1.1–2.2)
ABO/RH: NORMAL
ALBUMIN SERPL-MCNC: 2.9 G/DL (ref 3.4–5)
ALP BLD-CCNC: 173 U/L (ref 40–129)
ALT SERPL-CCNC: 46 U/L (ref 10–40)
ANION GAP SERPL CALCULATED.3IONS-SCNC: 12 MMOL/L (ref 3–16)
ANTIBODY SCREEN: NORMAL
AST SERPL-CCNC: 22 U/L (ref 15–37)
BASOPHILS ABSOLUTE: 0 K/UL (ref 0–0.2)
BASOPHILS RELATIVE PERCENT: 0.4 %
BILIRUB SERPL-MCNC: 1 MG/DL (ref 0–1)
BUN BLDV-MCNC: 28 MG/DL (ref 7–20)
CALCIUM SERPL-MCNC: 9.3 MG/DL (ref 8.3–10.6)
CHLORIDE BLD-SCNC: 100 MMOL/L (ref 99–110)
CO2: 27 MMOL/L (ref 21–32)
CREAT SERPL-MCNC: <0.5 MG/DL (ref 0.6–1.2)
EOSINOPHILS ABSOLUTE: 0 K/UL (ref 0–0.6)
EOSINOPHILS RELATIVE PERCENT: 0.3 %
GFR AFRICAN AMERICAN: >60
GFR NON-AFRICAN AMERICAN: >60
GLOBULIN: 3.6 G/DL
GLUCOSE BLD-MCNC: 122 MG/DL (ref 70–99)
GLUCOSE BLD-MCNC: 123 MG/DL (ref 70–99)
GLUCOSE BLD-MCNC: 127 MG/DL (ref 70–99)
GLUCOSE BLD-MCNC: 135 MG/DL (ref 70–99)
GLUCOSE BLD-MCNC: 137 MG/DL (ref 70–99)
GLUCOSE BLD-MCNC: 149 MG/DL (ref 70–99)
GLUCOSE BLD-MCNC: 155 MG/DL (ref 70–99)
HCT VFR BLD CALC: 30.9 % (ref 36–48)
HEMOGLOBIN: 9.7 G/DL (ref 12–16)
INR BLD: 1.94 (ref 0.86–1.14)
INR BLD: 2.61 (ref 0.86–1.14)
LYMPHOCYTES ABSOLUTE: 1.1 K/UL (ref 1–5.1)
LYMPHOCYTES RELATIVE PERCENT: 9.6 %
MAGNESIUM: 1.9 MG/DL (ref 1.8–2.4)
MCH RBC QN AUTO: 25.5 PG (ref 26–34)
MCHC RBC AUTO-ENTMCNC: 31.3 G/DL (ref 31–36)
MCV RBC AUTO: 81.6 FL (ref 80–100)
MONOCYTES ABSOLUTE: 0.8 K/UL (ref 0–1.3)
MONOCYTES RELATIVE PERCENT: 7.4 %
NEUTROPHILS ABSOLUTE: 9.3 K/UL (ref 1.7–7.7)
NEUTROPHILS RELATIVE PERCENT: 82.3 %
PDW BLD-RTO: 16.9 % (ref 12.4–15.4)
PERFORMED ON: ABNORMAL
PHOSPHORUS: 3 MG/DL (ref 2.5–4.9)
PLATELET # BLD: 183 K/UL (ref 135–450)
PMV BLD AUTO: 10.9 FL (ref 5–10.5)
POTASSIUM SERPL-SCNC: 3.3 MMOL/L (ref 3.5–5.1)
PROTHROMBIN TIME: 22.6 SEC (ref 10–13.2)
PROTHROMBIN TIME: 30.6 SEC (ref 10–13.2)
RBC # BLD: 3.79 M/UL (ref 4–5.2)
SODIUM BLD-SCNC: 139 MMOL/L (ref 136–145)
TOTAL PROTEIN: 6.5 G/DL (ref 6.4–8.2)
WBC # BLD: 11.3 K/UL (ref 4–11)

## 2021-04-30 PROCEDURE — 85025 COMPLETE CBC W/AUTO DIFF WBC: CPT

## 2021-04-30 PROCEDURE — 36430 TRANSFUSION BLD/BLD COMPNT: CPT

## 2021-04-30 PROCEDURE — 99291 CRITICAL CARE FIRST HOUR: CPT | Performed by: INTERNAL MEDICINE

## 2021-04-30 PROCEDURE — 6370000000 HC RX 637 (ALT 250 FOR IP): Performed by: STUDENT IN AN ORGANIZED HEALTH CARE EDUCATION/TRAINING PROGRAM

## 2021-04-30 PROCEDURE — 6360000002 HC RX W HCPCS: Performed by: INTERNAL MEDICINE

## 2021-04-30 PROCEDURE — 6370000000 HC RX 637 (ALT 250 FOR IP): Performed by: INTERNAL MEDICINE

## 2021-04-30 PROCEDURE — 86900 BLOOD TYPING SEROLOGIC ABO: CPT

## 2021-04-30 PROCEDURE — 94640 AIRWAY INHALATION TREATMENT: CPT

## 2021-04-30 PROCEDURE — 94003 VENT MGMT INPAT SUBQ DAY: CPT

## 2021-04-30 PROCEDURE — 94761 N-INVAS EAR/PLS OXIMETRY MLT: CPT

## 2021-04-30 PROCEDURE — 85610 PROTHROMBIN TIME: CPT

## 2021-04-30 PROCEDURE — 86901 BLOOD TYPING SEROLOGIC RH(D): CPT

## 2021-04-30 PROCEDURE — 84100 ASSAY OF PHOSPHORUS: CPT

## 2021-04-30 PROCEDURE — 2580000003 HC RX 258: Performed by: INTERNAL MEDICINE

## 2021-04-30 PROCEDURE — 3609027000 HC BRONCHOSCOPY: Performed by: INTERNAL MEDICINE

## 2021-04-30 PROCEDURE — 71045 X-RAY EXAM CHEST 1 VIEW: CPT

## 2021-04-30 PROCEDURE — 31622 DX BRONCHOSCOPE/WASH: CPT | Performed by: INTERNAL MEDICINE

## 2021-04-30 PROCEDURE — 36415 COLL VENOUS BLD VENIPUNCTURE: CPT

## 2021-04-30 PROCEDURE — 2000000000 HC ICU R&B

## 2021-04-30 PROCEDURE — P9017 PLASMA 1 DONOR FRZ W/IN 8 HR: HCPCS

## 2021-04-30 PROCEDURE — 2709999900 HC NON-CHARGEABLE SUPPLY: Performed by: INTERNAL MEDICINE

## 2021-04-30 PROCEDURE — 80053 COMPREHEN METABOLIC PANEL: CPT

## 2021-04-30 PROCEDURE — 86850 RBC ANTIBODY SCREEN: CPT

## 2021-04-30 PROCEDURE — 83735 ASSAY OF MAGNESIUM: CPT

## 2021-04-30 PROCEDURE — 2700000000 HC OXYGEN THERAPY PER DAY

## 2021-04-30 RX ORDER — POTASSIUM CHLORIDE 7.45 MG/ML
10 INJECTION INTRAVENOUS PRN
Status: DISCONTINUED | OUTPATIENT
Start: 2021-04-30 | End: 2021-05-01

## 2021-04-30 RX ORDER — LORAZEPAM 2 MG/ML
0.5 INJECTION INTRAMUSCULAR ONCE
Status: COMPLETED | OUTPATIENT
Start: 2021-04-30 | End: 2021-04-30

## 2021-04-30 RX ORDER — SODIUM CHLORIDE 9 MG/ML
INJECTION, SOLUTION INTRAVENOUS PRN
Status: DISCONTINUED | OUTPATIENT
Start: 2021-04-30 | End: 2021-05-03 | Stop reason: HOSPADM

## 2021-04-30 RX ORDER — POTASSIUM CHLORIDE 20 MEQ/1
40 TABLET, EXTENDED RELEASE ORAL PRN
Status: DISCONTINUED | OUTPATIENT
Start: 2021-04-30 | End: 2021-04-30

## 2021-04-30 RX ORDER — LEVOFLOXACIN 5 MG/ML
750 INJECTION, SOLUTION INTRAVENOUS EVERY 24 HOURS
Status: DISCONTINUED | OUTPATIENT
Start: 2021-04-30 | End: 2021-05-02

## 2021-04-30 RX ORDER — PHYTONADIONE 10 MG/ML
5 INJECTION, EMULSION INTRAMUSCULAR; INTRAVENOUS; SUBCUTANEOUS ONCE
Status: COMPLETED | OUTPATIENT
Start: 2021-04-30 | End: 2021-04-30

## 2021-04-30 RX ORDER — LIDOCAINE HYDROCHLORIDE 40 MG/ML
SOLUTION TOPICAL PRN
Status: DISCONTINUED | OUTPATIENT
Start: 2021-04-30 | End: 2021-04-30 | Stop reason: ALTCHOICE

## 2021-04-30 RX ADMIN — POTASSIUM BICARBONATE 20 MEQ: 782 TABLET, EFFERVESCENT ORAL at 14:40

## 2021-04-30 RX ADMIN — FUROSEMIDE 40 MG: 40 TABLET ORAL at 08:29

## 2021-04-30 RX ADMIN — TRAZODONE HYDROCHLORIDE 50 MG: 50 TABLET ORAL at 20:01

## 2021-04-30 RX ADMIN — Medication 10 ML: at 20:00

## 2021-04-30 RX ADMIN — PHYTONADIONE 5 MG: 10 INJECTION, EMULSION INTRAMUSCULAR; INTRAVENOUS; SUBCUTANEOUS at 16:57

## 2021-04-30 RX ADMIN — BUDESONIDE 500 MCG: 0.5 SUSPENSION RESPIRATORY (INHALATION) at 08:07

## 2021-04-30 RX ADMIN — MINERAL SUPPLEMENT IRON 300 MG / 5 ML STRENGTH LIQUID 100 PER BOX UNFLAVORED 300 MG: at 08:29

## 2021-04-30 RX ADMIN — IPRATROPIUM BROMIDE AND ALBUTEROL SULFATE 1 AMPULE: .5; 3 SOLUTION RESPIRATORY (INHALATION) at 16:44

## 2021-04-30 RX ADMIN — ARFORMOTEROL TARTRATE 15 MCG: 15 SOLUTION RESPIRATORY (INHALATION) at 20:07

## 2021-04-30 RX ADMIN — PREDNISONE 10 MG: 10 TABLET ORAL at 08:29

## 2021-04-30 RX ADMIN — GABAPENTIN 100 MG: 100 CAPSULE ORAL at 14:40

## 2021-04-30 RX ADMIN — MELATONIN TAB 3 MG 3 MG: 3 TAB at 20:01

## 2021-04-30 RX ADMIN — GABAPENTIN 100 MG: 100 CAPSULE ORAL at 20:01

## 2021-04-30 RX ADMIN — Medication 10 ML: at 08:29

## 2021-04-30 RX ADMIN — FAMOTIDINE 20 MG: 20 TABLET ORAL at 08:29

## 2021-04-30 RX ADMIN — GABAPENTIN 100 MG: 100 CAPSULE ORAL at 08:29

## 2021-04-30 RX ADMIN — ATORVASTATIN CALCIUM 80 MG: 80 TABLET, FILM COATED ORAL at 20:01

## 2021-04-30 RX ADMIN — IPRATROPIUM BROMIDE AND ALBUTEROL SULFATE 1 AMPULE: .5; 3 SOLUTION RESPIRATORY (INHALATION) at 20:07

## 2021-04-30 RX ADMIN — BUDESONIDE 500 MCG: 0.5 SUSPENSION RESPIRATORY (INHALATION) at 20:07

## 2021-04-30 RX ADMIN — IPRATROPIUM BROMIDE AND ALBUTEROL SULFATE 1 AMPULE: .5; 3 SOLUTION RESPIRATORY (INHALATION) at 12:19

## 2021-04-30 RX ADMIN — LEVOFLOXACIN 750 MG: 5 INJECTION, SOLUTION INTRAVENOUS at 19:37

## 2021-04-30 RX ADMIN — POTASSIUM BICARBONATE 20 MEQ: 782 TABLET, EFFERVESCENT ORAL at 16:58

## 2021-04-30 RX ADMIN — IPRATROPIUM BROMIDE AND ALBUTEROL SULFATE 1 AMPULE: .5; 3 SOLUTION RESPIRATORY (INHALATION) at 08:07

## 2021-04-30 RX ADMIN — VANCOMYCIN HYDROCHLORIDE 1250 MG: 10 INJECTION, POWDER, LYOPHILIZED, FOR SOLUTION INTRAVENOUS at 21:34

## 2021-04-30 RX ADMIN — ARFORMOTEROL TARTRATE 15 MCG: 15 SOLUTION RESPIRATORY (INHALATION) at 08:07

## 2021-04-30 RX ADMIN — LORAZEPAM 0.5 MG: 2 INJECTION INTRAMUSCULAR; INTRAVENOUS at 18:23

## 2021-04-30 RX ADMIN — SODIUM CHLORIDE 25 ML: 9 INJECTION, SOLUTION INTRAVENOUS at 19:36

## 2021-04-30 ASSESSMENT — PULMONARY FUNCTION TESTS
PIF_VALUE: 26

## 2021-04-30 NOTE — PROGRESS NOTES
[facility] Pulmonary/CCM Progress note      Admit Date: 4/28/2021    Chief Complaint: Shortness of breath and respiratory distress    Subjective: Interval History: Continues to have left lower lobe lung collapse post bronchoscopy yesterday. Has bright red-tinged secretion upon respiratory suction. Awake and alert. Hemodynamically stable. Repeat bronchoscopy was performed today. Scheduled Meds:   potassium bicarb-citric acid  20 mEq Oral BID WC    warfarin (COUMADIN) daily dosing (placeholder)   Other RX Placeholder    furosemide  40 mg Per G Tube Daily    sodium chloride flush  5-40 mL Intravenous 2 times per day    ipratropium-albuterol  1 ampule Inhalation 4x daily    insulin lispro  0-6 Units Subcutaneous Q4H    traZODone  50 mg Per G Tube Nightly    predniSONE  10 mg Per G Tube Daily    melatonin  3 mg Per G Tube Nightly    gabapentin  100 mg Per G Tube TID    ferrous sulfate  300 mg Per G Tube Every Other Day    famotidine  20 mg Per G Tube Daily    atorvastatin  80 mg Per G Tube Nightly    insulin lispro  0-6 Units Subcutaneous Once    Arformoterol Tartrate  15 mcg Nebulization BID    budesonide  500 mcg Nebulization BID     Continuous Infusions:   sodium chloride      propofol Stopped (04/29/21 1233)    sodium chloride      dextrose       PRN Meds:[DISCONTINUED] potassium chloride **OR** [DISCONTINUED] potassium alternative oral replacement **OR** potassium chloride, sodium chloride, albuterol sulfate HFA, sodium chloride flush, sodium chloride, promethazine **OR** ondansetron, polyethylene glycol, acetaminophen **OR** acetaminophen, glucose, dextrose, glucagon (rDNA), dextrose, loperamide    Review of Systems  Unable to obtain since the patient is nonverbal and tracheostomy dependent.     Objective:     Patient Vitals for the past 8 hrs:   BP Temp Temp src Pulse Resp SpO2   04/30/21 1349 127/60 -- -- 87 17 100 %   04/30/21 1346 109/64 -- -- 84 20 100 %   04/30/21 1343 (!) 121/95 -- -- 88 15 100 %   04/30/21 1341 -- -- -- 80 23 100 %   04/30/21 1340 (!) 122/94 -- -- 83 15 100 %   04/30/21 1337 128/68 -- -- 92 17 100 %   04/30/21 1335 (!) 149/65 -- -- 80 24 100 %   04/30/21 1334 -- -- -- 71 14 100 %   04/30/21 1333 (!) 192/131 -- -- -- 20 100 %   04/30/21 1330 (!) 152/103 -- -- 92 19 --   04/30/21 1220 -- -- -- -- 20 --   04/30/21 1143 -- -- -- -- 16 95 %   04/30/21 0818 -- -- -- -- 16 94 %   04/30/21 0807 -- -- -- -- 16 96 %   04/30/21 0800 (!) 154/94 98.2 °F (36.8 °C) Temporal 72 20 98 %     I/O last 3 completed shifts:  In: -   Out: 60 [Chest Tube:60]  No intake/output data recorded.     General Appearance: Nonverbal, in no acute distress  Skin: warm and dry, no rash or erythema  Head: normocephalic and atraumatic  Eyes: pupils equal, round, and reactive to light, extraocular eye movements intact, conjunctivae normal  ENT: external ear and ear canal normal bilaterally, nose without deformity, nasal mucosa and turbinates normal  Neck: supple and non-tender without mass, no cervical lymphadenopathy  Pulmonary/Chest: decreased breath sounds noted-left side  Cardiovascular: normal rate, regular rhythm,  no murmurs, rubs, distal pulses intact, no carotid bruits  Abdomen: soft, non-tender, non-distended, normal bowel sounds, no masses or organomegaly  Lymph Nodes: Cervical, supraclavicular normal  Extremities: no cyanosis, clubbing or edema  Musculoskeletal: normal range of motion, no joint swelling, deformity or tenderness  Neurologic: Alert, nonverbal, dense left hemiparesis    Data Review:  CBC:   Lab Results   Component Value Date    WBC 11.3 04/30/2021    RBC 3.79 04/30/2021     BMP:   Lab Results   Component Value Date    GLUCOSE 127 04/30/2021    CO2 27 04/30/2021    BUN 28 04/30/2021    CREATININE <0.5 04/30/2021    CALCIUM 9.3 04/30/2021     ABG:   Lab Results   Component Value Date    XKN3WUY 31.6 04/28/2021    BEART 8.4 04/28/2021    I1GZGZAH 100.0 04/28/2021    PHART 7.533 04/28/2021 GCY5NFI 37.5 04/28/2021    PO2ART 157.0 04/28/2021    PSL5ZBG 73.3 04/28/2021       Radiology: All pertinent images / reports were reviewed as a part of this visit. Narrative   EXAMINATION:   ONE XRAY VIEW OF THE CHEST       4/29/2021 1:08 pm       COMPARISON:   04/29/2021 at 10:55 a.m.       HISTORY:   ORDERING SYSTEM PROVIDED HISTORY: Post bronch   TECHNOLOGIST PROVIDED HISTORY:   Reason for exam:->Post bronch   Reason for Exam: Post bronch   Acuity: Unknown   Type of Exam: Unknown       FINDINGS:   Aerated lung now visualized in the left upper hemithorax.  Tracheostomy tube   remains in place.  The right lung and heart are unchanged.  Soft tissue gas   again seen in the left chest wall.           Impression   Some improvement demonstrating some aerated lung in the left upper hemithorax       Problem List:     Spontaneous tension pneumothorax status post chest tube placement  Mucous plugging of airway  Hemoptysis  Assessment/Plan:     Spontaneous tension pneumothorax status post chest tube placement-currently on waterseal with no residual pneumothorax. However patient noted to have moderate fresh blood/hemoptysis upon respiratory suction which is new since yesterday. Repeat bronchoscopy was performed today which shows significant bleeding particularly of the left lung , possibly from lower lobe. Washed with cold saline. Some oozing only with no brisk bleeding. Patient noted to have staph aureus and Serratia growing from respiratory cultures sent yesterday. Continue with Levaquin and add vancomycin, until sensitivities are available. Hemoptysis could be related to VAP/mucous plugging + coagulopathy. Continue with full ventilator support. Alert and oriented. Hemodynamically stable. Hold anticoagulation-administer vitamin K 5 mg SQ along with 2 units of FFP IV. Repeat chest x-ray in a.m. and consider removing chest tube tomorrow if x-ray remains stable with no recurrent pneumothorax.     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 Patient also has history of chronic atrial fibrillation-rate controlled. Would hold anticoagulation due to risk of respiratory bleed. Patient will be at a risk for CVA given her history of atrial fibrillation. If recurrent or continued bleeding noted, then we should consider CT angio/embolization. Critical care team will follow. Discussed with patient's daughter over the phone about the findings on bronchoscopy and the plan of management.     Gilberto Driver MD     Critical care time of 35 minutes excluding procedures

## 2021-04-30 NOTE — CONSULTS
Palliative Care:   a pleasant 79 y.o. female with history of diabetes type 2, asthma, CHF, atrial fibrillation, pulmonary hypertension, hyperlipidemia, obstructive sleep apnea, breast cancer status post lumpectomy and radiation treatment, obesity with BMI of 39.9 kg/m², who was recently [facility] on February 22, 2022 after about 3 weeks of hospital stay following admission with acute CVA, aspiration pneumothorax, currently status post tracheostomy and PEG placement, who presents from SNF in acute respiratory distress, hypoxia. She was also bradycardic on hypotensive. In the emergency room, chest x-ray is concerning for tension pneumothorax and possible developing ARDS. Troponin is 0.04. Decompression was done in the emergency room and patient currently has chest tube in place. Critical care has been consulted from the emergency room. Patient admitted 4/28/21 and Palliative consult placed.         Past Medical History:   has a past medical history of Acute GI bleeding, Atrial fibrillation ([facility] [address] Cerebral artery occlusion with cerebral infarction [facility]), Chronic respiratory failure ([facility] [address] COPD (chronic obstructive pulmonary disease) ([facility] [address] Hyperlipidemia, Hypertension, Systolic CHF ([facility] [address] Tracheostomy in place [facility]), and Type 2 diabetes mellitus ([facility] [address] Past Surgical History:   has a past surgical history that includes Upper gastrointestinal endoscopy (N/A, 1/29/2021); Upper gastrointestinal endoscopy (01/29/2021); IR GUIDED IVC FILTER PLACEMENT (2/4/2021); and Gastrostomy tube placement (N/A, 2/9/2021). Advance Directives:   Full Code. No ACP in place. Problem Severity: Pain/Other Symptoms: On ventilator with this assessment. Buttock wound on left and rectum inner. Wound care TEAM addressing.         Bed Mobility/Toileting/Transfer:    Full assist/bedbound      Performance Status:        Palliative Performance Scale:  100% []Full Normal activity & work No evidence of disease  90%   [] Full Normal activity & work Some evidence of disease  80%   [] Full Normal activity with Effort Some evidence of disease  70%   [] Reduced Unable Normal Job/Work Significant disease Full Normal or reduced  60%   [] Ambulation reduced; Significant disease; Can't do hobbies/housework; intake normal   or reduced; occasional assist; LOC full/confusion  50%   [] Mainly sit/lie; Extensive disease; Can't do any work; Considerable assist; intake normal  Or reduced; LOC full/confusion  40%   [] Mainly in bed; Extensive disease; Mainly assist; intake normal or reduced; occasional assist; LOC full/confusion  30%   [x] Bed Bound; Extensive disease; Total care; intake reduced; LOC full/confusion  20%   [] Bed Bound; Extensive disease; Total care; intake minimal; Drowsy/coma  10%   [] Bed Bound; Extensive disease; Total care; Mouth care only; Drowsy/coma    PPS 30%  Symptom Assessment: Appetite/Nausea/Bowels/Fatigue:     lbs. Albumin   2.9    Social History:   reports that she has quit smoking. She has never used smokeless tobacco. She reports previous alcohol use. She reports that she does not use drugs. Family History:  family history is not on file. Psychological/Spiritual:  Patient [facility] at [facility]         Family Discussion:    Call to daughter Neil Ovalle. Patient resting quietly at this time. No distress noted. Neil Ovalle was able to share patient's recent history since February and decline that occurred. Discussed ACP/Code status. Patient wishes are to remain full code. No ACP in place. Two daughters. ACP was pending prior to ventilator as they were needing two witness that could complete the process. Aware that both sisters are as acting DPOA at this time. Discussed Cheondoism. Patient is devote Voodoo. Agreeable to  spiritual support. DC POC is to return to [facility] as patient is trach/vent dependent at this time. Patient is being moved from [facility] to ICU today.  Daughter notified and excited that she can come see her mother later today. Will continue to support as needed.

## 2021-04-30 NOTE — PROGRESS NOTES
[address]  Patient has size 8 Shiley, [place]ex, XLT or other. Trach Kit of same size on standby  Yes, Obturator at head of the bed  Yes. Inner cannula cleaned/replaced Yes, drain sponge changed  Yes, Trach tie replaced is soiled No, Flange cleaned  Yes. [address] performed without complications. Comment pt naya well.

## 2021-04-30 NOTE — PROGRESS NOTES
04/29/21 2341   Vent Patient Data   Plateau Pressure 21 SEO26   Static Compliance 33 mL/cmH2O   Dynamic Compliance 25 mL/cmH2O

## 2021-04-30 NOTE — PROGRESS NOTES
Shift assessment complete. Pt is alert, orientation difficult to assess because of trach and difficulty speaking. Pt does not appear to be in any distress. Oxygen saturation greater than 90%. Pt with bright red secretions with suctioning. Pulmonology aware. Family updated and plan for bronchoscopy later this shift, consent signed and placed on chart.

## 2021-04-30 NOTE — PROGRESS NOTES
BID     PRN Meds: [DISCONTINUED] potassium chloride **OR** [DISCONTINUED] potassium alternative oral replacement **OR** potassium chloride, albuterol sulfate HFA, sodium chloride flush, sodium chloride, promethazine **OR** ondansetron, polyethylene glycol, acetaminophen **OR** acetaminophen, glucose, dextrose, glucagon (rDNA), dextrose, loperamide      Intake/Output Summary (Last 24 hours) at 4/30/2021 1402  Last data filed at 4/30/2021 0500  Gross per 24 hour   Intake --   Output 60 ml   Net -60 ml       Physical Exam Performed:    /60   Pulse 87   Temp 98.2 °F (36.8 °C) (Temporal)   Resp 17   Ht 5' 5\" (1.651 m)   Wt 186 lb 11.7 oz (84.7 kg)   SpO2 100%   BMI 31.07 kg/m²     General appearance: No apparent distress, appears stated age and cooperative. HEENT: Pupils equal, round, and reactive to light. Conjunctivae/corneas clear. Neck: Supple, with full range of motion. No jugular venous distention. Trachea midline. Respiratory:  Normal respiratory effort. Clear to auscultation, bilaterally without Rales/Wheezes/Rhonchi. Decreased respiratory sounds on left hemithorax. Status post left chest tube placement. Cardiovascular: Regular rate and rhythm with normal S1/S2 without murmurs, rubs or gallops. Abdomen: Soft, non-tender, non-distended with normal bowel sounds. Musculoskeletal: No clubbing, cyanosis or edema bilaterally. Full range of motion without deformity. Skin: Skin color, texture, turgor normal.  No rashes or lesions.   Neurologic: Nonverbal  Psychiatric: Alert   Capillary Refill: Brisk,3 seconds, normal   Peripheral Pulses: +2 palpable, equal bilaterally       Labs:   Recent Labs     04/28/21  1415 04/29/21  0439 04/30/21  0512   WBC 13.5* 11.4* 11.3*   HGB 11.4* 10.4* 9.7*   HCT 37.5 33.5* 30.9*    185 183     Recent Labs     04/28/21  1415 04/29/21  0439 04/30/21  0512   * 137 139   K 5.1 3.7 3.3*   CL 98* 98* 100   CO2 27 29 27   BUN 22* 28* 28*   CREATININE <0.5* <0.5* <0.5*   CALCIUM 9.4 9.7 9.3   PHOS  --  3.6 3.0     Recent Labs     04/29/21  0439 04/30/21  0512   AST 26 22   ALT 51* 46*   BILITOT 0.6 1.0   ALKPHOS 180* 173*     Recent Labs     04/28/21  1415 04/29/21  0439 04/30/21  0512   INR 1.49* 1.76* 2.61*     Recent Labs     04/28/21  1415 04/28/21  1950 04/28/21  2322   TROPONINI 0.04* 0.05* 0.04*       Urinalysis:      Lab Results   Component Value Date    NITRU Negative 02/11/2021    WBCUA None seen 02/11/2021    BACTERIA Rare 02/11/2021    RBCUA None seen 02/11/2021    BLOODU Negative 02/11/2021    SPECGRAV 1.025 02/11/2021    GLUCOSEU Negative 02/11/2021       Radiology:  XR CHEST PORTABLE   Final Result   No pneumothorax with pleural cavity at the apex unchanged. Moderate airspace disease throughout the left lung with volume loss lower   lobe. Vascular congestion. XR CHEST PORTABLE   Final Result   Some improvement demonstrating some aerated lung in the left upper hemithorax         XR CHEST PORTABLE   Final Result   Unchanged chest demonstrating total opacification of the left hemithorax         XR CHEST PORTABLE   Final Result   No recurrent pneumothorax, but complete opacification of the left hemithorax   has occurred. XR CHEST PORTABLE   Final Result   Minimal if any residual pneumothorax, with re-expansion of the left lung   after chest tube placement. Findings suggest developing pulmonary edema. Continued close follow-up with short-term follow-up chest x-ray recommended   for the above. XR CHEST PORTABLE   Final Result   Large left pneumothorax with mediastinal shift towards the right indicating   underlying tension. By history, this may be due to a ruptured bleb in this   intubated patient. There is also diffuse mild airspace opacification in the   right lung that may represent asymmetric edema or developing ARDS. RECOMMENDATION:   Critical results were called by Dr. Zoe Rhodes.  Feliciano Brumfield MD to [facility] on 4/28/2021 at 13:08. XR CHEST PORTABLE    (Results Pending)           Assessment/Plan:    Active Hospital Problems    Diagnosis    Tracheostomy status ([facility] [address] [Z93.0]    Mucus plugging of bronchi [J98.09]    Tension pneumothorax [J93.0]     Left spontaneous pneumothorax status post chest tube placement  Acute on chronic hypoxic respiratory failure  Trach and PEG dependent  History of A. fib on Coumadin   Status post bronchoscopy with mucous plugging    Continue ICU care  Pulmonary board  Status post bronchoscopy, shows thick purulent mucus block to left upper and lower lobes    Status post chest tube for left spontaneous pneumothorax  Initially was to wall suction now to waterseal  Pulmonary managing chest tube    Repeat chest x-ray in a.m. Non-ACS trend troponinemia, estimated EF is 49%, with diastolic dysfunction. Elevated pulmonary artery systolic pressure. Other comorbidities:  History of diabetes type 2, asthma, CHF, atrial fibrillation on warfarin, pulmonary hypertension, hyperlipidemia, obstructive sleep apnea, breast cancer status post lumpectomy and radiation, obesity. DVT Prophylaxis: Lovenox has been switched to warfarin  Diet: Diet NPO, After Midnight  Code Status: Full Code    PT/OT Eval Status: When appropriate    Dispo -continue ICU care    Due to the immediate potential for life-threatening deterioration due to acute on chronic hypoxic respiratory failure, left  pneumothorax, I spent 34 minutes providing critical care. This time is excluding time spent performing procedures.       Enoch Johnson MD

## 2021-05-01 ENCOUNTER — APPOINTMENT (OUTPATIENT)
Dept: GENERAL RADIOLOGY | Age: 81
DRG: 207 | End: 2021-05-01
Payer: COMMERCIAL

## 2021-05-01 ENCOUNTER — APPOINTMENT (OUTPATIENT)
Dept: CT IMAGING | Age: 81
DRG: 207 | End: 2021-05-01
Payer: COMMERCIAL

## 2021-05-01 LAB
A/G RATIO: 0.9 (ref 1.1–2.2)
ALBUMIN SERPL-MCNC: 3 G/DL (ref 3.4–5)
ALP BLD-CCNC: 161 U/L (ref 40–129)
ALT SERPL-CCNC: 40 U/L (ref 10–40)
ANION GAP SERPL CALCULATED.3IONS-SCNC: 10 MMOL/L (ref 3–16)
AST SERPL-CCNC: 21 U/L (ref 15–37)
BASOPHILS ABSOLUTE: 0 K/UL (ref 0–0.2)
BASOPHILS RELATIVE PERCENT: 0.3 %
BILIRUB SERPL-MCNC: 0.8 MG/DL (ref 0–1)
BLOOD BANK DISPENSE STATUS: NORMAL
BLOOD BANK PRODUCT CODE: NORMAL
BPU ID: NORMAL
BUN BLDV-MCNC: 21 MG/DL (ref 7–20)
CALCIUM SERPL-MCNC: 9 MG/DL (ref 8.3–10.6)
CHLORIDE BLD-SCNC: 99 MMOL/L (ref 99–110)
CO2: 29 MMOL/L (ref 21–32)
CREAT SERPL-MCNC: <0.5 MG/DL (ref 0.6–1.2)
CULTURE, RESPIRATORY: ABNORMAL
DESCRIPTION BLOOD BANK: NORMAL
EOSINOPHILS ABSOLUTE: 0.1 K/UL (ref 0–0.6)
EOSINOPHILS RELATIVE PERCENT: 0.8 %
GFR AFRICAN AMERICAN: >60
GFR NON-AFRICAN AMERICAN: >60
GLOBULIN: 3.3 G/DL
GLUCOSE BLD-MCNC: 109 MG/DL (ref 70–99)
GLUCOSE BLD-MCNC: 110 MG/DL (ref 70–99)
GLUCOSE BLD-MCNC: 146 MG/DL (ref 70–99)
GLUCOSE BLD-MCNC: 152 MG/DL (ref 70–99)
GLUCOSE BLD-MCNC: 168 MG/DL (ref 70–99)
GLUCOSE BLD-MCNC: 198 MG/DL (ref 70–99)
GLUCOSE BLD-MCNC: 208 MG/DL (ref 70–99)
GLUCOSE BLD-MCNC: 92 MG/DL (ref 70–99)
GRAM STAIN RESULT: ABNORMAL
HCT VFR BLD CALC: 28.9 % (ref 36–48)
HEMOGLOBIN: 9.1 G/DL (ref 12–16)
INR BLD: 1.93 (ref 0.86–1.14)
LYMPHOCYTES ABSOLUTE: 0.8 K/UL (ref 1–5.1)
LYMPHOCYTES RELATIVE PERCENT: 9 %
MAGNESIUM: 1.8 MG/DL (ref 1.8–2.4)
MCH RBC QN AUTO: 25.5 PG (ref 26–34)
MCHC RBC AUTO-ENTMCNC: 31.5 G/DL (ref 31–36)
MCV RBC AUTO: 80.8 FL (ref 80–100)
MONOCYTES ABSOLUTE: 0.7 K/UL (ref 0–1.3)
MONOCYTES RELATIVE PERCENT: 7.8 %
NEUTROPHILS ABSOLUTE: 7.1 K/UL (ref 1.7–7.7)
NEUTROPHILS RELATIVE PERCENT: 82.1 %
ORGANISM: ABNORMAL
ORGANISM: ABNORMAL
PDW BLD-RTO: 16.9 % (ref 12.4–15.4)
PERFORMED ON: ABNORMAL
PERFORMED ON: NORMAL
PHOSPHORUS: 2.6 MG/DL (ref 2.5–4.9)
PLATELET # BLD: 168 K/UL (ref 135–450)
PMV BLD AUTO: 10.3 FL (ref 5–10.5)
POTASSIUM SERPL-SCNC: 3.3 MMOL/L (ref 3.5–5.1)
PROTHROMBIN TIME: 22.5 SEC (ref 10–13.2)
RBC # BLD: 3.57 M/UL (ref 4–5.2)
SODIUM BLD-SCNC: 138 MMOL/L (ref 136–145)
TOTAL PROTEIN: 6.3 G/DL (ref 6.4–8.2)
WBC # BLD: 8.7 K/UL (ref 4–11)

## 2021-05-01 PROCEDURE — 71045 X-RAY EXAM CHEST 1 VIEW: CPT

## 2021-05-01 PROCEDURE — 99291 CRITICAL CARE FIRST HOUR: CPT | Performed by: INTERNAL MEDICINE

## 2021-05-01 PROCEDURE — 85610 PROTHROMBIN TIME: CPT

## 2021-05-01 PROCEDURE — 6370000000 HC RX 637 (ALT 250 FOR IP): Performed by: INTERNAL MEDICINE

## 2021-05-01 PROCEDURE — 6370000000 HC RX 637 (ALT 250 FOR IP): Performed by: STUDENT IN AN ORGANIZED HEALTH CARE EDUCATION/TRAINING PROGRAM

## 2021-05-01 PROCEDURE — 84100 ASSAY OF PHOSPHORUS: CPT

## 2021-05-01 PROCEDURE — 83735 ASSAY OF MAGNESIUM: CPT

## 2021-05-01 PROCEDURE — 94003 VENT MGMT INPAT SUBQ DAY: CPT

## 2021-05-01 PROCEDURE — 2700000000 HC OXYGEN THERAPY PER DAY

## 2021-05-01 PROCEDURE — 85025 COMPLETE CBC W/AUTO DIFF WBC: CPT

## 2021-05-01 PROCEDURE — 2000000000 HC ICU R&B

## 2021-05-01 PROCEDURE — 80053 COMPREHEN METABOLIC PANEL: CPT

## 2021-05-01 PROCEDURE — 6360000002 HC RX W HCPCS: Performed by: INTERNAL MEDICINE

## 2021-05-01 PROCEDURE — 6360000004 HC RX CONTRAST MEDICATION: Performed by: STUDENT IN AN ORGANIZED HEALTH CARE EDUCATION/TRAINING PROGRAM

## 2021-05-01 PROCEDURE — 94640 AIRWAY INHALATION TREATMENT: CPT

## 2021-05-01 PROCEDURE — 2580000003 HC RX 258: Performed by: INTERNAL MEDICINE

## 2021-05-01 PROCEDURE — 94761 N-INVAS EAR/PLS OXIMETRY MLT: CPT

## 2021-05-01 PROCEDURE — 71275 CT ANGIOGRAPHY CHEST: CPT

## 2021-05-01 RX ORDER — POTASSIUM CHLORIDE 7.45 MG/ML
10 INJECTION INTRAVENOUS PRN
Status: DISCONTINUED | OUTPATIENT
Start: 2021-05-01 | End: 2021-05-03 | Stop reason: HOSPADM

## 2021-05-01 RX ORDER — POTASSIUM CHLORIDE 7.45 MG/ML
10 INJECTION INTRAVENOUS PRN
Status: DISCONTINUED | OUTPATIENT
Start: 2021-05-01 | End: 2021-05-01

## 2021-05-01 RX ORDER — LORAZEPAM 2 MG/ML
0.5 INJECTION INTRAMUSCULAR ONCE
Status: COMPLETED | OUTPATIENT
Start: 2021-05-01 | End: 2021-05-01

## 2021-05-01 RX ORDER — POTASSIUM CHLORIDE 20 MEQ/1
40 TABLET, EXTENDED RELEASE ORAL PRN
Status: DISCONTINUED | OUTPATIENT
Start: 2021-05-01 | End: 2021-05-01

## 2021-05-01 RX ORDER — SODIUM CHLORIDE 9 MG/ML
INJECTION, SOLUTION INTRAVENOUS PRN
Status: DISCONTINUED | OUTPATIENT
Start: 2021-05-01 | End: 2021-05-03 | Stop reason: HOSPADM

## 2021-05-01 RX ORDER — POTASSIUM CHLORIDE 20 MEQ/1
40 TABLET, EXTENDED RELEASE ORAL PRN
Status: DISCONTINUED | OUTPATIENT
Start: 2021-05-01 | End: 2021-05-03 | Stop reason: HOSPADM

## 2021-05-01 RX ADMIN — POTASSIUM BICARBONATE 20 MEQ: 782 TABLET, EFFERVESCENT ORAL at 15:46

## 2021-05-01 RX ADMIN — LEVOFLOXACIN 750 MG: 5 INJECTION, SOLUTION INTRAVENOUS at 20:52

## 2021-05-01 RX ADMIN — IPRATROPIUM BROMIDE AND ALBUTEROL SULFATE 1 AMPULE: .5; 3 SOLUTION RESPIRATORY (INHALATION) at 12:19

## 2021-05-01 RX ADMIN — NYSTATIN 500000 UNITS: 100000 SUSPENSION ORAL at 08:10

## 2021-05-01 RX ADMIN — MELATONIN TAB 3 MG 3 MG: 3 TAB at 20:50

## 2021-05-01 RX ADMIN — INSULIN LISPRO 1 UNITS: 100 INJECTION, SOLUTION INTRAVENOUS; SUBCUTANEOUS at 04:55

## 2021-05-01 RX ADMIN — TRAZODONE HYDROCHLORIDE 50 MG: 50 TABLET ORAL at 20:50

## 2021-05-01 RX ADMIN — INSULIN LISPRO 2 UNITS: 100 INJECTION, SOLUTION INTRAVENOUS; SUBCUTANEOUS at 11:58

## 2021-05-01 RX ADMIN — VANCOMYCIN HYDROCHLORIDE 1250 MG: 10 INJECTION, POWDER, LYOPHILIZED, FOR SOLUTION INTRAVENOUS at 23:40

## 2021-05-01 RX ADMIN — IPRATROPIUM BROMIDE AND ALBUTEROL SULFATE 1 AMPULE: .5; 3 SOLUTION RESPIRATORY (INHALATION) at 20:14

## 2021-05-01 RX ADMIN — IPRATROPIUM BROMIDE AND ALBUTEROL SULFATE 1 AMPULE: .5; 3 SOLUTION RESPIRATORY (INHALATION) at 08:07

## 2021-05-01 RX ADMIN — POTASSIUM BICARBONATE 20 MEQ: 782 TABLET, EFFERVESCENT ORAL at 08:10

## 2021-05-01 RX ADMIN — FUROSEMIDE 40 MG: 40 TABLET ORAL at 08:11

## 2021-05-01 RX ADMIN — PREDNISONE 10 MG: 10 TABLET ORAL at 08:12

## 2021-05-01 RX ADMIN — IPRATROPIUM BROMIDE AND ALBUTEROL SULFATE 1 AMPULE: .5; 3 SOLUTION RESPIRATORY (INHALATION) at 15:42

## 2021-05-01 RX ADMIN — LORAZEPAM 0.5 MG: 2 INJECTION INTRAMUSCULAR; INTRAVENOUS at 06:33

## 2021-05-01 RX ADMIN — BUDESONIDE 500 MCG: 0.5 SUSPENSION RESPIRATORY (INHALATION) at 20:13

## 2021-05-01 RX ADMIN — BUDESONIDE 500 MCG: 0.5 SUSPENSION RESPIRATORY (INHALATION) at 08:07

## 2021-05-01 RX ADMIN — NYSTATIN 500000 UNITS: 100000 SUSPENSION ORAL at 15:46

## 2021-05-01 RX ADMIN — VANCOMYCIN HYDROCHLORIDE 1250 MG: 10 INJECTION, POWDER, LYOPHILIZED, FOR SOLUTION INTRAVENOUS at 12:25

## 2021-05-01 RX ADMIN — ARFORMOTEROL TARTRATE 15 MCG: 15 SOLUTION RESPIRATORY (INHALATION) at 08:06

## 2021-05-01 RX ADMIN — GABAPENTIN 100 MG: 100 CAPSULE ORAL at 13:54

## 2021-05-01 RX ADMIN — FAMOTIDINE 20 MG: 20 TABLET ORAL at 08:11

## 2021-05-01 RX ADMIN — ATORVASTATIN CALCIUM 80 MG: 80 TABLET, FILM COATED ORAL at 20:50

## 2021-05-01 RX ADMIN — NYSTATIN 500000 UNITS: 100000 SUSPENSION ORAL at 13:54

## 2021-05-01 RX ADMIN — Medication 10 ML: at 08:11

## 2021-05-01 RX ADMIN — GABAPENTIN 100 MG: 100 CAPSULE ORAL at 08:10

## 2021-05-01 RX ADMIN — IOPAMIDOL 75 ML: 755 INJECTION, SOLUTION INTRAVENOUS at 10:33

## 2021-05-01 RX ADMIN — GABAPENTIN 100 MG: 100 CAPSULE ORAL at 20:50

## 2021-05-01 RX ADMIN — INSULIN LISPRO 1 UNITS: 100 INJECTION, SOLUTION INTRAVENOUS; SUBCUTANEOUS at 15:46

## 2021-05-01 RX ADMIN — INSULIN LISPRO 1 UNITS: 100 INJECTION, SOLUTION INTRAVENOUS; SUBCUTANEOUS at 08:10

## 2021-05-01 ASSESSMENT — PULMONARY FUNCTION TESTS
PIF_VALUE: 27
PIF_VALUE: 26

## 2021-05-01 NOTE — PROGRESS NOTES
Shift assessment completed. See complex assessment in doc flowsheet. Pt currently has size 8 trach connected to vent. Current vent settings AC/PC. AC 16/PC20/FI02 30%/Peep 5. Afib on the monitor. Lungs diminished. Abd is soft with +bs x4 quadrants. G-tube in LUQ connected to Diabetic tube feed at 25ml/hr. Chest tube intact connected to water seal. Patient with history of cva with left side weakness and rigid does respong to pain. Right side with free range of motion. Skin is intact with wart/mass on left buttocks. Pt denies pain. Pt repositioned for comfort. Bed in lowest position, wheels locked, and alarm active. Updated pt on POC and all questions answered. No needs expressed. Call light within reach.

## 2021-05-01 NOTE — PROGRESS NOTES
Pt transported to CT scan with RN, RT and transportor. Returned to ICU 7 VSS. Awaiting plan of care from IR.1st unit FFP started. Pt tolerating. Daughter at bedside and updated. Bj Liu RN, BSN, CCRN.

## 2021-05-01 NOTE — PROGRESS NOTES
Subcutaneous Once    Arformoterol Tartrate  15 mcg Nebulization BID    budesonide  500 mcg Nebulization BID     PRN Meds: potassium chloride **OR** potassium alternative oral replacement **OR** potassium chloride, sodium chloride, sodium chloride, albuterol sulfate HFA, sodium chloride flush, sodium chloride, promethazine **OR** ondansetron, polyethylene glycol, acetaminophen **OR** acetaminophen, glucose, dextrose, glucagon (rDNA), dextrose, loperamide      Intake/Output Summary (Last 24 hours) at 5/1/2021 1352  Last data filed at 5/1/2021 1220  Gross per 24 hour   Intake 2301 ml   Output 500 ml   Net 1801 ml       Physical Exam Performed:    BP (!) 138/96   Pulse 78   Temp 98.4 °F (36.9 °C)   Resp 18   Ht 5' 5\" (1.651 m)   Wt 184 lb 1.4 oz (83.5 kg)   SpO2 100%   BMI 30.63 kg/m²     General appearance: No apparent distress, appears stated age and cooperative. HEENT: Pupils equal, round, and reactive to light. Conjunctivae/corneas clear. Neck: Supple, with full range of motion. No jugular venous distention. Trachea midline. Respiratory:  Normal respiratory effort. Clear to auscultation, bilaterally without Rales/Wheezes/Rhonchi. Decreased respiratory sounds on left hemithorax. Status post left chest tube placement. Cardiovascular: Regular rate and rhythm with normal S1/S2 without murmurs, rubs or gallops. Abdomen: Soft, non-tender, non-distended with normal bowel sounds. Musculoskeletal: No clubbing, cyanosis or edema bilaterally. Full range of motion without deformity. Skin: Skin color, texture, turgor normal.  No rashes or lesions.   Neurologic: Nonverbal  Psychiatric: Alert   Capillary Refill: Brisk,3 seconds, normal   Peripheral Pulses: +2 palpable, equal bilaterally       Labs:   Recent Labs     04/29/21  0439 04/30/21  0512 05/01/21  0356   WBC 11.4* 11.3* 8.7   HGB 10.4* 9.7* 9.1*   HCT 33.5* 30.9* 28.9*    183 168     Recent Labs     04/29/21  0439 04/30/21  0512 05/01/21  0356   NA 137 139 138   K 3.7 3.3* 3.3*   CL 98* 100 99   CO2 29 27 29   BUN 28* 28* 21*   CREATININE <0.5* <0.5* <0.5*   CALCIUM 9.7 9.3 9.0   PHOS 3.6 3.0 2.6     Recent Labs     04/29/21  0439 04/30/21  0512 05/01/21  0356   AST 26 22 21   ALT 51* 46* 40   BILITOT 0.6 1.0 0.8   ALKPHOS 180* 173* 161*     Recent Labs     04/30/21  0512 04/30/21  2041 05/01/21  0356   INR 2.61* 1.94* 1.93*     Recent Labs     04/28/21  1415 04/28/21  1950 04/28/21  2322   TROPONINI 0.04* 0.05* 0.04*       Urinalysis:      Lab Results   Component Value Date    NITRU Negative 02/11/2021    WBCUA None seen 02/11/2021    BACTERIA Rare 02/11/2021    RBCUA None seen 02/11/2021    BLOODU Negative 02/11/2021    SPECGRAV 1.025 02/11/2021    GLUCOSEU Negative 02/11/2021       Radiology:  XR CHEST PORTABLE   Final Result   Left chest tube removed, no pneumothorax      Otherwise, persistent bilateral airspace opacities         CTA CHEST W WO CONTRAST   Final Result   The bronchial arteries are opacified and there is no convincing evidence of   active extravasation arising from the bronchial arteries. Please note that   evaluation for active extravasation is limited due to streak artifact from   severely calcified and enlarged subcarinal lymph nodes as well as   calcifications and respiratory motion artifact associated with the airways in   the left lung centrally. Indwelling pleural drainage catheter without evidence of a pneumothorax. Suspect small left pleural effusion. There is also atelectasis involving the   majority of the left lower lobe. Subcutaneous emphysema along the left   anterior and lateral chest wall is noted. Linear and nodular opacification in the superior segment of the right lower   lobe, of uncertain etiology or clinical significance. Given the somewhat   linear appearance, this could be reflective of an infectious or inflammatory   pneumonitis.   Given the nodular component, this will need to be followed to complete resolution. Short-term interval CT follow-up is recommended. See   below recommendations. Bilateral thyroid nodules, measuring up to 2 cm on the left. See below   recommendations for imaging follow-up. RECOMMENDATIONS:   2 cm incidental thyroid nodule. Recommend thyroid US. Reference: J Am Sapna Radiol. 2015 Feb;12(2): 143-50         18 mm solid pulmonary nodule. Consider a non-contrast Chest CT at 3 months, a   PET/CT, or tissue sampling. These guidelines do not apply to immunocompromised patients and patients with   cancer. Follow up in patients with significant comorbidities as clinically   warranted. For lung cancer screening, adhere to Lung-RADS guidelines. Reference: Radiology. 2017; 284(1):228-43. XR CHEST PORTABLE   Final Result   Subtle curvilinear interface at the left lung apex, not clearly seen   previously. This could represent artifact or a tiny left apical pneumothorax   measuring 4-5 mm in size      Pleuroparenchymal disease is seen bilaterally, increased in the left upper   lobe. The findings were sent to the Radiology Results [address] at 9:42   am on 5/1/2021to be communicated to a licensed caregiver. XR CHEST PORTABLE   Final Result   Stable cardiomegaly with resolving central pulmonary congestion      No change in the pigtail catheter along the left apex and no pneumothorax. Tracheostomy tube unchanged in position. Slowly resolving opacity in the left lung and a questionable small residual   pleural effusion inferiorly which is less prominent. XR CHEST PORTABLE   Final Result   No pneumothorax with pleural cavity at the apex unchanged. Moderate airspace disease throughout the left lung with volume loss lower   lobe. Vascular congestion.          XR CHEST PORTABLE   Final Result   Some improvement demonstrating some aerated lung in the left upper hemithorax         XR CHEST PORTABLE   Final Result   Unchanged chest demonstrating total opacification of the left hemithorax         XR CHEST PORTABLE   Final Result   No recurrent pneumothorax, but complete opacification of the left hemithorax   has occurred. XR CHEST PORTABLE   Final Result   Minimal if any residual pneumothorax, with re-expansion of the left lung   after chest tube placement. Findings suggest developing pulmonary edema. Continued close follow-up with short-term follow-up chest x-ray recommended   for the above. XR CHEST PORTABLE   Final Result   Large left pneumothorax with mediastinal shift towards the right indicating   underlying tension. By history, this may be due to a ruptured bleb in this   intubated patient. There is also diffuse mild airspace opacification in the   right lung that may represent asymmetric edema or developing ARDS. RECOMMENDATION:   Critical results were called by Dr. Guanakito Vázquez. Karla Aguirre MD to [facility]   on 4/28/2021 at 13:08. Assessment/Plan:    Active Hospital Problems    Diagnosis    Hemoptysis [R04.2]    Tracheostomy status ([address] [Z93.0]    Mucus plugging of bronchi [J98.09]    Tension pneumothorax [J93.0]     Left spontaneous pneumothorax status post chest tube placement  Acute on chronic hypoxic respiratory failure  Trach and PEG dependent  History of A. fib on Coumadin   Status post bronchoscopy with mucous plugging    Continue ICU care  Pulmonary board  Status post bronchoscopy, shows thick purulent mucus block to left upper and lower lobes  BAL growing MRSA and Serratia, she is on Levaquin and vancomycin continue isolation  Bleeding from possible lower lobe of left lung with bronchoscopy, possible secondary to VAP, mucous plugging, and coagulopathy. Warfarin has been on hold vitamin K and FFP has been administered.     Status post chest tube for left spontaneous pneumothorax  Initially was to wall suction now to waterseal  Pulmonary managing chest tube    Repeat chest x-ray in a.m. Non-ACS trend troponinemia, estimated EF is 11%, with diastolic dysfunction. Elevated pulmonary artery systolic pressure. Other comorbidities:  History of diabetes type 2, asthma, CHF, atrial fibrillation on warfarin, pulmonary hypertension, hyperlipidemia, obstructive sleep apnea, breast cancer status post lumpectomy and radiation, obesity. DVT Prophylaxis: Lovenox has been switched to warfarin, now on hold for bleeding at chest tube site  Diet: DIET TUBE FEED CONTINUOUS/CYCLIC NPO; Diabetic; Gastrostomy; Continuous; 25; 50; 24  Code Status: Full Code    PT/OT Eval Status: When appropriate    Dispo -continue ICU care    Due to the immediate potential for life-threatening deterioration due to acute on chronic hypoxic respiratory failure, left  pneumothorax, I spent 34 minutes providing critical care. This time is excluding time spent performing procedures.       Bhavik Novoa MD

## 2021-05-01 NOTE — PROGRESS NOTES
Report received from [facility]. Shift assessment completed- see doc flow sheet for details. VSS. Pt alert, follows commands to R side, flaccid to L side. TF continues at goal rate @ 50 cc/hr. Lungs rhonchi and diminished. Abdomen rounded- soft- active BS. No edema noted. External catheter draining appropriately. Trach suctioned bright red blood (consistent from previous shift). H/H stable. Potassium 3.3- replacing with PO. Medications passed- see MAR. Repositioned in bed for comfort. Left anterior chest tube remains to water seal- no crepitus noted. Will continue to monitor. Danika Rutherford RN, BSN, CCRN.

## 2021-05-01 NOTE — PROGRESS NOTES
Reassessment completed and unchanged. VSS. Noted to still have bleeding from tracheal secretions. Dr. Chandra Proper informed RN for NPO status @ 0000 and bronchoscopy to be performed 5-2-21. Bathed. Repositioned. Monitoring closely. Epifanio Dias RN, BSN, CCRN.

## 2021-05-01 NOTE — PLAN OF CARE
Problem: Falls - Risk of:  Goal: Will remain free from falls  Description: Will remain free from falls  Outcome: Ongoing   Patient placed on fall Precautions per Gerold Angelucci Fall Risk Assessment Scale (Score> 45). Fall risk armband on, yellow blanket placed on foot of bed, S.A.F.E. sign or orange light displayed at door. Bed in locked and low position, bed alarm armed and audible, call light and bedside table in reach. Patient acknowledges the need to call before getting out of bed. Q2 monitoring, and toileting performed. Problem: Skin Integrity:  Goal: Absence of new skin breakdown  Description: Absence of new skin breakdown  Outcome: Ongoing   At risk for skin breakdown. See Genaro scale. Remains on bedrest.  Unable to position self in bed. Turn and reposition every two hours and as needed. Heels elevated off bed. Protective barrier placed as needed. Patient kept clean and dry. Pillows used for positioning. Will continue to monitor for skin breakdown. Problem: OXYGENATION/RESPIRATORY FUNCTION  Goal: Patient will achieve/maintain normal respiratory rate/effort  Description: Respiratory rate and effort will be within normal limits for the patient  Outcome: Ongoing   Patient tolerating vent. FI02 30%    Problem: Nutrition  Goal: Optimal nutrition therapy  Outcome: Ongoing   Tube feed started.  Patient tolerating with no n/v/d.

## 2021-05-01 NOTE — PROGRESS NOTES
[facility] Pulmonary/CCM Progress note      Admit Date: 4/28/2021    Chief Complaint: Shortness of breath and respiratory distress    Subjective: Interval History: Left lower lobe lung collapse appears better on chest x-ray today, no residual pneumothorax noted and hence left chest tube has been removed. Still has significant hemoptysis/fresh blood-tinged secretions from ET tube suction. Patient is hemodynamically stable. Scheduled Meds:   nystatin  5 mL Oral 4x Daily    potassium bicarb-citric acid  20 mEq Oral BID WC    vancomycin  15 mg/kg Intravenous Q12H    levofloxacin  750 mg Intravenous Q24H    furosemide  40 mg Per G Tube Daily    sodium chloride flush  5-40 mL Intravenous 2 times per day    ipratropium-albuterol  1 ampule Inhalation 4x daily    insulin lispro  0-6 Units Subcutaneous Q4H    traZODone  50 mg Per G Tube Nightly    predniSONE  10 mg Per G Tube Daily    melatonin  3 mg Per G Tube Nightly    gabapentin  100 mg Per G Tube TID    ferrous sulfate  300 mg Per G Tube Every Other Day    famotidine  20 mg Per G Tube Daily    atorvastatin  80 mg Per G Tube Nightly    insulin lispro  0-6 Units Subcutaneous Once    Arformoterol Tartrate  15 mcg Nebulization BID    budesonide  500 mcg Nebulization BID     Continuous Infusions:   sodium chloride      sodium chloride      propofol Stopped (04/29/21 1232)    sodium chloride Stopped (05/01/21 1225)    dextrose       PRN Meds:potassium chloride **OR** potassium alternative oral replacement **OR** potassium chloride, sodium chloride, sodium chloride, albuterol sulfate HFA, sodium chloride flush, sodium chloride, promethazine **OR** ondansetron, polyethylene glycol, acetaminophen **OR** acetaminophen, glucose, dextrose, glucagon (rDNA), dextrose, loperamide    Review of Systems  Unable to obtain since the patient is nonverbal and tracheostomy dependent.     Objective:     Patient Vitals for the past 8 hrs:   BP Temp Temp src Pulse Resp SpO2   05/01/21 1400 (!) 119/53 -- -- 65 17 100 %   05/01/21 1222 -- -- -- -- -- 100 %   05/01/21 1215 (!) 138/96 98.4 °F (36.9 °C) -- 78 18 --   05/01/21 1200 (!) 114/92 98.4 °F (36.9 °C) Temporal 81 19 100 %   05/01/21 1150 136/82 98.4 °F (36.9 °C) -- 84 16 --   05/01/21 1130 (!) 140/88 98.4 °F (36.9 °C) Temporal 95 16 100 %   05/01/21 1120 (!) 151/116 98.1 °F (36.7 °C) Temporal 101 18 --   05/01/21 1000 120/70 -- -- 76 19 95 %   05/01/21 0900 89/74 -- -- 88 12 95 %   05/01/21 0800 134/80 98.1 °F (36.7 °C) Temporal 98 28 95 %     I/O last 3 completed shifts: In: 2651.3 [I.V.:56.1; Blood:1199; NG/GT:696; IV Piggyback:700.2]  Out: 450 [Urine:350; Chest Tube:100]  No intake/output data recorded.     General Appearance: Nonverbal, in no acute distress  Skin: warm and dry, no rash or erythema  Head: normocephalic and atraumatic  Eyes: pupils equal, round, and reactive to light, extraocular eye movements intact, conjunctivae normal  ENT: external ear and ear canal normal bilaterally, nose without deformity, nasal mucosa and turbinates normal  Neck: supple and non-tender without mass, no cervical lymphadenopathy  Pulmonary/Chest: decreased breath sounds noted-left side  Cardiovascular: normal rate, regular rhythm,  no murmurs, rubs, distal pulses intact, no carotid bruits  Abdomen: soft, non-tender, non-distended, normal bowel sounds, no masses or organomegaly  Lymph Nodes: Cervical, supraclavicular normal  Extremities: no cyanosis, clubbing or edema  Musculoskeletal: normal range of motion, no joint swelling, deformity or tenderness  Neurologic: Alert, nonverbal, dense left hemiparesis    Data Review:  CBC:   Lab Results   Component Value Date    WBC 8.7 05/01/2021    RBC 3.57 05/01/2021     BMP:   Lab Results   Component Value Date    GLUCOSE 146 05/01/2021    CO2 29 05/01/2021    BUN 21 05/01/2021    CREATININE <0.5 05/01/2021    CALCIUM 9.0 05/01/2021     ABG:   Lab Results   Component Value Date    YGI1XLD 31.6 04/28/2021    BEART 8.4 04/28/2021    B7DSSZCT 100.0 04/28/2021    PHART 7.533 04/28/2021    VFG4MQC 37.5 04/28/2021    PO2ART 157.0 04/28/2021    CDZ3LZQ 73.3 04/28/2021       Radiology: All pertinent images / reports were reviewed as a part of this visit. Narrative   EXAMINATION:   ONE XRAY VIEW OF THE CHEST       4/29/2021 1:08 pm       COMPARISON:   04/29/2021 at 10:55 a.m.       HISTORY:   ORDERING SYSTEM PROVIDED HISTORY: Post bronch   TECHNOLOGIST PROVIDED HISTORY:   Reason for exam:->Post bronch   Reason for Exam: Post bronch   Acuity: Unknown   Type of Exam: Unknown       FINDINGS:   Aerated lung now visualized in the left upper hemithorax.  Tracheostomy tube   remains in place.  The right lung and heart are unchanged.  Soft tissue gas   again seen in the left chest wall.           Impression   Some improvement demonstrating some aerated lung in the left upper hemithorax       Problem List:   Status post tracheostomy  Spontaneous tension pneumothorax status post chest tube placement  MRSA/Serratia pneumonia  Hemoptysis  Assessment/Plan:   Status post tracheostomy following prior CVA since February. Continues on full ventilator support. Ongoing hemoptysis despite holding anticoagulation-INR 1.9, administer 2 more units of FFP. Patient received vitamin K 5 mg SQ yesterday. Concerns for ongoing left lower lobe bleed-CTA chest was obtained today which shows no active extravasation from bronchial arteries. Infiltrative area noted over the left upper and lower lobe area-could represent ongoing bleed. Discussed with Dr. Cheyenne Westfall from radiology-CTA findings not amenable to embolization. Monitor only at this time. Will plan to perform repeat bronchoscopy if necessary tomorrow if there are still concerns for ongoing bleed. MRSA/Serratia pneumonia-on Levaquin and vancomycin. Spontaneous tension pneumothorax status post chest tube placement.   No residual pneumothorax noted and hence chest tube has been removed. Hemoglobin 11.4 >9.1 (since admission ), could be related to ongoing hemoptysis. Monitor. Hemodynamically stable, not requiring vasopressors. 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 Patient also has history of chronic atrial fibrillation-rate controlled. Would hold anticoagulation due to risk of respiratory bleed. Patient will be at a risk for CVA given her history of atrial fibrillation. Critical care team will follow. Discussed with 1 of patient's daughters who are available today.     Jorge Lechuga MD     Critical care time of 35 minutes excluding procedures

## 2021-05-01 NOTE — PROGRESS NOTES
Dr. Lisset Dunbar at bedside and updated. Informed on +MRSA in bronchial washings noted from [facility] lab. Awaiting CXRAY report for possible chest tube removal. Discussing case with IR Dr. Milagro Mckeon for possible procedure for bleeding from L lung. Homero Swenson RN, BSN, CCRN.

## 2021-05-01 NOTE — PROGRESS NOTES
[address]  Patient has size 8 Portex. Trach Kit of same size on standby  Yes, Obturator at head of the bed  No. Inner cannula cleaned/replaced Yes, drain sponge changed  Yes, Trach tie replaced is soiled No, Flange cleaned  Yes. [address] performed without complications.  Comment:)

## 2021-05-01 NOTE — PROGRESS NOTES
[address]  Patient has size 8 Portex. Trach Kit of same size on standby  Yes, Obturator at bedside. Yes. Inner cannula cleaned/replaced Yes, drain sponge changed  Yes, Trach tie replaced is soiled Yes, Flange cleaned  Yes. [address] performed without complications.

## 2021-05-01 NOTE — PROGRESS NOTES
Reassessment completed and unchanged. VSS. Continues to have bright red bloody secretions with tracheal suctioning. Dr. Balta Mcdonald spoke with Dr. Trevor Carballo, no plan for emergent IR procedure today, okay to remove chest tube. Left chest tube removed- light dressing applied- no crepitus noted- pt tolerated. Repeat CXRAY completed post removal. TF restarted. FFP completed. Repositioned in bed for comfort. Daughter updated on plan of care. Chidi Matute RN, BSN, CCRN.

## 2021-05-01 NOTE — PROGRESS NOTES
Patient assessment completed. Tongue dry with white patches. Oral Nystatin added. Patient continues to tolerate vent with bloody secretions. Tube feed increased to goal of 50ml/hr. Patient repositioned in bed. No further needs at this time. Will continue to monitor.

## 2021-05-02 ENCOUNTER — APPOINTMENT (OUTPATIENT)
Dept: GENERAL RADIOLOGY | Age: 81
DRG: 207 | End: 2021-05-02
Payer: COMMERCIAL

## 2021-05-02 LAB
ANION GAP SERPL CALCULATED.3IONS-SCNC: 9 MMOL/L (ref 3–16)
BASOPHILS ABSOLUTE: 0 K/UL (ref 0–0.2)
BASOPHILS RELATIVE PERCENT: 0.3 %
BUN BLDV-MCNC: 13 MG/DL (ref 7–20)
CALCIUM SERPL-MCNC: 8.9 MG/DL (ref 8.3–10.6)
CHLORIDE BLD-SCNC: 95 MMOL/L (ref 99–110)
CO2: 29 MMOL/L (ref 21–32)
CREAT SERPL-MCNC: <0.5 MG/DL (ref 0.6–1.2)
EOSINOPHILS ABSOLUTE: 0.1 K/UL (ref 0–0.6)
EOSINOPHILS RELATIVE PERCENT: 0.8 %
GFR AFRICAN AMERICAN: >60
GFR NON-AFRICAN AMERICAN: >60
GLUCOSE BLD-MCNC: 138 MG/DL (ref 70–99)
GLUCOSE BLD-MCNC: 145 MG/DL (ref 70–99)
GLUCOSE BLD-MCNC: 151 MG/DL (ref 70–99)
GLUCOSE BLD-MCNC: 180 MG/DL (ref 70–99)
GLUCOSE BLD-MCNC: 203 MG/DL (ref 70–99)
HCT VFR BLD CALC: 29.1 % (ref 36–48)
HEMOGLOBIN: 9.1 G/DL (ref 12–16)
INR BLD: 1.41 (ref 0.86–1.14)
LYMPHOCYTES ABSOLUTE: 1 K/UL (ref 1–5.1)
LYMPHOCYTES RELATIVE PERCENT: 11.7 %
MAGNESIUM: 1.9 MG/DL (ref 1.8–2.4)
MCH RBC QN AUTO: 25.7 PG (ref 26–34)
MCHC RBC AUTO-ENTMCNC: 31.3 G/DL (ref 31–36)
MCV RBC AUTO: 82 FL (ref 80–100)
MONOCYTES ABSOLUTE: 0.7 K/UL (ref 0–1.3)
MONOCYTES RELATIVE PERCENT: 8.4 %
NEUTROPHILS ABSOLUTE: 6.8 K/UL (ref 1.7–7.7)
NEUTROPHILS RELATIVE PERCENT: 78.8 %
PDW BLD-RTO: 16.9 % (ref 12.4–15.4)
PERFORMED ON: ABNORMAL
PLATELET # BLD: 157 K/UL (ref 135–450)
PMV BLD AUTO: 9.6 FL (ref 5–10.5)
POTASSIUM REFLEX MAGNESIUM: 3.5 MMOL/L (ref 3.5–5.1)
PROTHROMBIN TIME: 16.4 SEC (ref 10–13.2)
RBC # BLD: 3.55 M/UL (ref 4–5.2)
SODIUM BLD-SCNC: 133 MMOL/L (ref 136–145)
VANCOMYCIN TROUGH: 14.2 UG/ML (ref 10–20)
WBC # BLD: 8.6 K/UL (ref 4–11)

## 2021-05-02 PROCEDURE — 99291 CRITICAL CARE FIRST HOUR: CPT | Performed by: INTERNAL MEDICINE

## 2021-05-02 PROCEDURE — 94640 AIRWAY INHALATION TREATMENT: CPT

## 2021-05-02 PROCEDURE — 71045 X-RAY EXAM CHEST 1 VIEW: CPT

## 2021-05-02 PROCEDURE — 6370000000 HC RX 637 (ALT 250 FOR IP): Performed by: INTERNAL MEDICINE

## 2021-05-02 PROCEDURE — 2709999900 HC NON-CHARGEABLE SUPPLY: Performed by: INTERNAL MEDICINE

## 2021-05-02 PROCEDURE — 6360000002 HC RX W HCPCS: Performed by: INTERNAL MEDICINE

## 2021-05-02 PROCEDURE — 31622 DX BRONCHOSCOPE/WASH: CPT | Performed by: INTERNAL MEDICINE

## 2021-05-02 PROCEDURE — 85610 PROTHROMBIN TIME: CPT

## 2021-05-02 PROCEDURE — 83735 ASSAY OF MAGNESIUM: CPT

## 2021-05-02 PROCEDURE — 0BJ08ZZ INSPECTION OF TRACHEOBRONCHIAL TREE, VIA NATURAL OR ARTIFICIAL OPENING ENDOSCOPIC: ICD-10-PCS | Performed by: INTERNAL MEDICINE

## 2021-05-02 PROCEDURE — 80202 ASSAY OF VANCOMYCIN: CPT

## 2021-05-02 PROCEDURE — 2700000000 HC OXYGEN THERAPY PER DAY

## 2021-05-02 PROCEDURE — 36415 COLL VENOUS BLD VENIPUNCTURE: CPT

## 2021-05-02 PROCEDURE — 80048 BASIC METABOLIC PNL TOTAL CA: CPT

## 2021-05-02 PROCEDURE — 2580000003 HC RX 258: Performed by: INTERNAL MEDICINE

## 2021-05-02 PROCEDURE — 6370000000 HC RX 637 (ALT 250 FOR IP): Performed by: STUDENT IN AN ORGANIZED HEALTH CARE EDUCATION/TRAINING PROGRAM

## 2021-05-02 PROCEDURE — 3609027000 HC BRONCHOSCOPY: Performed by: INTERNAL MEDICINE

## 2021-05-02 PROCEDURE — 85025 COMPLETE CBC W/AUTO DIFF WBC: CPT

## 2021-05-02 PROCEDURE — 94003 VENT MGMT INPAT SUBQ DAY: CPT

## 2021-05-02 PROCEDURE — 2000000000 HC ICU R&B

## 2021-05-02 RX ADMIN — INSULIN LISPRO 2 UNITS: 100 INJECTION, SOLUTION INTRAVENOUS; SUBCUTANEOUS at 19:41

## 2021-05-02 RX ADMIN — INSULIN LISPRO 1 UNITS: 100 INJECTION, SOLUTION INTRAVENOUS; SUBCUTANEOUS at 16:34

## 2021-05-02 RX ADMIN — Medication 1000 MG: at 12:31

## 2021-05-02 RX ADMIN — VANCOMYCIN HYDROCHLORIDE 1250 MG: 10 INJECTION, POWDER, LYOPHILIZED, FOR SOLUTION INTRAVENOUS at 12:48

## 2021-05-02 RX ADMIN — TRAZODONE HYDROCHLORIDE 50 MG: 50 TABLET ORAL at 20:29

## 2021-05-02 RX ADMIN — INSULIN LISPRO 1 UNITS: 100 INJECTION, SOLUTION INTRAVENOUS; SUBCUTANEOUS at 12:32

## 2021-05-02 RX ADMIN — BUDESONIDE 500 MCG: 0.5 SUSPENSION RESPIRATORY (INHALATION) at 08:21

## 2021-05-02 RX ADMIN — GABAPENTIN 100 MG: 100 CAPSULE ORAL at 12:29

## 2021-05-02 RX ADMIN — IPRATROPIUM BROMIDE AND ALBUTEROL SULFATE 1 AMPULE: .5; 3 SOLUTION RESPIRATORY (INHALATION) at 16:07

## 2021-05-02 RX ADMIN — ARFORMOTEROL TARTRATE 15 MCG: 15 SOLUTION RESPIRATORY (INHALATION) at 19:56

## 2021-05-02 RX ADMIN — MELATONIN TAB 3 MG 3 MG: 3 TAB at 20:29

## 2021-05-02 RX ADMIN — SODIUM CHLORIDE: 9 INJECTION, SOLUTION INTRAVENOUS at 12:46

## 2021-05-02 RX ADMIN — IPRATROPIUM BROMIDE AND ALBUTEROL SULFATE 1 AMPULE: .5; 3 SOLUTION RESPIRATORY (INHALATION) at 08:21

## 2021-05-02 RX ADMIN — VANCOMYCIN HYDROCHLORIDE 1250 MG: 10 INJECTION, POWDER, LYOPHILIZED, FOR SOLUTION INTRAVENOUS at 21:32

## 2021-05-02 RX ADMIN — PREDNISONE 10 MG: 10 TABLET ORAL at 12:29

## 2021-05-02 RX ADMIN — INSULIN LISPRO 1 UNITS: 100 INJECTION, SOLUTION INTRAVENOUS; SUBCUTANEOUS at 08:53

## 2021-05-02 RX ADMIN — ARFORMOTEROL TARTRATE 15 MCG: 15 SOLUTION RESPIRATORY (INHALATION) at 08:21

## 2021-05-02 RX ADMIN — IPRATROPIUM BROMIDE AND ALBUTEROL SULFATE 1 AMPULE: .5; 3 SOLUTION RESPIRATORY (INHALATION) at 19:56

## 2021-05-02 RX ADMIN — MINERAL SUPPLEMENT IRON 300 MG / 5 ML STRENGTH LIQUID 100 PER BOX UNFLAVORED 300 MG: at 12:30

## 2021-05-02 RX ADMIN — IPRATROPIUM BROMIDE AND ALBUTEROL SULFATE 1 AMPULE: .5; 3 SOLUTION RESPIRATORY (INHALATION) at 11:25

## 2021-05-02 RX ADMIN — ATORVASTATIN CALCIUM 80 MG: 80 TABLET, FILM COATED ORAL at 20:29

## 2021-05-02 RX ADMIN — NYSTATIN 500000 UNITS: 100000 SUSPENSION ORAL at 16:33

## 2021-05-02 RX ADMIN — BUDESONIDE 500 MCG: 0.5 SUSPENSION RESPIRATORY (INHALATION) at 19:56

## 2021-05-02 RX ADMIN — NYSTATIN 500000 UNITS: 100000 SUSPENSION ORAL at 20:29

## 2021-05-02 RX ADMIN — Medication 10 ML: at 20:29

## 2021-05-02 RX ADMIN — POTASSIUM BICARBONATE 20 MEQ: 782 TABLET, EFFERVESCENT ORAL at 16:34

## 2021-05-02 RX ADMIN — GABAPENTIN 100 MG: 100 CAPSULE ORAL at 20:29

## 2021-05-02 RX ADMIN — PROPOFOL 30 MCG/KG/MIN: 10 INJECTION, EMULSION INTRAVENOUS at 10:26

## 2021-05-02 RX ADMIN — FAMOTIDINE 20 MG: 20 TABLET ORAL at 12:29

## 2021-05-02 RX ADMIN — Medication 10 ML: at 08:53

## 2021-05-02 ASSESSMENT — PULMONARY FUNCTION TESTS
PIF_VALUE: 26
PIF_VALUE: 27
PIF_VALUE: 26

## 2021-05-02 ASSESSMENT — PAIN SCALES - GENERAL
PAINLEVEL_OUTOF10: 0
PAINLEVEL_OUTOF10: 2
PAINLEVEL_OUTOF10: 0

## 2021-05-02 NOTE — PROGRESS NOTES
05/02/21 0833   Vent Patient Data   Plateau Pressure 21 [id number]   Static Compliance 35 mL/cmH2O   Dynamic Compliance 24 mL/cmH2O

## 2021-05-02 NOTE — PROGRESS NOTES
Re-assessment complete; see flow sheet. No current changes. Pt repositioned with no complaints.    /78   Pulse 75   Temp 99.8 °F (37.7 °C) (Temporal)   Resp 16   Ht 5' 5\" (1.651 m)   Wt 184 lb 1.4 oz (83.5 kg)   SpO2 100%   BMI 30.63 kg/m²

## 2021-05-02 NOTE — PROGRESS NOTES
Hospitalist Progress Note      PCP: Luciana Prather MD    Date of Admission: 4/28/2021    Chief Complaint:   Hypoxia      Hospital Course:       79 y.o. female with history of diabetes type 2, asthma, CHF, atrial fibrillation, pulmonary hypertension, hyperlipidemia, obstructive sleep apnea, breast cancer status post lumpectomy and radiation treatment, obesity with BMI of 39.9 kg/m², who [facility] after about 3 weeks of hospital stay following admission with acute CVA, aspiration pneumothorax, currently status post tracheostomy and PEG placement, who presents from SNF in acute respiratory distress, hypoxia. She was also bradycardic on hypotensive. In the emergency room, chest x-ray is concerning for tension pneumothorax and possible developing ARDS.       Subjective:     S/p Left-side chest tube  Trach and vent dependent  Significant amount of blood-tinged secretion from ET tube  Status post repeated bronchoscopy showing bleeding from left lower lobe bronchus  Repeated chest x-ray some improvement in left upper hemothorax  She remain on FiO2 30%    Medications:  Reviewed    Infusion Medications    sodium chloride      sodium chloride      propofol Stopped (05/02/21 1242)    sodium chloride 10 mL/hr at 05/02/21 1246    dextrose       Scheduled Medications    cefTRIAXone (ROCEPHIN) IV  1,000 mg Intravenous Q24H    nystatin  5 mL Oral 4x Daily    potassium bicarb-citric acid  20 mEq Oral BID WC    vancomycin  15 mg/kg Intravenous Q12H    furosemide  40 mg Per G Tube Daily    sodium chloride flush  5-40 mL Intravenous 2 times per day    ipratropium-albuterol  1 ampule Inhalation 4x daily    insulin lispro  0-6 Units Subcutaneous Q4H    traZODone  50 mg Per G Tube Nightly    predniSONE  10 mg Per G Tube Daily    melatonin  3 mg Per G Tube Nightly    gabapentin  100 mg Per G Tube TID    ferrous sulfate  300 mg Per G Tube Every Other Day    famotidine  20 mg Per G Tube Daily    atorvastatin  80 mg Per G Tube Nightly    insulin lispro  0-6 Units Subcutaneous Once    Arformoterol Tartrate  15 mcg Nebulization BID    budesonide  500 mcg Nebulization BID     PRN Meds: potassium chloride **OR** potassium alternative oral replacement **OR** potassium chloride, sodium chloride, sodium chloride, albuterol sulfate HFA, sodium chloride flush, sodium chloride, promethazine **OR** ondansetron, polyethylene glycol, acetaminophen **OR** acetaminophen, glucose, dextrose, glucagon (rDNA), dextrose, loperamide      Intake/Output Summary (Last 24 hours) at 5/2/2021 1455  Last data filed at 5/2/2021 1305  Gross per 24 hour   Intake 1067.21 ml   Output 700 ml   Net 367.21 ml       Physical Exam Performed:    BP (!) 156/99   Pulse 83   Temp 98 °F (36.7 °C) (Temporal)   Resp 19   Ht 5' 5\" (1.651 m)   Wt 184 lb 4.9 oz (83.6 kg)   SpO2 100%   BMI 30.67 kg/m²     General appearance: No apparent distress, appears stated age and cooperative. HEENT: Pupils equal, round, and reactive to light. Conjunctivae/corneas clear. Neck: Supple, with full range of motion. No jugular venous distention. Trachea midline. Respiratory:  Normal respiratory effort. Clear to auscultation, bilaterally without Rales/Wheezes/Rhonchi. Decreased respiratory sounds on left hemithorax. Status post left chest tube placement. Cardiovascular: Regular rate and rhythm with normal S1/S2 without murmurs, rubs or gallops. Abdomen: Soft, non-tender, non-distended with normal bowel sounds. Musculoskeletal: No clubbing, cyanosis or edema bilaterally. Full range of motion without deformity. Skin: Skin color, texture, turgor normal.  No rashes or lesions.   Neurologic: Nonverbal  Psychiatric: Alert   Capillary Refill: Brisk,3 seconds, normal   Peripheral Pulses: +2 palpable, equal bilaterally       Labs:   Recent Labs     04/30/21  0512 05/01/21  0356 05/02/21  0434   WBC 11.3* 8.7 8.6   HGB 9.7* 9. 1* 9.1*   HCT 30.9* 28.9* 29.1*    168 157     Recent Labs     04/30/21  0512 05/01/21  0356 05/02/21  0434    138 133*   K 3.3* 3.3* 3.5    99 95*   CO2 27 29 29   BUN 28* 21* 13   CREATININE <0.5* <0.5* <0.5*   CALCIUM 9.3 9.0 8.9   PHOS 3.0 2.6  --      Recent Labs     04/30/21  0512 05/01/21  0356   AST 22 21   ALT 46* 40   BILITOT 1.0 0.8   ALKPHOS 173* 161*     Recent Labs     04/30/21 2041 05/01/21  0356 05/02/21  0434   INR 1.94* 1.93* 1.41*     No results for input(s): Dandre Seed in the last 72 hours. Urinalysis:      Lab Results   Component Value Date    NITRU Negative 02/11/2021    WBCUA None seen 02/11/2021    BACTERIA Rare 02/11/2021    RBCUA None seen 02/11/2021    BLOODU Negative 02/11/2021    SPECGRAV 1.025 02/11/2021    GLUCOSEU Negative 02/11/2021       Radiology:  XR CHEST PORTABLE   Final Result   Left chest tube removed, no pneumothorax      Otherwise, persistent bilateral airspace opacities         CTA CHEST W WO CONTRAST   Final Result   The bronchial arteries are opacified and there is no convincing evidence of   active extravasation arising from the bronchial arteries. Please note that   evaluation for active extravasation is limited due to streak artifact from   severely calcified and enlarged subcarinal lymph nodes as well as   calcifications and respiratory motion artifact associated with the airways in   the left lung centrally. Indwelling pleural drainage catheter without evidence of a pneumothorax. Suspect small left pleural effusion. There is also atelectasis involving the   majority of the left lower lobe. Subcutaneous emphysema along the left   anterior and lateral chest wall is noted. Linear and nodular opacification in the superior segment of the right lower   lobe, of uncertain etiology or clinical significance. Given the somewhat   linear appearance, this could be reflective of an infectious or inflammatory   pneumonitis.   Given the nodular component, this will need to be followed to   complete resolution. Short-term interval CT follow-up is recommended. See   below recommendations. Bilateral thyroid nodules, measuring up to 2 cm on the left. See below   recommendations for imaging follow-up. RECOMMENDATIONS:   2 cm incidental thyroid nodule. Recommend thyroid US. Reference: J Am Sapna Radiol. 2015 Feb;12(2): 143-50         18 mm solid pulmonary nodule. Consider a non-contrast Chest CT at 3 months, a   PET/CT, or tissue sampling. These guidelines do not apply to immunocompromised patients and patients with   cancer. Follow up in patients with significant comorbidities as clinically   warranted. For lung cancer screening, adhere to Lung-RADS guidelines. Reference: Radiology. 2017; 284(1):228-43. XR CHEST PORTABLE   Final Result   Subtle curvilinear interface at the left lung apex, not clearly seen   previously. This could represent artifact or a tiny left apical pneumothorax   measuring 4-5 mm in size      Pleuroparenchymal disease is seen bilaterally, increased in the left upper   lobe. The findings were sent to the Radiology Results [address] at 9:42   am on 5/1/2021to be communicated to a licensed caregiver. XR CHEST PORTABLE   Final Result   Stable cardiomegaly with resolving central pulmonary congestion      No change in the pigtail catheter along the left apex and no pneumothorax. Tracheostomy tube unchanged in position. Slowly resolving opacity in the left lung and a questionable small residual   pleural effusion inferiorly which is less prominent. XR CHEST PORTABLE   Final Result   No pneumothorax with pleural cavity at the apex unchanged. Moderate airspace disease throughout the left lung with volume loss lower   lobe. Vascular congestion.          XR CHEST PORTABLE   Final Result   Some improvement demonstrating some aerated lung in the left upper hemithorax         XR CHEST PORTABLE   Final Result   Unchanged chest demonstrating total opacification of the left hemithorax         XR CHEST PORTABLE   Final Result   No recurrent pneumothorax, but complete opacification of the left hemithorax   has occurred. XR CHEST PORTABLE   Final Result   Minimal if any residual pneumothorax, with re-expansion of the left lung   after chest tube placement. Findings suggest developing pulmonary edema. Continued close follow-up with short-term follow-up chest x-ray recommended   for the above. XR CHEST PORTABLE   Final Result   Large left pneumothorax with mediastinal shift towards the right indicating   underlying tension. By history, this may be due to a ruptured bleb in this   intubated patient. There is also diffuse mild airspace opacification in the   right lung that may represent asymmetric edema or developing ARDS. RECOMMENDATION:   Critical results were called by Dr. Lisa Amezquita. Jacoby Weldon MD to [facility]   on 4/28/2021 at 13:08. XR CHEST PORTABLE    (Results Pending)           Assessment/Plan:    Active Hospital Problems    Diagnosis    Hemoptysis [R04.2]    Tracheostomy status ([address] [Z93.0]    Mucus plugging of bronchi [J98.09]    Tension pneumothorax [J93.0]     Left spontaneous pneumothorax status post chest tube placement  Acute on chronic hypoxic respiratory failure  Trach and PEG dependent  Left lower lobe bleeding  History of A. sara on Coumadin   Status post bronchoscopy with mucous plugging    Continue ICU care  Pulmonary board  Status post bronchoscopy, shows thick purulent mucus block to left upper and lower lobes  BAL growing MRSA and Serratia, she is on Levaquin and vancomycin continue isolation  Bleeding from possible lower lobe of left lung with bronchoscopy, possible secondary to VAP, mucous plugging, and coagulopathy. Warfarin has been on hold vitamin K and FFP has been administered.   INR today 1.4    Repeated bronchoscopy today shows continue bleeding from left lower lobe bronchus    Status post chest tube for left spontaneous pneumothorax  Chest tube has been removed  Pulmonary managing chest tube    Repeat chest x-ray in a.m. Non-ACS trend troponinemia, estimated EF is 44%, with diastolic dysfunction. Elevated pulmonary artery systolic pressure. Other comorbidities:  History of diabetes type 2, asthma, CHF, atrial fibrillation on warfarin, pulmonary hypertension, hyperlipidemia, obstructive sleep apnea, breast cancer status post lumpectomy and radiation, obesity. DVT Prophylaxis: Lovenox has been switched to warfarin, now on hold     Diet: Diet NPO, After Midnight   Code Status: Full Code    PT/OT Eval Status: When appropriate    Dispo -continue ICU care, patient has been accepted to - MICU for transfer to IR for embolization versus RFA of left lower lobe bleeding. Due to the immediate potential for life-threatening deterioration due to acute on chronic hypoxic respiratory failure, left  pneumothorax, I spent 34 minutes providing critical care. This time is excluding time spent performing procedures.       Gonzalez Arnold MD

## 2021-05-02 NOTE — PROGRESS NOTES
[address]  Patient has size 8 Shiley, Portex, XLT or other. Trach Kit of same size on standby  Yes, Obturator at head of the bed  Yes. Inner cannula cleaned/replaced Yes, drain sponge changed  Yes, Trach tie replaced is soiled No, Flange cleaned  Yes. [address] performed without complications. Sx out moderate amount of bright red secretions. RN aware.     Electronically signed by Alana Wesley RCP on 5/1/2021 at 8:29 PM

## 2021-05-02 NOTE — PROGRESS NOTES
[facility] Pulmonary/CCM Progress note      Admit Date: 4/28/2021    Chief Complaint: Shortness of breath and respiratory distress    Subjective: Interval History: Pneumothorax has resolved-chest tube removed yesterday. Continues to have fresh blood-tinged secretions from ET tube despite reversal of anticoagulation-INR is 1.4. Patient hemodynamically stable. Repeat bronchoscopy performed today shows oozing from the left lower lobe bronchus. Scheduled Meds:   cefTRIAXone (ROCEPHIN) IV  1,000 mg Intravenous Q24H    nystatin  5 mL Oral 4x Daily    potassium bicarb-citric acid  20 mEq Oral BID WC    vancomycin  15 mg/kg Intravenous Q12H    furosemide  40 mg Per G Tube Daily    sodium chloride flush  5-40 mL Intravenous 2 times per day    ipratropium-albuterol  1 ampule Inhalation 4x daily    insulin lispro  0-6 Units Subcutaneous Q4H    traZODone  50 mg Per G Tube Nightly    predniSONE  10 mg Per G Tube Daily    melatonin  3 mg Per G Tube Nightly    gabapentin  100 mg Per G Tube TID    ferrous sulfate  300 mg Per G Tube Every Other Day    famotidine  20 mg Per G Tube Daily    atorvastatin  80 mg Per G Tube Nightly    insulin lispro  0-6 Units Subcutaneous Once    Arformoterol Tartrate  15 mcg Nebulization BID    budesonide  500 mcg Nebulization BID     Continuous Infusions:   sodium chloride      sodium chloride      propofol Stopped (05/02/21 1242)    sodium chloride 10 mL/hr at 05/02/21 1246    dextrose       PRN Meds:potassium chloride **OR** potassium alternative oral replacement **OR** potassium chloride, sodium chloride, sodium chloride, albuterol sulfate HFA, sodium chloride flush, sodium chloride, promethazine **OR** ondansetron, polyethylene glycol, acetaminophen **OR** acetaminophen, glucose, dextrose, glucagon (rDNA), dextrose, loperamide    Review of Systems  Unable to obtain since the patient is nonverbal and tracheostomy dependent.     Objective:     Patient Vitals for the past 8 hrs:   BP Temp Temp src Pulse Resp SpO2   05/02/21 1300 (!) 175/74 -- -- 68 16 100 %   05/02/21 1205 109/68 -- -- 74 13 97 %   05/02/21 1200 126/62 98 °F (36.7 °C) Temporal 72 16 98 %   05/02/21 1155 127/65 -- -- 83 16 98 %   05/02/21 1152 121/82 -- -- 70 16 100 %   05/02/21 1138 114/73 -- -- 68 16 100 %   05/02/21 1100 124/70 -- -- 78 16 99 %   05/02/21 1000 (!) 160/92 -- -- 98 20 98 %   05/02/21 0900 (!) 165/77 -- -- 89 18 96 %   05/02/21 0823 -- -- -- -- 20 100 %   05/02/21 0800 (!) 160/102 98.9 °F (37.2 °C) Temporal 94 21 100 %   05/02/21 0700 (!) 163/88 -- -- 75 17 --     I/O last 3 completed shifts:   In: 974.3 [I.V.:0.1; Blood:574; NG/GT:100; IV Piggyback:300.2]  Out: 0   I/O this shift:  In: 1067.2 [I.V.:663.2; NG/GT:404]  Out: 700 [Urine:700]    General Appearance: Nonverbal, in no acute distress  Skin: warm and dry, no rash or erythema  Head: normocephalic and atraumatic  Eyes: pupils equal, round, and reactive to light, extraocular eye movements intact, conjunctivae normal  ENT: external ear and ear canal normal bilaterally, nose without deformity, nasal mucosa and turbinates normal  Neck: supple and non-tender without mass, no cervical lymphadenopathy  Pulmonary/Chest: decreased breath sounds noted-left side  Cardiovascular: normal rate, regular rhythm,  no murmurs, rubs, distal pulses intact, no carotid bruits  Abdomen: soft, non-tender, non-distended, normal bowel sounds, no masses or organomegaly  Lymph Nodes: Cervical, supraclavicular normal  Extremities: no cyanosis, clubbing or edema  Musculoskeletal: normal range of motion, no joint swelling, deformity or tenderness  Neurologic: Alert, nonverbal, dense left hemiparesis    Data Review:  CBC:   Lab Results   Component Value Date    WBC 8.6 05/02/2021    RBC 3.55 05/02/2021     BMP:   Lab Results   Component Value Date    GLUCOSE 138 05/02/2021    CO2 29 05/02/2021    BUN 13 05/02/2021    CREATININE <0.5 05/02/2021    CALCIUM 8.9 05/02/2021     ABG: Lab Results   Component Value Date    SET4USH 31.6 04/28/2021    BEART 8.4 04/28/2021    K0XIHGQO 100.0 04/28/2021    PHART 7.533 04/28/2021    NXP6GUJ 37.5 04/28/2021    PO2ART 157.0 04/28/2021    QHI5TNM 73.3 04/28/2021       Radiology: All pertinent images / reports were reviewed as a part of this visit. Narrative   EXAMINATION:   ONE XRAY VIEW OF THE CHEST       4/29/2021 1:08 pm       COMPARISON:   04/29/2021 at 10:55 a.m.       HISTORY:   ORDERING SYSTEM PROVIDED HISTORY: Post bronch   TECHNOLOGIST PROVIDED HISTORY:   Reason for exam:->Post bronch   Reason for Exam: Post bronch   Acuity: Unknown   Type of Exam: Unknown       FINDINGS:   Aerated lung now visualized in the left upper hemithorax.  Tracheostomy tube   remains in place.  The right lung and heart are unchanged.  Soft tissue gas   again seen in the left chest wall.           Impression   Some improvement demonstrating some aerated lung in the left upper hemithorax       Problem List:   Status post tracheostomy  Spontaneous tension pneumothorax status post chest tube placement  MRSA/Serratia pneumonia  Hemoptysis  Assessment/Plan:   Status post tracheostomy following prior CVA since February. Continues on full ventilator support. Continues to have ongoing hemoptysis despite anticoagulation reversal.  Repeat bronchoscopy performed today shows continued oozing from the left lower lobe bronchus. MRSA/Serratia pneumonia-on Levaquin and vancomycin. Spontaneous tension pneumothorax, chest tube was removed yesterday. Hemoglobin 11.4 >9.1 (since admission ), could be related to ongoing hemoptysis. Monitor. Hemodynamically stable, not requiring vasopressors. 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 Patient also has history of chronic atrial fibrillation-rate controlled. Would hold anticoagulation due to risk of respiratory bleed.   Patient will be at a risk for CVA given her history of atrial fibrillation. Long discussion with Dr. Kathleen Ceja from IR-who felt that patient's anatomy on CTA chest is not amenable for IR guided embolization of bronchial artery. Spoke with [facility] attending and IP, accepted for transfer when bed becomes available. Patient would require revisit of IR embolization vs ?  RFA of left lower lobe bleed-unfortunately no visible mass or etiology noted.     Alicia Abreu MD     Critical care time of 32 minutes excluding procedures

## 2021-05-02 NOTE — PROGRESS NOTES
Shift assessment completed, see flow sheet. Pt is following commands however unable to speak d/t previous CVA, pt is flaccid on left side with incontinence of urine and stool. Pt has a purwick in place. Afib on monitor; controlled. G-tube in place, diabetic TF running. Will be turned off at midnight to be NPO for scheduled bronch in AM.      Pt trach/vent size 8 Protex will significant bloody secretions (not new). AC/PC: AC16/PC20 FiO2 30% / PEEP 5.      /76   Pulse 58   Temp 99.8 °F (37.7 °C) (Temporal)   Resp 16   Ht 5' 5\" (1.651 m)   Wt 184 lb 1.4 oz (83.5 kg)   SpO2 100%   BMI 30.63 kg/m²     Respirations are easy, even, and unlabored. Bilateral lung sounds diminished/rhonchorous. Mild edema present on upper extremities. X1 PIV in place, WNL with the following infusions:  Exie Nose for ABX treatment. Pt is DM; BS checked Q4. Call light within reach, bed in lowest position, bed alarm on. Will continue to monitor.

## 2021-05-02 NOTE — PROGRESS NOTES
05/01/21 2014   Vent Patient Data   Plateau Pressure 21 [id number]   Static Compliance 35 mL/cmH2O   Dynamic Compliance 24 mL/cmH2O

## 2021-05-02 NOTE — PROGRESS NOTES
Clinical Pharmacy Note: Pharmacy to Dose Vancomcyin    Vancomycin Day: 3  Current Dosin mg Q12H      Recent Labs     21  0356 21  0434   BUN 21* 13       Recent Labs     21  0356 21  0434   CREATININE <0.5* <0.5*       Recent Labs     21  0356 21  0434   WBC 8.7 8.6         Intake/Output Summary (Last 24 hours) at 2021 1208  Last data filed at 2021 1426  Gross per 24 hour   Intake 663.28 ml   Output 0 ml   Net 663.28 ml         Ht Readings from Last 1 Encounters:   21 5' 5\" (1.651 m)        Wt Readings from Last 1 Encounters:   21 184 lb 4.9 oz (83.6 kg)         Body mass index is 30.67 kg/m². CrCl cannot be calculated (This lab value cannot be used to calculate CrCl because it is not a number: <0.5). Trough: 14.2    Assessment/Plan:  Vancomycin level is therapeutic. Level was drawn appropriately in respect to last dose given. A vancomycin trough has been ordered for  @ 0900. Will Continue vancomycin dose 1250 mg Q12H. Changes in regimen will be determined based on culture results, renal function, and clinical response. Pharmacy will continue to monitor and adjust regimen as necessary.     Thank you for the consult,    Dontae Iglesias, PharmD  PGY1 Pharmacy Resident  [id number]    2021

## 2021-05-03 VITALS
OXYGEN SATURATION: 98 % | DIASTOLIC BLOOD PRESSURE: 73 MMHG | RESPIRATION RATE: 21 BRPM | SYSTOLIC BLOOD PRESSURE: 94 MMHG | HEART RATE: 93 BPM | WEIGHT: 188.27 LBS | BODY MASS INDEX: 31.37 KG/M2 | HEIGHT: 65 IN | TEMPERATURE: 97.5 F

## 2021-05-03 LAB
ANION GAP SERPL CALCULATED.3IONS-SCNC: 11 MMOL/L (ref 3–16)
BASOPHILS ABSOLUTE: 0 K/UL (ref 0–0.2)
BASOPHILS RELATIVE PERCENT: 0.4 %
BUN BLDV-MCNC: 11 MG/DL (ref 7–20)
CALCIUM SERPL-MCNC: 9.3 MG/DL (ref 8.3–10.6)
CHLORIDE BLD-SCNC: 96 MMOL/L (ref 99–110)
CO2: 27 MMOL/L (ref 21–32)
CREAT SERPL-MCNC: <0.5 MG/DL (ref 0.6–1.2)
EOSINOPHILS ABSOLUTE: 0.1 K/UL (ref 0–0.6)
EOSINOPHILS RELATIVE PERCENT: 0.9 %
GFR AFRICAN AMERICAN: >60
GFR NON-AFRICAN AMERICAN: >60
GLUCOSE BLD-MCNC: 157 MG/DL (ref 70–99)
GLUCOSE BLD-MCNC: 160 MG/DL (ref 70–99)
HCT VFR BLD CALC: 29.5 % (ref 36–48)
HEMOGLOBIN: 9.2 G/DL (ref 12–16)
INR BLD: 1.31 (ref 0.86–1.14)
LYMPHOCYTES ABSOLUTE: 1.1 K/UL (ref 1–5.1)
LYMPHOCYTES RELATIVE PERCENT: 13.3 %
MAGNESIUM: 1.8 MG/DL (ref 1.8–2.4)
MCH RBC QN AUTO: 25.7 PG (ref 26–34)
MCHC RBC AUTO-ENTMCNC: 31.3 G/DL (ref 31–36)
MCV RBC AUTO: 82.2 FL (ref 80–100)
MONOCYTES ABSOLUTE: 0.7 K/UL (ref 0–1.3)
MONOCYTES RELATIVE PERCENT: 7.9 %
NEUTROPHILS ABSOLUTE: 6.7 K/UL (ref 1.7–7.7)
NEUTROPHILS RELATIVE PERCENT: 77.5 %
PDW BLD-RTO: 16.5 % (ref 12.4–15.4)
PERFORMED ON: ABNORMAL
PLATELET # BLD: 179 K/UL (ref 135–450)
PMV BLD AUTO: 9.8 FL (ref 5–10.5)
POTASSIUM REFLEX MAGNESIUM: 3.4 MMOL/L (ref 3.5–5.1)
PROTHROMBIN TIME: 15.2 SEC (ref 10–13.2)
RBC # BLD: 3.59 M/UL (ref 4–5.2)
SODIUM BLD-SCNC: 134 MMOL/L (ref 136–145)
WBC # BLD: 8.6 K/UL (ref 4–11)

## 2021-05-03 PROCEDURE — 85025 COMPLETE CBC W/AUTO DIFF WBC: CPT

## 2021-05-03 PROCEDURE — 80048 BASIC METABOLIC PNL TOTAL CA: CPT

## 2021-05-03 PROCEDURE — 83735 ASSAY OF MAGNESIUM: CPT

## 2021-05-03 PROCEDURE — 85610 PROTHROMBIN TIME: CPT

## 2021-05-03 RX ADMIN — INSULIN LISPRO 1 UNITS: 100 INJECTION, SOLUTION INTRAVENOUS; SUBCUTANEOUS at 00:09

## 2021-05-03 RX ADMIN — INSULIN LISPRO 1 UNITS: 100 INJECTION, SOLUTION INTRAVENOUS; SUBCUTANEOUS at 05:16

## 2021-05-03 ASSESSMENT — PAIN SCALES - GENERAL
PAINLEVEL_OUTOF10: 0

## 2021-05-03 ASSESSMENT — PULMONARY FUNCTION TESTS
PIF_VALUE: 25
PIF_VALUE: 25

## 2021-05-03 NOTE — PROGRESS NOTES
[address]  Patient has size Crato. Trach Kit of same size on standby  Yes, Obturator at head of the bed  No in trach in drawer. Inner cannula cleaned/replaced Yes, drain sponge changed  Yes, Trach tie replaced is soiled No, Flange cleaned  Yes. [address] performed without complications.  Comment patient has copious red secretions  8 portex, not shiley

## 2021-05-03 NOTE — PROGRESS NOTES
Shift assessment completed. See complex assessment in doc flowsheet. Pt currently has size 8 trach Portex connected to vent. Current vent settings AC/PC. AC 16/PC20/FI02 30%/Peep 5. Afib on the monitor. Lungs Rhonchi. Abd is soft with +bs x4 quadrants. G-tube in LUQ connected to Diabetic tube feed at 50ml/hr. Patient with history of cva with left side weakness and rigid does respond to pain. Right side with free range of motion. Skin is intact with wart/mass on left buttocks. Pt denies pain. Pt repositioned for comfort. Bed in lowest position, wheels locked, and alarm active. Updated pt on POC and all questions answered. No needs expressed.  Call light within reach.

## 2021-05-03 NOTE — PROGRESS NOTES
Patient assessment completed, no acute changes. External female catheter exchanged with patient tolerating well. Patient repositioned in bed. Transfer center called no beds at  this time. Will call back later in day with update. No further needs at this time. Will continue to monitor.

## 2021-05-03 NOTE — DISCHARGE SUMMARY
Hospital Medicine Discharge Summary    Patient ID: Ezequiel Vargas      Patient's PCP: France Helms MD    Admit Date: 4/28/2021     Discharge Date: 5/3/2021     Admitting Physician: Lillian Soto MD     Discharge Physician: Butch Wiley MD     Discharge Diagnoses: Active Hospital Problems    Diagnosis    Hemoptysis [R04.2]    Tracheostomy status ([address] [Z93.0]    Mucus plugging of bronchi [J98.09]    Tension pneumothorax [J93.0]       The patient was seen and examined on day of discharge and this discharge summary is in conjunction with any daily progress note from day of discharge. Hospital Course:      79 y.o. female with history of diabetes type 2, asthma, CHF, atrial fibrillation, pulmonary hypertension, hyperlipidemia, obstructive sleep apnea, breast cancer status post lumpectomy and radiation treatment, obesity with BMI of 39.9 kg/m², who [facility] after about 3 weeks of hospital stay following admission with acute CVA, aspiration pneumothorax, currently status post tracheostomy and PEG placement, who presents from SNF in acute respiratory distress, hypoxia.  She was also bradycardic on hypotensive.  In the emergency room, chest x-ray is concerning for tension pneumothorax and possible developing ARDS.       Has been treated for the following medical conditions prior to be discharged to [facility] hospital    Left spontaneous pneumothorax status post chest tube placement  Acute on chronic hypoxic respiratory failure  Trach and PEG dependent  Left lower lobe bleeding  History of A. fib on Coumadin   Status post bronchoscopy with mucous plugging     Continue ICU care  Pulmonary board  Status post bronchoscopy, shows thick purulent mucus block to left upper and lower lobes  BAL growing MRSA and Serratia, she is on Levaquin and vancomycin continue isolation  Bleeding from possible lower lobe of left lung with bronchoscopy, possible secondary to VAP, mucous plugging, and coagulopathy. Warfarin has been on hold vitamin K and FFP has been administered.       Repeated bronchoscopy today shows continue bleeding from left lower lobe bronchus     Status post chest tube for left spontaneous pneumothorax  Chest tube has been removed  Pulmonary managing chest tube     Repeat chest x-ray in a.m.     Non-ACS trend troponinemia, estimated EF is 27%, with diastolic dysfunction. Elevated pulmonary artery systolic pressure.     Other comorbidities:  History of diabetes type 2, asthma, CHF, atrial fibrillation on warfarin, pulmonary hypertension, hyperlipidemia, obstructive sleep apnea, breast cancer status post lumpectomy and radiation, obesity. Patient has been transferred to [facility] MICU for possible embolization of left lower lobe bleeding. Physical Exam Performed:     BP 94/73   Pulse 93   Temp 97.5 °F (36.4 °C) (Temporal)   Resp 21   Ht 5' 5\" (1.651 m)   Wt 188 lb 4.4 oz (85.4 kg)   SpO2 98%   BMI 31.33 kg/m²       General appearance:  No apparent distress  HEENT:  Normal cephalic, atraumatic without obvious deformity. Pupils equal, round, and reactive to light. Extra ocular muscles intact. Conjunctivae/corneas clear. Neck: Supple, with full range of motion. No jugular venous distention. Trachea midline. Respiratory:  Normal respiratory effort. Clear to auscultation, bilaterally without Rales/Wheezes/Rhonchi. Cardiovascular:  Regular rate and rhythm with normal S1/S2 without murmurs, rubs or gallops. Abdomen: Soft, non-tender, non-distended with normal bowel sounds. Musculoskeletal:  No clubbing, cyanosis or edema bilaterally. Full range of motion without deformity. Skin: Skin color, texture, turgor normal.  No rashes or lesions. Neurologic: Alert   psychiatric: Nonverbal  Capillary Refill: Brisk,< 3 seconds   Peripheral Pulses: +2 palpable, equal bilaterally       Labs:  For convenience and continuity at follow-up the following most recent labs are provided:      CBC:    Lab Results   Component Value Date    WBC 8.6 05/03/2021    HGB 9.2 05/03/2021    HCT 29.5 05/03/2021     05/03/2021       Renal:    Lab Results   Component Value Date     05/03/2021    K 3.4 05/03/2021    CL 96 05/03/2021    CO2 27 05/03/2021    BUN 11 05/03/2021    CREATININE <0.5 05/03/2021    CALCIUM 9.3 05/03/2021    PHOS 2.6 05/01/2021         Significant Diagnostic Studies    Radiology:   XR CHEST PORTABLE   Final Result   Complete opacification of the hemithorax suggesting left lung atelectasis      The findings were sent to the Radiology Results [id number] at 3:33   pm on 5/2/2021to be communicated to a licensed caregiver. XR CHEST PORTABLE   Final Result   Left chest tube removed, no pneumothorax      Otherwise, persistent bilateral airspace opacities         CTA CHEST W WO CONTRAST   Final Result   The bronchial arteries are opacified and there is no convincing evidence of   active extravasation arising from the bronchial arteries. Please note that   evaluation for active extravasation is limited due to streak artifact from   severely calcified and enlarged subcarinal lymph nodes as well as   calcifications and respiratory motion artifact associated with the airways in   the left lung centrally. Indwelling pleural drainage catheter without evidence of a pneumothorax. Suspect small left pleural effusion. There is also atelectasis involving the   majority of the left lower lobe. Subcutaneous emphysema along the left   anterior and lateral chest wall is noted. Linear and nodular opacification in the superior segment of the right lower   lobe, of uncertain etiology or clinical significance. Given the somewhat   linear appearance, this could be reflective of an infectious or inflammatory   pneumonitis. Given the nodular component, this will need to be followed to   complete resolution. Short-term interval CT follow-up is recommended.   See below recommendations. Bilateral thyroid nodules, measuring up to 2 cm on the left. See below   recommendations for imaging follow-up. RECOMMENDATIONS:   2 cm incidental thyroid nodule. Recommend thyroid US. Reference: J Am Sapna Radiol. 2015 Feb;12(2): 143-50         18 mm solid pulmonary nodule. Consider a non-contrast Chest CT at 3 months, a   PET/CT, or tissue sampling. These guidelines do not apply to immunocompromised patients and patients with   cancer. Follow up in patients with significant comorbidities as clinically   warranted. For lung cancer screening, adhere to Lung-RADS guidelines. Reference: Radiology. 2017; 284(1):228-43. XR CHEST PORTABLE   Final Result   Subtle curvilinear interface at the left lung apex, not clearly seen   previously. This could represent artifact or a tiny left apical pneumothorax   measuring 4-5 mm in size      Pleuroparenchymal disease is seen bilaterally, increased in the left upper   lobe. The findings were sent to the Radiology Results [id number] at 9:42   am on 5/1/2021to be communicated to a licensed caregiver. XR CHEST PORTABLE   Final Result   Stable cardiomegaly with resolving central pulmonary congestion      No change in the pigtail catheter along the left apex and no pneumothorax. Tracheostomy tube unchanged in position. Slowly resolving opacity in the left lung and a questionable small residual   pleural effusion inferiorly which is less prominent. XR CHEST PORTABLE   Final Result   No pneumothorax with pleural cavity at the apex unchanged. Moderate airspace disease throughout the left lung with volume loss lower   lobe. Vascular congestion.          XR CHEST PORTABLE   Final Result   Some improvement demonstrating some aerated lung in the left upper hemithorax         XR CHEST PORTABLE   Final Result   Unchanged chest demonstrating total opacification of the left hemithorax         XR CHEST PORTABLE   Final Result   No recurrent pneumothorax, but complete opacification of the left hemithorax   has occurred. XR CHEST PORTABLE   Final Result   Minimal if any residual pneumothorax, with re-expansion of the left lung   after chest tube placement. Findings suggest developing pulmonary edema. Continued close follow-up with short-term follow-up chest x-ray recommended   for the above. XR CHEST PORTABLE   Final Result   Large left pneumothorax with mediastinal shift towards the right indicating   underlying tension. By history, this may be due to a ruptured bleb in this   intubated patient. There is also diffuse mild airspace opacification in the   right lung that may represent asymmetric edema or developing ARDS. RECOMMENDATION:   Critical results were called by Dr. eDnise Barron. Nathalia Gandhi MD to [facility]   on 4/28/2021 at 13:08. Consults:     IP CONSULT TO CRITICAL CARE  IP CONSULT TO PHARMACY  IP CONSULT TO PALLIATIVE CARE  IP CONSULT TO SPIRITUAL SERVICES  IP CONSULT TO PHARMACY    Disposition: Patient was transferred to [facility] MICU for IR procedure, embolization of the bleeding artery at left lower lobe    Condition at Discharge: Stable    Discharge Instructions/Follow-up: UC recommendation    Code Status:  Prior     Activity: activity as tolerated          Discharge Medications:     Discharge Medication List as of 5/3/2021  7:36 AM           Details   Arformoterol Tartrate (BROVANA) 15 MCG/2ML NEBU Take 1 ampule by nebulization 2 times dailyHistorical Med      atorvastatin (LIPITOR) 80 MG tablet 80 mg by Per G Tube route nightly Historical Med      famotidine (PEPCID) 20 MG tablet 20 mg by Per G Tube route dailyHistorical Med      ferrous sulfate 300 (60 Fe) MG/5ML syrup 300 mg by Per G Tube route every other dayHistorical Med      gabapentin (NEURONTIN) 100 MG capsule 100 mg by Per G Tube route 3 times daily.  Historical Med      insulin regular (HUMULIN R; NOVOLIN R) 100 UNIT/ML injection Inject 0-12 Units into the skin 3 times daily (before meals) Per patient sliding scale. Historical Med      ipratropium-albuterol (DUONEB) 0.5-2.5 (3) MG/3ML SOLN nebulizer solution Inhale 1 vial into the lungs 4 times dailyHistorical Med      loperamide (IMODIUM) 2 MG capsule 2 mg by Per G Tube route as needed for Diarrhea Historical Med      melatonin 3 MG TABS tablet 3 mg by Per G Tube route nightlyHistorical Med      potassium bicarbonate (K-LYTE) 25 MEQ disintegrating tablet 50 mEq by Per G Tube route dailyHistorical Med      predniSONE (DELTASONE) 10 MG tablet 10 mg by Per G Tube route daily Historical Med      budesonide (PULMICORT) 0.5 MG/2ML nebulizer suspension Take 1 ampule by nebulization 2 times dailyHistorical Med      traZODone (DESYREL) 50 MG tablet 50 mg by Per G Tube route nightlyHistorical Med      Cholecalciferol 10 MCG/ML LIQD 125 mcg by Gastric Tube route dailyHistorical Med      warfarin (COUMADIN) 4 MG tablet Take 4 mg by mouth nightlyHistorical Med      levETIRAcetam (KEPPRA) 100 MG/ML solution 10 mLs by Per NG tube route 2 times daily, Disp-600 mL, R-0NO PRINT      furosemide (LASIX) 40 MG tablet 40 mg by Per G Tube route daily Historical Med      acetaminophen (TYLENOL) 325 MG tablet 650 mg by Per G Tube route every 6 hours as needed for Pain Historical Med      calcium-vitamin D (OSCAL-500) 500-200 MG-UNIT per tablet 1 tablet by Per G Tube route daily Historical Med      carvedilol (COREG) 12.5 MG tablet 12.5 mg by Per G Tube route 2 times daily (with meals) Historical Med      losartan (COZAAR) 100 MG tablet 100 mg by Per G Tube route daily Historical Med             Time Spent on discharge is more than 30 minutes in the examination, evaluation, counseling and review of medications and discharge plan. Signed:    Butch Wiley MD   5/3/2021      Thank you France Helms MD for the opportunity to be involved in this patient's care.  If you have any questions or concerns please feel free to contact me at [phone].

## 2021-06-15 LAB
AFB CULTURE (MYCOBACTERIA): NORMAL
AFB SMEAR: NORMAL

## (undated) DEVICE — PROCEDURE KIT ENDOSCP CUST

## (undated) DEVICE — GLOVE ORTHO 8   MSG9480

## (undated) DEVICE — FORCEPS BX L240CM JAW DIA2.4MM ORNG L CAP W/ NDL DISP RAD

## (undated) DEVICE — SPECIMEN TRAP: Brand: ARGYLE

## (undated) DEVICE — SINGLE USE SUCTION VALVE MAJ-209: Brand: SINGLE USE SUCTION VALVE (STERILE)

## (undated) DEVICE — SINGLE USE BIOPSY VALVE MAJ-210: Brand: SINGLE USE BIOPSY VALVE (STERILE)

## (undated) DEVICE — GOWN AURORA NONREINF LG: Brand: MEDLINE INDUSTRIES, INC.

## (undated) DEVICE — Device

## (undated) DEVICE — CONMED CHANNEL MASTER PULMONARY AND PEDIATRIC CLEANING BRUSH, 160 CM X 2.0 MM: Brand: CONMED

## (undated) DEVICE — SYRINGE MED 50ML LUERLOCK TIP

## (undated) DEVICE — SPONGE 4X4IN 6 PLY NONWOV DRAIN LINT

## (undated) DEVICE — SYRINGE MED 10ML POLYPR LUERSLIP TIP FLAT TOP W/O SFTY DISP